# Patient Record
Sex: MALE | Race: WHITE | NOT HISPANIC OR LATINO | URBAN - METROPOLITAN AREA
[De-identification: names, ages, dates, MRNs, and addresses within clinical notes are randomized per-mention and may not be internally consistent; named-entity substitution may affect disease eponyms.]

---

## 2022-08-18 ENCOUNTER — NURSING HOME VISIT (OUTPATIENT)
Dept: GERIATRICS | Facility: OTHER | Age: 82
End: 2022-08-18
Payer: COMMERCIAL

## 2022-08-18 DIAGNOSIS — E78.49 OTHER HYPERLIPIDEMIA: ICD-10-CM

## 2022-08-18 DIAGNOSIS — F02.818 LATE ONSET ALZHEIMER'S DEMENTIA WITH BEHAVIORAL DISTURBANCE (HCC): Primary | ICD-10-CM

## 2022-08-18 DIAGNOSIS — G47.00 INSOMNIA, UNSPECIFIED TYPE: ICD-10-CM

## 2022-08-18 DIAGNOSIS — G30.1 LATE ONSET ALZHEIMER'S DEMENTIA WITH BEHAVIORAL DISTURBANCE (HCC): Primary | ICD-10-CM

## 2022-08-18 DIAGNOSIS — R26.81 UNSTEADY GAIT: ICD-10-CM

## 2022-08-18 DIAGNOSIS — F32.A DEPRESSION, UNSPECIFIED DEPRESSION TYPE: ICD-10-CM

## 2022-08-18 PROBLEM — F02.81 LATE ONSET ALZHEIMER'S DEMENTIA WITH BEHAVIORAL DISTURBANCE (HCC): Status: ACTIVE | Noted: 2022-08-18

## 2022-08-18 PROCEDURE — 99327 PR DOMICIL/REST HOME NEW PT VISIT HI SEVER 60 MIN: CPT | Performed by: FAMILY MEDICINE

## 2022-08-18 RX ORDER — PRIMIDONE 50 MG/1
50 TABLET ORAL 2 TIMES DAILY
COMMUNITY

## 2022-08-18 RX ORDER — DONEPEZIL HYDROCHLORIDE 5 MG/1
5 TABLET, FILM COATED ORAL
COMMUNITY

## 2022-08-18 RX ORDER — PAROXETINE 10 MG/1
10 TABLET, FILM COATED ORAL DAILY
COMMUNITY

## 2022-08-18 RX ORDER — PRAVASTATIN SODIUM 20 MG
20 TABLET ORAL DAILY
COMMUNITY

## 2022-08-18 RX ORDER — MEMANTINE HYDROCHLORIDE 10 MG/1
10 TABLET ORAL 2 TIMES DAILY
COMMUNITY

## 2022-08-18 NOTE — PROGRESS NOTES
Doreen [address]  [address], [address] [place] [address] POS 13  History and Physical    NAME: Kana Wheeler  AGE: 80 y o  SEX: male [phone]    DATE OF ENCOUNTER: 8/18/2022    Code status:  No CPR    Assessment and Plan     1  Late onset Alzheimer's dementia with behavioral disturbance ([facility] [address] )  - redirection, reorientation  - max assistance with ADLs  - cont donepezil 5 mg po qhs  - cont Memantine 10 mg po bid    2  Depression, unspecified depression type  - cont paroxetine 10 mg po qd    3  Other hyperlipidemia  - cont pravastatin 20 mg po qhs    4  Unsteady gait  - fall precautions in place  - does not use any assistive devices    5  Insomnia:  - start Melatonin 5 mg po qhs  - cont Tylenol 650 mg po qhs prn    All medications and routine orders were reviewed and updated as needed  Plan discussed with: staff    Chief Complaint     Seen for admission at [address] [place] [address] [address]    History of Present Illness     Kana Wheeler, a 79 y/o male with PMH of Dementia and HLD got admitted to [address] [place] [address] from home for long term stay  He was seen and examined at bedside, stable  He found lying in bed comfortably  He is pleasant, cooperative with exam  He answers questions but not goal oriented  Staff observed aggressive behaviors couple of times since admission  He is eating and sleeping well  HISTORY:  No past medical history on file  No family history on file    Social History     Socioeconomic History    Marital status: Not on file     Spouse name: Not on file    Number of children: Not on file    Years of education: Not on file    Highest education level: Not on file   Occupational History    Not on file   Tobacco Use    Smoking status: Not on file    Smokeless tobacco: Not on file   Substance and Sexual Activity    Alcohol use: Not on file    Drug use: Not on file    Sexual activity: Not on file   Other Topics Concern    Not on file   Social History Narrative    Not on file     Social Determinants of Health     Financial Resource Strain: Not on file   Food Insecurity: Not on file   Transportation Needs: Not on file   Physical Activity: Not on file   Stress: Not on file   Social Connections: Not on file   Intimate Partner Violence: Not on file   Housing Stability: Not on file       Allergies:  No Known Allergies    Review of Systems     Review of Systems   Unable to perform ROS: Dementia   As in HPI  Medications and orders     All medications reviewed and updated in Nursing Home EMR  Objective     Vitals: T: 97 6, P: 78, R: 16, BP: 114/62, 95% on RA, Wt: 133 2 lbs    Physical Exam  Vitals and nursing note reviewed  Constitutional:       General: He is not in acute distress  Appearance: Normal appearance  He is well-developed  He is not diaphoretic  HENT:      Head: Normocephalic and atraumatic  Nose: Nose normal       Mouth/Throat:      Mouth: Mucous membranes are moist       Pharynx: Oropharynx is clear  No oropharyngeal exudate  Comments: Dentures +  Eyes:      General: No scleral icterus  Right eye: No discharge  Left eye: No discharge  Extraocular Movements: Extraocular movements intact  Conjunctiva/sclera: Conjunctivae normal    Cardiovascular:      Rate and Rhythm: Normal rate and regular rhythm  Heart sounds: Normal heart sounds  No murmur heard  Pulmonary:      Effort: Pulmonary effort is normal  No respiratory distress  Breath sounds: Normal breath sounds  No wheezing  Chest:      Chest wall: No tenderness  Abdominal:      General: Bowel sounds are normal  There is no distension  Palpations: Abdomen is soft  Tenderness: There is no abdominal tenderness  There is no guarding  Musculoskeletal:         General: No tenderness or deformity  Normal range of motion  Cervical back: Normal range of motion and neck supple  Right lower leg: No edema  Left lower leg: No edema     Skin: General: Skin is warm and dry  Neurological:      Mental Status: He is alert  Cranial Nerves: No cranial nerve deficit  Motor: No abnormal muscle tone  Coordination: Coordination normal       Comments: Oriented to self  Verbal  Not goal oriented  Unable to follow commands  Psychiatric:         Mood and Affect: Mood normal          Behavior: Behavior normal          Pertinent Laboratory/Diagnostic Studies:   please see chart or hospital paperwork for details        - Counseling Documentation: patient was counseled regarding: prognosis

## 2022-09-07 ENCOUNTER — APPOINTMENT (EMERGENCY)
Dept: CT IMAGING | Facility: HOSPITAL | Age: 82
End: 2022-09-07

## 2022-09-07 ENCOUNTER — HOSPITAL ENCOUNTER (EMERGENCY)
Facility: HOSPITAL | Age: 82
Discharge: HOME | End: 2022-09-07
Attending: EMERGENCY MEDICINE
Payer: MEDICARE

## 2022-09-07 VITALS
WEIGHT: 136.02 LBS | HEART RATE: 71 BPM | TEMPERATURE: 97.7 F | RESPIRATION RATE: 18 BRPM | DIASTOLIC BLOOD PRESSURE: 81 MMHG | SYSTOLIC BLOOD PRESSURE: 169 MMHG | OXYGEN SATURATION: 97 %

## 2022-09-07 DIAGNOSIS — F03.918 DEMENTIA WITH BEHAVIORAL DISTURBANCE: ICD-10-CM

## 2022-09-07 DIAGNOSIS — S01.312A LACERATION OF AURICLE OF LEFT EAR, INITIAL ENCOUNTER: Primary | ICD-10-CM
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
## 2022-09-07 LAB
ANION GAP SERPL CALCULATED.3IONS-SCNC: 5 MMOL/L (ref 4–13)
ATRIAL RATE: 63 BPM
BASOPHILS # BLD AUTO: 0.06 THOUSANDS/ΜL (ref 0–0.1)
BASOPHILS NFR BLD AUTO: 1 % (ref 0–1)
BUN SERPL-MCNC: 13 MG/DL (ref 5–25)
CALCIUM SERPL-MCNC: 8.4 MG/DL (ref 8.4–10.2)
CHLORIDE SERPL-SCNC: 108 MMOL/L (ref 96–108)
CO2 SERPL-SCNC: 27 MMOL/L (ref 21–32)
CREAT SERPL-MCNC: 1 MG/DL (ref 0.6–1.3)
EOSINOPHIL # BLD AUTO: 0.23 THOUSAND/ΜL (ref 0–0.61)
EOSINOPHIL NFR BLD AUTO: 4 % (ref 0–6)
ERYTHROCYTE [DISTWIDTH] IN BLOOD BY AUTOMATED COUNT: 13.6 % (ref 11.6–15.1)
GFR SERPL CREATININE-BSD FRML MDRD: 71 ML/MIN/1.73SQ M
GLUCOSE SERPL-MCNC: 105 MG/DL (ref 65–140)
HCT VFR BLD AUTO: 30.9 % (ref 36.5–49.3)
HGB BLD-MCNC: 10 G/DL (ref 12–17)
IMM GRANULOCYTES # BLD AUTO: 0.02 THOUSAND/UL (ref 0–0.2)
IMM GRANULOCYTES NFR BLD AUTO: 0 % (ref 0–2)
LYMPHOCYTES # BLD AUTO: 1.69 THOUSANDS/ΜL (ref 0.6–4.47)
LYMPHOCYTES NFR BLD AUTO: 26 % (ref 14–44)
MCH RBC QN AUTO: 30.3 PG (ref 26.8–34.3)
MCHC RBC AUTO-ENTMCNC: 32.4 G/DL (ref 31.4–37.4)
MCV RBC AUTO: 94 FL (ref 82–98)
MONOCYTES # BLD AUTO: 0.52 THOUSAND/ΜL (ref 0.17–1.22)
MONOCYTES NFR BLD AUTO: 8 % (ref 4–12)
NEUTROPHILS # BLD AUTO: 4.05 THOUSANDS/ΜL (ref 1.85–7.62)
NEUTS SEG NFR BLD AUTO: 61 % (ref 43–75)
NRBC BLD AUTO-RTO: 0 /100 WBCS
P AXIS: 75 DEGREES
PLATELET # BLD AUTO: 232 THOUSANDS/UL (ref 149–390)
PMV BLD AUTO: 8.8 FL (ref 8.9–12.7)
POTASSIUM SERPL-SCNC: 2.9 MMOL/L (ref 3.5–5.3)
PR INTERVAL: 146 MS
QRS AXIS: -48 DEGREES
QRSD INTERVAL: 108 MS
QT INTERVAL: 432 MS
QTC INTERVAL: 442 MS
RBC # BLD AUTO: 3.3 MILLION/UL (ref 3.88–5.62)
SODIUM SERPL-SCNC: 140 MMOL/L (ref 135–147)
T WAVE AXIS: 74 DEGREES
VENTRICULAR RATE: 63 BPM
WBC # BLD AUTO: 6.57 THOUSAND/UL (ref 4.31–10.16)

## 2022-09-07 PROCEDURE — 99285 EMERGENCY DEPT VISIT HI MDM: CPT

## 2022-09-07 PROCEDURE — 12011 RPR F/E/E/N/L/M 2.5 CM/<: CPT | Performed by: EMERGENCY MEDICINE

## 2022-09-07 PROCEDURE — 85025 COMPLETE CBC W/AUTO DIFF WBC: CPT | Performed by: EMERGENCY MEDICINE

## 2022-09-07 PROCEDURE — 90471 IMMUNIZATION ADMIN: CPT

## 2022-09-07 PROCEDURE — 70450 CT HEAD/BRAIN W/O DYE: CPT

## 2022-09-07 PROCEDURE — 72125 CT NECK SPINE W/O DYE: CPT

## 2022-09-07 PROCEDURE — 99285 EMERGENCY DEPT VISIT HI MDM: CPT | Performed by: EMERGENCY MEDICINE

## 2022-09-07 PROCEDURE — G1004 CDSM NDSC: HCPCS

## 2022-09-07 PROCEDURE — 71260 CT THORAX DX C+: CPT

## 2022-09-07 PROCEDURE — 90715 TDAP VACCINE 7 YRS/> IM: CPT | Performed by: EMERGENCY MEDICINE

## 2022-09-07 PROCEDURE — 93005 ELECTROCARDIOGRAM TRACING: CPT

## 2022-09-07 PROCEDURE — 80048 BASIC METABOLIC PNL TOTAL CA: CPT | Performed by: EMERGENCY MEDICINE

## 2022-09-07 PROCEDURE — 74177 CT ABD & PELVIS W/CONTRAST: CPT

## 2022-09-07 PROCEDURE — 93010 ELECTROCARDIOGRAM REPORT: CPT | Performed by: INTERNAL MEDICINE

## 2022-09-07 PROCEDURE — 36415 COLL VENOUS BLD VENIPUNCTURE: CPT | Performed by: EMERGENCY MEDICINE

## 2022-09-07 RX ORDER — LIDOCAINE HYDROCHLORIDE 10 MG/ML
5 INJECTION, SOLUTION EPIDURAL; INFILTRATION; INTRACAUDAL; PERINEURAL ONCE
Status: COMPLETED | OUTPATIENT
Start: 2022-09-07 | End: 2022-09-07

## 2022-09-07 RX ORDER — QUETIAPINE FUMARATE 25 MG/1
100 TABLET, FILM COATED ORAL ONCE
Status: DISCONTINUED | OUTPATIENT
Start: 2022-09-07 | End: 2022-09-07 | Stop reason: HOSPADM

## 2022-09-07 RX ORDER — HALOPERIDOL 5 MG/1
5 TABLET ORAL ONCE
Status: DISCONTINUED | OUTPATIENT
Start: 2022-09-07 | End: 2022-09-07

## 2022-09-07 RX ORDER — OLANZAPINE 5 MG/1
10 TABLET, ORALLY DISINTEGRATING ORAL ONCE
Status: COMPLETED | OUTPATIENT
Start: 2022-09-07 | End: 2022-09-07

## 2022-09-07 RX ORDER — POTASSIUM CHLORIDE 20 MEQ/1
40 TABLET, EXTENDED RELEASE ORAL ONCE
Status: COMPLETED | OUTPATIENT
Start: 2022-09-07 | End: 2022-09-07

## 2022-09-07 RX ADMIN — IOHEXOL 70 ML: 350 INJECTION, SOLUTION INTRAVENOUS at 02:45

## 2022-09-07 RX ADMIN — LIDOCAINE HYDROCHLORIDE 5 ML: 10 INJECTION, SOLUTION EPIDURAL; INFILTRATION; INTRACAUDAL; PERINEURAL at 01:13

## 2022-09-07 RX ADMIN — POTASSIUM CHLORIDE 40 MEQ: 1500 TABLET, EXTENDED RELEASE ORAL at 02:03

## 2022-09-07 RX ADMIN — TETANUS TOXOID, REDUCED DIPHTHERIA TOXOID AND ACELLULAR PERTUSSIS VACCINE, ADSORBED 0.5 ML: 5; 2.5; 8; 8; 2.5 SUSPENSION INTRAMUSCULAR at 01:13

## 2022-09-07 RX ADMIN — OLANZAPINE 10 MG: 5 TABLET, ORALLY DISINTEGRATING ORAL at 03:30
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
## 2022-09-07 NOTE — ED PROCEDURE NOTE
Procedure  Laceration repair    Date/Time: 9/7/2022 4:48 AM  Performed by: Lonny Alex DO  Authorized by: Lonny Alex DO   Consent: Verbal consent obtained  Risks and benefits: risks, benefits and alternatives were discussed  Consent given by: patient  Patient understanding: patient states understanding of the procedure being performed  Patient consent: the patient's understanding of the procedure matches consent given  Patient identity confirmed: arm band  Time out: Immediately prior to procedure a "time out" was called to verify the correct patient, procedure, equipment, support staff and site/side marked as required  Body area: head/neck  Location details: left ear  Laceration length: 2 cm  Tendon involvement: none  Nerve involvement: none  Vascular damage: no  Anesthesia: local infiltration    Anesthesia:  Local Anesthetic: lidocaine 1% without epinephrine  Anesthetic total: 1 mL    Sedation:  Patient sedated: no      Wound Dehiscence:  Superficial Wound Dehiscence: simple closure      Procedure Details:  Preparation: Patient was prepped and draped in the usual sterile fashion  Irrigation solution: saline  Irrigation method: syringe  Amount of cleaning: standard  Debridement: none  Degree of undermining: none  Skin closure: 6-0 Prolene  Number of sutures: 3  Technique: simple  Approximation: close  Approximation difficulty: simple  Dressing: 4x4 sterile gauze  Patient tolerance: patient tolerated the procedure well with no immediate complications    Laceration repair    Date/Time: 9/7/2022 4:49 AM  Performed by: Lonny Alex DO  Authorized by: Lonny Alex DO   Consent: Verbal consent obtained    Risks and benefits: risks, benefits and alternatives were discussed  Consent given by: patient  Patient understanding: patient states understanding of the procedure being performed  Patient consent: the patient's understanding of the procedure matches consent given  Patient identity confirmed: arm band  Time out: Immediately prior to procedure a "time out" was called to verify the correct patient, procedure, equipment, support staff and site/side marked as required  Body area: head/neck  Location details: left ear  Laceration length: 2 cm  Tendon involvement: none  Nerve involvement: none  Vascular damage: no  Anesthesia: local infiltration    Anesthesia:  Local Anesthetic: lidocaine 1% without epinephrine  Anesthetic total: 1 mL    Sedation:  Patient sedated: no      Wound Dehiscence:  Superficial Wound Dehiscence: simple closure      Procedure Details:  Preparation: Patient was prepped and draped in the usual sterile fashion    Irrigation solution: saline  Irrigation method: syringe  Amount of cleaning: standard  Debridement: none  Degree of undermining: none  Skin closure: 6-0 Prolene  Number of sutures: 3  Technique: simple  Approximation: close  Approximation difficulty: simple  Dressing: 4x4 sterile gauze  Patient tolerance: patient tolerated the procedure well with no immediate complications                       Hellen Santizo DO  09/07/22 0450

## 2022-09-29 ENCOUNTER — NURSING HOME VISIT (OUTPATIENT)
Dept: GERIATRICS | Facility: OTHER | Age: 82
End: 2022-09-29
Payer: COMMERCIAL

## 2022-09-29 DIAGNOSIS — F32.A DEPRESSION, UNSPECIFIED DEPRESSION TYPE: ICD-10-CM

## 2022-09-29 DIAGNOSIS — F02.818 LATE ONSET ALZHEIMER'S DEMENTIA WITH BEHAVIORAL DISTURBANCE (HCC): Primary | ICD-10-CM

## 2022-09-29 DIAGNOSIS — E78.49 OTHER HYPERLIPIDEMIA: ICD-10-CM

## 2022-09-29 DIAGNOSIS — G47.00 INSOMNIA, UNSPECIFIED TYPE: ICD-10-CM

## 2022-09-29 DIAGNOSIS — G30.1 LATE ONSET ALZHEIMER'S DEMENTIA WITH BEHAVIORAL DISTURBANCE (HCC): Primary | ICD-10-CM

## 2022-09-29 PROCEDURE — 99335 PR DOM/R-HOME E/M EST PT LW MOD SEVERITY 25 MINUTES: CPT | Performed by: NURSE PRACTITIONER
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
## 2022-10-13 VITALS
WEIGHT: 129 LBS | SYSTOLIC BLOOD PRESSURE: 150 MMHG | DIASTOLIC BLOOD PRESSURE: 80 MMHG | TEMPERATURE: 97.2 F | RESPIRATION RATE: 20 BRPM | OXYGEN SATURATION: 96 % | HEART RATE: 85 BPM

## 2022-10-13 RX ORDER — ACETAMINOPHEN 325 MG/1
650 TABLET ORAL EVERY 6 HOURS PRN
COMMUNITY

## 2022-10-13 RX ORDER — CHOLECALCIFEROL (VITAMIN D3) 125 MCG
5 CAPSULE ORAL
COMMUNITY

## 2022-10-13 RX ORDER — OLANZAPINE 2.5 MG/1
2.5 TABLET ORAL 2 TIMES DAILY
COMMUNITY

## 2022-10-14 NOTE — ASSESSMENT & PLAN NOTE
· Mood stable on exam   · Patient has good spirits smiling during exam  · Continue Paxil 10 mg daily

## 2022-10-14 NOTE — ASSESSMENT & PLAN NOTE
· Encourage/provide good sleep hygiene   · Provide Tylenol here in her pain q h s     · Melatonin q h s  for insomnia

## 2022-10-14 NOTE — ASSESSMENT & PLAN NOTE
· There were reports of aggressive behaviors towards staff upon initial admission to [facility]   · Patient has transitioned well as of today and behaviors have subsided   · Patient is ambulating with a rolling walker  · Good meal completion   · No further concerns from staff at this time     · Continue Aricept 5 mg q h s  · Continue Namenda 10 mg b i d   · Continue olanzapine 2 5 mg b i d      Provide redirection, reorientation, distraction techniques  Fall Precautions  Assist with ADLs/IADLs  Avoid deliriogenic medications such as tramadol, benzodiazepines, anticholinergics, benadryl  Encourage Hydration/ Nutrition  Implement sleep hygiene, limit night time interuptions   Encourage participation in group activities when appropriate

## 2022-12-01 ENCOUNTER — NURSING HOME VISIT (OUTPATIENT)
Dept: GERIATRICS | Facility: OTHER | Age: 82
End: 2022-12-01

## 2022-12-01 DIAGNOSIS — F32.A DEPRESSION, UNSPECIFIED DEPRESSION TYPE: ICD-10-CM

## 2022-12-01 DIAGNOSIS — G30.1 LATE ONSET ALZHEIMER'S DEMENTIA WITH BEHAVIORAL DISTURBANCE (HCC): ICD-10-CM

## 2022-12-01 DIAGNOSIS — G47.00 INSOMNIA, UNSPECIFIED TYPE: Primary | ICD-10-CM

## 2022-12-01 DIAGNOSIS — F02.818 LATE ONSET ALZHEIMER'S DEMENTIA WITH BEHAVIORAL DISTURBANCE (HCC): ICD-10-CM

## 2022-12-04 RX ORDER — PRIMIDONE 50 MG/1
25 TABLET ORAL EVERY 12 HOURS
COMMUNITY

## 2022-12-04 RX ORDER — TRAZODONE HYDROCHLORIDE 50 MG/1
25 TABLET ORAL
COMMUNITY

## 2022-12-04 NOTE — ASSESSMENT & PLAN NOTE
Chronic per daughter  Start Trazodone 25mg daily at HS  D/C Melatonin 5mg daily  Per daughter, patient taking Primidone about 6 years ago for hand tremors  Start gradual dose reduction for Primidone:  = Start Primodone 25mg every 12 hours  = monitor for increase hand tremors  Continue consistent bed time routine  Continue monitoring and document sleep patterns at HS    [facility] on 12/16/2022

## 2022-12-04 NOTE — ASSESSMENT & PLAN NOTE
Continue Memory care and LTCF supportive care  Continue to monitor and document sleep/ meal/fluid and mood/ behavior patterns  Continue Olanzapine/ Namenda/ Aricept  Continue to redirect and reorient as often as needed

## 2022-12-04 NOTE — PROGRESS NOTES
[address], [address] [address] [place] [address]  [phone]    NAME: Regi Randall  AGE: [de-identified] y o  SEX: male    Progress Note    Location:   POS: 32 (LT)    Assessment/Plan:    Insomnia  Chronic per daughter  Start Trazodone 25mg daily at HS  D/C Melatonin 5mg daily  Per daughter, patient taking Primidone about 6 years ago for hand tremors  Start gradual dose reduction for Primidone:  = Start Primodone 25mg every 12 hours  = monitor for increase hand tremors  Continue consistent bed time routine  Continue monitoring and document sleep patterns at HS  BMP on 12/16/2022    Late onset Alzheimer's dementia with behavioral disturbance ([facility] [address] )  Continue Memory care and LTCF supportive care  Continue to monitor and document sleep/ meal/fluid and mood/ behavior patterns  Continue Olanzapine/ Namenda/ Aricept  Continue to redirect and reorient as often as needed  Depression  Continue Paxil 10mg daily      Chief complaint / Reason for visit: Acute visit    History of Present Illness: This is an 29-year-old male patient admitted at [facility] for dementia  Patient is seen and examined today per nursing request for night time wandering, going into other rooms and insomnia  Reviewed medication list on this visit: D/C Melatonin and start Trazodone 25mg daily at HS  Called and spoke to patient's POA (daughter) to clarify why patient is taking Primidone - per daughter, for hand tremors, " 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 it to him about 6 years ago"  Discussed about gradual dose reduction and eventual D/C if patient is able to tolerate being off medication - daughter agreeable, " He does not work anymore"  Discussed about starting Trazodone to address night time wandering and insomnia - daughter agreeable  Per daughter, insomnia is chronic, " Nothing works at home"       Patient is OOB for this visit - having later lunch at [facility] - per nursing, patient woke up late and slept after breakfast  Patient is cooperative, calm and not in distress  Patient acknowledged sleeping later today  Review of Systems:  Per history of present illness, all other systems reviewed and negative  HISTORY:  Medical Hx: Reviewed, unchanged  Family Hx: Reviewed, unchanged  Soc Hx: Reviewed,  unchanged    ALLERGY: Reviewed, unchanged  No Known Allergies     PHYSICAL EXAM:  Vital Signs: T97 0F -P83 -R18 BP: 136/73 SpO2: 96% RA   Weight: 131 0 lbs (11/1/2022) <= 132 4 lbs (10/24/2022) <= 129 0 lbs (9/13/2022)    General: NAD  Head: Atraumatic  Normocephalic  Eye Exam: anicteric sclera, no discharge, PERRLA, No injection  Oral Exam: moist mucous membranes, no buccaloropharyngeal erythema, palatine tonsils WNL  Neck Exam: no anterior cervical lymphadenopathy noted, neck supple  Cardiovascular: regular rate, regular rhythm, no murmurs, rubs, or gallops  Pulmonary: no wheeze, no rhonchi, no rales  No chest tenderness  Abdominal: soft, non-tender, nondistended, bowel sounds audible x 4 quadrants  : Non distended bladder  Extremities and skin: no edema noted, no rashes  Intact skin  Neurological: alert, cooperative and responsive, Oriented x 3, moving all 4 extremities symmetrically    Laboratory results / Imaging reviewed: Hard copy/ies in medical chart  Current Medications: All medications reviewed and updated in Nursing Home Chart    Please note:  Voice-recognition software may have been used in the preparation of this document  Occasional wrong word or "sound-alike" substitutions may have occurred due to the inherent limitations of voice recognition software  Interpretation should be guided by context      ALEXANDRA Braden  12/4/2022

## 2023-01-26 ENCOUNTER — NURSING HOME VISIT (OUTPATIENT)
Dept: GERIATRICS | Facility: OTHER | Age: 83
End: 2023-01-26

## 2023-01-26 DIAGNOSIS — F02.818 LATE ONSET ALZHEIMER'S DEMENTIA WITH BEHAVIORAL DISTURBANCE (HCC): Primary | ICD-10-CM

## 2023-01-26 DIAGNOSIS — R26.81 UNSTEADY GAIT: ICD-10-CM

## 2023-01-26 DIAGNOSIS — F32.A DEPRESSION, UNSPECIFIED DEPRESSION TYPE: ICD-10-CM

## 2023-01-26 DIAGNOSIS — G30.1 LATE ONSET ALZHEIMER'S DEMENTIA WITH BEHAVIORAL DISTURBANCE (HCC): Primary | ICD-10-CM

## 2023-01-27 NOTE — PROGRESS NOTES
[address], [address] [facility]   [place] [address]  [address]  History and Physical    NAME: Kacey Miranda  AGE: [de-identified] y o  SEX: male [id number]    DATE OF ENCOUNTER: 2/10/2023    Code status:  No CPR    Assessment and Plan     1  Late onset Alzheimer's dementia with behavioral disturbance ([facility] [address] )  - redirection, reorientation  - assistance with ADLs  - cont Donepezil 5 mg po qhs  - cont Memantine 10 mg po bid  - cont olanzapine 5 mg po qd    2  Unsteady gait  - doesnot use assistive devices  - Fall precautions in place    3  Depression, unspecified depression type  - stable  - wean off paroxetine    D/c pravastatin    All medications and routine orders were reviewed and updated as needed  Plan discussed with: staff    Chief Complaint     Seen for admission at [facility]    History of Present Illness     Kacey Miranda, a [de-identified] y/o male is a long term resident at [facility], dementia unit  He was seen and examined, stable  He is unable to give any history due to dementia  He wore two shirts and struggling to take them off  He needs mod assistance with ADLs  Staff have no concerns at this time  HISTORY:  Past Medical History:   Diagnosis Date   • Late onset Alzheimer's dementia with behavioral disturbance ([facility] [address] )    • Other hyperlipidemia      History reviewed  No pertinent family history  Social History     Socioeconomic History   • Marital status:       Spouse name: None   • Number of children: None   • Years of education: None   • Highest education level: None   Occupational History   • None   Tobacco Use   • Smoking status: Never   • Smokeless tobacco: Never   Vaping Use   • Vaping Use: None   Substance and Sexual Activity   • Alcohol use: Not Currently   • Drug use: Not Currently   • Sexual activity: Not Currently   Other Topics Concern   • None   Social History Narrative   • None     Social Determinants of Health     Financial Resource Strain: Not on file   Food Insecurity: Not on file   Transportation Needs: Not on file   Physical Activity: Not on file   Stress: Not on file   Social Connections: Not on file   Intimate Partner Violence: Not on file   Housing Stability: Not on file       Allergies:  No Known Allergies    Review of Systems     Review of Systems   Unable to perform ROS: Dementia   As in HPI  Medications and orders     All medications reviewed and updated in Nursing Home EMR  Objective     Vitals: T: 97 7, P: 83, R: 16, BP: 136/73, 96% on RA, Wt: 132 lbs    Physical Exam  Vitals and nursing note reviewed  Constitutional:       General: He is not in acute distress  Appearance: He is well-developed  He is not diaphoretic  Comments: Thin built elderly male   HENT:      Head: Normocephalic and atraumatic  Nose: Nose normal       Mouth/Throat:      Mouth: Mucous membranes are moist       Pharynx: Oropharynx is clear  No oropharyngeal exudate  Eyes:      General: No scleral icterus  Right eye: No discharge  Left eye: No discharge  Extraocular Movements: Extraocular movements intact  Conjunctiva/sclera: Conjunctivae normal    Cardiovascular:      Rate and Rhythm: Normal rate and regular rhythm  Heart sounds: Normal heart sounds  No murmur heard  Pulmonary:      Effort: Pulmonary effort is normal  No respiratory distress  Breath sounds: Normal breath sounds  No wheezing  Chest:      Chest wall: No tenderness  Abdominal:      General: Bowel sounds are normal  There is no distension  Palpations: Abdomen is soft  Tenderness: There is no abdominal tenderness  There is no guarding  Musculoskeletal:         General: No tenderness or deformity  Normal range of motion  Right lower leg: No edema  Left lower leg: No edema  Skin:     General: Skin is warm and dry  Neurological:      Mental Status: He is alert  Cranial Nerves: No cranial nerve deficit        Motor: No abnormal muscle tone  Coordination: Coordination normal       Comments: Oriented to self  - verbal, not goal oriented  - difficult to follow commands   Psychiatric:         Mood and Affect: Mood normal          Behavior: Behavior normal       Comments: Confused  Cooperative  Pertinent Laboratory/Diagnostic Studies: The following labs/studies were reviewed please see chart or hospital paperwork for details    See chart    - Counseling Documentation: patient was counseled regarding: prognosis

## 2023-03-23 ENCOUNTER — NURSING HOME VISIT (OUTPATIENT)
Dept: GERIATRICS | Facility: OTHER | Age: 83
End: 2023-03-23

## 2023-03-23 DIAGNOSIS — G47.00 INSOMNIA, UNSPECIFIED TYPE: ICD-10-CM

## 2023-03-23 DIAGNOSIS — R26.81 UNSTEADY GAIT: ICD-10-CM

## 2023-03-23 DIAGNOSIS — G30.1 LATE ONSET ALZHEIMER'S DEMENTIA WITH BEHAVIORAL DISTURBANCE (HCC): Primary | ICD-10-CM

## 2023-03-23 DIAGNOSIS — F02.818 LATE ONSET ALZHEIMER'S DEMENTIA WITH BEHAVIORAL DISTURBANCE (HCC): Primary | ICD-10-CM

## 2023-03-23 DIAGNOSIS — E78.49 OTHER HYPERLIPIDEMIA: ICD-10-CM

## 2023-03-23 DIAGNOSIS — F32.A DEPRESSION, UNSPECIFIED DEPRESSION TYPE: ICD-10-CM

## 2023-03-23 NOTE — ASSESSMENT & PLAN NOTE
· AAOx1- minimally verbal- ambulates with a rolling walker  · Good meal completion   · No further concerns from staff at this time     · Continue Aricept 5 mg q h s  · Continue Namenda 10 mg b i d   · Continue olanzapine 2 5 mg b i d      Provide redirection, reorientation, distraction techniques  Fall Precautions  Assist with ADLs/IADLs  Avoid deliriogenic medications such as tramadol, benzodiazepines, anticholinergics, benadryl  Encourage Hydration/ Nutrition  Implement sleep hygiene, limit night time interuptions   Encourage participation in group activities when appropriate

## 2023-03-23 NOTE — PROGRESS NOTES
[facility]  [address]  [phone]  [address]  [address]        NAME: Joey Nayak  AGE: [de-identified] y o  SEX: male CODE STATUS: No CPR    DATE OF ENCOUNTER: 3/23/2023    Assessment and Plan     1  Late onset Alzheimer's dementia with behavioral disturbance ([facility] [address] )  Assessment & Plan:  · AAOx1- minimally verbal- ambulates with a rolling walker  · Good meal completion   · No further concerns from staff at this time     · Continue Aricept 5 mg q h s  · Continue Namenda 10 mg b i d   · Continue olanzapine 2 5 mg b i d  Provide redirection, reorientation, distraction techniques  Fall Precautions  Assist with ADLs/IADLs  Avoid deliriogenic medications such as tramadol, benzodiazepines, anticholinergics, benadryl  Encourage Hydration/ Nutrition  Implement sleep hygiene, limit night time interuptions   Encourage participation in group activities when appropriate         2  Unsteady gait  Assessment & Plan:  Continue fall precautions  Assist with ADLs  Reminders how to use RW properly       3  Depression, unspecified depression type  Assessment & Plan:  · Mood stable on exam   · Continue Paxil 10 mg daily      4  Insomnia, unspecified type  Assessment & Plan:  · Chronic  · Continue Trazodone 25 mg Q HS        5  Other hyperlipidemia  Assessment & Plan:  Stable   Continue Pravastatin 20 mg daily          All medications and routine orders were reviewed and updated as needed  Chief Complaint     LTC follow up visit     History of Present Illness     Rosa Green is a 68-year-old male admitted to [address] [place] [address] in September of 2022 for Alzheimer's dementia  Patient has past medical history including but not limited to dementia, insomnia, depression, hyperlipidemia  Patient is seen today for 60 day visit  He is a poor historian due to his Alzheimer's  His speech is clear, he is able to make his needs known, he is AAO to self only   He is resting in bed, at baseline he is ambulatory without device  No reported recent falls  Does have hx of falls in past  He denies pain, he offers no complaints and is cooperative  Staff have no concerns at this time  He requires assistance and reminders, 1 person assist for ADLs/IADLs  Able to feed himself, has good appetite  Does have reported agitation at times, able to be redirected  The patient's allergies, past medical, surgical, social and family history were reviewed and unchanged  Review of Systems     Review of Systems   Unable to perform ROS: Dementia         Objective     Vitals:   Vitals:    03/23/23 1216   BP: 132/69   Pulse: 72   Resp: 18   Temp: (!) 97 1 °F (36 2 °C)   SpO2: 97%         Physical Exam  Vitals and nursing note reviewed  Constitutional:       General: He is not in acute distress  Appearance: Normal appearance  HENT:      Head: Normocephalic and atraumatic  Nose: No congestion or rhinorrhea  Mouth/Throat:      Mouth: Mucous membranes are moist    Eyes:      General: No scleral icterus  Extraocular Movements: Extraocular movements intact  Conjunctiva/sclera: Conjunctivae normal       Pupils: Pupils are equal, round, and reactive to light  Cardiovascular:      Rate and Rhythm: Normal rate and regular rhythm  Pulses: Normal pulses  Heart sounds: Normal heart sounds  No murmur heard  Pulmonary:      Effort: Pulmonary effort is normal       Breath sounds: Normal breath sounds  No wheezing, rhonchi or rales  Abdominal:      General: Bowel sounds are normal  There is no distension  Palpations: Abdomen is soft  Tenderness: There is no abdominal tenderness  Musculoskeletal:         General: No swelling or signs of injury  Lymphadenopathy:      Cervical: No cervical adenopathy  Skin:     General: Skin is warm and dry  Findings: No erythema  Neurological:      Mental Status: He is alert  He is disoriented  Sensory: No sensory deficit        Motor: "Weakness present  Gait: Gait abnormal    Psychiatric:         Mood and Affect: Mood normal          Behavior: Behavior normal          Pertinent Laboratory/Diagnostic Studies:  Reviewed in facility chart-stable     Current Medications   Medications reviewed and updated see facility [facility] for details  Current Outpatient Medications:   •  acetaminophen (TYLENOL) 325 mg tablet, Take 650 mg by mouth every 6 (six) hours as needed, Disp: , Rfl:   •  donepezil (ARICEPT) 5 mg tablet, Take 5 mg by mouth daily at bedtime, Disp: , Rfl:   •  memantine (NAMENDA) 10 mg tablet, Take 10 mg by mouth 2 (two) times a day, Disp: , Rfl:   •  OLANZapine (ZyPREXA) 2 5 mg tablet, Take 2 5 mg by mouth 2 (two) times a day, Disp: , Rfl:   •  PARoxetine (PAXIL) 10 mg tablet, Take 10 mg by mouth daily, Disp: , Rfl:   •  pravastatin (PRAVACHOL) 20 mg tablet, Take 20 mg by mouth daily, Disp: , Rfl:   •  traZODone (DESYREL) 50 mg tablet, Take 25 mg by mouth daily at bedtime, Disp: , Rfl:        Please note:  Voice-recognition software may have been used in the preparation of this document  Occasional wrong word or \"sound-alike\" substitutions may have occurred due to the inherent limitations of voice recognition software  Interpretation should be guided by anhtony Grant  3/23/2023 2:35 PM      "

## 2023-03-26 VITALS
OXYGEN SATURATION: 97 % | HEART RATE: 72 BPM | SYSTOLIC BLOOD PRESSURE: 132 MMHG | WEIGHT: 133.6 LBS | RESPIRATION RATE: 18 BRPM | TEMPERATURE: 97.1 F | DIASTOLIC BLOOD PRESSURE: 69 MMHG

## 2023-05-25 ENCOUNTER — NURSING HOME VISIT (OUTPATIENT)
Dept: GERIATRICS | Facility: OTHER | Age: 83
End: 2023-05-25

## 2023-05-25 DIAGNOSIS — F02.818 LATE ONSET ALZHEIMER'S DEMENTIA WITH BEHAVIORAL DISTURBANCE (HCC): Primary | ICD-10-CM

## 2023-05-25 DIAGNOSIS — G30.1 LATE ONSET ALZHEIMER'S DEMENTIA WITH BEHAVIORAL DISTURBANCE (HCC): Primary | ICD-10-CM

## 2023-05-25 DIAGNOSIS — F32.A DEPRESSION, UNSPECIFIED DEPRESSION TYPE: ICD-10-CM

## 2023-05-25 DIAGNOSIS — G47.00 INSOMNIA, UNSPECIFIED TYPE: ICD-10-CM

## 2023-05-25 NOTE — PROGRESS NOTES
[facility]  [address]  [phone]  [address]  [address]      NAME: Antionette Pace  AGE: [de-identified] y o  SEX: male [id number]    DATE OF ENCOUNTER: 5/29/2023    Assessment and Plan     1  Late onset Alzheimer's dementia with behavioral disturbance ([facility] [address] )  - redirection, reorientation  - assistance with ADLs  - cont Donepezil 5 mg po qd  - cont Memantine 10 mg po bid    2  Depression, unspecified depression type  - cont olanzapine 5 mg po qd    3  Insomnia, unspecified type  - cont Trazodone 25 mg po qhs      - Counseling Documentation: patient was counseled regarding: risk factor reductions    Chief Complaint     Routine Long term follow-up visit  History of Present Illness     HPI  Antionette Pace, a [de-identified] y/o male is a long term resident at [facility], seen and examined at bedside, stable  He is not able to give any history due to dementia  He walks independently  He needs mod assistance with ADLs  Staff have no concerns at this time  The following portions of the patient's history were reviewed and updated as appropriate: allergies, current medications, past family history, past medical history, past social history, past surgical history and problem list     Review of Systems     Review of Systems   Unable to perform ROS: Dementia   As in HPI  Active Problem List     Patient Active Problem List   Diagnosis   • Depression   • Late onset Alzheimer's dementia with behavioral disturbance ([facility] [address] )   • Other hyperlipidemia   • Unsteady gait   • Insomnia       Objective     Vitals: T: 97 2, P: 78, R: 16, BP: 150/78, 98% on RA, Wt: 137 5 lbs    Physical Exam  Vitals and nursing note reviewed  Constitutional:       General: He is not in acute distress  Appearance: Normal appearance  He is well-developed  He is not diaphoretic  HENT:      Head: Normocephalic and atraumatic        Nose: Nose normal       Mouth/Throat:      Mouth: Mucous membranes are moist       Pharynx: Oropharynx is clear  No oropharyngeal exudate  Eyes:      General: No scleral icterus  Right eye: No discharge  Left eye: No discharge  Extraocular Movements: Extraocular movements intact  Conjunctiva/sclera: Conjunctivae normal    Cardiovascular:      Rate and Rhythm: Normal rate and regular rhythm  Heart sounds: Normal heart sounds  No murmur heard  Pulmonary:      Effort: Pulmonary effort is normal  No respiratory distress  Breath sounds: Normal breath sounds  No wheezing  Chest:      Chest wall: No tenderness  Abdominal:      General: Bowel sounds are normal  There is no distension  Palpations: Abdomen is soft  Tenderness: There is no abdominal tenderness  There is no guarding  Musculoskeletal:         General: No tenderness or deformity  Normal range of motion  Cervical back: Normal range of motion and neck supple  Right lower leg: No edema  Left lower leg: No edema  Skin:     General: Skin is warm and dry  Neurological:      Mental Status: He is alert  Cranial Nerves: No cranial nerve deficit  Motor: No abnormal muscle tone  Coordination: Coordination normal       Comments: Oriented to self  Verbal  Able to follow simple commands  Psychiatric:         Mood and Affect: Mood normal          Behavior: Behavior normal          Pertinent Laboratory/Diagnostic Studies:  Refer to facility chart  Current Medications   Medications reviewed and updated in facility chart

## 2023-07-20 ENCOUNTER — NURSING HOME VISIT (OUTPATIENT)
Dept: GERIATRICS | Facility: OTHER | Age: 83
End: 2023-07-20
Payer: COMMERCIAL

## 2023-07-20 VITALS
TEMPERATURE: 97.4 F | OXYGEN SATURATION: 93 % | DIASTOLIC BLOOD PRESSURE: 55 MMHG | SYSTOLIC BLOOD PRESSURE: 91 MMHG | RESPIRATION RATE: 20 BRPM | HEART RATE: 79 BPM

## 2023-07-20 DIAGNOSIS — F32.A DEPRESSION, UNSPECIFIED DEPRESSION TYPE: ICD-10-CM

## 2023-07-20 DIAGNOSIS — G30.1 LATE ONSET ALZHEIMER'S DEMENTIA WITH BEHAVIORAL DISTURBANCE (HCC): Primary | ICD-10-CM

## 2023-07-20 DIAGNOSIS — G47.00 INSOMNIA, UNSPECIFIED TYPE: ICD-10-CM

## 2023-07-20 DIAGNOSIS — F02.818 LATE ONSET ALZHEIMER'S DEMENTIA WITH BEHAVIORAL DISTURBANCE (HCC): Primary | ICD-10-CM

## 2023-07-20 DIAGNOSIS — I95.9 HYPOTENSION, UNSPECIFIED HYPOTENSION TYPE: ICD-10-CM

## 2023-07-20 PROCEDURE — 99347 HOME/RES VST EST SF MDM 20: CPT | Performed by: NURSE PRACTITIONER

## 2023-07-20 NOTE — PROGRESS NOTES
[address]  [phone]  Nicholas Courts  Code 13        NAME: Dimitris Varma  AGE: 82 y.o. SEX: male CODE STATUS: No CPR    DATE OF ENCOUNTER: 7/20/2023    Assessment and Plan     1. Late onset Alzheimer's dementia with behavioral disturbance ([facility])  Assessment & Plan:  · AAOx1- minimally verbal- ambulates with a rolling walker- sometimes without device- needs reminders   · Good meal completion   · No further concerns from staff at this time     · Continue Aricept 5 mg q.h.s. · Continue Namenda 10 mg b.i.d.  · Continue olanzapine 2.5 mg b.i.d. Provide redirection, reorientation, distraction techniques  Fall Precautions  Assist with ADLs/IADLs  Avoid deliriogenic medications such as tramadol, benzodiazepines, anticholinergics, benadryl  Encourage Hydration/ Nutrition  Implement sleep hygiene, limit night time interuptions   Encourage participation in group activities when appropriate         2. Depression, unspecified depression type  Assessment & Plan:  · Mood stable on exam   · Previously on paxil- this was stopped, he is doing well , pleasant , active       3. Insomnia, unspecified type  Assessment & Plan:  · Chronic  · Continue Trazodone 25 mg Q HS        4. Hypotension, unspecified hypotension type  Assessment & Plan:  91/55 today  Denies dizzinesss  Ambulatory with steady gait on exam   Check CBC,BMP, TSH tomorrow 7/21  Encourage fluids          All medications and routine orders were reviewed and updated as needed. Chief Complaint     LTC follow up visit     History of Present Illness     Sonia Howard is a 46-year-old male admitted to [address] [place] [address] in September of 2022 for Alzheimer's dementia. Patient has past medical history including but not limited to dementia, insomnia, depression, hyperlipidemia. Patient is seen today for 60 day visit. He is a poor historian due to his Alzheimer's.  His speech is clear, he is able to make his needs known, he is AAO to self only. He is out of bed, standing at med cart, he is pleasant and cooperative, eating and drinking well per staff, no c/o pain, afebrile. No reported falls. He needs assistance with ADLs/IADLs. BP low today, will check labs, encouraged fluids. The patient's allergies, past medical, surgical, social and family history were reviewed and unchanged. Review of Systems     Review of Systems   Unable to perform ROS: Dementia         Objective     Vitals:   Vitals:    07/20/23 1254   BP: 91/55   Pulse: 79   Resp: 20   Temp: (!) 97.4 °F (36.3 °C)   SpO2: 93%         Physical Exam  Vitals and nursing note reviewed. Constitutional:       General: He is not in acute distress. Appearance: Normal appearance. HENT:      Head: Normocephalic and atraumatic. Nose: No congestion or rhinorrhea. Mouth/Throat:      Mouth: Mucous membranes are moist.   Eyes:      General: No scleral icterus. Extraocular Movements: Extraocular movements intact. Conjunctiva/sclera: Conjunctivae normal.      Pupils: Pupils are equal, round, and reactive to light. Cardiovascular:      Rate and Rhythm: Normal rate and regular rhythm. Pulses: Normal pulses. Heart sounds: Normal heart sounds. No murmur heard. Pulmonary:      Effort: Pulmonary effort is normal.      Breath sounds: Normal breath sounds. No wheezing, rhonchi or rales. Abdominal:      General: Bowel sounds are normal. There is no distension. Palpations: Abdomen is soft. Tenderness: There is no abdominal tenderness. Musculoskeletal:         General: No swelling or signs of injury. Lymphadenopathy:      Cervical: No cervical adenopathy. Skin:     General: Skin is warm and dry. Findings: No erythema. Neurological:      Mental Status: He is alert. He is disoriented. Sensory: No sensory deficit. Motor: Weakness present.       Gait: Gait abnormal.   Psychiatric:         Mood and Affect: Mood normal. Behavior: Behavior normal.         Pertinent Laboratory/Diagnostic Studies:  Reviewed in facility chart-stable     Current Medications   Medications reviewed and updated see facility [facility] for details. Current Outpatient Medications:   •  acetaminophen (TYLENOL) 325 mg tablet, Take 650 mg by mouth every 6 (six) hours as needed, Disp: , Rfl:   •  donepezil (ARICEPT) 5 mg tablet, Take 5 mg by mouth daily at bedtime, Disp: , Rfl:   •  memantine (NAMENDA) 10 mg tablet, Take 10 mg by mouth 2 (two) times a day, Disp: , Rfl:   •  OLANZapine (ZyPREXA) 2.5 mg tablet, Take 2.5 mg by mouth 2 (two) times a day, Disp: , Rfl:   •  traZODone (DESYREL) 50 mg tablet, Take 25 mg by mouth daily at bedtime, Disp: , Rfl:        Please note:  Voice-recognition software may have been used in the preparation of this document. Occasional wrong word or "sound-alike" substitutions may have occurred due to the inherent limitations of voice recognition software. Interpretation should be guided by context.          Danitza Fulton  7/20/2023 12:59 PM

## 2023-07-20 NOTE — ASSESSMENT & PLAN NOTE
· Mood stable on exam   · Previously on paxil- this was stopped, he is doing well , pleasant , active

## 2023-07-20 NOTE — ASSESSMENT & PLAN NOTE
· AAOx1- minimally verbal- ambulates with a rolling walker- sometimes without device- needs reminders   · Good meal completion   · No further concerns from staff at this time     · Continue Aricept 5 mg q.h.s. · Continue Namenda 10 mg b.i.d.  · Continue olanzapine 2.5 mg b.i.d.     Provide redirection, reorientation, distraction techniques  Fall Precautions  Assist with ADLs/IADLs  Avoid deliriogenic medications such as tramadol, benzodiazepines, anticholinergics, benadryl  Encourage Hydration/ Nutrition  Implement sleep hygiene, limit night time interuptions   Encourage participation in group activities when appropriate

## 2023-07-20 NOTE — ASSESSMENT & PLAN NOTE
91/55 today  Denies dizzinesss  Ambulatory with steady gait on exam   Check CBC,BMP, TSH tomorrow 7/21  Encourage fluids

## 2023-08-03 ENCOUNTER — APPOINTMENT (EMERGENCY)
Dept: RADIOLOGY | Facility: HOSPITAL | Age: 83
DRG: 871 | End: 2023-08-03
Payer: COMMERCIAL

## 2023-08-03 ENCOUNTER — NURSING HOME VISIT (OUTPATIENT)
Dept: GERIATRICS | Facility: OTHER | Age: 83
End: 2023-08-03
Payer: COMMERCIAL

## 2023-08-03 ENCOUNTER — APPOINTMENT (INPATIENT)
Dept: CT IMAGING | Facility: HOSPITAL | Age: 83
DRG: 871 | End: 2023-08-03
Payer: COMMERCIAL

## 2023-08-03 ENCOUNTER — HOSPITAL ENCOUNTER (INPATIENT)
Facility: HOSPITAL | Age: 83
LOS: 16 days | Discharge: HOSPICE | DRG: 871 | End: 2023-08-19
Attending: EMERGENCY MEDICINE | Admitting: INTERNAL MEDICINE
Payer: COMMERCIAL

## 2023-08-03 VITALS
OXYGEN SATURATION: 88 % | SYSTOLIC BLOOD PRESSURE: 100 MMHG | RESPIRATION RATE: 16 BRPM | DIASTOLIC BLOOD PRESSURE: 66 MMHG | HEART RATE: 108 BPM | TEMPERATURE: 97.5 F

## 2023-08-03 DIAGNOSIS — G30.1 LATE ONSET ALZHEIMER'S DEMENTIA WITH BEHAVIORAL DISTURBANCE (HCC): ICD-10-CM

## 2023-08-03 DIAGNOSIS — F02.818 LATE ONSET ALZHEIMER'S DEMENTIA WITH BEHAVIORAL DISTURBANCE (HCC): ICD-10-CM

## 2023-08-03 DIAGNOSIS — R06.00 DYSPNEA: ICD-10-CM

## 2023-08-03 DIAGNOSIS — J18.9 PNEUMONIA: Primary | ICD-10-CM

## 2023-08-03 DIAGNOSIS — J90 LOCULATED PLEURAL EFFUSION: ICD-10-CM

## 2023-08-03 DIAGNOSIS — R09.02 HYPOXIA: Primary | ICD-10-CM

## 2023-08-03 DIAGNOSIS — R47.81 SLURRED SPEECH: ICD-10-CM

## 2023-08-03 DIAGNOSIS — A41.9 SEPSIS (HCC): ICD-10-CM

## 2023-08-03 DIAGNOSIS — Z71.89 GOALS OF CARE, COUNSELING/DISCUSSION: ICD-10-CM

## 2023-08-03 DIAGNOSIS — E44.0 MODERATE PROTEIN-CALORIE MALNUTRITION (HCC): ICD-10-CM

## 2023-08-03 PROBLEM — D64.9 ANEMIA: Status: ACTIVE | Noted: 2023-08-03

## 2023-08-03 PROBLEM — R65.20 SEVERE SEPSIS (HCC): Status: ACTIVE | Noted: 2023-08-03

## 2023-08-03 PROBLEM — G93.40 ENCEPHALOPATHY ACUTE: Status: ACTIVE | Noted: 2023-08-03

## 2023-08-03 PROBLEM — R26.2 AMBULATORY DYSFUNCTION: Status: ACTIVE | Noted: 2023-08-03

## 2023-08-03 PROBLEM — D75.839 THROMBOCYTOSIS: Status: ACTIVE | Noted: 2023-08-03

## 2023-08-03 PROBLEM — R73.9 HYPERGLYCEMIA: Status: ACTIVE | Noted: 2023-08-03

## 2023-08-03 PROBLEM — J96.01 ACUTE RESPIRATORY FAILURE WITH HYPOXIA (HCC): Status: ACTIVE | Noted: 2023-08-03

## 2023-08-03 LAB
2HR DELTA HS TROPONIN: -6.7 NG/L
ALBUMIN SERPL BCP-MCNC: 3.2 G/DL (ref 3.5–5)
ALP SERPL-CCNC: 131 U/L (ref 34–104)
ALT SERPL W P-5'-P-CCNC: 6 U/L (ref 7–52)
ANION GAP SERPL CALCULATED.3IONS-SCNC: 12 MMOL/L
ANISOCYTOSIS BLD QL SMEAR: PRESENT
APTT PPP: 41 SECONDS (ref 23–37)
AST SERPL W P-5'-P-CCNC: 7 U/L (ref 13–39)
ATRIAL RATE: 93 BPM
BACTERIA UR QL AUTO: ABNORMAL /HPF
BASE EX.OXY STD BLDV CALC-SCNC: 62.8 % (ref 60–80)
BASE EXCESS BLDV CALC-SCNC: -4 MMOL/L
BASOPHILS # BLD AUTO: 0.03 THOUSANDS/ÂΜL (ref 0–0.1)
BASOPHILS NFR BLD AUTO: 0 % (ref 0–1)
BETA-HYDROXYBUTYRATE: 0.1 MMOL/L
BILIRUB SERPL-MCNC: 0.79 MG/DL (ref 0.2–1)
BILIRUB UR QL STRIP: NEGATIVE
BNP SERPL-MCNC: 237 PG/ML (ref 0–100)
BUN SERPL-MCNC: 35 MG/DL (ref 5–25)
CALCIUM ALBUM COR SERPL-MCNC: 9.8 MG/DL (ref 8.3–10.1)
CALCIUM SERPL-MCNC: 9.2 MG/DL (ref 8.4–10.2)
CARDIAC TROPONIN I PNL SERPL HS: 12.7 NG/L
CARDIAC TROPONIN I PNL SERPL HS: 6 NG/L
CHLORIDE SERPL-SCNC: 105 MMOL/L (ref 96–108)
CLARITY UR: CLEAR
CO2 SERPL-SCNC: 25 MMOL/L (ref 21–32)
COLOR UR: YELLOW
CREAT SERPL-MCNC: 1.29 MG/DL (ref 0.6–1.3)
EOSINOPHIL # BLD AUTO: 0 THOUSAND/ÂΜL (ref 0–0.61)
EOSINOPHIL NFR BLD AUTO: 0 % (ref 0–6)
ERYTHROCYTE [DISTWIDTH] IN BLOOD BY AUTOMATED COUNT: 13.9 % (ref 11.6–15.1)
EST. AVERAGE GLUCOSE BLD GHB EST-MCNC: 154 MG/DL
FERRITIN SERPL-MCNC: 714 NG/ML (ref 24–336)
FLUAV RNA RESP QL NAA+PROBE: NEGATIVE
FLUBV RNA RESP QL NAA+PROBE: NEGATIVE
FOLATE SERPL-MCNC: 8.2 NG/ML
GFR SERPL CREATININE-BSD FRML MDRD: 52 ML/MIN/1.73SQ M
GLUCOSE SERPL-MCNC: 216 MG/DL (ref 65–140)
GLUCOSE SERPL-MCNC: 368 MG/DL (ref 65–140)
GLUCOSE SERPL-MCNC: 419 MG/DL (ref 65–140)
GLUCOSE SERPL-MCNC: 428 MG/DL (ref 65–140)
GLUCOSE UR STRIP-MCNC: ABNORMAL MG/DL
HBA1C MFR BLD: 7 %
HCO3 BLDV-SCNC: 21.1 MMOL/L (ref 24–30)
HCT VFR BLD AUTO: 26.1 % (ref 36.5–49.3)
HGB BLD-MCNC: 8.1 G/DL (ref 12–17)
HGB UR QL STRIP.AUTO: NEGATIVE
HYALINE CASTS #/AREA URNS LPF: ABNORMAL /LPF
HYPERCHROMIA BLD QL SMEAR: PRESENT
IMM GRANULOCYTES # BLD AUTO: 0.33 THOUSAND/UL (ref 0–0.2)
IMM GRANULOCYTES NFR BLD AUTO: 1 % (ref 0–2)
INR PPP: 1.33 (ref 0.84–1.19)
IRON SATN MFR SERPL: 14 % (ref 20–50)
IRON SERPL-MCNC: 26 UG/DL (ref 65–175)
KETONES UR STRIP-MCNC: NEGATIVE MG/DL
LACTATE SERPL-SCNC: 1.5 MMOL/L (ref 0.5–2)
LACTATE SERPL-SCNC: 3.2 MMOL/L (ref 0.5–2)
LEUKOCYTE ESTERASE UR QL STRIP: ABNORMAL
LYMPHOCYTES # BLD AUTO: 0.99 THOUSANDS/ÂΜL (ref 0.6–4.47)
LYMPHOCYTES NFR BLD AUTO: 4 % (ref 14–44)
MCH RBC QN AUTO: 30 PG (ref 26.8–34.3)
MCHC RBC AUTO-ENTMCNC: 31 G/DL (ref 31.4–37.4)
MCV RBC AUTO: 97 FL (ref 82–98)
MONOCYTES # BLD AUTO: 1.07 THOUSAND/ÂΜL (ref 0.17–1.22)
MONOCYTES NFR BLD AUTO: 4 % (ref 4–12)
MUCOUS THREADS UR QL AUTO: ABNORMAL
NEUTROPHILS # BLD AUTO: 24.58 THOUSANDS/ÂΜL (ref 1.85–7.62)
NEUTS SEG NFR BLD AUTO: 91 % (ref 43–75)
NITRITE UR QL STRIP: NEGATIVE
NON-SQ EPI CELLS URNS QL MICRO: ABNORMAL /HPF
NRBC BLD AUTO-RTO: 0 /100 WBCS
O2 CT BLDV-SCNC: 6.7 ML/DL
P AXIS: 64 DEGREES
PCO2 BLDV: 38.2 MM HG (ref 42–50)
PH BLDV: 7.36 [PH] (ref 7.3–7.4)
PH UR STRIP.AUTO: 5.5 [PH]
PLATELET # BLD AUTO: 479 THOUSANDS/UL (ref 149–390)
PLATELET BLD QL SMEAR: ABNORMAL
PMV BLD AUTO: 9.6 FL (ref 8.9–12.7)
PO2 BLDV: 37.5 MM HG (ref 35–45)
POTASSIUM SERPL-SCNC: 3.7 MMOL/L (ref 3.5–5.3)
PR INTERVAL: 124 MS
PROCALCITONIN SERPL-MCNC: 2.31 NG/ML
PROT SERPL-MCNC: 7.1 G/DL (ref 6.4–8.4)
PROT UR STRIP-MCNC: ABNORMAL MG/DL
PROTHROMBIN TIME: 16.7 SECONDS (ref 11.6–14.5)
QRS AXIS: -53 DEGREES
QRSD INTERVAL: 98 MS
QT INTERVAL: 360 MS
QTC INTERVAL: 447 MS
RBC # BLD AUTO: 2.7 MILLION/UL (ref 3.88–5.62)
RBC #/AREA URNS AUTO: ABNORMAL /HPF
RBC MORPH BLD: PRESENT
RSV RNA RESP QL NAA+PROBE: NEGATIVE
S PNEUM AG UR QL: NEGATIVE
SARS-COV-2 RNA RESP QL NAA+PROBE: NEGATIVE
SODIUM SERPL-SCNC: 142 MMOL/L (ref 135–147)
SP GR UR STRIP.AUTO: 1.03 (ref 1–1.03)
T WAVE AXIS: 52 DEGREES
TIBC SERPL-MCNC: 182 UG/DL (ref 250–450)
UROBILINOGEN UR STRIP-ACNC: 2 MG/DL
VENTRICULAR RATE: 93 BPM
VIT B12 SERPL-MCNC: 251 PG/ML (ref 180–914)
WBC # BLD AUTO: 27 THOUSAND/UL (ref 4.31–10.16)
WBC #/AREA URNS AUTO: ABNORMAL /HPF

## 2023-08-03 PROCEDURE — 71250 CT THORAX DX C-: CPT

## 2023-08-03 PROCEDURE — 82948 REAGENT STRIP/BLOOD GLUCOSE: CPT

## 2023-08-03 PROCEDURE — 85025 COMPLETE CBC W/AUTO DIFF WBC: CPT | Performed by: EMERGENCY MEDICINE

## 2023-08-03 PROCEDURE — 99291 CRITICAL CARE FIRST HOUR: CPT | Performed by: EMERGENCY MEDICINE

## 2023-08-03 PROCEDURE — 81001 URINALYSIS AUTO W/SCOPE: CPT | Performed by: EMERGENCY MEDICINE

## 2023-08-03 PROCEDURE — 82746 ASSAY OF FOLIC ACID SERUM: CPT

## 2023-08-03 PROCEDURE — 80053 COMPREHEN METABOLIC PANEL: CPT | Performed by: EMERGENCY MEDICINE

## 2023-08-03 PROCEDURE — 99223 1ST HOSP IP/OBS HIGH 75: CPT | Performed by: NURSE PRACTITIONER

## 2023-08-03 PROCEDURE — 83036 HEMOGLOBIN GLYCOSYLATED A1C: CPT

## 2023-08-03 PROCEDURE — 84484 ASSAY OF TROPONIN QUANT: CPT | Performed by: EMERGENCY MEDICINE

## 2023-08-03 PROCEDURE — 36415 COLL VENOUS BLD VENIPUNCTURE: CPT | Performed by: EMERGENCY MEDICINE

## 2023-08-03 PROCEDURE — 82728 ASSAY OF FERRITIN: CPT

## 2023-08-03 PROCEDURE — 83550 IRON BINDING TEST: CPT

## 2023-08-03 PROCEDURE — 84145 PROCALCITONIN (PCT): CPT | Performed by: EMERGENCY MEDICINE

## 2023-08-03 PROCEDURE — 96365 THER/PROPH/DIAG IV INF INIT: CPT

## 2023-08-03 PROCEDURE — 99348 HOME/RES VST EST LOW MDM 30: CPT | Performed by: NURSE PRACTITIONER

## 2023-08-03 PROCEDURE — 87040 BLOOD CULTURE FOR BACTERIA: CPT | Performed by: EMERGENCY MEDICINE

## 2023-08-03 PROCEDURE — 82010 KETONE BODYS QUAN: CPT | Performed by: EMERGENCY MEDICINE

## 2023-08-03 PROCEDURE — 85730 THROMBOPLASTIN TIME PARTIAL: CPT | Performed by: EMERGENCY MEDICINE

## 2023-08-03 PROCEDURE — 93005 ELECTROCARDIOGRAM TRACING: CPT

## 2023-08-03 PROCEDURE — 83605 ASSAY OF LACTIC ACID: CPT | Performed by: EMERGENCY MEDICINE

## 2023-08-03 PROCEDURE — 83880 ASSAY OF NATRIURETIC PEPTIDE: CPT | Performed by: EMERGENCY MEDICINE

## 2023-08-03 PROCEDURE — 99223 1ST HOSP IP/OBS HIGH 75: CPT | Performed by: INTERNAL MEDICINE

## 2023-08-03 PROCEDURE — 85610 PROTHROMBIN TIME: CPT | Performed by: EMERGENCY MEDICINE

## 2023-08-03 PROCEDURE — 71045 X-RAY EXAM CHEST 1 VIEW: CPT

## 2023-08-03 PROCEDURE — 96375 TX/PRO/DX INJ NEW DRUG ADDON: CPT

## 2023-08-03 PROCEDURE — 0241U HB NFCT DS VIR RESP RNA 4 TRGT: CPT | Performed by: EMERGENCY MEDICINE

## 2023-08-03 PROCEDURE — 82607 VITAMIN B-12: CPT

## 2023-08-03 PROCEDURE — 99285 EMERGENCY DEPT VISIT HI MDM: CPT

## 2023-08-03 PROCEDURE — 87449 NOS EACH ORGANISM AG IA: CPT

## 2023-08-03 PROCEDURE — 83540 ASSAY OF IRON: CPT

## 2023-08-03 PROCEDURE — 70450 CT HEAD/BRAIN W/O DYE: CPT

## 2023-08-03 PROCEDURE — 82805 BLOOD GASES W/O2 SATURATION: CPT | Performed by: EMERGENCY MEDICINE

## 2023-08-03 PROCEDURE — G1004 CDSM NDSC: HCPCS

## 2023-08-03 PROCEDURE — 96361 HYDRATE IV INFUSION ADD-ON: CPT

## 2023-08-03 RX ORDER — ACETAMINOPHEN 325 MG/1
650 TABLET ORAL EVERY 6 HOURS PRN
Status: DISCONTINUED | OUTPATIENT
Start: 2023-08-03 | End: 2023-08-19 | Stop reason: HOSPADM

## 2023-08-03 RX ORDER — INSULIN LISPRO 100 [IU]/ML
1-5 INJECTION, SOLUTION INTRAVENOUS; SUBCUTANEOUS
Status: DISCONTINUED | OUTPATIENT
Start: 2023-08-03 | End: 2023-08-19 | Stop reason: HOSPADM

## 2023-08-03 RX ORDER — DONEPEZIL HYDROCHLORIDE 5 MG/1
5 TABLET, FILM COATED ORAL
Status: DISCONTINUED | OUTPATIENT
Start: 2023-08-03 | End: 2023-08-19 | Stop reason: HOSPADM

## 2023-08-03 RX ORDER — TRAZODONE HYDROCHLORIDE 50 MG/1
25 TABLET ORAL
Status: DISCONTINUED | OUTPATIENT
Start: 2023-08-03 | End: 2023-08-03

## 2023-08-03 RX ORDER — MEMANTINE HYDROCHLORIDE 10 MG/1
10 TABLET ORAL 2 TIMES DAILY
Status: DISCONTINUED | OUTPATIENT
Start: 2023-08-03 | End: 2023-08-19 | Stop reason: HOSPADM

## 2023-08-03 RX ORDER — OLANZAPINE 2.5 MG/1
2.5 TABLET ORAL 2 TIMES DAILY
Status: DISCONTINUED | OUTPATIENT
Start: 2023-08-03 | End: 2023-08-03

## 2023-08-03 RX ORDER — INSULIN LISPRO 100 [IU]/ML
5 INJECTION, SOLUTION INTRAVENOUS; SUBCUTANEOUS
Status: DISCONTINUED | OUTPATIENT
Start: 2023-08-03 | End: 2023-08-10

## 2023-08-03 RX ORDER — SENNOSIDES 8.6 MG
1 TABLET ORAL
Status: DISCONTINUED | OUTPATIENT
Start: 2023-08-03 | End: 2023-08-15

## 2023-08-03 RX ORDER — INSULIN GLARGINE 100 [IU]/ML
10 INJECTION, SOLUTION SUBCUTANEOUS
Status: DISCONTINUED | OUTPATIENT
Start: 2023-08-03 | End: 2023-08-12

## 2023-08-03 RX ORDER — GUAIFENESIN 600 MG/1
1200 TABLET, EXTENDED RELEASE ORAL EVERY 12 HOURS SCHEDULED
Status: DISCONTINUED | OUTPATIENT
Start: 2023-08-03 | End: 2023-08-19 | Stop reason: HOSPADM

## 2023-08-03 RX ORDER — SODIUM CHLORIDE, SODIUM GLUCONATE, SODIUM ACETATE, POTASSIUM CHLORIDE, MAGNESIUM CHLORIDE, SODIUM PHOSPHATE, DIBASIC, AND POTASSIUM PHOSPHATE .53; .5; .37; .037; .03; .012; .00082 G/100ML; G/100ML; G/100ML; G/100ML; G/100ML; G/100ML; G/100ML
75 INJECTION, SOLUTION INTRAVENOUS CONTINUOUS
Status: DISCONTINUED | OUTPATIENT
Start: 2023-08-03 | End: 2023-08-04

## 2023-08-03 RX ORDER — ENOXAPARIN SODIUM 100 MG/ML
40 INJECTION SUBCUTANEOUS DAILY
Status: DISCONTINUED | OUTPATIENT
Start: 2023-08-03 | End: 2023-08-19 | Stop reason: HOSPADM

## 2023-08-03 RX ADMIN — ENOXAPARIN SODIUM 40 MG: 40 INJECTION SUBCUTANEOUS at 16:43

## 2023-08-03 RX ADMIN — SODIUM CHLORIDE 1000 ML: 0.9 INJECTION, SOLUTION INTRAVENOUS at 11:07

## 2023-08-03 RX ADMIN — STANDARDIZED SENNA CONCENTRATE 8.6 MG: 8.6 TABLET ORAL at 21:14

## 2023-08-03 RX ADMIN — SODIUM CHLORIDE, SODIUM GLUCONATE, SODIUM ACETATE, POTASSIUM CHLORIDE, MAGNESIUM CHLORIDE, SODIUM PHOSPHATE, DIBASIC, AND POTASSIUM PHOSPHATE 75 ML/HR: .53; .5; .37; .037; .03; .012; .00082 INJECTION, SOLUTION INTRAVENOUS at 18:24

## 2023-08-03 RX ADMIN — DONEPEZIL HYDROCHLORIDE 5 MG: 5 TABLET ORAL at 21:14

## 2023-08-03 RX ADMIN — CEFEPIME 1000 MG: 1 INJECTION, POWDER, FOR SOLUTION INTRAMUSCULAR; INTRAVENOUS at 12:05

## 2023-08-03 RX ADMIN — SODIUM CHLORIDE 1000 ML: 0.9 INJECTION, SOLUTION INTRAVENOUS at 11:52

## 2023-08-03 RX ADMIN — OLANZAPINE 2.5 MG: 2.5 TABLET, FILM COATED ORAL at 17:56

## 2023-08-03 RX ADMIN — GUAIFENESIN 1200 MG: 600 TABLET ORAL at 21:14

## 2023-08-03 RX ADMIN — TRAZODONE HYDROCHLORIDE 25 MG: 50 TABLET ORAL at 21:14

## 2023-08-03 RX ADMIN — INSULIN HUMAN 5 UNITS: 100 INJECTION, SOLUTION PARENTERAL at 13:07

## 2023-08-03 RX ADMIN — MEMANTINE 10 MG: 10 TABLET ORAL at 17:56

## 2023-08-03 RX ADMIN — SODIUM CHLORIDE 1000 ML: 0.9 INJECTION, SOLUTION INTRAVENOUS at 12:23

## 2023-08-03 RX ADMIN — INSULIN GLARGINE 10 UNITS: 100 INJECTION, SOLUTION SUBCUTANEOUS at 21:13

## 2023-08-03 RX ADMIN — CEFTRIAXONE SODIUM 1000 MG: 10 INJECTION, POWDER, FOR SOLUTION INTRAVENOUS at 20:59

## 2023-08-03 NOTE — ASSESSMENT & PLAN NOTE
· POA met severe sepsis criteria for leukocytosis 27.00, ANC 24.58, tachycardia heart rate 95, lactic acid 3.2. · At the ED received a total 3L fluid sepsis fluid , lactic acid down to 1.5  · Procalcitonin elevated to 2.31  · Chest x-ray pending final read however concerning for large right-sided consolidation versus malignancy. · UA large leukocyte, negative for nitrates, glucosuria no bacteria  · Drip score 3 ([facility] resident, poor functional status), no recent hospitalizations. · Severe sepsis likely secondary to PNA    · Plan  · Continue ceftriaxone  · Get MRSA swab  · Repeat procalcitonin in a.m. · Check urine Legionella, urine strep pneumo  · Follow-up blood cultures  · CBC a.m.  · Monitor vital signs  · Mucinex 1200 twice daily  · Aspiration precaution  · Respiratory protocol  · Sputum culture  · Chest x-ray concerning for pneumonia at this time however cannot rule out underlying malignancy given large consolidation. CT chest on the outpatient setting?

## 2023-08-03 NOTE — ASSESSMENT & PLAN NOTE
· POA at facility noted slurred speech, slow responsive, not at his baseline  · Encephalopathy likely metabolic in the setting of sepsis  · Check CT head : No acute intracranial leasion  · Continue antibiotic as above

## 2023-08-03 NOTE — Clinical Note
Case was discussed with dr Alysha Callejas and the patient's admission status was agreed to be Admission Status: inpatient status to the service of Dr. Alysha Callejas .

## 2023-08-03 NOTE — ASSESSMENT & PLAN NOTE
· Nursing noted patient was "not himself" Found to be hypoxic with O2 88% on room air, later dropped to 79% after he took his O2 off. · Upon assessment patient was Tachycardic, Pale and with new onset slurred speech.    · Full Code- will sent to ED for eval

## 2023-08-03 NOTE — H&P
[facility]  H&P  Name: Rancho Baxter 82 y.o. male I MRN: [id number]  Unit/Bed#: W -01 I Date of Admission: 8/3/2023   Date of Service: 8/3/2023 I Hospital Day: 0      Assessment/Plan   * Severe sepsis [facility])  Assessment & Plan  · POA met severe sepsis criteria for leukocytosis 27.00, ANC 24.58, tachycardia heart rate 95, lactic acid 3.2. · At the ED received a total 3L fluid sepsis fluid , lactic acid down to 1.5  · Procalcitonin elevated to 2.31  · Chest x-ray pending final read however concerning for large right-sided consolidation versus malignancy. · UA large leukocyte, negative for nitrates, glucosuria no bacteria  · Drip score 3 ([facility] resident, poor functional status), no recent hospitalizations. · Severe sepsis likely secondary to PNA    · Plan  · Continue ceftriaxone  · Get MRSA swab  · Repeat procalcitonin in a.m. · Check urine Legionella, urine strep pneumo  · Follow-up blood cultures  · CBC a.m.  · Monitor vital signs  · Mucinex 1200 twice daily  · Aspiration precaution  · Respiratory protocol  · Sputum culture  · C CT chest without contrast given loculated effusion noted on chest x-ray final read pending this result can consider IR versus pulmonology for further management evaluation. Acute respiratory failure with hypoxia (HCC)  Assessment & Plan  · Noted with shortness of breath, hypoxic saturating 79% prior to admission  · Upon admission saturating well on 2 L nasal cannula for 94% however desaturates without oxygen supplementation.   · Continue antibiotic treatment as above  · Respiratory protocol  · Oxygen supplementation keep O2> 88%, wean as able    Encephalopathy acute  Assessment & Plan  · POA at facility noted slurred speech, slow responsive, not at his baseline  · Encephalopathy likely metabolic in the setting of sepsis  · Check CT head rule out TIA  · Continue antibiotic as above      Hyperglycemia  Assessment & Plan  · POA noted elevated glucose 419, no prior history documented of diabetes. 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 · Unclear if history of melena, hematochezia  · Anemia unclear etiology at this time possibly secondary to iron deficiency given thrombocytosis although this could represent current setting of infection versus underlying malignancy? · Plan  · Check iron panel, folate, vitamin B12  · Monitor output   ·  CBC a.m. Thrombocytosis  Assessment & Plan  · POA platelets 825, pressure review in September 2022 was noted unremarkable to 32  · Presumably in the setting of iron deficiency anemia versus infection versus underlying malignancy given chest x-ray with large consolidation? · CBC a.m. Depression  Assessment & Plan  Continue home meds    Ambulatory dysfunction  Assessment & Plan  · At baseline ambulate without AD, noted unsteadiness new from baseline  · PT OT  ·        VTE Pharmacologic Prophylaxis: VTE Score: 5 High Risk (Score >/= 5) - Pharmacological DVT Prophylaxis Ordered: enoxaparin (Lovenox). Sequential Compression Devices Ordered.   Code Status: Level 3 - DNAR and DNI facility paperwork  Discussion with family: Updated  (sister) via phone. Anticipated Length of Stay: Patient will be admitted on an inpatient basis with an anticipated length of stay of greater than 2 midnights secondary to Severe sepsis. Chief Complaint: Shortness of breath, hypoxia    History of Present Illness:  Rodger Self is a 82 y.o. male with a PMH of Alzheimer with behavioral disturbance, Depression, hyperlipidemia who presents from [facility] after being noted shortness of breath, "not himself" from baseline with associated slurred speech, unsteadiness in his gait, tachycardia, pale, lethargic saturating 79% on room air. As per nurse at [address] [place] [address] started with cough somewhat productive 3 days however no fevers, no chills with associated decreased p.o. intake. At the ED minutes to review sepsis criteria was started on cefepime, received a total of 3 L of fluid resuscitation given leukocytosis, elevated lactic acid with associated chest x-ray concerning for large consolidation in right lung. Review of Systems:    Review of Systems   Unable to perform ROS: Dementia     Past Medical and Surgical History:   Past Medical History:   Diagnosis Date   • Late onset Alzheimer's dementia with behavioral disturbance ([address])    • Other hyperlipidemia        History reviewed. No pertinent surgical history. Meds/Allergies:  Prior to Admission medications    Medication Sig Start Date End Date Taking?  Authorizing Provider   acetaminophen (TYLENOL) 325 mg tablet Take 650 mg by mouth every 6 (six) hours as needed   Yes Historical Provider, MD   donepezil (ARICEPT) 5 mg tablet Take 5 mg by mouth daily at bedtime   Yes Historical Provider, MD   memantine (NAMENDA) 10 mg tablet Take 10 mg by mouth 2 (two) times a day   Yes Historical Provider, MD   OLANZapine (ZyPREXA) 2.5 mg tablet Take 2.5 mg by mouth 2 (two) times a day   Yes Historical Provider, MD traZODone (DESYREL) 50 mg tablet Take 25 mg by mouth daily at bedtime   Yes Historical Provider, MD     I have reveiwed home medications using records provided by [facility]. Allergies: No Known Allergies    Social History:  Marital Status:    Occupation:   Patient Pre-hospital Living Situation: Assisted Living  Patient Pre-hospital Level of Mobility: walks  Patient Pre-hospital Diet Restrictions: None  Substance Use History:   Social History     Substance and Sexual Activity   Alcohol Use Not Currently     Social History     Tobacco Use   Smoking Status Never   Smokeless Tobacco Never     Social History     Substance and Sexual Activity   Drug Use Not Currently       Family History:  History reviewed. No pertinent family history. Physical Exam:     Vitals:   Blood Pressure: 146/72 (08/03/23 1532)  Pulse: (!) 42 (08/03/23 1532)  Temperature: 99.7 °F (37.6 °C) (08/03/23 1532)  Temp Source: Axillary (08/03/23 1045)  Respirations: 17 (08/03/23 1532)  Height: 5' 9" (175.3 cm) (08/03/23 1345)  SpO2: 99 % (08/03/23 1532)    Physical Exam  Vitals and nursing note reviewed. Constitutional:       General: He is sleeping. He is not in acute distress. Appearance: He is well-developed. He is ill-appearing. Comments: Cachectic, chronically ill-appearing   HENT:      Head: Normocephalic and atraumatic. Right Ear: External ear normal.      Left Ear: External ear normal.      Nose: Nose normal.      Mouth/Throat:      Mouth: Mucous membranes are moist.   Eyes:      General: No scleral icterus. Extraocular Movements: Extraocular movements intact. Conjunctiva/sclera: Conjunctivae normal.      Pupils: Pupils are equal, round, and reactive to light. Cardiovascular:      Rate and Rhythm: Normal rate and regular rhythm. Pulses: Normal pulses. Heart sounds: Normal heart sounds. No murmur heard. Pulmonary:      Effort: Pulmonary effort is normal. No respiratory distress.       Breath sounds: Rhonchi present. Abdominal:      General: Bowel sounds are normal. There is no distension. Palpations: Abdomen is soft. Tenderness: There is abdominal tenderness. Comments: Tender to palpation left upper quadrant   Musculoskeletal:         General: No swelling. Cervical back: Normal range of motion and neck supple. Right lower leg: No edema. Left lower leg: No edema. Skin:     General: Skin is warm and dry. Capillary Refill: Capillary refill takes less than 2 seconds. Neurological:      Mental Status: He is easily aroused. He is disoriented. Comments: Oriented to self, however not to place or situation. Unable due to age, year.            Additional Data:     Lab Results:  Results from last 7 days   Lab Units 08/03/23  1049   WBC Thousand/uL 27.00*   HEMOGLOBIN g/dL 8.1*   HEMATOCRIT % 26.1*   PLATELETS Thousands/uL 479*   NEUTROS PCT % 91*   LYMPHS PCT % 4*   MONOS PCT % 4   EOS PCT % 0     Results from last 7 days   Lab Units 08/03/23  1049   SODIUM mmol/L 142   POTASSIUM mmol/L 3.7   CHLORIDE mmol/L 105   CO2 mmol/L 25   BUN mg/dL 35*   CREATININE mg/dL 1.29   ANION GAP mmol/L 12   CALCIUM mg/dL 9.2   ALBUMIN g/dL 3.2*   TOTAL BILIRUBIN mg/dL 0.79   ALK PHOS U/L 131*   ALT U/L 6*   AST U/L 7*   GLUCOSE RANDOM mg/dL 419*         Results from last 7 days   Lab Units 08/03/23  1622 08/03/23  1054   POC GLUCOSE mg/dl 216* 428*         Results from last 7 days   Lab Units 08/03/23  1355 08/03/23  1131 08/03/23  1049   LACTIC ACID mmol/L 1.5 3.2*  --    PROCALCITONIN ng/ml  --   --  2.31*       Lines/Drains:  Invasive Devices     Peripheral Intravenous Line  Duration           Peripheral IV 08/03/23 Left Antecubital <1 day    Peripheral IV 08/03/23 Left Forearm <1 day                    Imaging: Reviewed radiology reports from this admission including: chest xray  XR chest 1 view portable   Final Result by Mabelene Rinne, MD (08/03 1021)      There is a right mid to lower lung opacity with well-circumscribed medial margin suggests loculated effusion. Suggest CT chest for further evaluation                  Workstation performed: [id number]         CT chest wo contrast    (Results Pending)   CT head wo contrast    (Results Pending)       EKG and Other Studies Reviewed on Admission:   · EKG: Sinus Tachycardia. HR 98.    ** Please Note: This note has been constructed using a voice recognition system.  **

## 2023-08-03 NOTE — ASSESSMENT & PLAN NOTE
· POA noted elevated glucose 419, no prior history documented of diabetes.   Received 5 units regular insulin  · Beta hydroxybutyrate and unremarkable, UA significant for glucosuria  · Plan  · Continue carbohydrate controlled diet  · We will start Lantus 10 units at bedtime  · Continue Humalog 5 units 3 times daily  · Sliding scale  · Hypoglycemia protocol  · Glucose check per protocol  · Check hemoglobin A1c

## 2023-08-03 NOTE — ASSESSMENT & PLAN NOTE
· POA platelets 609, pressure review in September 2022 was noted unremarkable to 32  · Presumably in the setting of iron deficiency anemia versus infection versus underlying malignancy given chest x-ray with large consolidation? · CBC a.m.

## 2023-08-03 NOTE — SEPSIS NOTE
Sepsis Note   Bladimir Stephenson 82 y.o. male MRN: [id number]  Unit/Bed#: ED-05 Encounter: [id number]          Default Flowsheet Data (last 720 hours)     Sepsis Reassess     Row Name 08/03/23 1416                   Repeat Volume Status and Tissue Perfusion Assessment Performed    Date of Reassessment: 08/03/23  -        Time of Reassessment: 1416  -[id number]        Sepsis Reassessment Note: Click "NEXT" below (NOT "close") to generate sepsis reassessment note. YES (proceed by clicking "NEXT")  -        Repeat Volume Status and Tissue Perfusion Assessment Performed --              User Key  (r) = Recorded By, (t) = Taken By, (c) = Cosigned By    [facility] Name Provider Maria Del Carmen Ross MD Resident                There is no height or weight on file to calculate BMI. Wt Readings from Last 1 Encounters:   03/23/23 60.6 kg (133 lb 9.6 oz)        Height is required to calculate ideal body weight.

## 2023-08-03 NOTE — ASSESSMENT & PLAN NOTE
· POA met severe sepsis criteria for leukocytosis 27.00, ANC 24.58, tachycardia heart rate 95, lactic acid 3.2. · At the ED received a total 3L fluid sepsis fluid , lactic acid down to 1.5  · Procalcitonin elevated down trending from 2.31 to 1.45  · Chest x-ray:There is a right mid to lower lung opacity with well-circumscribed medial margin suggests loculated effusion  · CT Chest: Moderate right pleural effusion with compressive atelectasis over the right lower lobe and posterior middle and upper lobes. Consolidation in the left lower lobe probably atelectasis superimposed pneumonia cannot be excluded  · UA large leukocyte, negative for nitrates, glucosuria no bacteria  · Drip score 3 ([facility] resident, poor functional status), no recent hospitalizations.   · Severe sepsis likely secondary to PNA  · Check urine Legionella, urine strep pneumo" negative  · Aspiration precaution: Patient diet change to dysphagia 3 with thin liquid and med crushed    Plan  · IR chest tube placement  · Start Unasyn 3 g for anaerobic coverage  ·  MRSA swab: pending  ·  blood cultures pending   · Monitor vital signs  · Mucinex 1200 twice daily  · Respiratory protocol

## 2023-08-03 NOTE — PLAN OF CARE
Problem: MOBILITY - ADULT  Goal: Maintain or return to baseline ADL function  Description: INTERVENTIONS:  -  Assess patient's ability to carry out ADLs; assess patient's baseline for ADL function and identify physical deficits which impact ability to perform ADLs (bathing, care of mouth/teeth, toileting, grooming, dressing, etc.)  - Assess/evaluate cause of self-care deficits   - Assess range of motion  - Assess patient's mobility; develop plan if impaired  - Assess patient's need for assistive devices and provide as appropriate  - Encourage maximum independence but intervene and supervise when necessary  - Involve family in performance of ADLs  - Assess for home care needs following discharge   - Consider OT consult to assist with ADL evaluation and planning for discharge  - Provide patient education as appropriate  Outcome: Progressing  Goal: Maintains/Returns to pre admission functional level  Description: INTERVENTIONS:  - Perform BMAT or MOVE assessment daily.   - Set and communicate daily mobility goal to care team and patient/family/caregiver. - Collaborate with rehabilitation services on mobility goals if consulted  - Perform Range of Motion  times a day. - Reposition patient every  hours.   - Dangle patient  times a day  - Stand patient  times a day  - Ambulate patient  times a day  - Out of bed to chair  times a day   - Out of bed for meals  times a day  - Out of bed for toileting  - Record patient progress and toleration of activity level   Outcome: Progressing     Problem: PAIN - ADULT  Goal: Verbalizes/displays adequate comfort level or baseline comfort level  Description: Interventions:  - Encourage patient to monitor pain and request assistance  - Assess pain using appropriate pain scale  - Administer analgesics based on type and severity of pain and evaluate response  - Implement non-pharmacological measures as appropriate and evaluate response  - Consider cultural and social influences on pain and pain management  - Notify physician/advanced practitioner if interventions unsuccessful or patient reports new pain  Outcome: Progressing     Problem: INFECTION - ADULT  Goal: Absence or prevention of progression during hospitalization  Description: INTERVENTIONS:  - Assess and monitor for signs and symptoms of infection  - Monitor lab/diagnostic results  - Monitor all insertion sites, i.e. indwelling lines, tubes, and drains  - Monitor endotracheal if appropriate and nasal secretions for changes in amount and color  - Webster appropriate cooling/warming therapies per order  - Administer medications as ordered  - Instruct and encourage patient and family to use good hand hygiene technique  - Identify and instruct in appropriate isolation precautions for identified infection/condition  Outcome: Progressing  Goal: Absence of fever/infection during neutropenic period  Description: INTERVENTIONS:  - Monitor WBC    Outcome: Progressing     Problem: SAFETY ADULT  Goal: Maintain or return to baseline ADL function  Description: INTERVENTIONS:  -  Assess patient's ability to carry out ADLs; assess patient's baseline for ADL function and identify physical deficits which impact ability to perform ADLs (bathing, care of mouth/teeth, toileting, grooming, dressing, etc.)  - Assess/evaluate cause of self-care deficits   - Assess range of motion  - Assess patient's mobility; develop plan if impaired  - Assess patient's need for assistive devices and provide as appropriate  - Encourage maximum independence but intervene and supervise when necessary  - Involve family in performance of ADLs  - Assess for home care needs following discharge   - Consider OT consult to assist with ADL evaluation and planning for discharge  - Provide patient education as appropriate  Outcome: Progressing  Goal: Maintains/Returns to pre admission functional level  Description: INTERVENTIONS:  - Perform BMAT or MOVE assessment daily.   - Set and communicate daily mobility goal to care team and patient/family/caregiver. - Collaborate with rehabilitation services on mobility goals if consulted  - Perform Range of Motion  times a day. - Reposition patient every  hours.   - Dangle patient  times a day  - Stand patient  times a day  - Ambulate patient  times a day  - Out of bed to chair  times a day   - Out of bed for meals  times a day  - Out of bed for toileting  - Record patient progress and toleration of activity level   Outcome: Progressing  Goal: Patient will remain free of falls  Description: INTERVENTIONS:  - Educate patient/family on patient safety including physical limitations  - Instruct patient to call for assistance with activity   - Consult OT/PT to assist with strengthening/mobility   - Keep Call bell within reach  - Keep bed low and locked with side rails adjusted as appropriate  - Keep care items and personal belongings within reach  - Initiate and maintain comfort rounds  - Make Fall Risk Sign visible to staff  - Offer Toileting every  Hours, in advance of need  - Initiate/Maintain alarm  - Obtain necessary fall risk management equipment  - Apply yellow socks and bracelet for high fall risk patients  - Consider moving patient to room near nurses station  Outcome: Progressing     Problem: DISCHARGE PLANNING  Goal: Discharge to home or other facility with appropriate resources  Description: INTERVENTIONS:  - Identify barriers to discharge w/patient and caregiver  - Arrange for needed discharge resources and transportation as appropriate  - Identify discharge learning needs (meds, wound care, etc.)  - Arrange for interpretive services to assist at discharge as needed  - Refer to Case Management Department for coordinating discharge planning if the patient needs post-hospital services based on physician/advanced practitioner order or complex needs related to functional status, cognitive ability, or social support system  Outcome: Progressing Problem: Knowledge Deficit  Goal: Patient/family/caregiver demonstrates understanding of disease process, treatment plan, medications, and discharge instructions  Description: Complete learning assessment and assess knowledge base.   Interventions:  - Provide teaching at level of understanding  - Provide teaching via preferred learning methods  Outcome: Progressing

## 2023-08-03 NOTE — ASSESSMENT & PLAN NOTE
· POA platelets 219 today, pressure review in September 2022 was noted unremarkable to 32  · Iron panel: Iron Sat: 14, TIBC: 182, iron: 26    Plan  Continue to monitor

## 2023-08-03 NOTE — ASSESSMENT & PLAN NOTE
· POA hemoglobin 8.1, pressure review hemoglobin back in September 2022 noted 10.1. · Unclear if history of melena, hematochezia  · Anemia unclear etiology at this time possibly secondary to iron deficiency given thrombocytosis although this could represent current setting of infection versus underlying malignancy? · Plan  · Check iron panel, folate, vitamin B12  · Monitor output   ·  CBC a.m.

## 2023-08-03 NOTE — ASSESSMENT & PLAN NOTE
· Continue home Aricept 5 mg nightly, olanzapine 2.5 twice daily, Namenda 10 mg twice daily  · Continue trazodone 50 mg at bedtime  · Delirium precaution  · Frequent reorientation  · Gerontology consulted appreciate recommendations  · Avoid restraints if able unless danger to himself or pralsetinib

## 2023-08-03 NOTE — ED PROVIDER NOTES
History  Chief Complaint   Patient presents with   • Shortness of Breath     Pt presents from [facility] with recent sob per EMS, blood sugar also in the 400's but pt is not known diabetic      27-year-old male sent in from nursing home for shortness of breath. Patient has a history of dementia but today they noticed his labored breathing as well as they checked a blood sugar and it was in the 400s. Patient has no history of diabetes. Patient is only oriented to self at baseline. History provided by:  Medical records, nursing home and EMS personnel  History limited by:  Dementia   used: No    Shortness of Breath  Severity:  Moderate  Onset quality:  Sudden  Duration:  12 hours  Timing:  Constant  Progression:  Unable to specify  Chronicity:  New  Ineffective treatments:  None tried  Associated symptoms: cough and fever    Associated symptoms: no abdominal pain, no chest pain, no headaches, no rash, no vomiting and no wheezing    Risk factors: tobacco use (Former heavy smoker according to family)        Prior to Admission Medications   Prescriptions Last Dose Informant Patient Reported? Taking? OLANZapine (ZyPREXA) 2.5 mg tablet   Yes No   Sig: Take 2.5 mg by mouth 2 (two) times a day   acetaminophen (TYLENOL) 325 mg tablet   Yes No   Sig: Take 650 mg by mouth every 6 (six) hours as needed   donepezil (ARICEPT) 5 mg tablet   Yes No   Sig: Take 5 mg by mouth daily at bedtime   memantine (NAMENDA) 10 mg tablet   Yes No   Sig: Take 10 mg by mouth 2 (two) times a day   traZODone (DESYREL) 50 mg tablet   Yes No   Sig: Take 25 mg by mouth daily at bedtime      Facility-Administered Medications: None       Past Medical History:   Diagnosis Date   • Late onset Alzheimer's dementia with behavioral disturbance ([address])    • Other hyperlipidemia        History reviewed. No pertinent surgical history. History reviewed. No pertinent family history.   I have reviewed and agree with the history as documented. E-Cigarette/Vaping     E-Cigarette/Vaping Substances   • Nicotine No    • Flavoring No      Social History     Tobacco Use   • Smoking status: Never   • Smokeless tobacco: Never   Substance Use Topics   • Alcohol use: Not Currently   • Drug use: Not Currently       Review of Systems   Constitutional: Positive for fever. Negative for activity change and appetite change. HENT: Negative for congestion and facial swelling. Eyes: Negative for discharge and redness. Respiratory: Positive for cough and shortness of breath. Negative for wheezing. Cardiovascular: Negative for chest pain and leg swelling. Gastrointestinal: Negative for abdominal distention, abdominal pain, blood in stool and vomiting. Endocrine: Negative for cold intolerance and polydipsia. Genitourinary: Negative for difficulty urinating and hematuria. Musculoskeletal: Negative for arthralgias and gait problem. Skin: Negative for color change and rash. Allergic/Immunologic: Negative for food allergies and immunocompromised state. Neurological: Negative for dizziness, seizures and headaches. Hematological: Negative for adenopathy. Does not bruise/bleed easily. Psychiatric/Behavioral: Negative for agitation, confusion and decreased concentration. All other systems reviewed and are negative. Physical Exam  Physical Exam  Vitals and nursing note reviewed. Constitutional:       Appearance: He is well-developed. He is not toxic-appearing. HENT:      Head: Normocephalic and atraumatic. Right Ear: Tympanic membrane normal.      Left Ear: Tympanic membrane normal.      Nose: Nose normal.   Eyes:      General: Lids are normal.      Conjunctiva/sclera: Conjunctivae normal.      Pupils: Pupils are equal, round, and reactive to light. Neck:      Vascular: No carotid bruit or JVD. Trachea: Trachea normal.   Cardiovascular:      Rate and Rhythm: Normal rate and regular rhythm. No extrasystoles are present. Heart sounds: Normal heart sounds. Pulmonary:      Effort: Pulmonary effort is normal.      Breath sounds: Examination of the right-upper field reveals rhonchi. Examination of the right-middle field reveals rhonchi. Examination of the right-lower field reveals rhonchi. Rhonchi present. No decreased breath sounds, wheezing or rales. Chest:      Chest wall: No deformity or tenderness. Abdominal:      General: Bowel sounds are normal.      Palpations: Abdomen is soft. Tenderness: There is no abdominal tenderness. There is no guarding or rebound. Musculoskeletal:      Right shoulder: No swelling, deformity or tenderness. Normal range of motion. Cervical back: Normal range of motion and neck supple. No deformity, tenderness or bony tenderness. Lymphadenopathy:      Cervical: No cervical adenopathy. Skin:     General: Skin is warm and dry. Neurological:      Mental Status: Mental status is at baseline. He is disoriented. Cranial Nerves: No cranial nerve deficit. Sensory: No sensory deficit. Deep Tendon Reflexes: Reflexes are normal and symmetric. Psychiatric:         Speech: He is noncommunicative. Cognition and Memory: Memory is impaired.          Vital Signs  ED Triage Vitals [08/03/23 1045]   Temperature Pulse Respirations Blood Pressure SpO2   99.1 °F (37.3 °C) 95 20 120/64 94 %      Temp Source Heart Rate Source Patient Position - Orthostatic VS BP Location FiO2 (%)   Axillary Monitor Sitting Right arm --      Pain Score       --           Vitals:    08/03/23 1045 08/03/23 1200 08/03/23 1230   BP: 120/64 134/63 143/73   Pulse: 95 89 88   Patient Position - Orthostatic VS: Sitting  Sitting         Visual Acuity      ED Medications  Medications   sodium chloride 0.9 % bolus 1,000 mL (0 mL Intravenous Stopped 8/3/23 1207)   sodium chloride 0.9 % bolus 1,000 mL (0 mL Intravenous Stopped 8/3/23 1222)     Followed by   sodium chloride 0.9 % bolus 1,000 mL (0 mL Intravenous Stopped 8/3/23 1253)   cefepime (MAXIPIME) 1,000 mg in dextrose 5 % 50 mL IVPB (0 mg Intravenous Stopped 8/3/23 1235)   insulin regular (HumuLIN R,NovoLIN R) injection 5 Units (5 Units Intravenous Given 8/3/23 1307)       Diagnostic Studies  Results Reviewed     Procedure Component Value Units Date/Time    Procalcitonin [[id number]]  (Abnormal) Collected: 08/03/23 1049    Lab Status: Final result Specimen: Blood from Arm, Left Updated: 08/03/23 1326     Procalcitonin 2.31 ng/ml     UA w Reflex to Microscopic w Reflex to Culture [[id number]]  (Abnormal) Collected: 08/03/23 1257    Lab Status: Final result Specimen: Urine, Clean Catch Updated: 08/03/23 1312     Color, UA Yellow     Clarity, UA Clear     Specific Gravity, UA 1.028     pH, UA 5.5     Leukocytes, UA Elevated glucose may cause decreased leukocyte values. See urine microscopic for UWBC result     Nitrite, UA Negative     Protein, UA 70 (1+) mg/dl      Glucose, UA >=1000 (1%) mg/dl      Ketones, UA Negative mg/dl      Urobilinogen, UA 2.0 mg/dl      Bilirubin, UA Negative     Occult Blood, UA Negative    Urine Microscopic [[id number]] Collected: 08/03/23 1257    Lab Status: In process Specimen: Urine, Clean Catch Updated: 08/03/23 1312    HS Troponin I 2hr [[id number]] Collected: 08/03/23 1304    Lab Status: In process Specimen: Blood from Arm, Right Updated: 08/03/23 1310    B-Type Natriuretic Peptide(BNP) [[id number]] Collected: 08/03/23 1049    Lab Status: In process Specimen: Blood from Arm, Left Updated: 08/03/23 1305    APTT [[id number]] Collected: 08/03/23 1049    Lab Status: In process Specimen: Blood from Arm, Left Updated: 08/03/23 624 [facility] [[id number]] Collected: 08/03/23 1049    Lab Status:  In process Specimen: Blood from Arm, Left Updated: 08/03/23 1302    Lactic acid [[id number]]  (Abnormal) Collected: 08/03/23 1131    Lab Status: Final result Specimen: Blood from Arm, Left Updated: 08/03/23 1218     LACTIC ACID 3.2 mmol/L     Narrative: Result may be elevated if tourniquet was used during collection. Lactic acid 2 Hours [[id number]]     Lab Status: No result Specimen: Blood     FLU/RSV/COVID - if FLU/RSV clinically relevant [[id number]]  (Normal) Collected: 08/03/23 1109    Lab Status: Final result Specimen: Nares from Nose Updated: 08/03/23 1210     SARS-CoV-2 Negative     INFLUENZA A PCR Negative     INFLUENZA B PCR Negative     RSV PCR Negative    Narrative:      FOR PEDIATRIC PATIENTS - copy/paste COVID Guidelines URL to browser: [email or web address]    SARS-CoV-2 assay is a Nucleic Acid Amplification assay intended for the  qualitative detection of nucleic acid from SARS-CoV-2 in nasopharyngeal  swabs. Results are for the presumptive identification of SARS-CoV-2 RNA. Positive results are indicative of infection with SARS-CoV-2, the virus  causing COVID-19, but do not rule out bacterial infection or co-infection  with other viruses. Laboratories within the [facility] and its  territories are required to report all positive results to the appropriate  public health authorities. Negative results do not preclude SARS-CoV-2  infection and should not be used as the sole basis for treatment or other  patient management decisions. Negative results must be combined with  clinical observations, patient history, and epidemiological information. This test has not been FDA cleared or approved. This test has been authorized by FDA under an Emergency Use Authorization  (EUA). This test is only authorized for the duration of time the  declaration that circumstances exist justifying the authorization of the  emergency use of an in vitro diagnostic tests for detection of SARS-CoV-2  virus and/or diagnosis of COVID-19 infection under section 564(b)(1) of  the Act, 21 U. S.C. 029VWP-1(A)(3), unless the authorization is terminated  or revoked sooner.  The test has been validated but independent review by FDA  and CLIA is pending. Test performed using [employer] [facility] GeneXpert: This RT-PCR assay targets N2,  a region unique to SARS-CoV-2. A conserved region in the E-gene was chosen  for pan-Sarbecovirus detection which includes SARS-CoV-2. According to CMS-2020-01-R, this platform meets the definition of high-throughput technology. Blood culture #2 [[id number]] Collected: 08/03/23 1201    Lab Status:  In process Specimen: Blood from Arm, Right Updated: 08/03/23 1205    HS Troponin I 4hr [[id number]]     Lab Status: No result Specimen: Blood     Comprehensive metabolic panel [[id number]]  (Abnormal) Collected: 08/03/23 1049    Lab Status: Final result Specimen: Blood from Arm, Left Updated: 08/03/23 1200     Sodium 142 mmol/L      Potassium 3.7 mmol/L      Chloride 105 mmol/L      CO2 25 mmol/L      ANION GAP 12 mmol/L      BUN 35 mg/dL      Creatinine 1.29 mg/dL      Glucose 419 mg/dL      Calcium 9.2 mg/dL      Corrected Calcium 9.8 mg/dL      AST 7 U/L      ALT 6 U/L      Alkaline Phosphatase 131 U/L      Total Protein 7.1 g/dL      Albumin 3.2 g/dL      Total Bilirubin 0.79 mg/dL      eGFR 52 ml/min/1.73sq m     Narrative:      [place] guidelines for Chronic Kidney Disease (CKD):   •  Stage 1 with normal or high GFR (GFR > 90 mL/min/1.73 square meters)  •  Stage 2 Mild CKD (GFR = 60-89 mL/min/1.73 square meters)  •  Stage 3A Moderate CKD (GFR = 45-59 mL/min/1.73 square meters)  •  Stage 3B Moderate CKD (GFR = 30-44 mL/min/1.73 square meters)  •  Stage 4 Severe CKD (GFR = 15-29 mL/min/1.73 square meters)  •  Stage 5 End Stage CKD (GFR <15 mL/min/1.73 square meters)  Note: GFR calculation is accurate only with a steady state creatinine    HS Troponin 0hr (reflex protocol) [[id number]]  (Normal) Collected: 08/03/23 1049    Lab Status: Final result Specimen: Blood from Arm, Left Updated: 08/03/23 1159     hs TnI 0hr 12.7 ng/L     CBC and differential [[id number]]  (Abnormal) Collected: 08/03/23 1049    Lab Status: Final result Specimen: Blood from Arm, Left Updated: 08/03/23 1153     WBC 27.00 Thousand/uL      RBC 2.70 Million/uL      Hemoglobin 8.1 g/dL      Hematocrit 26.1 %      MCV 97 fL      MCH 30.0 pg      MCHC 31.0 g/dL      RDW 13.9 %      MPV 9.6 fL      Platelets 308 Thousands/uL      nRBC 0 /100 WBCs      Neutrophils Relative 91 %      Immat GRANS % 1 %      Lymphocytes Relative 4 %      Monocytes Relative 4 %      Eosinophils Relative 0 %      Basophils Relative 0 %      Neutrophils Absolute 24.58 Thousands/µL      Immature Grans Absolute 0.33 Thousand/uL      Lymphocytes Absolute 0.99 Thousands/µL      Monocytes Absolute 1.07 Thousand/µL      Eosinophils Absolute 0.00 Thousand/µL      Basophils Absolute 0.03 Thousands/µL     Narrative: This is an appended report. These results have been appended to a previously verified report. Smear Review(Phlebs Do Not Order) [[id number]]  (Abnormal) Collected: 08/03/23 1049    Lab Status: Final result Specimen: Blood from Arm, Left Updated: 08/03/23 1153     RBC Morphology Present     Platelet Estimate Increased     Anisocytosis Present     Hypochromia Present    Beta Hydroxybutyrate [[id number]]  (Normal) Collected: 08/03/23 1109    Lab Status: Final result Specimen: Blood from Arm, Left Updated: 08/03/23 1147     BETA-HYDROXYBUTYRATE 0.1 mmol/L     Blood culture #1 [[id number]] Collected: 08/03/23 1131    Lab Status:  In process Specimen: Blood from Arm, Left Updated: 08/03/23 1138    Blood gas, venous [[id number]]     Lab Status: No result Specimen: Blood     Fingerstick Glucose (POCT) [[id number]]  (Abnormal) Collected: 08/03/23 1054    Lab Status: Final result Updated: 08/03/23 1055     POC Glucose 428 mg/dl                  XR chest 1 view portable    (Results Pending)              Procedures  ECG 12 Lead Documentation Only    Date/Time: 8/3/2023 10:56 AM    Performed by: Christopher Haq DO  Authorized by: Christopher Haq DO Patient location:  ED  Rate:     ECG rate:  90  Rhythm:     Rhythm: sinus rhythm    T waves:     T waves: non-specific      CriticalCare Time    Date/Time: 8/3/2023 1:00 PM    Performed by: Sharon Ayala DO  Authorized by: Sharon Ayala DO    Critical care provider statement:     Critical care time (minutes):  33    Critical care was necessary to treat or prevent imminent or life-threatening deterioration of the following conditions:  Sepsis    Critical care was time spent personally by me on the following activities:  Blood draw for specimens, obtaining history from patient or surrogate, development of treatment plan with patient or surrogate, evaluation of patient's response to treatment, examination of patient, review of old charts, re-evaluation of patient's condition, ordering and review of radiographic studies, ordering and review of laboratory studies, ordering and performing treatments and interventions and interpretation of cardiac output measurements             ED Course                                             Medical Decision Making  Differential diagnosis includes but is not limited to pneumonia, sepsis, acute bronchitis viral illness such as COVID flu RSV less likely CHF or COPD exacerbation    Dyspnea: acute illness or injury  Pneumonia: acute illness or injury  Sepsis [facility]): acute illness or injury  Amount and/or Complexity of Data Reviewed  Independent Historian: guardian     Details: Sister Tong Jacobo and brother-in-law Carmela Funez are Power of  they state that his wife had  several years ago and he is only gotten sicker in that time. They recently moved him to the nursing home from [facility]. States that he was a long to heavy duty smoker but he did not go to the doctor they are unsure if he really has any other medical problems. They did discuss CODE STATUS with me and would like him to be in DNR/DNI. External Data Reviewed: notes.      Details: Reviewed nursing home notes from this morning. They were concerned about his hypoxia and slurred speech I do not appreciate any slurred speech. Did discuss with family and they said although he is less responsive than usual well they do not feel like his speech is any different than his baseline. I did not pursue a CT of his head I felt that because of his pneumonia and sepsis that was the reason for his decreased alertness  Labs: ordered. Decision-making details documented in ED Course. Radiology: ordered and independent interpretation performed. Details: Large right-sided pneumonia  ECG/medicine tests: ordered and independent interpretation performed. Decision-making details documented in ED Course. Risk  OTC drugs. Prescription drug management. Decision regarding hospitalization. Risk Details: Patient will be admitted inpatient to the internal medicine service. Discussed with internal medicine team and they are agreement with the plan        Disposition  Final diagnoses:   Pneumonia   Sepsis ([address])   Dyspnea     Time reflects when diagnosis was documented in both MDM as applicable and the Disposition within this note     Time User Action Codes Description Comment    8/3/2023  1:06 PM Micheal Toussaint Add [J18.9] Pneumonia     8/3/2023  1:06 PM Turock, Cheryle Calix K Add [A41.9] Sepsis ([address])     8/3/2023  1:07 PM Micheal Toussaint Add [R06.00] Dyspnea       ED Disposition     ED Disposition   Admit    Condition   Stable    Date/Time   Thu Aug 3, 2023  1:06 PM    Comment   Case was discussed with dr Stanislav Soares and the patient's admission status was agreed to be Admission Status: inpatient status to the service of Dr. Stanislav Soares . Follow-up Information    None         Patient's Medications   Discharge Prescriptions    No medications on file       No discharge procedures on file.     PDMP Review     None          ED Provider  Electronically Signed by           Gregg Wright DO  08/03/23 [address] [place] [address] Dr DO  08/03/23 1320

## 2023-08-03 NOTE — ASSESSMENT & PLAN NOTE
· AAOx1- minimally verbal- clear speech at baseline- ambulates with a rolling walker- sometimes without device- needs reminders   · Staff noted change in mental status today- hypoxia, tachycardia and slurred speech   · Concern for TIA vs UTI vs Progressive dementia   · Staff state they have noticed weight loss and decreased po intake over the past 2-3 months     · Continue Aricept 5 mg q.h.s. · Continue Namenda 10 mg b.i.d.  · Continue olanzapine 2.5 mg b.i.d.     Provide redirection, reorientation, distraction techniques  Fall Precautions  Assist with ADLs/IADLs  Avoid deliriogenic medications such as tramadol, benzodiazepines, anticholinergics, benadryl  Encourage Hydration/ Nutrition  Implement sleep hygiene, limit night time interuptions   Encourage participation in group activities when appropriate

## 2023-08-03 NOTE — LETTER
Thank you for allowing us to participate in the care of your patient, Jin Sumner, who was hospitalized from [unfilled] through 8/19/2023 with the admitting diagnosis of Streptococcus empyema. Patient received IV antibiotics and had chest tube placed. Patient discharged on amoxicillin to complete 3 weeks duration of antibiotics after removal of chest tube. Patient will be going back to [facility] on hospice care. If you have any additional questions or would like to discuss further, please feel free to contact me.     Sophia Alexander MD  [facility] Internal Medicine, Hospitalist  [phone]

## 2023-08-03 NOTE — PROGRESS NOTES
[address]  [phone]  FACILITY: [facility]   Code 13  ACUTE VISIT    Assessment/Plan:    1. Hypoxia  Assessment & Plan:  · Nursing noted patient was "not himself" Found to be hypoxic with O2 88% on room air, later dropped to 79% after he took his O2 off. · Upon assessment patient was Tachycardic, Pale and with new onset slurred speech. · Full Code- will sent to ED for eval       2. Slurred speech  Assessment & Plan:  · New onset slurred speech   · At baseline his speech is clear and coherent- he does have dementia and is a poor historian however he does answer questions appropriately and able to make his needs known at baseline  · Need to r/o TIA vs UTi in setting of dementia   · Does have hx of HLD as well   · Full Code - Requested to send to Ed for eval      3. Late onset Alzheimer's dementia with behavioral disturbance ([address])  Assessment & Plan:  · AAOx1- minimally verbal- clear speech at baseline- ambulates with a rolling walker- sometimes without device- needs reminders   · Staff noted change in mental status today- hypoxia, tachycardia and slurred speech   · Concern for TIA vs UTI vs Progressive dementia   · Staff state they have noticed weight loss and decreased po intake over the past 2-3 months     · Continue Aricept 5 mg q.h.s. · Continue Namenda 10 mg b.i.d.  · Continue olanzapine 2.5 mg b.i.d. Provide redirection, reorientation, distraction techniques  Fall Precautions  Assist with ADLs/IADLs  Avoid deliriogenic medications such as tramadol, benzodiazepines, anticholinergics, benadryl  Encourage Hydration/ Nutrition  Implement sleep hygiene, limit night time interuptions   Encourage participation in group activities when appropriate              Subjective:      Patient ID: Madie Post is a 82 y.o. male. CODE STATUS: CPR   Patient is a  LTC resident at [address] [place] [address] Rd.    Seen and evaluated at bedside today for Acute visit per request of nursing for c/o hypoxia. PAST MEDICAL HISTORY:  Past Medical History:   Diagnosis Date   • Late onset Alzheimer's dementia with behavioral disturbance ([address])    • Other hyperlipidemia      History reviewed. No pertinent surgical history. Razia Zamora is a 79-year-old male, he is a LTC resident of  [address] [place] [address] since September of 2022 for Alzheimer's dementia. Patient has past medical history including but not limited to dementia, insomnia, depression, hyperlipidemia. Patient is seen today for Acute visit. He is a poor historian due to his Alzheimer's. Nursing noted patient was not himself, he was noted to have elevated heart rate, appeared short of breath, and pale for 2 days. On exam today patient is hypoxic, tachycardic and has new onset slurred speech. At baseline his speech is clear, he is able to make his needs known, he is AAO to self only. Staff state he has had a slow decline over the past few months, eating less, needing more assistance. He appears weak and frail on exam. Recent labs on 7/21/23 unremarkable, Renal function WNL, No leukocytosis, TSH WNL, CBC wnl excpet Hgb 10.8. He is a full code and will send to ED for eval.          Review of Systems   Unable to perform ROS: Dementia         Objective:    VITALS:   Vitals:    08/03/23 1155   BP: 100/66   Pulse: (!) 108   Resp: 16   Temp: 97.5 °F (36.4 °C)   SpO2: (!) 88%          Physical Exam  Vitals and nursing note reviewed. Constitutional:       General: He is not in acute distress. Appearance: He is cachectic. He is ill-appearing. HENT:      Head: Normocephalic and atraumatic. Nose: No congestion or rhinorrhea. Mouth/Throat:      Mouth: Mucous membranes are moist.   Eyes:      General: No scleral icterus. Extraocular Movements: Extraocular movements intact. Conjunctiva/sclera: Conjunctivae normal.      Pupils: Pupils are equal, round, and reactive to light. Cardiovascular:      Rate and Rhythm: Regular rhythm. Tachycardia present. Pulses: Normal pulses. Heart sounds: Normal heart sounds. No murmur heard. Pulmonary:      Effort: Pulmonary effort is normal.      Breath sounds: Normal breath sounds. No wheezing, rhonchi or rales. Abdominal:      General: Bowel sounds are normal. There is no distension. Palpations: Abdomen is soft. Tenderness: There is no abdominal tenderness. Musculoskeletal:         General: No swelling or signs of injury. Lymphadenopathy:      Cervical: No cervical adenopathy. Skin:     General: Skin is warm and dry. Findings: No erythema. Neurological:      Mental Status: He is oriented to person, place, and time. He is lethargic. GCS: GCS eye subscore is 3. GCS verbal subscore is 2. GCS motor subscore is 5. Sensory: No sensory deficit. Motor: Weakness present. Gait: Gait abnormal.   Psychiatric:         Attention and Perception: He is inattentive. Mood and Affect: Mood normal.         Speech: Speech is delayed and slurred. Behavior: Behavior is slowed. Cognition and Memory: Cognition is impaired. Memory is impaired. No current facility-administered medications for this visit.     Current Outpatient Medications:   •  acetaminophen (TYLENOL) 325 mg tablet, Take 650 mg by mouth every 6 (six) hours as needed, Disp: , Rfl:   •  donepezil (ARICEPT) 5 mg tablet, Take 5 mg by mouth daily at bedtime, Disp: , Rfl:   •  memantine (NAMENDA) 10 mg tablet, Take 10 mg by mouth 2 (two) times a day, Disp: , Rfl:   •  OLANZapine (ZyPREXA) 2.5 mg tablet, Take 2.5 mg by mouth 2 (two) times a day, Disp: , Rfl:   •  traZODone (DESYREL) 50 mg tablet, Take 25 mg by mouth daily at bedtime, Disp: , Rfl:     Facility-Administered Medications Ordered in Other Visits:   •  cefepime (MAXIPIME) 1,000 mg in dextrose 5 % 50 mL IVPB, 1,000 mg, Intravenous, Once, TEREZA Dorman,   •  sodium chloride 0.9 % bolus 1,000 mL, 1,000 mL, Intravenous, Once, Delvis Dorman, DO, Last Rate: 1,000 mL/hr at 08/03/23 1107, 1,000 mL at 08/03/23 1107  •  sodium chloride 0.9 % bolus 1,000 mL, 1,000 mL, Intravenous, Once, Last Rate: 2,000 mL/hr at 08/03/23 1152, 1,000 mL at 08/03/23 1152 **FOLLOWED BY** sodium chloride 0.9 % bolus 1,000 mL, 1,000 mL, Intravenous, Once, Leah Mason,       Please note:  Voice-recognition software may have been used in the preparation of this document. Occasional wrong word or "sound-alike" substitutions may have occurred due to the inherent limitations of voice recognition software. Interpretation should be guided by context.        ALEXANDRA Ch  08/03/23  11:55 AM

## 2023-08-03 NOTE — ASSESSMENT & PLAN NOTE
· Noted with shortness of breath, hypoxic saturating 79% prior to admission  · Upon admission saturating well on 2 L nasal cannula for 94% however desaturates without oxygen supplementation.   · Continue antibiotic treatment as above  · Respiratory protocol  · Oxygen supplementation keep O2> 88%, wean as able

## 2023-08-03 NOTE — ASSESSMENT & PLAN NOTE
· New onset slurred speech   · At baseline his speech is clear and coherent- he does have dementia and is a poor historian however he does answer questions appropriately and able to make his needs known at baseline  · Need to r/o TIA vs UTi in setting of dementia   · Does have hx of HLD as well   · Full Code - Requested to send to Ed for eval

## 2023-08-03 NOTE — CONSULTS
Consultation - Geriatric Medicine   Emily Her 82 y.o. male MRN: [id number]  Unit/Bed#:  W -01 Encounter: [id number]      Assessment/Plan   Late onset Alzheimer's dementia with behavioral disturbance with altered mental status  · Patient had altered mental status and slurred speech at facility  · Patient minimally responsive on exam  · Speech remains slurred on exam-recommend CT of head, [employer] text admitting resident Dr. Graff Salts regarding concerns  · Alzheimer's dementia progressive  · Takes Aricept 5 mg nightly, olanzapine 5 mg daily, and Namenda 10 mg twice daily  · Takes trazodone 50 mg nightly for insomnia  · Would hold on medications like olanzapine and trazodone at this time due to altered mental status  Patient is alert oriented to person  At risk for delirium due to hospitalization, sepsis, medications  Recommend delirium precautions  Maintain sleep-wake cycle, avoid nighttime interruptions  minimize overnight interruptions, group overnight vitals/labs/nursing checks as possible  dim lights, close blinds and turn off tv to minimize stimulation and encourage sleep environment in evenings  Provide adequate pain control  Avoid urinary retention and constipation  Provide frequent and early mobilization  Provide frequent redirection and reorientation as needed  Avoid medications that may worsen or precipitate delirium such as tramadol, benzodiazepines, anticholinergics, and Benadryl  Redirect unwanted behaviors as first-line therapy, avoid physical restraints as able to  · Continue to monitor    Severe sepsis  · Presented from [address] [place] [address] with hypoxia and altered mental status  · Met sepsis protocol with leukocytosis of 27, ANC 24.58, tachycardia, and elevated lactic acid of 3.2  · Procalcitonin 2.31  · Received 3l of fluid in the ER  · Felt that severe sepsis likely secondary to pneumonia  · Started on ceftriaxone  · MRSA, Legionella urine, strep pneumonia urine, blood cultures pending  · Continue to monitor vital signs  · Management per primary team    Anemia  · Hemoglobin today 8.1 hematocrit 26.1-previous from 9/7/2022 was 10.0 and 30.9  · Iron panel, folate, vitamin B12 pending  · Continue to monitor CBC    Depression/anxiety  · Chronic  · Previously on Paxil which was then weaned off and patient was doing well at nursing facility  · Currently on olanzapine 5 mg nightly-would hold for now  · Continue to provide emotional support and    Insomnia  First-line treatment is behavior modification  Maintain sleep-wake cycle, avoid nighttime interruptions  Avoid caffeine throughout the day  Avoid napping throughout the day  Encourage physical activity throughout the day  · Avoid sedative hypnotics including benzodiazepines and benadryl  · Chronic-was maintained on trazodone 50 mg nightly at nursing facility  · Would hold on trazodone at this time given patient's altered mental status and lethargy    Vision impairment  Patient does have vision impairment  Wears glasses at baseline   Recommend use of corrective lenses at all appropriate times  Encourage adequate lighting and encourage use of assistance with ambulation  Keep personal belongings close to avoid reaching  Encourage appropriate footwear at all times  Recommend large font for printed materials provided to patient    Frailty  Clinical Frail Scale: 7 living with severe frailty  Total protein 7.1 and albumin 3.2  Encourage adequate hydration and nutrition, including protein in meals  OOB as tolerated  PT/OT consulted  · Encourage early and frequent mobilization    Ambulatory dysfunction  At a baseline ambulates without AD  PT/OT following  Fall precautions  Out of bed as tolerated  Encourage early and frequent mobilization  Encourage adequate hydration and nutrition  Provide adequate pain management   Goal is undetermined at this time  · Continue with PT/OT for continued strength and balance training       Home medication review  Tylenol 975 mg every 8   donezepil 5 mg nightly  memantine 10 mg twice daily  Olanzapine 5 mg daily at 5 PM  Trazodone 50 mg at 8 PM    Personally confirmed with medication list sent from [facility]    History of Present Illness   Physician Requesting Consult: Andrzej Jamil MD  Reason for Consult / Principal Problem: Late onset Alzheimer's dementia with behavioral disturbance  Hx and PE limited by: Alzheimer's dementia  Additional history obtained from: Chart review and patient evaluation      HPI: Bladimir Stephenson is a 82y.o. year old male who presents with history of Alzheimer dementia, anemia, depression, anxiety, and insomnia. He presented from [address] [place] [address] with hypoxia and altered mental status with slurred speech. He resides at [address] [place] [address]. He moves without any assistive devices. No recent falls. He requires assistance for all ADLs and IADLs. He uses eyeglasses and upper dentures. He has ongoing issues with anxiety, depression, and insomnia-although per nursing staff well controlled on current medications. His appetite has been stable, no recent appetite loss or weight loss. He is incontinent of both bowel and bladder. He does not drive. He has ongoing issues with memory loss. Information provided by nurse at [address] [place] [address]. Upon examination patient was lying in bed, resting. He was very sleepy on exam, would only respond yes when I said his name. Did not want to answer any other questions. Nursing reports he has been very lethargic since arriving to the floor with minimally responsive. When they tried to roll and reposition him he did yell at them. Speech does remain slurred. Consults    Review of Systems   Unable to perform ROS: Mental status change (dementia and AMS)       Historical Information   Past Medical History:   Diagnosis Date   • Late onset Alzheimer's dementia with behavioral disturbance ([address])    • Other hyperlipidemia      History reviewed.  No pertinent surgical history. Social History   Social History     Substance and Sexual Activity   Alcohol Use Not Currently     Social History     Substance and Sexual Activity   Drug Use Not Currently     Social History     Tobacco Use   Smoking Status Never   Smokeless Tobacco Never     Family History: History reviewed. No pertinent family history. Meds/Allergies   all current active meds have been reviewed    No Known Allergies    Objective     Intake/Output Summary (Last 24 hours) at 8/3/2023 1636  Last data filed at 8/3/2023 1253  Gross per 24 hour   Intake 3050 ml   Output --   Net 3050 ml     Invasive Devices     Peripheral Intravenous Line  Duration           Peripheral IV 08/03/23 Left Antecubital <1 day    Peripheral IV 08/03/23 Left Forearm <1 day                Physical Exam  Vitals reviewed. Constitutional:       General: He is not in acute distress. Appearance: He is well-developed. He is ill-appearing. Comments: Frail-appearing   HENT:      Head: Normocephalic and atraumatic. Cardiovascular:      Rate and Rhythm: Regular rhythm. Tachycardia present. Heart sounds: No murmur heard. Pulmonary:      Effort: Pulmonary effort is normal. No respiratory distress. Breath sounds: Normal breath sounds. Abdominal:      Palpations: Abdomen is soft. Tenderness: There is no abdominal tenderness. Musculoskeletal:         General: No swelling. Right lower leg: No edema. Left lower leg: No edema. Skin:     General: Skin is warm and dry. Neurological:      Mental Status: He is alert. Mental status is at baseline. Cranial Nerves: No cranial nerve deficit. Motor: Weakness present.       Gait: Gait abnormal.      Comments: Unable to assess orientation         Lab Results:   I have personally reviewed pertinent lab results including the following:  -Lactic acid, VBG, troponin, UA,    I have personally reviewed the following imaging study reports in PACS:  -Chest x-ray    Therapies: PT: Not consulted  OT: Not consulted    VTE Prophylaxis: Sequential compression device (Venodyne)  and Enoxaparin (Lovenox)    Code Status: Level 3 - DNAR and DNI  Advance Directive and Living Will:      Power of :  yes  POLST:      Family and Social Support:   Living arrangements: [facility]  Support system: Family  Assistance needed: Yes    Goals of Care: Undetermined    Please note:  Voice-recognition software may have been used in the preparation of this document. Occasional wrong word or "sound-alike" substitutions may have occurred due to the inherent limitations of voice recognition software. Interpretation should be guided by context.

## 2023-08-03 NOTE — ASSESSMENT & PLAN NOTE
· POA noted elevated glucose 419, no prior history documented of diabetes.   Received 5 units regular insulin  · Beta hydroxybutyrate and unremarkable, UA significant for glucosuria  · Check hemoglobin A1c:7      Plan  · Continue carbohydrate controlled diet  · Continue Lantus 10 units at bedtime  · Continue Humalog 5 units 3 times daily  · Sliding scale  · Hypoglycemia protocol  · Glucose check per protocol

## 2023-08-03 NOTE — ASSESSMENT & PLAN NOTE
· POA hemoglobin 8.0, pressure review hemoglobin back in September 2022 noted 10.1. · Unclear if history of melena, hematochezia  · Anemia unclear etiology at this time possibly secondary to iron deficiency given thrombocytosis although this could represent current setting of infection versus underlying malignancy? Check iron panel: Ferritin 714, Iron Sat: 14, TIBC: 182, Iron: 26  folate, vitamin B12 WNL     Plan  · Monitor output and consider transfusion if less than 7  ·  CBC a.m.

## 2023-08-03 NOTE — ASSESSMENT & PLAN NOTE
· Home meds Aricept 5 mg nightly, olanzapine 5 mg daily, Namenda 10 mg twice daily, trazodone 50 mg at bedtime  · Holding trazodone 50 mg at bedtime, Zyprexa 5 mg  · Delirium precaution  · Frequent reorientation  · Gerontology consulted appreciate recommendations  · Avoid restraints if able unless danger to himself or pralsetinib  · If combative consider 2.5 mg Zyprexa as needed

## 2023-08-04 ENCOUNTER — APPOINTMENT (INPATIENT)
Dept: RADIOLOGY | Facility: HOSPITAL | Age: 83
DRG: 871 | End: 2023-08-04
Payer: COMMERCIAL

## 2023-08-04 PROBLEM — N39.0 URINARY TRACT INFECTION: Status: RESOLVED | Noted: 2023-08-04 | Resolved: 2023-08-04

## 2023-08-04 PROBLEM — N39.0 URINARY TRACT INFECTION: Status: ACTIVE | Noted: 2023-08-04

## 2023-08-04 LAB
ANION GAP SERPL CALCULATED.3IONS-SCNC: 11 MMOL/L
ANION GAP SERPL CALCULATED.3IONS-SCNC: 9 MMOL/L
ATRIAL RATE: 92 BPM
BASOPHILS # BLD AUTO: 0.03 THOUSANDS/ÂΜL (ref 0–0.1)
BASOPHILS # BLD AUTO: 0.05 THOUSANDS/ÂΜL (ref 0–0.1)
BASOPHILS NFR BLD AUTO: 0 % (ref 0–1)
BASOPHILS NFR BLD AUTO: 0 % (ref 0–1)
BUN SERPL-MCNC: 22 MG/DL (ref 5–25)
BUN SERPL-MCNC: 23 MG/DL (ref 5–25)
CALCIUM SERPL-MCNC: 8.3 MG/DL (ref 8.4–10.2)
CALCIUM SERPL-MCNC: 8.7 MG/DL (ref 8.4–10.2)
CHLORIDE SERPL-SCNC: 114 MMOL/L (ref 96–108)
CHLORIDE SERPL-SCNC: 114 MMOL/L (ref 96–108)
CO2 SERPL-SCNC: 23 MMOL/L (ref 21–32)
CO2 SERPL-SCNC: 23 MMOL/L (ref 21–32)
CREAT SERPL-MCNC: 0.95 MG/DL (ref 0.6–1.3)
CREAT SERPL-MCNC: 1.07 MG/DL (ref 0.6–1.3)
EOSINOPHIL # BLD AUTO: 0.01 THOUSAND/ÂΜL (ref 0–0.61)
EOSINOPHIL # BLD AUTO: 0.02 THOUSAND/ÂΜL (ref 0–0.61)
EOSINOPHIL NFR BLD AUTO: 0 % (ref 0–6)
EOSINOPHIL NFR BLD AUTO: 0 % (ref 0–6)
ERYTHROCYTE [DISTWIDTH] IN BLOOD BY AUTOMATED COUNT: 14.1 % (ref 11.6–15.1)
ERYTHROCYTE [DISTWIDTH] IN BLOOD BY AUTOMATED COUNT: 14.2 % (ref 11.6–15.1)
GFR SERPL CREATININE-BSD FRML MDRD: 65 ML/MIN/1.73SQ M
GFR SERPL CREATININE-BSD FRML MDRD: 75 ML/MIN/1.73SQ M
GLUCOSE FLD-MCNC: 22 MG/DL
GLUCOSE SERPL-MCNC: 119 MG/DL (ref 65–140)
GLUCOSE SERPL-MCNC: 158 MG/DL (ref 65–140)
GLUCOSE SERPL-MCNC: 161 MG/DL (ref 65–140)
GLUCOSE SERPL-MCNC: 164 MG/DL (ref 65–140)
GLUCOSE SERPL-MCNC: 180 MG/DL (ref 65–140)
GLUCOSE SERPL-MCNC: 207 MG/DL (ref 65–140)
HCT VFR BLD AUTO: 20.6 % (ref 36.5–49.3)
HCT VFR BLD AUTO: 25.9 % (ref 36.5–49.3)
HGB BLD-MCNC: 6.2 G/DL (ref 12–17)
HGB BLD-MCNC: 8 G/DL (ref 12–17)
IMM GRANULOCYTES # BLD AUTO: 0.31 THOUSAND/UL (ref 0–0.2)
IMM GRANULOCYTES # BLD AUTO: >0.5 THOUSAND/UL (ref 0–0.2)
IMM GRANULOCYTES NFR BLD AUTO: 2 % (ref 0–2)
IMM GRANULOCYTES NFR BLD AUTO: 2 % (ref 0–2)
L PNEUMO1 AG UR QL IA.RAPID: NEGATIVE
LDH FLD L TO P-CCNC: 1560 U/L
LYMPHOCYTES # BLD AUTO: 1.52 THOUSANDS/ÂΜL (ref 0.6–4.47)
LYMPHOCYTES # BLD AUTO: 1.56 THOUSANDS/ÂΜL (ref 0.6–4.47)
LYMPHOCYTES NFR BLD AUTO: 6 % (ref 14–44)
LYMPHOCYTES NFR BLD AUTO: 9 % (ref 14–44)
MAGNESIUM SERPL-MCNC: 1.7 MG/DL (ref 1.9–2.7)
MCH RBC QN AUTO: 29.5 PG (ref 26.8–34.3)
MCH RBC QN AUTO: 29.9 PG (ref 26.8–34.3)
MCHC RBC AUTO-ENTMCNC: 30.1 G/DL (ref 31.4–37.4)
MCHC RBC AUTO-ENTMCNC: 30.9 G/DL (ref 31.4–37.4)
MCV RBC AUTO: 97 FL (ref 82–98)
MCV RBC AUTO: 98 FL (ref 82–98)
MONOCYTES # BLD AUTO: 0.9 THOUSAND/ÂΜL (ref 0.17–1.22)
MONOCYTES # BLD AUTO: 0.92 THOUSAND/ÂΜL (ref 0.17–1.22)
MONOCYTES NFR BLD AUTO: 4 % (ref 4–12)
MONOCYTES NFR BLD AUTO: 5 % (ref 4–12)
NEUTROPHILS # BLD AUTO: 14.23 THOUSANDS/ÂΜL (ref 1.85–7.62)
NEUTROPHILS # BLD AUTO: 22.44 THOUSANDS/ÂΜL (ref 1.85–7.62)
NEUTS SEG NFR BLD AUTO: 84 % (ref 43–75)
NEUTS SEG NFR BLD AUTO: 88 % (ref 43–75)
NRBC BLD AUTO-RTO: 0 /100 WBCS
NRBC BLD AUTO-RTO: 0 /100 WBCS
P AXIS: 61 DEGREES
PH BODY FLUID: 6.9
PLATELET # BLD AUTO: 363 THOUSANDS/UL (ref 149–390)
PLATELET # BLD AUTO: 488 THOUSANDS/UL (ref 149–390)
PMV BLD AUTO: 9.5 FL (ref 8.9–12.7)
PMV BLD AUTO: 9.6 FL (ref 8.9–12.7)
POTASSIUM SERPL-SCNC: 3.2 MMOL/L (ref 3.5–5.3)
POTASSIUM SERPL-SCNC: 3.3 MMOL/L (ref 3.5–5.3)
PR INTERVAL: 124 MS
PROCALCITONIN SERPL-MCNC: 1.45 NG/ML
PROT FLD-MCNC: 4.9 G/DL
QRS AXIS: -43 DEGREES
QRSD INTERVAL: 96 MS
QT INTERVAL: 308 MS
QTC INTERVAL: 380 MS
RBC # BLD AUTO: 2.1 MILLION/UL (ref 3.88–5.62)
RBC # BLD AUTO: 2.68 MILLION/UL (ref 3.88–5.62)
SODIUM SERPL-SCNC: 146 MMOL/L (ref 135–147)
SODIUM SERPL-SCNC: 148 MMOL/L (ref 135–147)
T WAVE AXIS: 45 DEGREES
VENTRICULAR RATE: 92 BPM
WBC # BLD AUTO: 17.01 THOUSAND/UL (ref 4.31–10.16)
WBC # BLD AUTO: 25.53 THOUSAND/UL (ref 4.31–10.16)

## 2023-08-04 PROCEDURE — 82945 GLUCOSE OTHER FLUID: CPT | Performed by: INTERNAL MEDICINE

## 2023-08-04 PROCEDURE — 93005 ELECTROCARDIOGRAM TRACING: CPT

## 2023-08-04 PROCEDURE — 87077 CULTURE AEROBIC IDENTIFY: CPT | Performed by: RADIOLOGY

## 2023-08-04 PROCEDURE — 87181 SC STD AGAR DILUTION PER AGT: CPT | Performed by: RADIOLOGY

## 2023-08-04 PROCEDURE — 87075 CULTR BACTERIA EXCEPT BLOOD: CPT | Performed by: RADIOLOGY

## 2023-08-04 PROCEDURE — 86920 COMPATIBILITY TEST SPIN: CPT

## 2023-08-04 PROCEDURE — 84145 PROCALCITONIN (PCT): CPT

## 2023-08-04 PROCEDURE — 32557 INSERT CATH PLEURA W/ IMAGE: CPT | Performed by: RADIOLOGY

## 2023-08-04 PROCEDURE — 86900 BLOOD TYPING SEROLOGIC ABO: CPT

## 2023-08-04 PROCEDURE — 87205 SMEAR GRAM STAIN: CPT | Performed by: RADIOLOGY

## 2023-08-04 PROCEDURE — 99232 SBSQ HOSP IP/OBS MODERATE 35: CPT | Performed by: HOSPITALIST

## 2023-08-04 PROCEDURE — 93010 ELECTROCARDIOGRAM REPORT: CPT | Performed by: INTERNAL MEDICINE

## 2023-08-04 PROCEDURE — 80048 BASIC METABOLIC PNL TOTAL CA: CPT

## 2023-08-04 PROCEDURE — 83986 ASSAY PH BODY FLUID NOS: CPT | Performed by: INTERNAL MEDICINE

## 2023-08-04 PROCEDURE — 87070 CULTURE OTHR SPECIMN AEROBIC: CPT | Performed by: RADIOLOGY

## 2023-08-04 PROCEDURE — 86850 RBC ANTIBODY SCREEN: CPT

## 2023-08-04 PROCEDURE — 86901 BLOOD TYPING SEROLOGIC RH(D): CPT

## 2023-08-04 PROCEDURE — 85025 COMPLETE CBC W/AUTO DIFF WBC: CPT

## 2023-08-04 PROCEDURE — 82948 REAGENT STRIP/BLOOD GLUCOSE: CPT

## 2023-08-04 PROCEDURE — 83615 LACTATE (LD) (LDH) ENZYME: CPT | Performed by: INTERNAL MEDICINE

## 2023-08-04 PROCEDURE — 99223 1ST HOSP IP/OBS HIGH 75: CPT | Performed by: INTERNAL MEDICINE

## 2023-08-04 PROCEDURE — 32557 INSERT CATH PLEURA W/ IMAGE: CPT

## 2023-08-04 PROCEDURE — 92610 EVALUATE SWALLOWING FUNCTION: CPT

## 2023-08-04 PROCEDURE — 83735 ASSAY OF MAGNESIUM: CPT

## 2023-08-04 PROCEDURE — NC001 PR NO CHARGE: Performed by: RADIOLOGY

## 2023-08-04 PROCEDURE — 99233 SBSQ HOSP IP/OBS HIGH 50: CPT | Performed by: NURSE PRACTITIONER

## 2023-08-04 PROCEDURE — 87081 CULTURE SCREEN ONLY: CPT

## 2023-08-04 PROCEDURE — 84157 ASSAY OF PROTEIN OTHER: CPT | Performed by: INTERNAL MEDICINE

## 2023-08-04 PROCEDURE — C1769 GUIDE WIRE: HCPCS

## 2023-08-04 PROCEDURE — C1729 CATH, DRAINAGE: HCPCS

## 2023-08-04 RX ORDER — POTASSIUM CHLORIDE 14.9 MG/ML
20 INJECTION INTRAVENOUS
Status: COMPLETED | OUTPATIENT
Start: 2023-08-04 | End: 2023-08-04

## 2023-08-04 RX ORDER — POTASSIUM CHLORIDE 29.8 MG/ML
40 INJECTION INTRAVENOUS
Status: DISCONTINUED | OUTPATIENT
Start: 2023-08-04 | End: 2023-08-04

## 2023-08-04 RX ORDER — OLANZAPINE 10 MG/1
5 INJECTION, POWDER, LYOPHILIZED, FOR SOLUTION INTRAMUSCULAR ONCE
Status: DISCONTINUED | OUTPATIENT
Start: 2023-08-04 | End: 2023-08-15

## 2023-08-04 RX ORDER — DEXTROSE MONOHYDRATE 50 MG/ML
75 INJECTION, SOLUTION INTRAVENOUS CONTINUOUS
Status: DISCONTINUED | OUTPATIENT
Start: 2023-08-04 | End: 2023-08-08

## 2023-08-04 RX ORDER — LIDOCAINE WITH 8.4% SOD BICARB 0.9%(10ML)
SYRINGE (ML) INJECTION AS NEEDED
Status: COMPLETED | OUTPATIENT
Start: 2023-08-04 | End: 2023-08-04

## 2023-08-04 RX ORDER — POTASSIUM CHLORIDE 14.9 MG/ML
20 INJECTION INTRAVENOUS ONCE
Status: DISCONTINUED | OUTPATIENT
Start: 2023-08-04 | End: 2023-08-04

## 2023-08-04 RX ADMIN — STANDARDIZED SENNA CONCENTRATE 8.6 MG: 8.6 TABLET ORAL at 21:31

## 2023-08-04 RX ADMIN — Medication 7 ML: at 15:01

## 2023-08-04 RX ADMIN — ENOXAPARIN SODIUM 40 MG: 40 INJECTION SUBCUTANEOUS at 08:41

## 2023-08-04 RX ADMIN — MEMANTINE 10 MG: 10 TABLET ORAL at 08:42

## 2023-08-04 RX ADMIN — POTASSIUM CHLORIDE 20 MEQ: 14.9 INJECTION, SOLUTION INTRAVENOUS at 10:22

## 2023-08-04 RX ADMIN — INSULIN LISPRO 1 UNITS: 100 INJECTION, SOLUTION INTRAVENOUS; SUBCUTANEOUS at 08:31

## 2023-08-04 RX ADMIN — SODIUM CHLORIDE 3 G: 9 INJECTION, SOLUTION INTRAVENOUS at 13:22

## 2023-08-04 RX ADMIN — ACETAMINOPHEN 650 MG: 325 TABLET, FILM COATED ORAL at 16:11

## 2023-08-04 RX ADMIN — POTASSIUM CHLORIDE 20 MEQ: 14.9 INJECTION, SOLUTION INTRAVENOUS at 08:35

## 2023-08-04 RX ADMIN — DEXTROSE 75 ML/HR: 5 SOLUTION INTRAVENOUS at 10:21

## 2023-08-04 RX ADMIN — POTASSIUM PHOSPHATE, MONOBASIC AND POTASSIUM PHOSPHATE, DIBASIC 21 MMOL: 224; 236 INJECTION, SOLUTION, CONCENTRATE INTRAVENOUS at 21:40

## 2023-08-04 RX ADMIN — INSULIN LISPRO 5 UNITS: 100 INJECTION, SOLUTION INTRAVENOUS; SUBCUTANEOUS at 12:30

## 2023-08-04 RX ADMIN — SODIUM CHLORIDE 3 G: 9 INJECTION, SOLUTION INTRAVENOUS at 18:35

## 2023-08-04 RX ADMIN — GUAIFENESIN 1200 MG: 600 TABLET ORAL at 21:31

## 2023-08-04 RX ADMIN — DONEPEZIL HYDROCHLORIDE 5 MG: 5 TABLET ORAL at 21:31

## 2023-08-04 RX ADMIN — CEFEPIME 1000 MG: 1 INJECTION, POWDER, FOR SOLUTION INTRAMUSCULAR; INTRAVENOUS at 12:30

## 2023-08-04 RX ADMIN — GUAIFENESIN 1200 MG: 600 TABLET ORAL at 08:42

## 2023-08-04 RX ADMIN — MEMANTINE 10 MG: 10 TABLET ORAL at 18:35

## 2023-08-04 RX ADMIN — INSULIN LISPRO 5 UNITS: 100 INJECTION, SOLUTION INTRAVENOUS; SUBCUTANEOUS at 09:47

## 2023-08-04 RX ADMIN — INSULIN GLARGINE 10 UNITS: 100 INJECTION, SOLUTION SUBCUTANEOUS at 21:32

## 2023-08-04 RX ADMIN — INSULIN LISPRO 1 UNITS: 100 INJECTION, SOLUTION INTRAVENOUS; SUBCUTANEOUS at 17:27

## 2023-08-04 RX ADMIN — INSULIN LISPRO 5 UNITS: 100 INJECTION, SOLUTION INTRAVENOUS; SUBCUTANEOUS at 17:28

## 2023-08-04 NOTE — CONSULTS
Interventional Radiology  Consultation 8/4/2023     Consults  Rodger Self   1940   [id number]      Assessment/Plan:  82year old male with large right pleural effusion. IR consulted for right chest tube placement. Labs are appropriate, plan for US guided right chest tube placement. Medical Problems     Problem List     * (Principal) Severe sepsis ([address])    Depression    Late onset Alzheimer's dementia with behavioral disturbance ([address])    Other hyperlipidemia    Unsteady gait    Insomnia    Low BP    Hypoxia    Slurred speech    Hyperglycemia    Acute respiratory failure with hypoxia (HCC)    Anemia    Thrombocytosis    Encephalopathy acute    Ambulatory dysfunction            Subjective:     Patient ID: Rodger Self is a 82 y.o. male. History of Present Illness  82year old male with large right pleural effusion. IR consulted for right chest tube placement. Labs are appropriate, plan for US guided right chest tube placement. Review of Systems  as above    Past Medical History:   Diagnosis Date   • Late onset Alzheimer's dementia with behavioral disturbance ([address])    • Other hyperlipidemia         History reviewed. No pertinent surgical history.      Social History     Tobacco Use   Smoking Status Never   Smokeless Tobacco Never        Social History     Substance and Sexual Activity   Alcohol Use Not Currently        Social History     Substance and Sexual Activity   Drug Use Not Currently        No Known Allergies    Current Facility-Administered Medications   Medication Dose Route Frequency Provider Last Rate Last Admin   • acetaminophen (TYLENOL) tablet 650 mg  650 mg Oral Q6H PRN Kamaljit Che MD       • ampicillin-sulbactam (UNASYN) 3 g in sodium chloride 0.9 % 100 mL IVPB  3 g Intravenous Q6H Danitza Ward  mL/hr at 08/04/23 1322 3 g at 08/04/23 1322   • dextrose 5 % infusion  75 mL/hr Intravenous Continuous Parker Liu MD 75 mL/hr at 08/04/23 1021 75 mL/hr at 08/04/23 1021   • donepezil (ARICEPT) tablet 5 mg  5 mg Oral HS Antony Muniz MD   5 mg at 08/03/23 2114   • enoxaparin (LOVENOX) subcutaneous injection 40 mg  40 mg Subcutaneous Daily Antony Muniz MD   40 mg at 08/04/23 0841   • guaiFENesin (MUCINEX) 12 hr tablet 1,200 mg  1,200 mg Oral Q12H 3021 [facility] MD Kim   1,200 mg at 08/04/23 0884   • insulin glargine (LANTUS) subcutaneous injection 10 Units 0.1 mL  10 Units Subcutaneous HS Antony Muniz MD   10 Units at 08/03/23 2113   • insulin lispro (HumaLOG) 100 units/mL subcutaneous injection 1-5 Units  1-5 Units Subcutaneous TID [facility] Antony Muniz MD   1 Units at 08/04/23 0831   • insulin lispro (HumaLOG) 100 units/mL subcutaneous injection 5 Units  5 Units Subcutaneous TID With Meals Antony Muniz MD   5 Units at 08/04/23 1230   • memantine (NAMENDA) tablet 10 mg  10 mg Oral BID Antony Muniz MD   10 mg at 08/04/23 6365   • senna (SENOKOT) tablet 8.6 mg  1 tablet Oral HS Antony Muniz MD   8.6 mg at 08/03/23 2114          Objective:    Vitals:    08/04/23 0739 08/04/23 1011 08/04/23 1027 08/04/23 1028   BP: 130/67      BP Location:       Pulse: 89 101 67 (!) 106   Resp: 16      Temp: 98.3 °F (36.8 °C)   99.8 °F (37.7 °C)   TempSrc:       SpO2: (!) 89% 90% 98% 97%   Weight:       Height:            Physical Exam  Not performed    Lab Results   Component Value Date     (H) 08/03/2023      Lab Results   Component Value Date    WBC 25.53 (H) 08/04/2023    HGB 8.0 (L) 08/04/2023    HCT 25.9 (L) 08/04/2023    MCV 97 08/04/2023     (H) 08/04/2023     Lab Results   Component Value Date    INR 1.33 (H) 08/03/2023    PROTIME 16.7 (H) 08/03/2023     Lab Results   Component Value Date    PTT 41 (H) 08/03/2023         I have personally reviewed pertinent imaging and laboratory results.      Code Status: Level 3 - DNAR and DNI  Advance Directive and Living Will: Power of :    POLST:      IR has been consulted to evaluate the patient, determine the appropriate procedure, and whether or not a procedure can and should be performed. Thank you for allowing me to participate in the care of Kimberly Hightower. Please don't hesitate to call, text, email, or [employer] with any questions. This text is generated with voice recognition software. There may be translation, syntax,  or grammatical errors. If you have any questions, please contact the dictating provider.

## 2023-08-04 NOTE — PLAN OF CARE
Problem: MOBILITY - ADULT  Goal: Maintain or return to baseline ADL function  Description: INTERVENTIONS:  -  Assess patient's ability to carry out ADLs; assess patient's baseline for ADL function and identify physical deficits which impact ability to perform ADLs (bathing, care of mouth/teeth, toileting, grooming, dressing, etc.)  - Assess/evaluate cause of self-care deficits   - Assess range of motion  - Assess patient's mobility; develop plan if impaired  - Assess patient's need for assistive devices and provide as appropriate  - Encourage maximum independence but intervene and supervise when necessary  - Involve family in performance of ADLs  - Assess for home care needs following discharge   - Consider OT consult to assist with ADL evaluation and planning for discharge  - Provide patient education as appropriate  Outcome: Progressing     Problem: PAIN - ADULT  Goal: Verbalizes/displays adequate comfort level or baseline comfort level  Description: Interventions:  - Encourage patient to monitor pain and request assistance  - Assess pain using appropriate pain scale  - Administer analgesics based on type and severity of pain and evaluate response  - Implement non-pharmacological measures as appropriate and evaluate response  - Consider cultural and social influences on pain and pain management  - Notify physician/advanced practitioner if interventions unsuccessful or patient reports new pain  Outcome: Progressing     Problem: INFECTION - ADULT  Goal: Absence or prevention of progression during hospitalization  Description: INTERVENTIONS:  - Assess and monitor for signs and symptoms of infection  - Monitor lab/diagnostic results  - Monitor all insertion sites, i.e. indwelling lines, tubes, and drains  - Monitor endotracheal if appropriate and nasal secretions for changes in amount and color  - [place] appropriate cooling/warming therapies per order  - Administer medications as ordered  - Instruct and encourage patient and family to use good hand hygiene technique  - Identify and instruct in appropriate isolation precautions for identified infection/condition  Outcome: Progressing     Problem: SAFETY ADULT  Goal: Patient will remain free of falls  Description: INTERVENTIONS:  - Educate patient/family on patient safety including physical limitations  - Instruct patient to call for assistance with activity   - Consult OT/PT to assist with strengthening/mobility   - Keep Call bell within reach  - Keep bed low and locked with side rails adjusted as appropriate  - Keep care items and personal belongings within reach  - Initiate and maintain comfort rounds  - Make Fall Risk Sign visible to staff  - Offer Toileting every 2 Hours, in advance of need  - Initiate/Maintain bed/chair alarm  - Apply yellow socks and bracelet for high fall risk patients  - Consider moving patient to room near nurses station  Outcome: Progressing

## 2023-08-04 NOTE — PROGRESS NOTES
Progress Note - Geriatric Medicine   Justina Spicer 82 y.o. male MRN: [id number]  Unit/Bed#: W -01 Encounter: [id number]      Assessment/Plan:  Late onset Alzheimer's dementia with behavior disturbance  · History of Alzheimer's dementia, had altered mental status at facility with slurred speech yesterday  · CT of the head showed no acute intracranial abnormality. PARENCHYMA: Decreased attenuation is noted in periventricular and subcortical white matter demonstrating an appearance that is statistically most likely to represent mild microangiopathic change; this appearance is similar when compared to most recent prior examination.   · Vitamin B12 level 251-recommend vitamin B12 supplementation for goal of vitamin B12 level greater than 400  · Recommend checking a TSH with next set of lab  · Maintained at the facility on Aricept 5 mg nightly, olanzapine 5 mg daily, Namenda twice daily, and trazodone 50 mg nightly for insomnia  · Trazodone and Zyprexa held due to altered mental status yesterday-continue to hold  Unable to assess orientation  At risk for delirium due to Alzheimer's dementia, hospitalization, sepsis, sleep disturbance  Recommend delirium precautions  Maintain sleep-wake cycle, avoid nighttime interruptions  minimize overnight interruptions, group overnight vitals/labs/nursing checks as possible  dim lights, close blinds and turn off tv to minimize stimulation and encourage sleep environment in evenings  Provide adequate pain control  Avoid urinary retention and constipation  Provide frequent and early mobilization  Provide frequent redirection and reorientation as needed  Avoid medications that may worsen or precipitate delirium such as tramadol, benzodiazepines, anticholinergics, and Benadryl  Redirect unwanted behaviors as first-line therapy, avoid physical restraints as able to  · Continue to monitor    Severe sepsis  · Presented from [address] [place] [address] with hypoxia and altered mental status  · Met sepsis protocol with leukocytosis, tachycardia, and elevated lactic acid  · Procalcitonin yesterday was 2.31 and today was 1.45  · Blood cultures pending  · Sputum culture not yet collected  · Legionella urine, strep pneumoniae urine negative  · Tmax 101  · CT the chest yesterday showed- IMPRESSION:Moderate right pleural effusion with compressive atelectasis over the right lower lobe and posterior right middle and upper lobes. Consolidation in the left lower lobe, probably atelectatic. Superimposed pneumonia cannot be excluded. Soft tissue densities in the bronchial tree as above, appearance favors secretions. · Currently on Rocephin daily  · Management per primary    Insomnia  First-line treatment is behavior modification  Maintain sleep-wake cycle, avoid nighttime interruptions  Avoid caffeine throughout the day  Avoid napping throughout the day  Encourage physical activity throughout the day  · Avoid sedative hypnotics including benzodiazepines and benadryl  · Chronically takes trazodone 50 mg nightly-hold for now    Ambulatory dysfunction  At a baseline ambulates without AD  PT/OT following  Fall precautions  Out of bed as tolerated  Encourage early and frequent mobilization  Encourage adequate hydration and nutrition  Provide adequate pain management   Goal is undetermined   · Continue with PT/OT for continued strength and balance training     Subjective:   Patient is being seen for a geriatrics follow-up. Upon examination patient was lying bed, resting. He remains lethargic on exam, was just groaning. Nurse reports he did take his pills and applesauce and ate a small amount of breakfast.  Patient's sister was bedside who reports he is very altered from his baseline. At baseline he communicates with them and is able to recognize her. They are requesting an eye update from internal medicine and I Tiger text internal medicine resident to come bedside to speak with them.     Review of Systems   Unable to perform ROS: Mental status change         Objective:     Vitals: Blood pressure 130/67, pulse 101, temperature 98.3 °F (36.8 °C), resp. rate 16, height 5' 9" (1.753 m), weight 59.3 kg (130 lb 11.7 oz), SpO2 90 %. ,Body mass index is 19.31 kg/m². Intake/Output Summary (Last 24 hours) at 8/4/2023 1013  Last data filed at 8/3/2023 2200  Gross per 24 hour   Intake 3370 ml   Output --   Net 3370 ml       Current Medications: Reviewed    Physical Exam:   Physical Exam  Vitals reviewed. Constitutional:       General: He is sleeping. He is not in acute distress. Appearance: He is well-developed. He is ill-appearing. Comments: Frail-appearing   HENT:      Head: Normocephalic and atraumatic. Cardiovascular:      Rate and Rhythm: Normal rate and regular rhythm. Heart sounds: No murmur heard. Pulmonary:      Effort: Pulmonary effort is normal. No respiratory distress. Breath sounds: Normal breath sounds. Comments: 1L nasal cannula  Abdominal:      General: Abdomen is flat. Bowel sounds are normal. There is no distension. Palpations: Abdomen is soft. Tenderness: There is no abdominal tenderness. Musculoskeletal:         General: No swelling. Right lower leg: No edema. Left lower leg: No edema. Skin:     General: Skin is warm and dry. Coloration: Skin is pale. Neurological:      Mental Status: Mental status is at baseline. Cranial Nerves: No cranial nerve deficit. Motor: Weakness present.       Gait: Gait abnormal.      Comments: Unable to assess orientation          Invasive Devices     Peripheral Intravenous Line  Duration           Peripheral IV 08/03/23 Left Antecubital 1 day    Peripheral IV 08/03/23 Left Forearm <1 day          Drain  Duration           External Urinary Catheter <1 day                Lab, Imaging and other studies:    Lab Results:   I have personally reviewed pertinent lab results including the following:  -Procalcitonin, BMP, CBC, Legionella urine, strep pneumonia urine, folate, vitamin B12    I have personally reviewed the following imaging study reports in PACS:  CT head and CT chest  - I have personally reviewed pertinent reports. Please note:  Voice-recognition software may have been used in the preparation of this document. Occasional wrong word or "sound-alike" substitutions may have occurred due to the inherent limitations of voice recognition software. Interpretation should be guided by context.

## 2023-08-04 NOTE — PHYSICAL THERAPY NOTE
Physical Therapy Cancellation Note     08/04/23 1429   Note Type   Note type Cancelled Session   Cancel Reasons Patient off floor/test   Additional Comments PT consult received and chart reviewed. PT attempt. Spoke with RN Ryan Wynne pt currently off unit at IR for chest tube placement. Will f/u as able and approrpriate.        Bindu Chris, PT, DPT   Available via [employer]Days[employer]  NPI # [id number]  [place] License - [id number]  0/3/0753

## 2023-08-04 NOTE — PROGRESS NOTES
[facility]  Progress Note  Name: Haider Lipscomb  MRN: [id number]  Unit/Bed#: W -01 I Date of Admission: 8/3/2023   Date of Service: 8/4/2023 I Hospital Day: 1    Assessment/Plan   * Severe sepsis ([address])  Assessment & Plan  · POA met severe sepsis criteria for leukocytosis 27.00, ANC 24.58, tachycardia heart rate 95, lactic acid 3.2. · At the ED received a total 3L fluid sepsis fluid , lactic acid down to 1.5  · Procalcitonin elevated down trending from 2.31 to 1.45  · Chest x-ray:There is a right mid to lower lung opacity with well-circumscribed medial margin suggests loculated effusion  · CT Chest: Moderate right pleural effusion with compressive atelectasis over the right lower lobe and posterior middle and upper lobes. Consolidation in the left lower lobe probably atelectasis superimposed pneumonia cannot be excluded  · UA large leukocyte, negative for nitrates, glucosuria no bacteria  · Drip score 3 ([facility] resident, poor functional status), no recent hospitalizations. · Severe sepsis likely secondary to PNA  · Check urine Legionella, urine strep pneumo" negative  · Aspiration precaution: Patient diet change to dysphagia 3 with thin liquid and med crushed    Plan  · IR chest tube placement  · Start Unasyn 3 g for anaerobic coverage  ·  MRSA swab: pending  ·  blood cultures pending   · Monitor vital signs  · Mucinex 1200 twice daily  · Respiratory protocol  · BMP at 8PM          Acute respiratory failure with hypoxia (HCC)  Assessment & Plan  · Noted with shortness of breath, hypoxic saturating 79% prior to admission  · Upon admission saturating well on 2 L nasal cannula for 94% however desaturates without oxygen supplementation. · Continue antibiotic treatment as above  · Respiratory protocol  · Oxygen supplementation keep O2> 88%, wean as able    Hyperglycemia  Assessment & Plan  · POA noted elevated glucose 419, no prior history documented of diabetes.   Received 5 units regular insulin  · Beta hydroxybutyrate and unremarkable, UA significant for glucosuria  · Check hemoglobin A1c:7      Plan  · Continue carbohydrate controlled diet  · Continue Lantus 10 units at bedtime  · Continue Humalog 5 units 3 times daily  · Sliding scale  · Hypoglycemia protocol  · Glucose check per protocol      Encephalopathy acute  Assessment & Plan  · POA at facility noted slurred speech, slow responsive, not at his baseline  · Encephalopathy likely metabolic in the setting of sepsis  · Check CT head : No acute intracranial leasion  · Continue antibiotic as above      Ambulatory dysfunction  Assessment & Plan  · At baseline ambulate without AD, noted unsteadiness new from baseline  · PT/ OT      Thrombocytosis  Assessment & Plan  · POA platelets 886 today, pressure review in September 2022 was noted unremarkable to 32  · Iron panel: Iron Sat: 14, TIBC: 182, iron: 26    Plan  Continue to monitor    Anemia  Assessment & Plan  · POA hemoglobin 8.0, pressure review hemoglobin back in September 2022 noted 10.1. · Unclear if history of melena, hematochezia  · Anemia unclear etiology at this time possibly secondary to iron deficiency given thrombocytosis although this could represent current setting of infection versus underlying malignancy? Check iron panel: Ferritin 714, Iron Sat: 14, TIBC: 182, Iron: 26  folate, vitamin B12 WNL     Plan  · Monitor output and consider transfusion if less than 7  ·  CBC a.m.       Late onset Alzheimer's dementia with behavioral disturbance (HCC)  Assessment & Plan  · Home meds Aricept 5 mg nightly, olanzapine 5 mg daily, Namenda 10 mg twice daily, trazodone 50 mg at bedtime  · Holding trazodone 50 mg at bedtime, Zyprexa 5 mg  · Delirium precaution  · Frequent reorientation  · Gerontology consulted appreciate recommendations  · Avoid restraints if able unless danger to himself or pralsetinib  · If combative consider 2.5 mg Zyprexa as needed    Depression  Assessment & Plan  Continue home meds             VTE Pharmacologic Prophylaxis: VTE Score: 5 High Risk (Score >/= 5) - Pharmacological DVT Prophylaxis Ordered: enoxaparin (Lovenox). Sequential Compression Devices Ordered. Patient Centered Rounds: I performed bedside rounds with nursing staff today. Discussions with Specialists or Other Care Team Provider: IR    Education and Discussions with Family / Patient: Updated  (sister) at bedside. Current Length of Stay: 1 day(s)  Current Patient Status: Inpatient   Discharge Plan: [facility] is following this patient on consult. They are not yet medically stable for discharge secondary to SEVERE Sepsis. Code Status: Level 3 - DNAR and DNI    Subjective:   Patient was put on restraints overnight due to agitation. This morning restraint was removed, and patient is being monitored for any agitation. Unable to assess orientation because of his  dementia. patient sister came to visit him. Patient diet was changed to dysphagia 3 w/thin liquid along with crushed medication. Objective:     Vitals:   Temp (24hrs), Av.7 °F (37.6 °C), Min:98.3 °F (36.8 °C), Max:101 °F (38.3 °C)    Temp:  [98.3 °F (36.8 °C)-101 °F (38.3 °C)] 99.8 °F (37.7 °C)  HR:  [] 95  Resp:  [16-19] 19  BP: (109-141)/(56-96) 109/68  SpO2:  [89 %-98 %] 92 %  Body mass index is 19.31 kg/m². Input and Output Summary (last 24 hours): Intake/Output Summary (Last 24 hours) at 2023 1532  Last data filed at 2023 1504  Gross per 24 hour   Intake 320 ml   Output 410 ml   Net -90 ml       Physical Exam:   Physical Exam  Vitals and nursing note reviewed. Constitutional:       General: He is not in acute distress. Appearance: He is well-developed. He is ill-appearing. HENT:      Head: Normocephalic and atraumatic. Eyes:      Conjunctiva/sclera: Conjunctivae normal.   Cardiovascular:      Rate and Rhythm: Normal rate and regular rhythm.       Heart sounds: No murmur heard.  Pulmonary:      Effort: Pulmonary effort is normal. No respiratory distress. Abdominal:      General: Abdomen is flat. Bowel sounds are normal.      Palpations: Abdomen is soft. Tenderness: There is no abdominal tenderness. Musculoskeletal:         General: No swelling. Cervical back: Neck supple. Skin:     General: Skin is warm and dry. Capillary Refill: Capillary refill takes less than 2 seconds. Neurological:      Mental Status: He is alert. He is disoriented. Psychiatric:         Mood and Affect: Mood normal.         Additional Data:     Labs:  Results from last 7 days   Lab Units 08/04/23  0900   WBC Thousand/uL 25.53*   HEMOGLOBIN g/dL 8.0*   HEMATOCRIT % 25.9*   PLATELETS Thousands/uL 488*   NEUTROS PCT % 88*   LYMPHS PCT % 6*   MONOS PCT % 4   EOS PCT % 0     Results from last 7 days   Lab Units 08/04/23  0518 08/03/23  1049   SODIUM mmol/L 148* 142   POTASSIUM mmol/L 3.2* 3.7   CHLORIDE mmol/L 114* 105   CO2 mmol/L 23 25   BUN mg/dL 23 35*   CREATININE mg/dL 0.95 1.29   ANION GAP mmol/L 11 12   CALCIUM mg/dL 8.7 9.2   ALBUMIN g/dL  --  3.2*   TOTAL BILIRUBIN mg/dL  --  0.79   ALK PHOS U/L  --  131*   ALT U/L  --  6*   AST U/L  --  7*   GLUCOSE RANDOM mg/dL 161* 419*     Results from last 7 days   Lab Units 08/03/23  1716   INR  1.33*     Results from last 7 days   Lab Units 08/04/23  1130 08/04/23  0822 08/03/23  2105 08/03/23  1622 08/03/23  1054   POC GLUCOSE mg/dl 119 164* 368* 216* 428*     Results from last 7 days   Lab Units 08/03/23  1614   HEMOGLOBIN A1C % 7.0*     Results from last 7 days   Lab Units 08/04/23  0518 08/03/23  1355 08/03/23  1131 08/03/23  1049   LACTIC ACID mmol/L  --  1.5 3.2*  --    PROCALCITONIN ng/ml 1.45*  --   --  2.31*       Lines/Drains:  Invasive Devices     Peripheral Intravenous Line  Duration           Peripheral IV 08/03/23 Left Forearm 1 day    Peripheral IV 08/04/23 Dorsal (posterior); Right Forearm <1 day          Drain  Duration Chest Tube Right 12 Fr. <1 day                      Imaging: Reviewed radiology reports from this admission including: chest xray    Recent Cultures (last 7 days):   Results from last 7 days   Lab Units 08/03/23  2251 08/03/23  1201 08/03/23  1131   BLOOD CULTURE   --  Received in Microbiology Lab. Culture in Progress. Received in Microbiology Lab. Culture in Progress. LEGIONELLA URINARY ANTIGEN  Negative  --   --        Last 24 Hours Medication List:   Current Facility-Administered Medications   Medication Dose Route Frequency Provider Last Rate   • acetaminophen  650 mg Oral Q6H PRN Pam Quijano MD     • ampicillin-sulbactam  3 g Intravenous Q6H Gin Lanza MD Stopped (08/04/23 1508)   • dextrose  75 mL/hr Intravenous Continuous Marina Mcmanus MD 75 mL/hr (08/04/23 1021)   • donepezil  5 mg Oral HS Pam Quijano MD     • enoxaparin  40 mg Subcutaneous Daily Pam Quijano MD     • guaiFENesin  1,200 mg Oral Q12H [address] [place], MD     • insulin glargine  10 Units Subcutaneous HS Pam Quijano MD     • insulin lispro  1-5 Units Subcutaneous TID [address] [place], MD     • insulin lispro  5 Units Subcutaneous TID With Meals Pam Quijano MD     • memantine  10 mg Oral BID Pam Quijano MD     • senna  1 tablet Oral HS Pam Quijano MD          Today, Patient Was Seen By: Rox Moseley MD    **Please Note: This note may have been constructed using a voice recognition system. **

## 2023-08-04 NOTE — BRIEF OP NOTE (RAD/CATH)
INTERVENTIONAL RADIOLOGY PROCEDURE NOTE    Date: 8/4/2023    Procedure:   Procedure Summary     Date:  Room / Location:     Anesthesia Start:  Anesthesia Stop:     Procedure:  Diagnosis:     Scheduled Providers:  Responsible Provider:     Anesthesia Type: Not recorded ASA Status: Not recorded          Preoperative diagnosis:   1. Pneumonia    2. Sepsis ([address])    3. Dyspnea    4. Late onset Alzheimer's dementia with behavioral disturbance ([address])    5. Loculated pleural effusion         Postoperative diagnosis: Same. Surgeon: Haydee Dorantes MD     Assistant: None. No qualified resident was available. Blood loss: Minimal    Specimens: 10 cc pleural culture     Findings: 12 F chest tube placed into right effusion. Complications: None immediate.     Anesthesia: local

## 2023-08-04 NOTE — SPEECH THERAPY NOTE
Speech-Language Pathology Bedside Swallow Evaluation        Patient Name: Leigh Ann MARCUSDLorriZ Date: 8/4/2023     Problem List  Principal Problem:    Severe sepsis [facility])  Active Problems:    Depression    Late onset Alzheimer's dementia with behavioral disturbance ([address])    Hyperglycemia    Acute respiratory failure with hypoxia (HCC)    Anemia    Thrombocytosis    Encephalopathy acute    Ambulatory dysfunction         Past Medical History  Past Medical History:   Diagnosis Date   • Late onset Alzheimer's dementia with behavioral disturbance ([address])    • Other hyperlipidemia        Past Surgical History  History reviewed. No pertinent surgical history. Summary    Pt presents with at least mild oral dysphagia- difficulty noted chewing harder foods ie carrot coins and fresh fruit cup. No overt s/s aspiration noted w/ thin liquids by cup and straw. Recommendations:   Diet: soft/level 3 diet and thin liquids   Meds: crushed with puree   Frequent Oral care: 2x/day  Aspiration precautions   Other Recommendations/ considerations: will cont to follow for diet tolerance and monitor for aspiration risk. Current Medical Status  Pt is a 82 y.o. male who presented to [address] [place] [address]  with severe sepsis. Pt presented emergency department complaining of of breath and hypoxia. Patient is from assisted living facility, he was noted to be short of breath and not himself. He was noted to have oxygen saturations of 79% on room air. Evaluation emergency department he was noted to have a large right-sided consolidation versus mass. Was started on antibiotics and being admitted. He is unable to provide any history. On exam chronically ill-appearing. Dry mucous membranes. Lungs poor respiratory effort.     Past medical history:   Please see H&P for details    Special Studies:  CT-chest w/o contrast: 8/3/23 Moderate right pleural effusion with compressive atelectasis over the right lower lobe and posterior right middle and upper lobes. Consolidation in the left lower lobe, probably atelectatic. Superimposed pneumonia cannot be excluded. Soft tissue densities in the bronchial tree as above, appearance favors secretions. Social/Education/Vocational Hx:  Pt lives [address]    Swallow Information   Current Risks for Dysphagia & Aspiration: hx of dementia  Current Symptoms/Concerns: R sided consolidation, suspect aspiration. Current Diet: regular diet and thin liquids   Baseline Diet: regular diet and thin liquids  Takes pills- unknown     Baseline Assessment   Behavior/Cognition: alert and confused   Speech/Language Status: able to follow commands inconsistently and limited verbal output  Patient Positioning: upright in bed     Swallow Mechanism Exam   Facial: symmetrical  Labial: WFL  Lingual: WFL  Velum: unable to visualize  Mandible: adequate ROM  Dentition: full dentures  Vocal quality:clear/adequate   Volitional Cough: strong/productive   Respiratory: NC    Consistencies Assessed and Performance   Consistencies Administered: thin liquids, nectar thick, honey thick, puree, soft solids and hard solids (pt seen at lunch w/ mashed potatoes, meatloaf, carrots coins, fresh fruit cup)    Oral Stage: pt w/ adequate bolus retrieval from fork, cup and straw. Decreased/prolonged  Mastication w/ carrot coins and fresh fruit. Better oral processing w/ meatloaf and mashed potatoes. Pt appeared w/ adequate oral control w/ liquids by cup and straw. No pocketing or residue noted. Pharyngeal Stage: swallow initiation was timely with complete laryngeal excursion.  No coughing, throat clearing or wet voice noted throughout meal.       Esophageal Concerns: none reported      Results Reviewed with: patient, RN, MD and family   Dysphagia Goals: pt will tolerate dysphagia 3 diet  with thin liquids without s/s of aspiration x2-4 sessions      April Francisco Armando MA CCC-SLP  Speech Pathologist  [place] license # [id number]  [id number]  [place] license # [id number]  Available via [facility] Text

## 2023-08-04 NOTE — CONSULTS
Pulmonary Consultation   Perla Bailey 82 y.o. male MRN: [id number]  Unit/Bed#: W -01 Encounter: [id number]    Reason for consultation: Pleural effusion. Requesting physician: Dr. Fara Hightower. Impressions:  1. Right pleural effusion suspicious for parapneumonic effusion versus empyema. 2. Alzheimer's disease with behavioral dysfunction. 3. Concern for recurrent aspiration. Recommendations:  1. Could perform thoracentesis but imaging strongly favors that this is a complicated parapneumonic effusion or empyema and so a chest tube is recommended. 2. Referred patient to IR for this as patient is not able to follow commands and will need multiple staff members to help keep patient still. 3. When chest tube in place, please place to -20cm H2O suction. Patient may be to water seal during transport. 4. Recommend transitioning antibiotics to Unasyn for anaerobic coverage. 5. Patient will not be able to perform a sputum culture correctly. 6. Did attempt to discuss whether this procedure was acceptable with his sister, but her phone was turned off.  7. Pulmonary will follow with recommendations for things like possible intrapleural lytics. History of Present Illness   HPI:  Perla Bailey is a 82 y.o. male who was referred from a nursing home due to hypoxemia and dyspnea. He was found to be saturating 79% on room air. Imaging demonstrated a small to moderate right pleural effusion with pleural thickening suspicious for an empyema. He has demonstrated a coarse, heavy cough. Patient cannot provide further history due to his dementia. Review of systems:  Review of Systems   Unable to perform ROS: Dementia     All other 12-point review of systems are negative. Historical Information   Past Medical History:   Diagnosis Date   • Late onset Alzheimer's dementia with behavioral disturbance ([address])    • Other hyperlipidemia      History reviewed. No pertinent surgical history. History reviewed.  No pertinent family history. Tobacco history: Unknown. Family history: Unknown. Meds/Allergies   Current Facility-Administered Medications   Medication Dose Route Frequency   • acetaminophen (TYLENOL) tablet 650 mg  650 mg Oral Q6H PRN   • cefepime (MAXIPIME) 1,000 mg in dextrose 5 % 50 mL IVPB  1,000 mg Intravenous Q12H   • dextrose 5 % infusion  75 mL/hr Intravenous Continuous   • donepezil (ARICEPT) tablet 5 mg  5 mg Oral HS   • enoxaparin (LOVENOX) subcutaneous injection 40 mg  40 mg Subcutaneous Daily   • guaiFENesin (MUCINEX) 12 hr tablet 1,200 mg  1,200 mg Oral Q12H DAVID   • insulin glargine (LANTUS) subcutaneous injection 10 Units 0.1 mL  10 Units Subcutaneous HS   • insulin lispro (HumaLOG) 100 units/mL subcutaneous injection 1-5 Units  1-5 Units Subcutaneous TID AC   • insulin lispro (HumaLOG) 100 units/mL subcutaneous injection 5 Units  5 Units Subcutaneous TID With Meals   • memantine (NAMENDA) tablet 10 mg  10 mg Oral BID   • senna (SENOKOT) tablet 8.6 mg  1 tablet Oral HS     No Known Allergies    Vitals: Blood pressure 130/67, pulse (!) 106, temperature 99.8 °F (37.7 °C), resp. rate 16, height 5' 9" (1.753 m), weight 59.3 kg (130 lb 11.7 oz), SpO2 97 %., on 2 L O2, Body mass index is 19.31 kg/m². Intake/Output Summary (Last 24 hours) at 8/4/2023 1233  Last data filed at 8/3/2023 2200  Gross per 24 hour   Intake 1370 ml   Output --   Net 1370 ml     Physical exam:     Physical Exam  Vitals reviewed. Constitutional:       General: He is not in acute distress. Appearance: Normal appearance. He is not ill-appearing or toxic-appearing. HENT:      Head: Normocephalic and atraumatic. Eyes:      General: No scleral icterus. Conjunctiva/sclera: Conjunctivae normal.      Pupils: Pupils are equal, round, and reactive to light. Cardiovascular:      Rate and Rhythm: Normal rate and regular rhythm. Heart sounds: Normal heart sounds. No murmur heard. No friction rub. No gallop. Pulmonary:      Effort: Pulmonary effort is normal. No respiratory distress. Breath sounds: Examination of the right-middle field reveals decreased breath sounds. Examination of the right-lower field reveals decreased breath sounds. Decreased breath sounds and rhonchi present. No wheezing or rales. Musculoskeletal:      Cervical back: Neck supple. Right lower leg: No edema. Left lower leg: No edema. Skin:     General: Skin is warm and dry. Findings: No rash. Neurological:      Mental Status: He is alert. Mental status is at baseline. He is disoriented. Comments: Intermittently answering questions appropriately versus providing nonsense remarks; occasionally abusive toward staff per reports. Labs: I have personally reviewed pertinent lab results. Results from last 7 days   Lab Units 08/04/23  0900 08/03/23  1049   WBC Thousand/uL 25.53* 27.00*   HEMOGLOBIN g/dL 8.0* 8.1*   HEMATOCRIT % 25.9* 26.1*   PLATELETS Thousands/uL 488* 479*         Results from last 7 days   Lab Units 08/04/23  0518 08/03/23  1049   POTASSIUM mmol/L 3.2* 3.7   CHLORIDE mmol/L 114* 105   CO2 mmol/L 23 25   BUN mg/dL 23 35*   CREATININE mg/dL 0.95 1.29   CALCIUM mg/dL 8.7 9.2   ALK PHOS U/L  --  131*   ALT U/L  --  6*   AST U/L  --  7*     Results from last 7 days   Lab Units 08/03/23  1716   INR  1.33*   PTT seconds 41*     Imaging and other studies: I have personally reviewed pertinent films in PACS    Code Status: Level 3 - DNAR and DNI    Thank you for allowing us to participate in the care of your patient.     Madelyn Tanner M.D.

## 2023-08-04 NOTE — ASSESSMENT & PLAN NOTE
· POA met severe sepsis criteria for leukocytosis 27.00, ANC 24.58, tachycardia heart rate 95, lactic acid 3.2. · At the ED received a total 3L fluid sepsis fluid , lactic acid down to 1.5  · Procalcitonin elevated down trending from 2.31 to 1.45  · Chest x-ray:There is a right mid to lower lung opacity with well-circumscribed medial margin suggests loculated effusion  · CT Chest: Moderate right pleural effusion with compressive atelectasis over the right lower lobe and posterior middle and upper lobes. Consolidation in the left lower lobe probably atelectasis superimposed pneumonia cannot be excluded  · UA large leukocyte, negative for nitrates, glucosuria no bacteria  · Drip score 3 ([facility] resident, poor functional status), no recent hospitalizations.   · Severe sepsis likely secondary to PNA  · Check urine Legionella, urine strep pneumo" negative  · Aspiration precaution: Patient diet change to dysphagia 3 with thin liquid and med crushed  ·  Blood cultures pending, MRSA swab: pending  IR chest tube placement: chest tube draining 300 ml straw color fluid     Plan    · Day 2  Unasyn 3 g for anaerobic coverage  · Respiratory protocol  ·  Monitor vital signs  · Mucinex 1200 twice daily

## 2023-08-04 NOTE — OCCUPATIONAL THERAPY NOTE
Occupational Therapy Cancellation     Patient Name: Armando Room  ZABQC'T Date: 8/4/2023  Problem List  Principal Problem:    Severe sepsis [facility])  Active Problems:    Depression    Late onset Alzheimer's dementia with behavioral disturbance ([address])    Hyperglycemia    Acute respiratory failure with hypoxia (HCC)    Anemia    Thrombocytosis    Encephalopathy acute    Ambulatory dysfunction    Urinary tract infection    Past Medical History  Past Medical History:   Diagnosis Date    Late onset Alzheimer's dementia with behavioral disturbance ([address])     Other hyperlipidemia      Past Surgical History  History reviewed. No pertinent surgical history. 08/04/23 1430   Note Type   Note type Cancelled Session  (Friday 8/4/23)   Cancel Reasons Patient off floor/test   Additional Comments OT orders received and chart review completed. Attempted to see pt for OT eval. Presently off floor at IR for chest tube placement. Will cancel and continue to follow as appropriate / schedule allows.      Jenny Restrepo, OTR/L  [id number]  [id number]

## 2023-08-04 NOTE — ASSESSMENT & PLAN NOTE
· POA hemoglobin 8.0, pressure review hemoglobin back in September 2022 noted 10.1. · Unclear if history of melena, hematochezia  · Anemia unclear etiology at this time possibly secondary to iron deficiency given thrombocytosis although this could represent current setting of infection versus underlying malignancy? Check iron panel: Ferritin 714, Iron Sat: 14, TIBC: 182, Iron: 26  folate, vitamin B12 WNL   Received 1 unit of blood over night for hemoglobin of 6.2  Plan  · Monitor output and consider transfusion if less than 7  ·  CBC a.m.

## 2023-08-04 NOTE — PLAN OF CARE
Problem: MOBILITY - ADULT  Goal: Maintain or return to baseline ADL function  Description: INTERVENTIONS:  -  Assess patient's ability to carry out ADLs; assess patient's baseline for ADL function and identify physical deficits which impact ability to perform ADLs (bathing, care of mouth/teeth, toileting, grooming, dressing, etc.)  - Assess/evaluate cause of self-care deficits   - Assess range of motion  - Assess patient's mobility; develop plan if impaired  - Assess patient's need for assistive devices and provide as appropriate  - Encourage maximum independence but intervene and supervise when necessary  - Involve family in performance of ADLs  - Assess for home care needs following discharge   - Consider OT consult to assist with ADL evaluation and planning for discharge  - Provide patient education as appropriate  Outcome: Progressing  Goal: Maintains/Returns to pre admission functional level  Description: INTERVENTIONS:  - Perform BMAT or MOVE assessment daily.   - Set and communicate daily mobility goal to care team and patient/family/caregiver. - Collaborate with rehabilitation services on mobility goals if consulted  - Perform Range of Motion  times a day. - Reposition patient every  hours.   - Dangle patient  times a day  - Stand patient  times a day  - Ambulate patient  times a day  - Out of bed to chair  times a day   - Out of bed for meals  times a day  - Out of bed for toileting  - Record patient progress and toleration of activity level   Outcome: Progressing     Problem: PAIN - ADULT  Goal: Verbalizes/displays adequate comfort level or baseline comfort level  Description: Interventions:  - Encourage patient to monitor pain and request assistance  - Assess pain using appropriate pain scale  - Administer analgesics based on type and severity of pain and evaluate response  - Implement non-pharmacological measures as appropriate and evaluate response  - Consider cultural and social influences on pain and pain management  - Notify physician/advanced practitioner if interventions unsuccessful or patient reports new pain  Outcome: Progressing     Problem: INFECTION - ADULT  Goal: Absence or prevention of progression during hospitalization  Description: INTERVENTIONS:  - Assess and monitor for signs and symptoms of infection  - Monitor lab/diagnostic results  - Monitor all insertion sites, i.e. indwelling lines, tubes, and drains  - Monitor endotracheal if appropriate and nasal secretions for changes in amount and color  - [place] appropriate cooling/warming therapies per order  - Administer medications as ordered  - Instruct and encourage patient and family to use good hand hygiene technique  - Identify and instruct in appropriate isolation precautions for identified infection/condition  Outcome: Progressing  Goal: Absence of fever/infection during neutropenic period  Description: INTERVENTIONS:  - Monitor WBC    Outcome: Progressing     Problem: SAFETY ADULT  Goal: Maintain or return to baseline ADL function  Description: INTERVENTIONS:  -  Assess patient's ability to carry out ADLs; assess patient's baseline for ADL function and identify physical deficits which impact ability to perform ADLs (bathing, care of mouth/teeth, toileting, grooming, dressing, etc.)  - Assess/evaluate cause of self-care deficits   - Assess range of motion  - Assess patient's mobility; develop plan if impaired  - Assess patient's need for assistive devices and provide as appropriate  - Encourage maximum independence but intervene and supervise when necessary  - Involve family in performance of ADLs  - Assess for home care needs following discharge   - Consider OT consult to assist with ADL evaluation and planning for discharge  - Provide patient education as appropriate  Outcome: Progressing  Goal: Maintains/Returns to pre admission functional level  Description: INTERVENTIONS:  - Perform BMAT or MOVE assessment daily.   - Set and communicate daily mobility goal to care team and patient/family/caregiver. - Collaborate with rehabilitation services on mobility goals if consulted  - Perform Range of Motion  times a day. - Reposition patient every  hours.   - Dangle patient  times a day  - Stand patient  times a day  - Ambulate patient  times a day  - Out of bed to chair  times a day   - Out of bed for meals  times a day  - Out of bed for toileting  - Record patient progress and toleration of activity level   Outcome: Progressing  Goal: Patient will remain free of falls  Description: INTERVENTIONS:  - Educate patient/family on patient safety including physical limitations  - Instruct patient to call for assistance with activity   - Consult OT/PT to assist with strengthening/mobility   - Keep Call bell within reach  - Keep bed low and locked with side rails adjusted as appropriate  - Keep care items and personal belongings within reach  - Initiate and maintain comfort rounds  - Make Fall Risk Sign visible to staff  - Offer Toileting every  Hours, in advance of need  - Initiate/Maintain alarm  - Obtain necessary fall risk management equipment:   - Apply yellow socks and bracelet for high fall risk patients  - Consider moving patient to room near nurses station  Outcome: Progressing     Problem: DISCHARGE PLANNING  Goal: Discharge to home or other facility with appropriate resources  Description: INTERVENTIONS:  - Identify barriers to discharge w/patient and caregiver  - Arrange for needed discharge resources and transportation as appropriate  - Identify discharge learning needs (meds, wound care, etc.)  - Arrange for interpretive services to assist at discharge as needed  - Refer to Case Management Department for coordinating discharge planning if the patient needs post-hospital services based on physician/advanced practitioner order or complex needs related to functional status, cognitive ability, or social support system  Outcome: Progressing Problem: Knowledge Deficit  Goal: Patient/family/caregiver demonstrates understanding of disease process, treatment plan, medications, and discharge instructions  Description: Complete learning assessment and assess knowledge base.   Interventions:  - Provide teaching at level of understanding  - Provide teaching via preferred learning methods  Outcome: Progressing     Problem: Prexisting or High Potential for Compromised Skin Integrity  Goal: Skin integrity is maintained or improved  Description: INTERVENTIONS:  - Identify patients at risk for skin breakdown  - Assess and monitor skin integrity  - Assess and monitor nutrition and hydration status  - Monitor labs   - Assess for incontinence   - Turn and reposition patient  - Assist with mobility/ambulation  - Relieve pressure over bony prominences  - Avoid friction and shearing  - Provide appropriate hygiene as needed including keeping skin clean and dry  - Evaluate need for skin moisturizer/barrier cream  - Collaborate with interdisciplinary team   - Patient/family teaching  - Consider wound care consult   Outcome: Progressing

## 2023-08-04 NOTE — UTILIZATION REVIEW
Initial Clinical Review    Admission: Date/Time/Statement:   Admission Orders (From admission, onward)     Ordered        08/03/23 1337  INPATIENT ADMISSION  Once                      Orders Placed This Encounter   Procedures   • INPATIENT ADMISSION     Standing Status:   Standing     Number of Occurrences:   1     Order Specific Question:   Level of Care     Answer:   Med Surg [16]     Order Specific Question:   Estimated length of stay     Answer:   More than 2 Midnights     Order Specific Question:   Certification     Answer:   I certify that inpatient services are medically necessary for this patient for a duration of greater than two midnights. See H&P and MD Progress Notes for additional information about the patient's course of treatment. ED Arrival Information     Expected   -    Arrival   8/3/2023 10:38    Acuity   Urgent            Means of arrival   Ambulance    Escorted by   [facility]    Admission type   Emergency            Arrival complaint   SOB           Chief Complaint   Patient presents with   • Shortness of Breath     [facility] with recent sob per EMS, blood sugar also in the 400's but pt is not known diabetic        Initial Presentation: 82 y.o. male  PMH of Alzheimer with behavioral disturbance, depression, hyperlipidemia who presents to ED via EMS [facility] after being noted shortness of breath, "not himself" from baseline with associated slurred speech, unsteadiness in his gait, tachycardia, pale, lethargic saturating 79% on room air, cough x 3 days, decreased po intake per facility . On exam, pt cachectic, ill appearing, has rhonchi. Sat wel on 2 L o2 but desats off O2. Tender to touch LUQ abdomen, oriented to self only . Labs -WBC elevated 27.0 ,ANC 24.58,   procal 2.31 elevated LA 3.2, elevated glucose . UA large leukocyte, glucosuria.  Hgb 8.1 from 10.1 10/2022  CXR concerning for large consolidation in right lung. ECG- ST @98 . Drip score 3. Pt given IVF, IV abx, 5 U reg insulin  in ED. Pt admitted as inpatient with severe sepsis-likely 2/2 PNA. Acute resp failure w/ hypoxia. Acute encephalopathy. Hyperglycemia. Plan -IV ceftriaxone, procal and CBC in am, check urine antigens, F/U blood and sputum cx, Mucinex. Obtain CT chest. Continue supplemental O2 to keep sat >88 %. Check CT head . Carb controlled diet. start Lantus 10 units at bedtime . Continue Humalog 5 units 3 times daily. SSI Accucheks . A1c.     gerontology consult- minimally responsive on exam .Speech remains slurred on exam-recommend CT of head. Delirium monitoring. Hold on medications like olanzapine and trazodone at this time due to altered mental status. F/U Iron panel, folate, vitamin B12 pending. Monitor CBC . PT/OT . Date: 8/4   Day 2:    Medicine- Pulmonology consult placed today . Temp 101.0 F overnight . Procal down trending. LA normalized with IVF. WBC's remain elevated . Urine antigens neg . IV ceftriaxone d/c'ed, started IV cefepime . Sodium elevated to 148 today . IVF changed to dextrose . K3.2 , repletion ordered. BMP 1800 tonight . Blood glucose improved from admission . Diet change to dysphagia 3 with thin liquid and med crushed. F/U Blood cx . Continue to monitor glucose . CT head shows no  acute intracranial lesion. Pt placed on restraints overnight due to agitation , restraints removed this am .    Pulmonology consult- R pleural effusion suspicious for parapneumonic effusion versus empyema. Recommend chest tube . Referred to IR for this. Pt not able to follow commands and will need multiple staff members to help keep patient still. . When Chest tube in place , place to -20 cm H2o suction . Recommend transition abx to Unasyn . Pt not able to perform sputum cx correctly . Decreased breath sounds R mid and lower fields , rhonchi present .  Intermittently answering questions appropriately versus providing nonsense remarks; occasionally abusive toward staff . IR consult- plan for US guided right chest tube placement  8/4/23 @1508   12 F chest tube placed into right effusion local anesthesia  10 cc's yellow/clear aspirated and sent to the lab. Sutures, statlock, and dry dressing to site. Tube to -20 mmH20 pleuravac seal.      Date: 8/5    Day 3: Has surpassed a 2nd midnight with active treatments and services, which include :  Hgb down to 6.2 last night, received consent from sister for transfusions- pt unable to give consent . Pt transfused 1 U RBC overnight . For CBC today as well as LD ordered . K 3.4, mag 1.7 repletion ordered . 12 Fr chest tube in place -20 cm H2o for yellow drainage , 620 ml since insertion . Pt continues IV Unasyn. F./U body fluid , blood and urine cx . Dysphagia diet with difficulty swallowing per nsg . Dextrose IVF infusing with glucose 204 this am .Sodium 142 this am . .          ED Triage Vitals [08/03/23 1045]   Temperature Pulse Respirations Blood Pressure SpO2   99.1 °F (37.3 °C) 95 20 120/64 94 %      Temp Source Heart Rate Source Patient Position - Orthostatic VS BP Location FiO2 (%)   Axillary Monitor Sitting Right arm --      Pain Score       --          Wt Readings from Last 1 Encounters:   08/04/23 59.3 kg (130 lb 11.7 oz)     Additional Vital Signs:   Date/Time Temp Pulse Resp BP MAP (mmHg) SpO2 Calculated FIO2 (%) - Nasal Cannula Nasal Cannula O2 Flow Rate (L/min) O2 Device   08/04/23 10:28:01 99.8 °F (37.7 °C) 106 Abnormal  -- -- -- 97 % -- -- --   08/04/23 10:27:48 -- 67 -- -- -- 98 % -- -- --   08/04/23 10:11:06 -- 101 -- -- -- 90 % -- -- --   08/04/23 07:39:28 98.3 °F (36.8 °C) 89 16 130/67 88 89 % Abnormal  -- -- --   08/03/23 21:00:55 101 °F (38.3 °C) Abnormal  80 16 141/72 95 90 % -- -- --   08/03/23 15:32:04 99.7 °F (37.6 °C) 42 Abnormal  17 146/72 97 99 % -- -- --   08/03/23 1400 -- 83 -- 134/68 94 94 % -- -- --   08/03/23 1345 -- 83 -- 157/77 108 96 % -- -- --   08/03/23 1330 -- 86 -- 138/72 100 95 % -- -- --   08/03/23 1315 -- 91 -- 160/79 112 91 % -- -- --   08/03/23 1300 -- 90 -- 147/87 112 92 % -- -- --   08/03/23 1245 -- 91 -- 130/66 92 93 % -- -- --   08/03/23 1230 -- 88 16 143/73 102 94 % 28 2 L/min Nasal cannula   08/03/23 1215 -- 89 -- 143/68 98 94 % -- -- --   08/03/23 1200 -- 89 16 134/63 90 -- -- -- --     Pertinent Labs/Diagnostic Test Results:    8/3 ECG-   ECG rate:  90   Rhythm:     Rhythm: sinus rhythm     T waves:     T waves: non-specific     8/4 ECG-Normal sinus rhythm  Left axis deviation  Incomplete right bundle branch block  Nonspecific T wave abnormality    CT chest wo contrast   Final Result by Brenda Watson MD (08/04 9417)      Moderate right pleural effusion with compressive atelectasis over the right lower lobe and posterior right middle and upper lobes. Consolidation in the left lower lobe, probably atelectatic. Superimposed pneumonia cannot be excluded. Soft tissue densities in the bronchial tree as above, appearance favors secretions. Other findings, as per the body of the report. Workstation performed: [id number]         CT head wo contrast   Final Result by Geni Ivan MD (08/04 0248)      No acute intracranial abnormality. Workstation performed: [id number]         XR chest 1 view portable   Final Result by Freddie Reid MD (08/03 5664)      There is a right mid to lower lung opacity with well-circumscribed medial margin suggests loculated effusion.  Suggest CT chest for further evaluation                  Workstation performed: [id number]           Results from last 7 days   Lab Units 08/03/23  1109   SARS-COV-2  Negative     Results from last 7 days   Lab Units 08/04/23  0900 08/03/23  1049   WBC Thousand/uL 25.53* 27.00*   HEMOGLOBIN g/dL 8.0* 8.1*   HEMATOCRIT % 25.9* 26.1*   PLATELETS Thousands/uL 488* 479*   NEUTROS ABS Thousands/µL 22.44* 24.58*         Results from last 7 days   Lab Units 08/04/23  0518 08/03/23  1049   SODIUM mmol/L 148* 142   POTASSIUM mmol/L 3.2* 3.7   CHLORIDE mmol/L 114* 105   CO2 mmol/L 23 25   ANION GAP mmol/L 11 12   BUN mg/dL 23 35*   CREATININE mg/dL 0.95 1.29   EGFR ml/min/1.73sq m 75 52   CALCIUM mg/dL 8.7 9.2     Results from last 7 days   Lab Units 08/03/23  1049   AST U/L 7*   ALT U/L 6*   ALK PHOS U/L 131*   TOTAL PROTEIN g/dL 7.1   ALBUMIN g/dL 3.2*   TOTAL BILIRUBIN mg/dL 0.79     Results from last 7 days   Lab Units 08/04/23  1130 08/04/23  0822 08/03/23  2105 08/03/23  1622 08/03/23  1054   POC GLUCOSE mg/dl 119 164* 368* 216* 428*     Results from last 7 days   Lab Units 08/04/23  0518 08/03/23  1049   GLUCOSE RANDOM mg/dL 161* 419*         Results from last 7 days   Lab Units 08/03/23  1614   HEMOGLOBIN A1C % 7.0*   EAG mg/dl 154     BETA-HYDROXYBUTYRATE   Date Value Ref Range Status   08/03/2023 0.1 <0.6 mmol/L Final          Results from last 7 days   Lab Units 08/03/23  1354   PH JOHN  7.360   PCO2 JOHN mm Hg 38.2*   PO2 JOHN mm Hg 37.5   HCO3 JOHN mmol/L 21.1*   BASE EXC JOHN mmol/L -4.0   O2 CONTENT JOHN ml/dL 6.7   O2 HGB, VENOUS % 62.8             Results from last 7 days   Lab Units 08/03/23  1304 08/03/23  1049   HS TNI 0HR ng/L  --  12.7   HS TNI 2HR ng/L 6  --    HSTNI D2 ng/L -6.7  --          Results from last 7 days   Lab Units 08/03/23  1716   PROTIME seconds 16.7*   INR  1.33*   PTT seconds 41*         Results from last 7 days   Lab Units 08/04/23  0518 08/03/23  1049   PROCALCITONIN ng/ml 1.45* 2.31*     Results from last 7 days   Lab Units 08/03/23  1355 08/03/23  1131   LACTIC ACID mmol/L 1.5 3.2*             Results from last 7 days   Lab Units 08/03/23  1049   BNP pg/mL 237*     Results from last 7 days   Lab Units 08/03/23  1049   FERRITIN ng/mL 714*   IRON SATURATION % 14*   IRON ug/dL 26*   TIBC ug/dL 182*                                 Results from last 7 days   Lab Units 08/03/23  1257   CLARITY UA  Clear   COLOR UA  Yellow   SPEC GRAV UA  1.028   PH UA 5.5   GLUCOSE UA mg/dl >=1000 (1%)*   KETONES UA mg/dl Negative   BLOOD UA  Negative   PROTEIN UA mg/dl 70 (1+)*   NITRITE UA  Negative   BILIRUBIN UA  Negative   UROBILINOGEN UA (BE) mg/dl 2.0*   LEUKOCYTES UA  Elevated glucose may cause decreased leukocyte values. See urine microscopic for UWBC result*   WBC UA /hpf 1-2   RBC UA /hpf 1-2   BACTERIA UA /hpf None Seen   EPITHELIAL CELLS WET PREP /hpf Occasional   MUCUS THREADS  Occasional*     Results from last 7 days   Lab Units 08/03/23  2251 08/03/23  1257 08/03/23  1109   STREP PNEUMONIAE ANTIGEN, URINE   --  Negative  --    LEGIONELLA URINARY ANTIGEN  Negative  --   --    INFLUENZA A PCR   --   --  Negative   INFLUENZA B PCR   --   --  Negative   RSV PCR   --   --  Negative                             Results from last 7 days   Lab Units 08/03/23  1201 08/03/23  1131   BLOOD CULTURE  Received in Microbiology Lab. Culture in Progress. Received in Microbiology Lab. Culture in Progress.                    ED Treatment:   Medication Administration from 08/03/2023 1038 to 08/03/2023 1518       Date/Time Order Dose Route Action     08/03/2023 1207 EDT sodium chloride 0.9 % bolus 1,000 mL 0 mL Intravenous Stopped     08/03/2023 1107 EDT sodium chloride 0.9 % bolus 1,000 mL 1,000 mL Intravenous New Bag     08/03/2023 1222 EDT sodium chloride 0.9 % bolus 1,000 mL 0 mL Intravenous Stopped     08/03/2023 1152 EDT sodium chloride 0.9 % bolus 1,000 mL 1,000 mL Intravenous New Bag     08/03/2023 1253 EDT sodium chloride 0.9 % bolus 1,000 mL 0 mL Intravenous Stopped     08/03/2023 1223 EDT sodium chloride 0.9 % bolus 1,000 mL 1,000 mL Intravenous New Bag     08/03/2023 1235 EDT cefepime (MAXIPIME) 1,000 mg in dextrose 5 % 50 mL IVPB 0 mg Intravenous Stopped     08/03/2023 1205 EDT cefepime (MAXIPIME) 1,000 mg in dextrose 5 % 50 mL IVPB 1,000 mg Intravenous New Bag     08/03/2023 1307 EDT insulin regular (HumuLIN R,NovoLIN R) injection 5 Units 5 Units Intravenous Given Past Medical History:   Diagnosis Date   • Late onset Alzheimer's dementia with behavioral disturbance ([address])    • Other hyperlipidemia      Present on Admission:  • Late onset Alzheimer's dementia with behavioral disturbance ([address])  • Depression      Admitting Diagnosis: Pneumonia [J18.9]  Dyspnea [R06.00]  Sepsis ([address]) [A41.9]  Late onset Alzheimer's dementia with behavioral disturbance ([address]) [G30.1, F02.818]  Age/Sex: 82 y.o. male  Admission Orders:  Scheduled Medications:  cefepime, 1,000 mg, Intravenous, Q12H  donepezil, 5 mg, Oral, HS  enoxaparin, 40 mg, Subcutaneous, Daily  guaiFENesin, 1,200 mg, Oral, Q12H [address]  insulin glargine, 10 Units, Subcutaneous, HS  insulin lispro, 1-5 Units, Subcutaneous, TID AC  insulin lispro, 5 Units, Subcutaneous, TID With Meals  memantine, 10 mg, Oral, BID  potassium chloride, 20 mEq, Intravenous, Q2H  senna, 1 tablet, Oral, HS    traZODone (DESYREL) tablet 25 mg  Dose: 25 mg  Freq: Daily at bedtime Route: PO  Start: 08/03/23 2200 End: 08/03/23 2131  potassium chloride 20 mEq IVPB (premix)  Dose: 20 mEq  Freq: Every 2 hours Route: IV  Last Dose: 20 mEq (08/04/23 1022)  Start: 08/04/23 0845 End: 08/04/23 1244    Continuous IV Infusions:  dextrose, 75 mL/hr, Intravenous, Continuous    multi-electrolyte (PLASMALYTE-A/ISOLYTE-S PH 7.4) IV solution  Rate: 75 mL/hr Dose: 75 mL/hr  Freq: Continuous Route: IV  Indications of Use: IV Hydration  Last Dose: Stopped (08/04/23 0847)  Start: 08/03/23 1745 End: 08/04/23 0835  PRN Meds:  acetaminophen, 650 mg, Oral, Q6H PRN    aspiration monitoring    ambulate TID   SCD  IP CONSULT TO GERONTOLOGY  IP CONSULT TO PULMONOLOGY  IP CONSULT TO PHARMACY    Network Utilization Review Department  ATTENTION: Please call with any questions or concerns to [phone] and carefully listen to the prompts so that you are directed to the right person.  All voicemails are confidential.  Betty Fonseca all requests for admission clinical reviews, approved or denied determinations and any other requests to dedicated fax number below belonging to the campus where the patient is receiving treatment.  List of dedicated fax numbers for the Facilities:  [place] DENIALS (Administrative/Medical Necessity) [phone] [address] (Maternity/NICU/Pediatrics) [phone]   [address] [place] [address] [phone]   [facility] [address] [facility] [phone]   [address] [place] [address] [address] [facility] [address] [phone]   [address] [facility] [address] [phone]

## 2023-08-04 NOTE — ASSESSMENT & PLAN NOTE
· POA platelets 987 today, pressure review in September 2022 was noted unremarkable to 32  · Iron panel: Iron Sat: 14, TIBC: 182, iron: 26    Plan  Continue to monitor

## 2023-08-05 LAB
ABO GROUP BLD BPU: NORMAL
ABO GROUP BLD: NORMAL
ABO GROUP BLD: NORMAL
ANION GAP SERPL CALCULATED.3IONS-SCNC: 9 MMOL/L
ANISOCYTOSIS BLD QL SMEAR: PRESENT
BASOPHILS # BLD MANUAL: 0 THOUSAND/UL (ref 0–0.1)
BASOPHILS NFR MAR MANUAL: 0 % (ref 0–1)
BLD GP AB SCN SERPL QL: NEGATIVE
BPU ID: NORMAL
BUN SERPL-MCNC: 17 MG/DL (ref 5–25)
CALCIUM SERPL-MCNC: 8.3 MG/DL (ref 8.4–10.2)
CHLORIDE SERPL-SCNC: 110 MMOL/L (ref 96–108)
CO2 SERPL-SCNC: 23 MMOL/L (ref 21–32)
CREAT SERPL-MCNC: 0.87 MG/DL (ref 0.6–1.3)
CROSSMATCH: NORMAL
EOSINOPHIL # BLD MANUAL: 0.47 THOUSAND/UL (ref 0–0.4)
EOSINOPHIL NFR BLD MANUAL: 2 % (ref 0–6)
ERYTHROCYTE [DISTWIDTH] IN BLOOD BY AUTOMATED COUNT: 15.9 % (ref 11.6–15.1)
GFR SERPL CREATININE-BSD FRML MDRD: 81 ML/MIN/1.73SQ M
GLUCOSE SERPL-MCNC: 108 MG/DL (ref 65–140)
GLUCOSE SERPL-MCNC: 179 MG/DL (ref 65–140)
GLUCOSE SERPL-MCNC: 186 MG/DL (ref 65–140)
GLUCOSE SERPL-MCNC: 204 MG/DL (ref 65–140)
GLUCOSE SERPL-MCNC: 94 MG/DL (ref 65–140)
HCT VFR BLD AUTO: 28.1 % (ref 36.5–49.3)
HGB BLD-MCNC: 8.9 G/DL (ref 12–17)
LDH SERPL-CCNC: 172 U/L (ref 140–271)
LG PLATELETS BLD QL SMEAR: PRESENT
LYMPHOCYTES # BLD AUTO: 2.1 THOUSAND/UL (ref 0.6–4.47)
LYMPHOCYTES # BLD AUTO: 4 % (ref 14–44)
MAGNESIUM SERPL-MCNC: 1.7 MG/DL (ref 1.9–2.7)
MCH RBC QN AUTO: 29.6 PG (ref 26.8–34.3)
MCHC RBC AUTO-ENTMCNC: 31.7 G/DL (ref 31.4–37.4)
MCV RBC AUTO: 93 FL (ref 82–98)
MONOCYTES # BLD AUTO: 1.17 THOUSAND/UL (ref 0–1.22)
MONOCYTES NFR BLD: 5 % (ref 4–12)
MRSA NOSE QL CULT: NORMAL
MYELOCYTES NFR BLD MANUAL: 1 % (ref 0–1)
NEUTROPHILS # BLD MANUAL: 19.36 THOUSAND/UL (ref 1.85–7.62)
NEUTS BAND NFR BLD MANUAL: 2 % (ref 0–8)
NEUTS SEG NFR BLD AUTO: 81 % (ref 43–75)
NRBC BLD AUTO-RTO: 1 /100 WBC (ref 0–2)
PHOSPHATE SERPL-MCNC: 3.4 MG/DL (ref 2.3–4.1)
PLATELET # BLD AUTO: 471 THOUSANDS/UL (ref 149–390)
PLATELET BLD QL SMEAR: ABNORMAL
PMV BLD AUTO: 9.7 FL (ref 8.9–12.7)
POLYCHROMASIA BLD QL SMEAR: PRESENT
POTASSIUM SERPL-SCNC: 3.4 MMOL/L (ref 3.5–5.3)
RBC # BLD AUTO: 3.01 MILLION/UL (ref 3.88–5.62)
RBC MORPH BLD: PRESENT
RH BLD: POSITIVE
RH BLD: POSITIVE
SODIUM SERPL-SCNC: 142 MMOL/L (ref 135–147)
SPECIMEN EXPIRATION DATE: NORMAL
UNIT DISPENSE STATUS: NORMAL
UNIT PRODUCT CODE: NORMAL
UNIT PRODUCT VOLUME: 350 ML
UNIT RH: NORMAL
VARIANT LYMPHS # BLD AUTO: 5 %
WBC # BLD AUTO: 23.32 THOUSAND/UL (ref 4.31–10.16)

## 2023-08-05 PROCEDURE — 84100 ASSAY OF PHOSPHORUS: CPT

## 2023-08-05 PROCEDURE — 0W9930Z DRAINAGE OF RIGHT PLEURAL CAVITY WITH DRAINAGE DEVICE, PERCUTANEOUS APPROACH: ICD-10-PCS | Performed by: RADIOLOGY

## 2023-08-05 PROCEDURE — 85027 COMPLETE CBC AUTOMATED: CPT

## 2023-08-05 PROCEDURE — 82948 REAGENT STRIP/BLOOD GLUCOSE: CPT

## 2023-08-05 PROCEDURE — 85007 BL SMEAR W/DIFF WBC COUNT: CPT

## 2023-08-05 PROCEDURE — 30233N1 TRANSFUSION OF NONAUTOLOGOUS RED BLOOD CELLS INTO PERIPHERAL VEIN, PERCUTANEOUS APPROACH: ICD-10-PCS | Performed by: HOSPITALIST

## 2023-08-05 PROCEDURE — 80048 BASIC METABOLIC PNL TOTAL CA: CPT

## 2023-08-05 PROCEDURE — P9040 RBC LEUKOREDUCED IRRADIATED: HCPCS

## 2023-08-05 PROCEDURE — 99232 SBSQ HOSP IP/OBS MODERATE 35: CPT | Performed by: INTERNAL MEDICINE

## 2023-08-05 PROCEDURE — 99232 SBSQ HOSP IP/OBS MODERATE 35: CPT | Performed by: HOSPITALIST

## 2023-08-05 PROCEDURE — 83615 LACTATE (LD) (LDH) ENZYME: CPT

## 2023-08-05 PROCEDURE — 83735 ASSAY OF MAGNESIUM: CPT

## 2023-08-05 RX ORDER — POTASSIUM CHLORIDE 14.9 MG/ML
20 INJECTION INTRAVENOUS
Status: COMPLETED | OUTPATIENT
Start: 2023-08-05 | End: 2023-08-05

## 2023-08-05 RX ORDER — MAGNESIUM SULFATE HEPTAHYDRATE 40 MG/ML
2 INJECTION, SOLUTION INTRAVENOUS ONCE
Status: COMPLETED | OUTPATIENT
Start: 2023-08-05 | End: 2023-08-05

## 2023-08-05 RX ADMIN — MAGNESIUM SULFATE HEPTAHYDRATE 2 G: 40 INJECTION, SOLUTION INTRAVENOUS at 10:05

## 2023-08-05 RX ADMIN — POTASSIUM CHLORIDE 20 MEQ: 14.9 INJECTION, SOLUTION INTRAVENOUS at 10:05

## 2023-08-05 RX ADMIN — SODIUM CHLORIDE 3 G: 9 INJECTION, SOLUTION INTRAVENOUS at 00:00

## 2023-08-05 RX ADMIN — SODIUM CHLORIDE 3 G: 9 INJECTION, SOLUTION INTRAVENOUS at 18:34

## 2023-08-05 RX ADMIN — MEMANTINE 10 MG: 10 TABLET ORAL at 10:06

## 2023-08-05 RX ADMIN — ACETAMINOPHEN 650 MG: 325 TABLET, FILM COATED ORAL at 15:53

## 2023-08-05 RX ADMIN — DONEPEZIL HYDROCHLORIDE 5 MG: 5 TABLET ORAL at 21:48

## 2023-08-05 RX ADMIN — ENOXAPARIN SODIUM 40 MG: 40 INJECTION SUBCUTANEOUS at 08:23

## 2023-08-05 RX ADMIN — SODIUM CHLORIDE 3 G: 9 INJECTION, SOLUTION INTRAVENOUS at 07:57

## 2023-08-05 RX ADMIN — INSULIN LISPRO 1 UNITS: 100 INJECTION, SOLUTION INTRAVENOUS; SUBCUTANEOUS at 18:33

## 2023-08-05 RX ADMIN — INSULIN LISPRO 5 UNITS: 100 INJECTION, SOLUTION INTRAVENOUS; SUBCUTANEOUS at 07:59

## 2023-08-05 RX ADMIN — POTASSIUM CHLORIDE 20 MEQ: 14.9 INJECTION, SOLUTION INTRAVENOUS at 07:57

## 2023-08-05 RX ADMIN — INSULIN LISPRO 2 UNITS: 100 INJECTION, SOLUTION INTRAVENOUS; SUBCUTANEOUS at 07:59

## 2023-08-05 RX ADMIN — SODIUM CHLORIDE 3 G: 9 INJECTION, SOLUTION INTRAVENOUS at 13:45

## 2023-08-05 RX ADMIN — GUAIFENESIN 1200 MG: 600 TABLET ORAL at 08:23

## 2023-08-05 RX ADMIN — STANDARDIZED SENNA CONCENTRATE 8.6 MG: 8.6 TABLET ORAL at 21:48

## 2023-08-05 RX ADMIN — INSULIN GLARGINE 10 UNITS: 100 INJECTION, SOLUTION SUBCUTANEOUS at 21:48

## 2023-08-05 RX ADMIN — GUAIFENESIN 1200 MG: 600 TABLET ORAL at 21:48

## 2023-08-05 RX ADMIN — DEXTROSE 75 ML/HR: 5 SOLUTION INTRAVENOUS at 13:59

## 2023-08-05 RX ADMIN — INSULIN LISPRO 5 UNITS: 100 INJECTION, SOLUTION INTRAVENOUS; SUBCUTANEOUS at 13:45

## 2023-08-05 RX ADMIN — MEMANTINE 10 MG: 10 TABLET ORAL at 18:33

## 2023-08-05 NOTE — PROGRESS NOTES
Progress Note - Pulmonary   Denia Steele 82 y.o. male MRN: [id number]  Unit/Bed#: W -01 Encounter: [id number]      Interval History:   Patient nonverbal.  To provide any history. Review of Systems:  Full 12 point review of systems negative other than previously mentioned    Objective:   Vitals: Blood pressure 149/83, pulse 72, temperature 99.6 °F (37.6 °C), temperature source Axillary, resp. rate 20, height 5' 9" (1.753 m), weight 59.6 kg (131 lb 6.3 oz), SpO2 92 %. , Body mass index is 19.4 kg/m². No acute distress but nonverbal  S1-S2  Diminished breath sounds likely secondary to poor inspiratory effort, + chest tube in right hemithorax  Soft nontender nondistended  Not oriented    Labs: I have personally reviewed pertinent lab results. Results Reviewed     Procedure Component Value Units Date/Time    Blood culture #1 [[id number]] Collected: 08/03/23 1131    Lab Status: Preliminary result Specimen: Blood from Arm, Left Updated: 08/04/23 1601     Blood Culture No Growth at 24 hrs. Blood culture #2 [[id number]] Collected: 08/03/23 1201    Lab Status: Preliminary result Specimen: Blood from Arm, Right Updated: 08/04/23 1601     Blood Culture No Growth at 24 hrs. MRSA culture [[id number]] Collected: 08/04/23 0953    Lab Status:  In process Specimen: Nares from Nose Updated: 08/04/23 0957    CBC and differential [[id number]]  (Abnormal) Collected: 08/04/23 0900    Lab Status: Final result Specimen: Blood from Arm, Right Updated: 08/04/23 0907     WBC 25.53 Thousand/uL      RBC 2.68 Million/uL      Hemoglobin 8.0 g/dL      Hematocrit 25.9 %      MCV 97 fL      MCH 29.9 pg      MCHC 30.9 g/dL      RDW 14.1 %      MPV 9.5 fL      Platelets 258 Thousands/uL      nRBC 0 /100 WBCs      Neutrophils Relative 88 %      Immat GRANS % 2 %      Lymphocytes Relative 6 %      Monocytes Relative 4 %      Eosinophils Relative 0 %      Basophils Relative 0 %      Neutrophils Absolute 22.44 Thousands/µL      Immature Grans Absolute >0.50 Thousand/uL      Lymphocytes Absolute 1.56 Thousands/µL      Monocytes Absolute 0.92 Thousand/µL      Eosinophils Absolute 0.01 Thousand/µL      Basophils Absolute 0.05 Thousands/µL     Legionella antigen, Urine [[id number]]  (Normal) Collected: 08/03/23 2251    Lab Status: Final result Specimen: Urine, Other Updated: 08/04/23 0800     Legionella Urinary Antigen Negative    Procalcitonin, Next Day AM Collection [[id number]]  (Abnormal) Collected: 08/04/23 0518    Lab Status: Final result Specimen: Blood from Arm, Right Updated: 08/04/23 0619     Procalcitonin 1.45 ng/ml     Iron Saturation % [[id number]]  (Abnormal) Collected: 08/03/23 1049    Lab Status: Final result Specimen: Blood from Arm, Left Updated: 08/03/23 2230     Iron Saturation 14 %      TIBC 182 ug/dL      Iron 26 ug/dL     Folate [[id number]]  (Normal) Collected: 08/03/23 1049    Lab Status: Final result Specimen: Blood from Arm, Left Updated: 08/03/23 2018     Folate 8.2 ng/mL     Vitamin B12 [[id number]]  (Normal) Collected: 08/03/23 1049    Lab Status: Final result Specimen: Blood from Arm, Left Updated: 08/03/23 2018     Vitamin B-12 251 pg/mL     Ferritin [[id number]]  (Abnormal) Collected: 08/03/23 1049    Lab Status: Final result Specimen: Blood from Arm, Left Updated: 08/03/23 2018     Ferritin 714 ng/mL     Hemoglobin A1C w/ EAG Estimation [[id number]]  (Abnormal) Collected: 08/03/23 1614    Lab Status: Final result Specimen: Blood from Arm, Right Updated: 08/03/23 2006     Hemoglobin A1C 7.0 %       mg/dl     Protime-INR [[id number]]  (Abnormal) Collected: 08/03/23 1716    Lab Status: Final result Specimen: Blood from Arm, Right Updated: 08/03/23 1747     Protime 16.7 seconds      INR 1.33    APTT [[id number]]  (Abnormal) Collected: 08/03/23 1716    Lab Status: Final result Specimen: Blood from Arm, Right Updated: 08/03/23 1747     PTT 41 seconds     Strep Pneumoniae, Urine [[id number]]  (Normal) Collected: 08/03/23 [facility]    Lab Status: Final result Specimen: Urine, Clean Catch Updated: 08/03/23 1648     Strep pneumoniae antigen, urine Negative    Lactic acid 2 Hours [[id number]]  (Normal) Collected: 08/03/23 1355    Lab Status: Final result Specimen: Blood from Arm, Left Updated: 08/03/23 1416     LACTIC ACID 1.5 mmol/L     Narrative:      Result may be elevated if tourniquet was used during collection.     Blood gas, venous [[id number]]  (Abnormal) Collected: 08/03/23 1354    Lab Status: Final result Specimen: Blood from Arm, Left Updated: 08/03/23 1411     pH, Kj 7.360     pCO2, Kj 38.2 mm Hg      pO2, Kj 37.5 mm Hg      HCO3, Kj 21.1 mmol/L      Base Excess, Kj -4.0 mmol/L      O2 Content, Kj 6.7 ml/dL      O2 HGB, VENOUS 62.8 %     HS Troponin I 2hr [[id number]]  (Normal) Collected: 08/03/23 1304    Lab Status: Final result Specimen: Blood from Arm, Right Updated: 08/03/23 1339     hs TnI 2hr 6 ng/L      Delta 2hr hsTnI -6.7 ng/L     B-Type Natriuretic Peptide(BNP) [[id number]]  (Abnormal) Collected: 08/03/23 1049    Lab Status: Final result Specimen: Blood from Arm, Left Updated: 08/03/23 1332      pg/mL     Urine Microscopic [[id number]]  (Abnormal) Collected: 08/03/23 1257    Lab Status: Final result Specimen: Urine, Clean Catch Updated: 08/03/23 1331     RBC, UA 1-2 /hpf      WBC, UA 1-2 /hpf      Epithelial Cells Occasional /hpf      Bacteria, UA None Seen /hpf      MUCUS THREADS Occasional     Hyaline Casts, UA 0-3 /lpf     Procalcitonin [[id number]]  (Abnormal) Collected: 08/03/23 1049    Lab Status: Final result Specimen: Blood from Arm, Left Updated: 08/03/23 1326     Procalcitonin 2.31 ng/ml     UA w Reflex to Microscopic w Reflex to Culture [[id number]]  (Abnormal) Collected: 08/03/23 1257    Lab Status: Final result Specimen: Urine, Clean Catch Updated: 08/03/23 1312     Color, UA Yellow     Clarity, UA Clear     Specific Gravity, UA 1.028     pH, UA 5.5     Leukocytes, UA Elevated glucose may cause decreased leukocyte values. See urine microscopic for UWBC result     Nitrite, UA Negative     Protein, UA 70 (1+) mg/dl      Glucose, UA >=1000 (1%) mg/dl      Ketones, UA Negative mg/dl      Urobilinogen, UA 2.0 mg/dl      Bilirubin, UA Negative     Occult Blood, UA Negative    Lactic acid [[id number]]  (Abnormal) Collected: 08/03/23 1131    Lab Status: Final result Specimen: Blood from Arm, Left Updated: 08/03/23 1218     LACTIC ACID 3.2 mmol/L     Narrative:      Result may be elevated if tourniquet was used during collection. FLU/RSV/COVID - if FLU/RSV clinically relevant [[id number]]  (Normal) Collected: 08/03/23 1109    Lab Status: Final result Specimen: Nares from Nose Updated: 08/03/23 1210     SARS-CoV-2 Negative     INFLUENZA A PCR Negative     INFLUENZA B PCR Negative     RSV PCR Negative    Narrative:      FOR PEDIATRIC PATIENTS - copy/paste COVID Guidelines URL to browser: [email or web address]    SARS-CoV-2 assay is a Nucleic Acid Amplification assay intended for the  qualitative detection of nucleic acid from SARS-CoV-2 in nasopharyngeal  swabs. Results are for the presumptive identification of SARS-CoV-2 RNA. Positive results are indicative of infection with SARS-CoV-2, the virus  causing COVID-19, but do not rule out bacterial infection or co-infection  with other viruses. Laboratories within the [facility] and its  territories are required to report all positive results to the appropriate  public health authorities. Negative results do not preclude SARS-CoV-2  infection and should not be used as the sole basis for treatment or other  patient management decisions. Negative results must be combined with  clinical observations, patient history, and epidemiological information. This test has not been FDA cleared or approved. This test has been authorized by FDA under an Emergency Use Authorization  (EUA).  This test is only authorized for the duration of time the  declaration that circumstances exist justifying the authorization of the  emergency use of an in vitro diagnostic tests for detection of SARS-CoV-2  virus and/or diagnosis of COVID-19 infection under section 564(b)(1) of  the Act, 21 U. S.C. 994XVP-2(F)(6), unless the authorization is terminated  or revoked sooner. The test has been validated but independent review by FDA  and CLIA is pending. Test performed using [employer] GeneXpert: This RT-PCR assay targets N2,  a region unique to SARS-CoV-2. A conserved region in the E-gene was chosen  for pan-Sarbecovirus detection which includes SARS-CoV-2. According to CMS-2020-01-R, this platform meets the definition of high-throughput technology.     Comprehensive metabolic panel [[id number]]  (Abnormal) Collected: 08/03/23 1049    Lab Status: Final result Specimen: Blood from Arm, Left Updated: 08/03/23 1200     Sodium 142 mmol/L      Potassium 3.7 mmol/L      Chloride 105 mmol/L      CO2 25 mmol/L      ANION GAP 12 mmol/L      BUN 35 mg/dL      Creatinine 1.29 mg/dL      Glucose 419 mg/dL      Calcium 9.2 mg/dL      Corrected Calcium 9.8 mg/dL      AST 7 U/L      ALT 6 U/L      Alkaline Phosphatase 131 U/L      Total Protein 7.1 g/dL      Albumin 3.2 g/dL      Total Bilirubin 0.79 mg/dL      eGFR 52 ml/min/1.73sq m     Narrative:      [place] guidelines for Chronic Kidney Disease (CKD):   •  Stage 1 with normal or high GFR (GFR > 90 mL/min/1.73 square meters)  •  Stage 2 Mild CKD (GFR = 60-89 mL/min/1.73 square meters)  •  Stage 3A Moderate CKD (GFR = 45-59 mL/min/1.73 square meters)  •  Stage 3B Moderate CKD (GFR = 30-44 mL/min/1.73 square meters)  •  Stage 4 Severe CKD (GFR = 15-29 mL/min/1.73 square meters)  •  Stage 5 End Stage CKD (GFR <15 mL/min/1.73 square meters)  Note: GFR calculation is accurate only with a steady state creatinine    HS Troponin 0hr (reflex protocol) [[id number]]  (Normal) Collected: 08/03/23 1049    Lab Status: Final result Specimen: Blood from Arm, Left Updated: 08/03/23 1159     hs TnI 0hr 12.7 ng/L     CBC and differential [[id number]]  (Abnormal) Collected: 08/03/23 1049    Lab Status: Final result Specimen: Blood from Arm, Left Updated: 08/03/23 1153     WBC 27.00 Thousand/uL      RBC 2.70 Million/uL      Hemoglobin 8.1 g/dL      Hematocrit 26.1 %      MCV 97 fL      MCH 30.0 pg      MCHC 31.0 g/dL      RDW 13.9 %      MPV 9.6 fL      Platelets 538 Thousands/uL      nRBC 0 /100 WBCs      Neutrophils Relative 91 %      Immat GRANS % 1 %      Lymphocytes Relative 4 %      Monocytes Relative 4 %      Eosinophils Relative 0 %      Basophils Relative 0 %      Neutrophils Absolute 24.58 Thousands/µL      Immature Grans Absolute 0.33 Thousand/uL      Lymphocytes Absolute 0.99 Thousands/µL      Monocytes Absolute 1.07 Thousand/µL      Eosinophils Absolute 0.00 Thousand/µL      Basophils Absolute 0.03 Thousands/µL     Narrative: This is an appended report. These results have been appended to a previously verified report.     Smear Review(Phlebs Do Not Order) [[id number]]  (Abnormal) Collected: 08/03/23 1049    Lab Status: Final result Specimen: Blood from Arm, Left Updated: 08/03/23 1153     RBC Morphology Present     Platelet Estimate Increased     Anisocytosis Present     Hypochromia Present    Beta Hydroxybutyrate [[id number]]  (Normal) Collected: 08/03/23 1109    Lab Status: Final result Specimen: Blood from Arm, Left Updated: 08/03/23 1147     BETA-HYDROXYBUTYRATE 0.1 mmol/L     Fingerstick Glucose (POCT) [[id number]]  (Abnormal) Collected: 08/03/23 1054    Lab Status: Final result Updated: 08/03/23 1055     POC Glucose 428 mg/dl            Current Medications:    Current Facility-Administered Medications:   •  acetaminophen (TYLENOL) tablet 650 mg, 650 mg, Oral, Q6H PRN, Peri Mujica MD, 650 mg at 08/04/23 1611  •  ampicillin-sulbactam (UNASYN) 3 g in sodium chloride 0.9 % 100 mL IVPB, 3 g, Intravenous, Julissa Hummel MD, Last Rate: 200 mL/hr at 08/05/23 0757, 3 g at 08/05/23 0757  •  dextrose 5 % infusion, 75 mL/hr, Intravenous, Continuous, Erika Gueavra MD, Last Rate: 75 mL/hr at 08/04/23 1021, 75 mL/hr at 08/04/23 1021  •  donepezil (ARICEPT) tablet 5 mg, 5 mg, Oral, HS, Andrey Gabriel MD, 5 mg at 08/04/23 2131  •  enoxaparin (LOVENOX) subcutaneous injection 40 mg, 40 mg, Subcutaneous, Daily, Andrey Gabriel MD, 40 mg at 08/05/23 0811  •  guaiFENesin (MUCINEX) 12 hr tablet 1,200 mg, 1,200 mg, Oral, Q12H [address], Andrey Gabriel MD, 1,200 mg at 08/05/23 9927  •  insulin glargine (LANTUS) subcutaneous injection 10 Units 0.1 mL, 10 Units, Subcutaneous, HS, Andrey Gabriel MD, 10 Units at 08/04/23 2132  •  insulin lispro (HumaLOG) 100 units/mL subcutaneous injection 1-5 Units, 1-5 Units, Subcutaneous, TID AC, 2 Units at 08/05/23 0759 **AND** Fingerstick Glucose (POCT), , , TID AC, Andrey Gabriel MD  •  insulin lispro (HumaLOG) 100 units/mL subcutaneous injection 5 Units, 5 Units, Subcutaneous, TID With Meals, Andrey Gabriel MD, 5 Units at 08/05/23 0759  •  memantine (NAMENDA) tablet 10 mg, 10 mg, Oral, BID, Andrey Gabriel MD, 10 mg at 08/05/23 1006  •  OLANZapine (ZyPREXA) IM injection 5 mg, 5 mg, Intramuscular, Once, Yennifer Rosario DO  •  senna (SENOKOT) tablet 8.6 mg, 1 tablet, Oral, HS, Andrey Gabriel MD, 8.6 mg at 08/04/23 2131     Microbiology:  Pleural cultures pending    Imaging and other studies:   I personally viewed and interpreted the following imaging studies:  CT chest 8/3/2023 shows right-sided loculated pleural effusion with thickened visceral pleura    Assessment:  1. Complicated parapneumonic effusion    Plan:  • Patient's pleural studies are consistent with complicated parapneumonic effusion  • Continue chest tube drainage until less than 100 cc of output over 24 hours.   There is a 300 cc of serous output over the last 24 hours (total in Pleur-evac)  • Antibiotics per primary service, however I agree with ampicillin/sulbactam  • Follow-up pleural cultures  • Repeat chest x-ray tomorrow  • Pulmonary medicine will continue to follow      Richard Yanez M.D.   [facility]

## 2023-08-05 NOTE — PROGRESS NOTES
[id number]  Progress Note  Name: Nadia Regalado  MRN: [id number]  Unit/Bed#: W -01 I Date of Admission: 8/3/2023   Date of Service: 8/5/2023 I Hospital Day: 2    Assessment/Plan   * Severe sepsis ([address])  Assessment & Plan  · POA met severe sepsis criteria for leukocytosis 27.00, ANC 24.58, tachycardia heart rate 95, lactic acid 3.2. · At the ED received a total 3L fluid sepsis fluid , lactic acid down to 1.5  · Procalcitonin elevated down trending from 2.31 to 1.45  · Chest x-ray:There is a right mid to lower lung opacity with well-circumscribed medial margin suggests loculated effusion  · CT Chest: Moderate right pleural effusion with compressive atelectasis over the right lower lobe and posterior middle and upper lobes. Consolidation in the left lower lobe probably atelectasis superimposed pneumonia cannot be excluded  · UA large leukocyte, negative for nitrates, glucosuria no bacteria  · Drip score 3 (senior living resident, poor functional status), no recent hospitalizations. · Severe sepsis likely secondary to PNA  · Check urine Legionella, urine strep pneumo" negative  · Aspiration precaution: Patient diet change to dysphagia 3 with thin liquid and med crushed  ·  Blood cultures pending, MRSA swab: pending  IR chest tube placement: chest tube draining 300 ml straw color fluid     Plan    · Day 2  Unasyn 3 g for anaerobic coverage  · Respiratory protocol  ·  Monitor vital signs  · Mucinex 1200 twice daily              Acute respiratory failure with hypoxia (HCC)  Assessment & Plan  · Noted with shortness of breath, hypoxic saturating 79% prior to admission  · Upon admission saturating well on 2 L nasal cannula for 94% however desaturates without oxygen supplementation.   · Continue antibiotic treatment as above  · Respiratory protocol  · Oxygen supplementation keep O2> 88%, wean as able    Hyperglycemia  Assessment & Plan  · POA noted elevated glucose 419, no prior history documented of diabetes. Received 5 units regular insulin  · Beta hydroxybutyrate and unremarkable, UA significant for glucosuria  · Check hemoglobin A1c:7      Plan  · Continue carbohydrate controlled diet  · Continue Lantus 10 units at bedtime  · Continue Humalog 5 units 3 times daily  · Sliding scale  · Hypoglycemia protocol  · Glucose check per protocol      Encephalopathy acute  Assessment & Plan  · POA at facility noted slurred speech, slow responsive, not at his baseline  · Encephalopathy likely metabolic in the setting of sepsis  · Check CT head : No acute intracranial leasion  · Continue antibiotic as above      Ambulatory dysfunction  Assessment & Plan  · At baseline ambulate without AD, noted unsteadiness new from baseline  · PT/ OT      Thrombocytosis  Assessment & Plan  · POA platelets 296 today, pressure review in September 2022 was noted unremarkable to 32  · Iron panel: Iron Sat: 14, TIBC: 182, iron: 26    Plan  Continue to monitor    Anemia  Assessment & Plan  · POA hemoglobin 8.0, pressure review hemoglobin back in September 2022 noted 10.1. · Unclear if history of melena, hematochezia  · Anemia unclear etiology at this time possibly secondary to iron deficiency given thrombocytosis although this could represent current setting of infection versus underlying malignancy? Check iron panel: Ferritin 714, Iron Sat: 14, TIBC: 182, Iron: 26  folate, vitamin B12 WNL   Received 1 unit of blood over night for hemoglobin of 6.2  Plan  · Monitor output and consider transfusion if less than 7  ·  CBC a.m.       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 Sequential Compression Devices Ordered. Patient Centered Rounds: I performed bedside rounds with nursing staff today. Discussions with Specialists or Other Care Team Provider: Critical care    Education and Discussions with Family / Patient: Attempted to update  (sister) via phone. Left voicemail. Current Length of Stay: 2 day(s)  Current Patient Status: Inpatient   Discharge Plan: [facility] is following this patient on consult. They are not yet medically stable for discharge secondary to Pneumonia. Code Status: Level 3 - DNAR and DNI    Subjective:   Over patient's hemoglobin dropped to 6.2 and received 1 unit of blood. This morning patient's hemoglobin is stable. Patient is more alert and able to answer questions. Patient needed respiratory protocol to help clear his airway. After that he was able to be off   oxygen. He denies chest pain, headache and shortness of breath. Objective:     Vitals:   Temp (24hrs), Av.6 °F (37.6 °C), Min:98 °F (36.7 °C), Max:101 °F (38.3 °C)    Temp:  [98 °F (36.7 °C)-101 °F (38.3 °C)] 101 °F (38.3 °C)  HR:  [] 90  Resp:  [16-22] 22  BP: (119-168)/(54-83) 122/64  SpO2:  [88 %-96 %] 93 %  Body mass index is 19.4 kg/m². Input and Output Summary (last 24 hours): Intake/Output Summary (Last 24 hours) at 2023 1546  Last data filed at 2023 1417  Gross per 24 hour   Intake 2568.75 ml   Output 1275 ml   Net 1293.75 ml       Physical Exam:   Physical Exam  Vitals and nursing note reviewed. Constitutional:       General: He is not in acute distress. Appearance: Normal appearance. He is well-developed. HENT:      Head: Normocephalic and atraumatic.    Eyes:      Conjunctiva/sclera: Conjunctivae normal.   Cardiovascular:      Rate and Rhythm: Normal rate and regular rhythm. Heart sounds: No murmur heard. Pulmonary:      Effort: Pulmonary effort is normal. No respiratory distress. Breath sounds: Normal breath sounds. Abdominal:      General: Abdomen is flat. Bowel sounds are normal.      Palpations: Abdomen is soft. Tenderness: There is no abdominal tenderness. Musculoskeletal:         General: No swelling. Cervical back: Neck supple. Skin:     General: Skin is warm and dry. Capillary Refill: Capillary refill takes less than 2 seconds. Neurological:      Mental Status: He is alert. He is disoriented. Psychiatric:         Mood and Affect: Mood normal.         Additional Data:     Labs:  Results from last 7 days   Lab Units 08/05/23  1059 08/04/23  2202   WBC Thousand/uL 23.32* 17.01*   HEMOGLOBIN g/dL 8.9* 6.2*   HEMATOCRIT % 28.1* 20.6*   PLATELETS Thousands/uL 471* 363   BANDS PCT % 2  --    NEUTROS PCT %  --  84*   LYMPHS PCT %  --  9*   LYMPHO PCT % 4*  --    MONOS PCT %  --  5   MONO PCT % 5  --    EOS PCT % 2 0     Results from last 7 days   Lab Units 08/05/23  0715 08/04/23  0518 08/03/23  1049   SODIUM mmol/L 142   < > 142   POTASSIUM mmol/L 3.4*   < > 3.7   CHLORIDE mmol/L 110*   < > 105   CO2 mmol/L 23   < > 25   BUN mg/dL 17   < > 35*   CREATININE mg/dL 0.87   < > 1.29   ANION GAP mmol/L 9   < > 12   CALCIUM mg/dL 8.3*   < > 9.2   ALBUMIN g/dL  --   --  3.2*   TOTAL BILIRUBIN mg/dL  --   --  0.79   ALK PHOS U/L  --   --  131*   ALT U/L  --   --  6*   AST U/L  --   --  7*   GLUCOSE RANDOM mg/dL 186*   < > 419*    < > = values in this interval not displayed.      Results from last 7 days   Lab Units 08/03/23  1716   INR  1.33*     Results from last 7 days   Lab Units 08/05/23  1107 08/05/23  0743 08/04/23  2017 08/04/23  1615 08/04/23  1130 08/04/23  0822 08/03/23  2105 08/03/23  1622 08/03/23  1054   POC GLUCOSE mg/dl 94 204* 207* 158* 119 164* 368* 216* 428*     Results from last 7 days   Lab Units 08/03/23  1614   HEMOGLOBIN A1C % 7.0*     Results from last 7 days   Lab Units 08/04/23  0518 08/03/23  1355 08/03/23  1131 08/03/23  1049   LACTIC ACID mmol/L  --  1.5 3.2*  --    PROCALCITONIN ng/ml 1.45*  --   --  2.31*       Lines/Drains:  Invasive Devices     Peripheral Intravenous Line  Duration           Peripheral IV 08/03/23 Left Forearm 2 days    Peripheral IV 08/04/23 Dorsal (posterior); Right Forearm 1 day    Peripheral IV 08/04/23 Right Hand <1 day          Drain  Duration           Chest Tube Right 12 Fr. 1 day                      Imaging: Reviewed radiology reports from this admission including: chest CT scan    Recent Cultures (last 7 days):   Results from last 7 days   Lab Units 08/04/23  1515 08/03/23  2251 08/03/23  1201 08/03/23  1131   BLOOD CULTURE   --   --  No Growth at 24 hrs. No Growth at 24 hrs.    GRAM STAIN RESULT  2+ Polys  No bacteria seen  --   --   --    BODY FLUID CULTURE, STERILE  No growth  --   --   --    LEGIONELLA URINARY ANTIGEN   --  Negative  --   --        Last 24 Hours Medication List:   Current Facility-Administered Medications   Medication Dose Route Frequency Provider Last Rate   • acetaminophen  650 mg Oral Q6H PRN Bryanna Franks MD     • ampicillin-sulbactam  3 g Intravenous Q6H Rosana Kaiser MD 3 g (08/05/23 1345)   • dextrose  75 mL/hr Intravenous Continuous Liz Hall MD 75 mL/hr (08/05/23 1359)   • donepezil  5 mg Oral HS Bryanna Franks MD     • enoxaparin  40 mg Subcutaneous Daily Bryanna Franks MD     • guaiFENesin  1,200 mg Oral Q12H [address] [place], [place]     • insulin glargine  10 Units Subcutaneous HS Bryanna Franks MD     • insulin lispro  1-5 Units Subcutaneous TID [address] [place], MD     • insulin lispro  5 Units Subcutaneous TID With Meals Bryanna Franks MD     • memantine  10 mg Oral BID Franklin Ceron Osman Mcarthur MD     • OLANZapine  5 mg Intramuscular Once Luis Hogan DO     • senna  1 tablet Oral HS Subhash Van MD          Today, Patient Was Seen By: Shanel Saleem MD    **Please Note: This note may have been constructed using a voice recognition system. **

## 2023-08-05 NOTE — PLAN OF CARE
Problem: MOBILITY - ADULT  Goal: Maintain or return to baseline ADL function  Description: INTERVENTIONS:  -  Assess patient's ability to carry out ADLs; assess patient's baseline for ADL function and identify physical deficits which impact ability to perform ADLs (bathing, care of mouth/teeth, toileting, grooming, dressing, etc.)  - Assess/evaluate cause of self-care deficits   - Assess range of motion  - Assess patient's mobility; develop plan if impaired  - Assess patient's need for assistive devices and provide as appropriate  - Encourage maximum independence but intervene and supervise when necessary  - Involve family in performance of ADLs  - Assess for home care needs following discharge   - Consider OT consult to assist with ADL evaluation and planning for discharge  - Provide patient education as appropriate  Outcome: Progressing  Goal: Maintains/Returns to pre admission functional level  Description: INTERVENTIONS:  - Perform BMAT or MOVE assessment daily.   - Set and communicate daily mobility goal to care team and patient/family/caregiver. - Collaborate with rehabilitation services on mobility goals if consulted  - Perform Range of Motion  times a day. - Reposition patient every  hours.   - Dangle patient  times a day  - Stand patient  times a day  - Ambulate patient  times a day  - Out of bed to chair  times a day   - Out of bed for meals  times a day  - Out of bed for toileting  - Record patient progress and toleration of activity level   Outcome: Progressing     Problem: PAIN - ADULT  Goal: Verbalizes/displays adequate comfort level or baseline comfort level  Description: Interventions:  - Encourage patient to monitor pain and request assistance  - Assess pain using appropriate pain scale  - Administer analgesics based on type and severity of pain and evaluate response  - Implement non-pharmacological measures as appropriate and evaluate response  - Consider cultural and social influences on pain and pain management  - Notify physician/advanced practitioner if interventions unsuccessful or patient reports new pain  Outcome: Progressing     Problem: INFECTION - ADULT  Goal: Absence or prevention of progression during hospitalization  Description: INTERVENTIONS:  - Assess and monitor for signs and symptoms of infection  - Monitor lab/diagnostic results  - Monitor all insertion sites, i.e. indwelling lines, tubes, and drains  - Monitor endotracheal if appropriate and nasal secretions for changes in amount and color  - [place] appropriate cooling/warming therapies per order  - Administer medications as ordered  - Instruct and encourage patient and family to use good hand hygiene technique  - Identify and instruct in appropriate isolation precautions for identified infection/condition  Outcome: Progressing  Goal: Absence of fever/infection during neutropenic period  Description: INTERVENTIONS:  - Monitor WBC    Outcome: Progressing     Problem: SAFETY ADULT  Goal: Maintain or return to baseline ADL function  Description: INTERVENTIONS:  -  Assess patient's ability to carry out ADLs; assess patient's baseline for ADL function and identify physical deficits which impact ability to perform ADLs (bathing, care of mouth/teeth, toileting, grooming, dressing, etc.)  - Assess/evaluate cause of self-care deficits   - Assess range of motion  - Assess patient's mobility; develop plan if impaired  - Assess patient's need for assistive devices and provide as appropriate  - Encourage maximum independence but intervene and supervise when necessary  - Involve family in performance of ADLs  - Assess for home care needs following discharge   - Consider OT consult to assist with ADL evaluation and planning for discharge  - Provide patient education as appropriate  Outcome: Progressing  Goal: Maintains/Returns to pre admission functional level  Description: INTERVENTIONS:  - Perform BMAT or MOVE assessment daily.   - Set and communicate daily mobility goal to care team and patient/family/caregiver. - Collaborate with rehabilitation services on mobility goals if consulted  - Perform Range of Motion  times a day. - Reposition patient every  hours.   - Dangle patient  times a day  - Stand patient  times a day  - Ambulate patient  times a day  - Out of bed to chair  times a day   - Out of bed for meals  times a day  - Out of bed for toileting  - Record patient progress and toleration of activity level   Outcome: Progressing  Goal: Patient will remain free of falls  Description: INTERVENTIONS:  - Educate patient/family on patient safety including physical limitations  - Instruct patient to call for assistance with activity   - Consult OT/PT to assist with strengthening/mobility   - Keep Call bell within reach  - Keep bed low and locked with side rails adjusted as appropriate  - Keep care items and personal belongings within reach  - Initiate and maintain comfort rounds  - Make Fall Risk Sign visible to staff  - Offer Toileting every  Hours, in advance of need  - Initiate/Maintain alarm  - Obtain necessary fall risk management equipment  - Apply yellow socks and bracelet for high fall risk patients  - Consider moving patient to room near nurses station  Outcome: Progressing     Problem: DISCHARGE PLANNING  Goal: Discharge to home or other facility with appropriate resources  Description: INTERVENTIONS:  - Identify barriers to discharge w/patient and caregiver  - Arrange for needed discharge resources and transportation as appropriate  - Identify discharge learning needs (meds, wound care, etc.)  - Arrange for interpretive services to assist at discharge as needed  - Refer to Case Management Department for coordinating discharge planning if the patient needs post-hospital services based on physician/advanced practitioner order or complex needs related to functional status, cognitive ability, or social support system  Outcome: Progressing Problem: Knowledge Deficit  Goal: Patient/family/caregiver demonstrates understanding of disease process, treatment plan, medications, and discharge instructions  Description: Complete learning assessment and assess knowledge base.   Interventions:  - Provide teaching at level of understanding  - Provide teaching via preferred learning methods  Outcome: Progressing     Problem: Prexisting or High Potential for Compromised Skin Integrity  Goal: Skin integrity is maintained or improved  Description: INTERVENTIONS:  - Identify patients at risk for skin breakdown  - Assess and monitor skin integrity  - Assess and monitor nutrition and hydration status  - Monitor labs   - Assess for incontinence   - Turn and reposition patient  - Assist with mobility/ambulation  - Relieve pressure over bony prominences  - Avoid friction and shearing  - Provide appropriate hygiene as needed including keeping skin clean and dry  - Evaluate need for skin moisturizer/barrier cream  - Collaborate with interdisciplinary team   - Patient/family teaching  - Consider wound care consult   Outcome: Progressing     Problem: SAFETY,RESTRAINT: NV/NON-SELF DESTRUCTIVE BEHAVIOR  Goal: Remains free of harm/injury (restraint for non violent/non self-detsructive behavior)  Description: INTERVENTIONS:  - Instruct patient/family regarding restraint use   - Assess and monitor physiologic and psychological status   - Provide interventions and comfort measures to meet assessed patient needs   - Identify and implement measures to help patient regain control  - Assess readiness for release of restraint   Outcome: Progressing  Goal: Returns to optimal restraint-free functioning  Description: INTERVENTIONS:  - Assess the patient's behavior and symptoms that indicate continued need for restraint  - Identify and implement measures to help patient regain control  - Assess readiness for release of restraint   Outcome: Progressing     Problem: Nutrition/Hydration-ADULT  Goal: Nutrient/Hydration intake appropriate for improving, restoring or maintaining nutritional needs  Description: Monitor and assess patient's nutrition/hydration status for malnutrition. Collaborate with interdisciplinary team and initiate plan and interventions as ordered. Monitor patient's weight and dietary intake as ordered or per policy. Utilize nutrition screening tool and intervene as necessary. Determine patient's food preferences and provide high-protein, high-caloric foods as appropriate.      INTERVENTIONS:  - Monitor oral intake, urinary output, labs, and treatment plans  - Assess nutrition and hydration status and recommend course of action  - Evaluate amount of meals eaten  - Assist patient with eating if necessary   - Allow adequate time for meals  - Recommend/ encourage appropriate diets, oral nutritional supplements, and vitamin/mineral supplements  - Order, calculate, and assess calorie counts as needed  - Recommend, monitor, and adjust tube feedings and TPN/PPN based on assessed needs  - Assess need for intravenous fluids  - Provide specific nutrition/hydration education as appropriate  - Include patient/family/caregiver in decisions related to nutrition  Outcome: Progressing

## 2023-08-05 NOTE — QUICK NOTE
Notified by nurse that the patient's HGB < 7.0 (6.2). Patient unable to provide consent, and sister Osker Duane was called at 23:22/11:22 PM and verbal consent was obtained. Will recheck H/H after transfusion.

## 2023-08-06 ENCOUNTER — APPOINTMENT (INPATIENT)
Dept: RADIOLOGY | Facility: HOSPITAL | Age: 83
DRG: 871 | End: 2023-08-06
Payer: COMMERCIAL

## 2023-08-06 PROBLEM — J90 LOCULATED PLEURAL EFFUSION: Status: ACTIVE | Noted: 2023-08-06

## 2023-08-06 PROBLEM — D75.839 THROMBOCYTOSIS: Status: RESOLVED | Noted: 2023-08-03 | Resolved: 2023-08-06

## 2023-08-06 LAB
ANION GAP SERPL CALCULATED.3IONS-SCNC: 9 MMOL/L
BASOPHILS # BLD AUTO: 0.03 THOUSANDS/ÂΜL (ref 0–0.1)
BASOPHILS NFR BLD AUTO: 0 % (ref 0–1)
BUN SERPL-MCNC: 14 MG/DL (ref 5–25)
CALCIUM SERPL-MCNC: 7.9 MG/DL (ref 8.4–10.2)
CHLORIDE SERPL-SCNC: 106 MMOL/L (ref 96–108)
CO2 SERPL-SCNC: 23 MMOL/L (ref 21–32)
CREAT SERPL-MCNC: 0.94 MG/DL (ref 0.6–1.3)
EOSINOPHIL # BLD AUTO: 0.28 THOUSAND/ÂΜL (ref 0–0.61)
EOSINOPHIL NFR BLD AUTO: 2 % (ref 0–6)
ERYTHROCYTE [DISTWIDTH] IN BLOOD BY AUTOMATED COUNT: 15.6 % (ref 11.6–15.1)
GFR SERPL CREATININE-BSD FRML MDRD: 76 ML/MIN/1.73SQ M
GLUCOSE SERPL-MCNC: 139 MG/DL (ref 65–140)
GLUCOSE SERPL-MCNC: 160 MG/DL (ref 65–140)
GLUCOSE SERPL-MCNC: 172 MG/DL (ref 65–140)
GLUCOSE SERPL-MCNC: 85 MG/DL (ref 65–140)
GLUCOSE SERPL-MCNC: 98 MG/DL (ref 65–140)
HCT VFR BLD AUTO: 29.1 % (ref 36.5–49.3)
HGB BLD-MCNC: 9.2 G/DL (ref 12–17)
IMM GRANULOCYTES # BLD AUTO: 0.27 THOUSAND/UL (ref 0–0.2)
IMM GRANULOCYTES NFR BLD AUTO: 2 % (ref 0–2)
LYMPHOCYTES # BLD AUTO: 1.61 THOUSANDS/ÂΜL (ref 0.6–4.47)
LYMPHOCYTES NFR BLD AUTO: 10 % (ref 14–44)
MAGNESIUM SERPL-MCNC: 1.8 MG/DL (ref 1.9–2.7)
MCH RBC QN AUTO: 29.1 PG (ref 26.8–34.3)
MCHC RBC AUTO-ENTMCNC: 31.6 G/DL (ref 31.4–37.4)
MCV RBC AUTO: 92 FL (ref 82–98)
MONOCYTES # BLD AUTO: 0.72 THOUSAND/ÂΜL (ref 0.17–1.22)
MONOCYTES NFR BLD AUTO: 4 % (ref 4–12)
NEUTROPHILS # BLD AUTO: 13.83 THOUSANDS/ÂΜL (ref 1.85–7.62)
NEUTS SEG NFR BLD AUTO: 82 % (ref 43–75)
NRBC BLD AUTO-RTO: 0 /100 WBCS
PLATELET # BLD AUTO: 475 THOUSANDS/UL (ref 149–390)
PMV BLD AUTO: 9.5 FL (ref 8.9–12.7)
POTASSIUM SERPL-SCNC: 3.4 MMOL/L (ref 3.5–5.3)
RBC # BLD AUTO: 3.16 MILLION/UL (ref 3.88–5.62)
SODIUM SERPL-SCNC: 138 MMOL/L (ref 135–147)
WBC # BLD AUTO: 16.74 THOUSAND/UL (ref 4.31–10.16)

## 2023-08-06 PROCEDURE — 83735 ASSAY OF MAGNESIUM: CPT

## 2023-08-06 PROCEDURE — 99232 SBSQ HOSP IP/OBS MODERATE 35: CPT | Performed by: HOSPITALIST

## 2023-08-06 PROCEDURE — 99232 SBSQ HOSP IP/OBS MODERATE 35: CPT | Performed by: INTERNAL MEDICINE

## 2023-08-06 PROCEDURE — 82948 REAGENT STRIP/BLOOD GLUCOSE: CPT

## 2023-08-06 PROCEDURE — 71046 X-RAY EXAM CHEST 2 VIEWS: CPT

## 2023-08-06 PROCEDURE — 80048 BASIC METABOLIC PNL TOTAL CA: CPT

## 2023-08-06 PROCEDURE — 85025 COMPLETE CBC W/AUTO DIFF WBC: CPT

## 2023-08-06 RX ORDER — MAGNESIUM SULFATE HEPTAHYDRATE 40 MG/ML
2 INJECTION, SOLUTION INTRAVENOUS ONCE
Status: COMPLETED | OUTPATIENT
Start: 2023-08-06 | End: 2023-08-06

## 2023-08-06 RX ORDER — POTASSIUM CHLORIDE 14.9 MG/ML
20 INJECTION INTRAVENOUS ONCE
Status: COMPLETED | OUTPATIENT
Start: 2023-08-06 | End: 2023-08-06

## 2023-08-06 RX ORDER — POTASSIUM CHLORIDE 20 MEQ/1
20 TABLET, EXTENDED RELEASE ORAL ONCE
Status: DISCONTINUED | OUTPATIENT
Start: 2023-08-06 | End: 2023-08-06

## 2023-08-06 RX ADMIN — INSULIN LISPRO 1 UNITS: 100 INJECTION, SOLUTION INTRAVENOUS; SUBCUTANEOUS at 08:32

## 2023-08-06 RX ADMIN — ENOXAPARIN SODIUM 40 MG: 40 INJECTION SUBCUTANEOUS at 08:32

## 2023-08-06 RX ADMIN — SODIUM CHLORIDE 3 G: 9 INJECTION, SOLUTION INTRAVENOUS at 21:06

## 2023-08-06 RX ADMIN — MAGNESIUM SULFATE HEPTAHYDRATE 2 G: 40 INJECTION, SOLUTION INTRAVENOUS at 11:26

## 2023-08-06 RX ADMIN — DEXTROSE 75 ML/HR: 5 SOLUTION INTRAVENOUS at 11:31

## 2023-08-06 RX ADMIN — GUAIFENESIN 1200 MG: 600 TABLET ORAL at 21:20

## 2023-08-06 RX ADMIN — INSULIN LISPRO 1 UNITS: 100 INJECTION, SOLUTION INTRAVENOUS; SUBCUTANEOUS at 13:41

## 2023-08-06 RX ADMIN — MEMANTINE 10 MG: 10 TABLET ORAL at 08:32

## 2023-08-06 RX ADMIN — DONEPEZIL HYDROCHLORIDE 5 MG: 5 TABLET ORAL at 21:20

## 2023-08-06 RX ADMIN — SODIUM CHLORIDE 3 G: 9 INJECTION, SOLUTION INTRAVENOUS at 01:21

## 2023-08-06 RX ADMIN — SODIUM CHLORIDE 3 G: 9 INJECTION, SOLUTION INTRAVENOUS at 08:31

## 2023-08-06 RX ADMIN — POTASSIUM CHLORIDE 20 MEQ: 14.9 INJECTION, SOLUTION INTRAVENOUS at 09:09

## 2023-08-06 RX ADMIN — ACETAMINOPHEN 650 MG: 325 TABLET, FILM COATED ORAL at 16:23

## 2023-08-06 RX ADMIN — SODIUM CHLORIDE 3 G: 9 INJECTION, SOLUTION INTRAVENOUS at 13:40

## 2023-08-06 RX ADMIN — GUAIFENESIN 1200 MG: 600 TABLET ORAL at 08:34

## 2023-08-06 RX ADMIN — INSULIN GLARGINE 10 UNITS: 100 INJECTION, SOLUTION SUBCUTANEOUS at 22:18

## 2023-08-06 RX ADMIN — MEMANTINE 10 MG: 10 TABLET ORAL at 17:00

## 2023-08-06 RX ADMIN — INSULIN LISPRO 5 UNITS: 100 INJECTION, SOLUTION INTRAVENOUS; SUBCUTANEOUS at 13:41

## 2023-08-06 RX ADMIN — STANDARDIZED SENNA CONCENTRATE 8.6 MG: 8.6 TABLET ORAL at 21:20

## 2023-08-06 RX ADMIN — INSULIN LISPRO 5 UNITS: 100 INJECTION, SOLUTION INTRAVENOUS; SUBCUTANEOUS at 08:33

## 2023-08-06 NOTE — PHYSICAL THERAPY NOTE
PHYSICAL THERAPY NOTE    Patient Name: Srikanth BHAT Date: 8/6/2023 08/06/23 2640   Note Type   Note type Cancelled Session   Cancel Reasons Medical status   Additional Comments (S)  Per PCA, pt running fever of 102. Pt Cancelled for PT session. Additionally PT would appreciate clarification of PTA mobility status as CM handoff stated that pt was DEPENDENT @ [facility], whereas Geriatric medicine stated that Pt ambulates without AD at his baseline.  Will follow   Ginger Moreno, PT

## 2023-08-06 NOTE — ASSESSMENT & PLAN NOTE
Waxing waning mental status, likely delirious.    Delirium precautions  -Patient is high risk of delirium due to cognitive impairment  -Initiate delirium precautions  -maintain normal sleep/wake cycle  -minimize overnight interruptions, group overnight vitals/labs/nursing checks as possible  -dim lights, close blinds and turn off tv to minimize stimulation and encourage sleep environment in evenings  -ensure that pain is well controlled consider Tylenol 975mg Q8H scheduled if not already ordered   -monitor for fecal and urinary retention which may precipitate delirium  -encourage early mobilization and ambulation  -provide frequent reorientation and redirection  -encourage family and friends at the bedside to help help calm patient if anxious  -avoid medications which may precipitate or worsen delirium such as tramadol, benzodiazepine, anticholinergics, and benadryl  -encourage hydration and nutrition   -Avoid antipsychotics altogether due to prolonged QTC

## 2023-08-06 NOTE — PLAN OF CARE
Problem: MOBILITY - ADULT  Goal: Maintain or return to baseline ADL function  Description: INTERVENTIONS:  -  Assess patient's ability to carry out ADLs; assess patient's baseline for ADL function and identify physical deficits which impact ability to perform ADLs (bathing, care of mouth/teeth, toileting, grooming, dressing, etc.)  - Assess/evaluate cause of self-care deficits   - Assess range of motion  - Assess patient's mobility; develop plan if impaired  - Assess patient's need for assistive devices and provide as appropriate  - Encourage maximum independence but intervene and supervise when necessary  - Involve family in performance of ADLs  - Assess for home care needs following discharge   - Consider OT consult to assist with ADL evaluation and planning for discharge  - Provide patient education as appropriate  Outcome: Progressing  Goal: Maintains/Returns to pre admission functional level  Description: INTERVENTIONS:  - Perform BMAT or MOVE assessment daily.   - Set and communicate daily mobility goal to care team and patient/family/caregiver. - Collaborate with rehabilitation services on mobility goals if consulted  - Perform Range of Motion  times a day. - Reposition patient every  hours.   - Dangle patient  times a day  - Stand patient times a day  - Ambulate patient  times a day  - Out of bed to chair  times a day   - Out of bed for meals  times a day  - Out of bed for toileting  - Record patient progress and toleration of activity level   Outcome: Progressing     Problem: PAIN - ADULT  Goal: Verbalizes/displays adequate comfort level or baseline comfort level  Description: Interventions:  - Encourage patient to monitor pain and request assistance  - Assess pain using appropriate pain scale  - Administer analgesics based on type and severity of pain and evaluate response  - Implement non-pharmacological measures as appropriate and evaluate response  - Consider cultural and social influences on pain and pain management  - Notify physician/advanced practitioner if interventions unsuccessful or patient reports new pain  Outcome: Progressing     Problem: INFECTION - ADULT  Goal: Absence or prevention of progression during hospitalization  Description: INTERVENTIONS:  - Assess and monitor for signs and symptoms of infection  - Monitor lab/diagnostic results  - Monitor all insertion sites, i.e. indwelling lines, tubes, and drains  - Monitor endotracheal if appropriate and nasal secretions for changes in amount and color  - [place] appropriate cooling/warming therapies per order  - Administer medications as ordered  - Instruct and encourage patient and family to use good hand hygiene technique  - Identify and instruct in appropriate isolation precautions for identified infection/condition  Outcome: Progressing  Goal: Absence of fever/infection during neutropenic period  Description: INTERVENTIONS:  - Monitor WBC    Outcome: Progressing     Problem: SAFETY ADULT  Goal: Maintain or return to baseline ADL function  Description: INTERVENTIONS:  -  Assess patient's ability to carry out ADLs; assess patient's baseline for ADL function and identify physical deficits which impact ability to perform ADLs (bathing, care of mouth/teeth, toileting, grooming, dressing, etc.)  - Assess/evaluate cause of self-care deficits   - Assess range of motion  - Assess patient's mobility; develop plan if impaired  - Assess patient's need for assistive devices and provide as appropriate  - Encourage maximum independence but intervene and supervise when necessary  - Involve family in performance of ADLs  - Assess for home care needs following discharge   - Consider OT consult to assist with ADL evaluation and planning for discharge  - Provide patient education as appropriate  Outcome: Progressing  Goal: Maintains/Returns to pre admission functional level  Description: INTERVENTIONS:  - Perform BMAT or MOVE assessment daily.   - Set and communicate daily mobility goal to care team and patient/family/caregiver. - Collaborate with rehabilitation services on mobility goals if consulted  - Perform Range of Motion  times a day. - Reposition patient every  hours.   - Dangle patient  times a day  - Stand patient  times a day  - Ambulate patient  times a day  - Out of bed to chair  times a day   - Out of bed for meals  times a day  - Out of bed for toileting  - Record patient progress and toleration of activity level   Outcome: Progressing  Goal: Patient will remain free of falls  Description: INTERVENTIONS:  - Educate patient/family on patient safety including physical limitations  - Instruct patient to call for assistance with activity   - Consult OT/PT to assist with strengthening/mobility   - Keep Call bell within reach  - Keep bed low and locked with side rails adjusted as appropriate  - Keep care items and personal belongings within reach  - Initiate and maintain comfort rounds  - Make Fall Risk Sign visible to staff  - Offer Toileting every  Hours, in advance of need  - Initiate/Maintain alarm  - Obtain necessary fall risk management equipment:  - Apply yellow socks and bracelet for high fall risk patients  - Consider moving patient to room near nurses station  Outcome: Progressing     Problem: DISCHARGE PLANNING  Goal: Discharge to home or other facility with appropriate resources  Description: INTERVENTIONS:  - Identify barriers to discharge w/patient and caregiver  - Arrange for needed discharge resources and transportation as appropriate  - Identify discharge learning needs (meds, wound care, etc.)  - Arrange for interpretive services to assist at discharge as needed  - Refer to Case Management Department for coordinating discharge planning if the patient needs post-hospital services based on physician/advanced practitioner order or complex needs related to functional status, cognitive ability, or social support system  Outcome: Progressing Problem: Knowledge Deficit  Goal: Patient/family/caregiver demonstrates understanding of disease process, treatment plan, medications, and discharge instructions  Description: Complete learning assessment and assess knowledge base.   Interventions:  - Provide teaching at level of understanding  - Provide teaching via preferred learning methods  Outcome: Progressing     Problem: Prexisting or High Potential for Compromised Skin Integrity  Goal: Skin integrity is maintained or improved  Description: INTERVENTIONS:  - Identify patients at risk for skin breakdown  - Assess and monitor skin integrity  - Assess and monitor nutrition and hydration status  - Monitor labs   - Assess for incontinence   - Turn and reposition patient  - Assist with mobility/ambulation  - Relieve pressure over bony prominences  - Avoid friction and shearing  - Provide appropriate hygiene as needed including keeping skin clean and dry  - Evaluate need for skin moisturizer/barrier cream  - Collaborate with interdisciplinary team   - Patient/family teaching  - Consider wound care consult   Outcome: Progressing     Problem: SAFETY,RESTRAINT: NV/NON-SELF DESTRUCTIVE BEHAVIOR  Goal: Remains free of harm/injury (restraint for non violent/non self-detsructive behavior)  Description: INTERVENTIONS:  - Instruct patient/family regarding restraint use   - Assess and monitor physiologic and psychological status   - Provide interventions and comfort measures to meet assessed patient needs   - Identify and implement measures to help patient regain control  - Assess readiness for release of restraint   Outcome: Progressing  Goal: Returns to optimal restraint-free functioning  Description: INTERVENTIONS:  - Assess the patient's behavior and symptoms that indicate continued need for restraint  - Identify and implement measures to help patient regain control  - Assess readiness for release of restraint   Outcome: Progressing     Problem: Nutrition/Hydration-ADULT  Goal: Nutrient/Hydration intake appropriate for improving, restoring or maintaining nutritional needs  Description: Monitor and assess patient's nutrition/hydration status for malnutrition. Collaborate with interdisciplinary team and initiate plan and interventions as ordered. Monitor patient's weight and dietary intake as ordered or per policy. Utilize nutrition screening tool and intervene as necessary. Determine patient's food preferences and provide high-protein, high-caloric foods as appropriate.      INTERVENTIONS:  - Monitor oral intake, urinary output, labs, and treatment plans  - Assess nutrition and hydration status and recommend course of action  - Evaluate amount of meals eaten  - Assist patient with eating if necessary   - Allow adequate time for meals  - Recommend/ encourage appropriate diets, oral nutritional supplements, and vitamin/mineral supplements  - Order, calculate, and assess calorie counts as needed  - Recommend, monitor, and adjust tube feedings and TPN/PPN based on assessed needs  - Assess need for intravenous fluids  - Provide specific nutrition/hydration education as appropriate  - Include patient/family/caregiver in decisions related to nutrition  Outcome: Progressing

## 2023-08-06 NOTE — PROGRESS NOTES
[address] [facility]  Progress Note  Name: Mark Nava  MRN: [id number]  Unit/Bed#: W -01 I Date of Admission: 8/3/2023   Date of Service: 8/6/2023 I Hospital Day: 3    Assessment/Plan   Loculated pleural effusion  Assessment & Plan  S/p chest tube placement. Cultures pending. No growth thus far. pulm following, adjusted chest tube. cxr continues to show persistent effusion unchanged thus far. Agree with pulm may need thoracic surgery eval for VATS, however, will discuss with sister and patient first given they were hesitant with invasive surgical interventions. Ambulatory dysfunction  Assessment & Plan  Pt/ot eval pending    Encephalopathy acute  Assessment & Plan  Waxing waning mental status, likely delirious. Delirium precautions  -Patient is high risk of delirium due to cognitive impairment  -Initiate delirium precautions  -maintain normal sleep/wake cycle  -minimize overnight interruptions, group overnight vitals/labs/nursing checks as possible  -dim lights, close blinds and turn off tv to minimize stimulation and encourage sleep environment in evenings  -ensure that pain is well controlled consider Tylenol 975mg Q8H scheduled if not already ordered   -monitor for fecal and urinary retention which may precipitate delirium  -encourage early mobilization and ambulation  -provide frequent reorientation and redirection  -encourage family and friends at the bedside to help help calm patient if anxious  -avoid medications which may precipitate or worsen delirium such as tramadol, benzodiazepine, anticholinergics, and benadryl  -encourage hydration and nutrition   -Avoid antipsychotics altogether due to prolonged QTC          Anemia  Assessment & Plan  S/p transfusion. 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 On restraints at times.      * Severe sepsis [facility])  Assessment & Plan  Due to loculated effusion, cont unasyn             VTE Pharmacologic Prophylaxis: VTE Score: 5 High Risk (Score >/= 5) - Pharmacological DVT Prophylaxis Ordered: enoxaparin (Lovenox). Sequential Compression Devices Ordered. Patient Centered Rounds: I performed bedside rounds with nursing staff today. Discussions with Specialists or Other Care Team Provider:     Education and Discussions with Family / Patient: called sister for an update and left a voicemail. Total Time Spent on Date of Encounter in care of patient: 35 minutes This time was spent on one or more of the following: performing physical exam; counseling and coordination of care; obtaining or reviewing history; documenting in the medical record; reviewing/ordering tests, medications or procedures; communicating with other healthcare professionals and discussing with patient's family/caregivers. Current Length of Stay: 3 day(s)  Current Patient Status: Inpatient   Certification Statement: The patient will continue to require additional inpatient hospital stay due to management of chest tube      Code Status: Level 3 - DNAR and DNI    Subjective:   Pt waxing waning, denies any complaints, poor appetite per RN. Objective:     Vitals:   Temp (24hrs), Av.5 °F (37.5 °C), Min:98.8 °F (37.1 °C), Max:100.3 °F (37.9 °C)    Temp:  [98.8 °F (37.1 °C)-100.3 °F (37.9 °C)] 100.3 °F (37.9 °C)  HR:  [70-82] 82  Resp:  [16-22] 22  BP: (114-122)/(65-69) 122/68  SpO2:  [92 %-94 %] 92 %  Body mass index is 19.4 kg/m². Input and Output Summary (last 24 hours): Intake/Output Summary (Last 24 hours) at 2023 1547  Last data filed at 2023 1300  Gross per 24 hour   Intake 1201.25 ml   Output 675 ml   Net 526.25 ml       Physical Exam:   Lungs: chest tube in place draining fluid. Cont to monitor output. Cardiac: normal rate. Neuro aa0*1, on restraints.          Additional Data:     Labs:  Results from last 7 days   Lab Units 23  0537 23  1059   WBC Thousand/uL 16.74* 23.32*   HEMOGLOBIN g/dL 9.2* 8.9*   HEMATOCRIT % 29.1* 28.1*   PLATELETS Thousands/uL 475* 471*   BANDS PCT %  --  2   NEUTROS PCT % 82*  --    LYMPHS PCT % 10*  --    LYMPHO PCT %  --  4*   MONOS PCT % 4  --    MONO PCT %  --  5   EOS PCT % 2 2     Results from last 7 days   Lab Units 08/06/23  0537 08/04/23  0518 08/03/23  1049   SODIUM mmol/L 138   < > 142   POTASSIUM mmol/L 3.4*   < > 3.7   CHLORIDE mmol/L 106   < > 105   CO2 mmol/L 23   < > 25   BUN mg/dL 14   < > 35*   CREATININE mg/dL 0.94   < > 1.29   ANION GAP mmol/L 9   < > 12   CALCIUM mg/dL 7.9*   < > 9.2   ALBUMIN g/dL  --   --  3.2*   TOTAL BILIRUBIN mg/dL  --   --  0.79   ALK PHOS U/L  --   --  131*   ALT U/L  --   --  6*   AST U/L  --   --  7*   GLUCOSE RANDOM mg/dL 139   < > 419*    < > = values in this interval not displayed. Results from last 7 days   Lab Units 08/03/23  1716   INR  1.33*     Results from last 7 days   Lab Units 08/06/23  1114 08/06/23  0735 08/05/23  2121 08/05/23  1559 08/05/23  1107 08/05/23  0743 08/04/23  2017 08/04/23  1615 08/04/23  1130 08/04/23  0822 08/03/23  2105 08/03/23  1622   POC GLUCOSE mg/dl 172* 160* 108 179* 94 204* 207* 158* 119 164* 368* 216*     Results from last 7 days   Lab Units 08/03/23  1614   HEMOGLOBIN A1C % 7.0*     Results from last 7 days   Lab Units 08/04/23  0518 08/03/23  1355 08/03/23  1131 08/03/23  1049   LACTIC ACID mmol/L  --  1.5 3.2*  --    PROCALCITONIN ng/ml 1.45*  --   --  2.31*       Lines/Drains:  Invasive Devices     Peripheral Intravenous Line  Duration           Peripheral IV 08/03/23 Left Forearm 3 days    Peripheral IV 08/04/23 Dorsal (posterior); Right Forearm 2 days    Peripheral IV 08/04/23 Right Hand 1 day          Drain  Duration           Chest Tube Right 12 Fr. 2 days                      Imaging: Reviewed radiology reports from this admission including: chest xray    Recent Cultures (last 7 days):   Results from last 7 days   Lab Units 08/04/23  1515 08/03/23  2251 08/03/23  1201 08/03/23  1131   BLOOD CULTURE   --   --  No Growth at 48 hrs. No Growth at 48 hrs. GRAM STAIN RESULT  2+ Polys  No bacteria seen  --   --   --    BODY FLUID CULTURE, STERILE  No growth  --   --   --    LEGIONELLA URINARY ANTIGEN   --  Negative  --   --        Last 24 Hours Medication List:   Current Facility-Administered Medications   Medication Dose Route Frequency Provider Last Rate   • acetaminophen  650 mg Oral Q6H PRN Aron Dodson MD     • ampicillin-sulbactam  3 g Intravenous Q6H Melissa Phoenix MD 3 g (08/06/23 1340)   • dextrose  75 mL/hr Intravenous Continuous Kike Monzon MD 75 mL/hr (08/06/23 1131)   • donepezil  5 mg Oral HS Aron Dodson MD     • enoxaparin  40 mg Subcutaneous Daily Aron Dodson MD     • guaiFENesin  1,200 mg Oral Q12H [address] [place], [place]     • insulin glargine  10 Units Subcutaneous HS Aron Dodson MD     • insulin lispro  1-5 Units Subcutaneous TID [address] [place], [place]     • insulin lispro  5 Units Subcutaneous TID With Meals Aron Dodson MD     • memantine  10 mg Oral BID Aron Dodson MD     • OLANZapine  5 mg Intramuscular Once Ana Gonsales DO     • senna  1 tablet Oral HS Aron Dodson MD          Today, Patient Was Seen By: Annamaria Pritchard MD    **Please Note: This note may have been constructed using a voice recognition system. **

## 2023-08-06 NOTE — ASSESSMENT & PLAN NOTE
S/p chest tube placement. Cultures pending. No growth thus far. pulm following, adjusted chest tube. cxr continues to show persistent effusion unchanged thus far. Agree with pulm may need thoracic surgery eval for VATS, however, will discuss with sister and patient first given they were hesitant with invasive surgical interventions.

## 2023-08-06 NOTE — PROGRESS NOTES
Progress Note - Pulmonary   Armitz Her 82 y.o. male MRN: [id number]  Unit/Bed#: W -01 Encounter: [id number]      Interval History:   Pt minimally verbal today. C/o sinus headache. Does not answer other questions appropriately. Review of Systems:  Full 12 point review of systems negative other than previously mentioned    Objective:   Vitals: Blood pressure 122/69, pulse 70, temperature 98.8 °F (37.1 °C), resp. rate 16, height 5' 9" (1.753 m), weight 59.6 kg (131 lb 6.3 oz), SpO2 92 %. , Body mass index is 19.4 kg/m². NAD  s1s2  Diminished BS  Soft nt nd  Lethargic, minimally responsive    Labs: I have personally reviewed pertinent lab results. Results Reviewed     Procedure Component Value Units Date/Time    MRSA culture [[id number]]  (Normal) Collected: 08/04/23 0953    Lab Status: Final result Specimen: Nares from Nose Updated: 08/05/23 2330     MRSA Culture Only No Methicillin Resistant Staphlyococcus aureus (MRSA) isolated    Blood culture #1 [[id number]] Collected: 08/03/23 1131    Lab Status: Preliminary result Specimen: Blood from Arm, Left Updated: 08/05/23 1601     Blood Culture No Growth at 48 hrs. Blood culture #2 [[id number]] Collected: 08/03/23 1201    Lab Status: Preliminary result Specimen: Blood from Arm, Right Updated: 08/05/23 1601     Blood Culture No Growth at 48 hrs.     CBC and differential [[id number]]  (Abnormal) Collected: 08/04/23 0900    Lab Status: Final result Specimen: Blood from Arm, Right Updated: 08/04/23 0907     WBC 25.53 Thousand/uL      RBC 2.68 Million/uL      Hemoglobin 8.0 g/dL      Hematocrit 25.9 %      MCV 97 fL      MCH 29.9 pg      MCHC 30.9 g/dL      RDW 14.1 %      MPV 9.5 fL      Platelets 201 Thousands/uL      nRBC 0 /100 WBCs      Neutrophils Relative 88 %      Immat GRANS % 2 %      Lymphocytes Relative 6 %      Monocytes Relative 4 %      Eosinophils Relative 0 %      Basophils Relative 0 %      Neutrophils Absolute 22.44 Thousands/µL      Immature Grans Absolute >0.50 Thousand/uL      Lymphocytes Absolute 1.56 Thousands/µL      Monocytes Absolute 0.92 Thousand/µL      Eosinophils Absolute 0.01 Thousand/µL      Basophils Absolute 0.05 Thousands/µL     Legionella antigen, Urine [[id number]]  (Normal) Collected: 08/03/23 2251    Lab Status: Final result Specimen: Urine, Other Updated: 08/04/23 0800     Legionella Urinary Antigen Negative    Procalcitonin, Next Day AM Collection [[id number]]  (Abnormal) Collected: 08/04/23 0518    Lab Status: Final result Specimen: Blood from Arm, Right Updated: 08/04/23 0619     Procalcitonin 1.45 ng/ml     Iron Saturation % [[id number]]  (Abnormal) Collected: 08/03/23 1049    Lab Status: Final result Specimen: Blood from Arm, Left Updated: 08/03/23 2230     Iron Saturation 14 %      TIBC 182 ug/dL      Iron 26 ug/dL     Folate [[id number]]  (Normal) Collected: 08/03/23 1049    Lab Status: Final result Specimen: Blood from Arm, Left Updated: 08/03/23 2018     Folate 8.2 ng/mL     Vitamin B12 [[id number]]  (Normal) Collected: 08/03/23 1049    Lab Status: Final result Specimen: Blood from Arm, Left Updated: 08/03/23 2018     Vitamin B-12 251 pg/mL     Ferritin [[id number]]  (Abnormal) Collected: 08/03/23 1049    Lab Status: Final result Specimen: Blood from Arm, Left Updated: 08/03/23 2018     Ferritin 714 ng/mL     Hemoglobin A1C w/ EAG Estimation [[id number]]  (Abnormal) Collected: 08/03/23 1614    Lab Status: Final result Specimen: Blood from Arm, Right Updated: 08/03/23 2006     Hemoglobin A1C 7.0 %       mg/dl     Protime-INR [[id number]]  (Abnormal) Collected: 08/03/23 1716    Lab Status: Final result Specimen: Blood from Arm, Right Updated: 08/03/23 1747     Protime 16.7 seconds      INR 1.33    APTT [[id number]]  (Abnormal) Collected: 08/03/23 1716    Lab Status: Final result Specimen: Blood from Arm, Right Updated: 08/03/23 1747     PTT 41 seconds     Strep Pneumoniae, Urine [[id number]]  (Normal) Collected: 08/03/23 1257 Lab Status: Final result Specimen: Urine, Clean Catch Updated: 08/03/23 1648     Strep pneumoniae antigen, urine Negative    Lactic acid 2 Hours [[id number]]  (Normal) Collected: 08/03/23 1355    Lab Status: Final result Specimen: Blood from Arm, Left Updated: 08/03/23 1416     LACTIC ACID 1.5 mmol/L     Narrative:      Result may be elevated if tourniquet was used during collection.     Blood gas, venous [[id number]]  (Abnormal) Collected: 08/03/23 1354    Lab Status: Final result Specimen: Blood from Arm, Left Updated: 08/03/23 1411     pH, Kj 7.360     pCO2, Kj 38.2 mm Hg      pO2, Kj 37.5 mm Hg      HCO3, Kj 21.1 mmol/L      Base Excess, Kj -4.0 mmol/L      O2 Content, Kj 6.7 ml/dL      O2 HGB, VENOUS 62.8 %     HS Troponin I 2hr [[id number]]  (Normal) Collected: 08/03/23 1304    Lab Status: Final result Specimen: Blood from Arm, Right Updated: 08/03/23 1339     hs TnI 2hr 6 ng/L      Delta 2hr hsTnI -6.7 ng/L     B-Type Natriuretic Peptide(BNP) [[id number]]  (Abnormal) Collected: 08/03/23 1049    Lab Status: Final result Specimen: Blood from Arm, Left Updated: 08/03/23 1332      pg/mL     Urine Microscopic [[id number]]  (Abnormal) Collected: 08/03/23 1257    Lab Status: Final result Specimen: Urine, Clean Catch Updated: 08/03/23 1331     RBC, UA 1-2 /hpf      WBC, UA 1-2 /hpf      Epithelial Cells Occasional /hpf      Bacteria, UA None Seen /hpf      MUCUS THREADS Occasional     Hyaline Casts, UA 0-3 /lpf     Procalcitonin [[id number]]  (Abnormal) Collected: 08/03/23 1049    Lab Status: Final result Specimen: Blood from Arm, Left Updated: 08/03/23 1326     Procalcitonin 2.31 ng/ml     UA w Reflex to Microscopic w Reflex to Culture [[id number]]  (Abnormal) Collected: 08/03/23 1257    Lab Status: Final result Specimen: Urine, Clean Catch Updated: 08/03/23 1312     Color, UA Yellow     Clarity, UA Clear     Specific Gravity, UA 1.028     pH, UA 5.5     Leukocytes, UA Elevated glucose may cause decreased leukocyte values. See urine microscopic for UWBC result     Nitrite, UA Negative     Protein, UA 70 (1+) mg/dl      Glucose, UA >=1000 (1%) mg/dl      Ketones, UA Negative mg/dl      Urobilinogen, UA 2.0 mg/dl      Bilirubin, UA Negative     Occult Blood, UA Negative    Lactic acid [[id number]]  (Abnormal) Collected: 08/03/23 1131    Lab Status: Final result Specimen: Blood from Arm, Left Updated: 08/03/23 1218     LACTIC ACID 3.2 mmol/L     Narrative:      Result may be elevated if tourniquet was used during collection. FLU/RSV/COVID - if FLU/RSV clinically relevant [[id number]]  (Normal) Collected: 08/03/23 1109    Lab Status: Final result Specimen: Nares from Nose Updated: 08/03/23 1210     SARS-CoV-2 Negative     INFLUENZA A PCR Negative     INFLUENZA B PCR Negative     RSV PCR Negative    Narrative:      FOR PEDIATRIC PATIENTS - copy/paste COVID Guidelines URL to browser: [email or web address]    SARS-CoV-2 assay is a Nucleic Acid Amplification assay intended for the  qualitative detection of nucleic acid from SARS-CoV-2 in nasopharyngeal  swabs. Results are for the presumptive identification of SARS-CoV-2 RNA. Positive results are indicative of infection with SARS-CoV-2, the virus  causing COVID-19, but do not rule out bacterial infection or co-infection  with other viruses. Laboratories within the [facility] and its  territories are required to report all positive results to the appropriate  public health authorities. Negative results do not preclude SARS-CoV-2  infection and should not be used as the sole basis for treatment or other  patient management decisions. Negative results must be combined with  clinical observations, patient history, and epidemiological information. This test has not been FDA cleared or approved. This test has been authorized by FDA under an Emergency Use Authorization  (EUA).  This test is only authorized for the duration of time the  declaration that circumstances exist justifying the authorization of the  emergency use of an in vitro diagnostic tests for detection of SARS-CoV-2  virus and/or diagnosis of COVID-19 infection under section 564(b)(1) of  the Act, 21 U. S.C. 954UHB-3(Y)(0), unless the authorization is terminated  or revoked sooner. The test has been validated but independent review by FDA  and CLIA is pending. Test performed using [employer] GeneXpert: This RT-PCR assay targets N2,  a region unique to SARS-CoV-2. A conserved region in the E-gene was chosen  for pan-Sarbecovirus detection which includes SARS-CoV-2. According to CMS-2020-01-R, this platform meets the definition of high-throughput technology.     Comprehensive metabolic panel [[id number]]  (Abnormal) Collected: 08/03/23 1049    Lab Status: Final result Specimen: Blood from Arm, Left Updated: 08/03/23 1200     Sodium 142 mmol/L      Potassium 3.7 mmol/L      Chloride 105 mmol/L      CO2 25 mmol/L      ANION GAP 12 mmol/L      BUN 35 mg/dL      Creatinine 1.29 mg/dL      Glucose 419 mg/dL      Calcium 9.2 mg/dL      Corrected Calcium 9.8 mg/dL      AST 7 U/L      ALT 6 U/L      Alkaline Phosphatase 131 U/L      Total Protein 7.1 g/dL      Albumin 3.2 g/dL      Total Bilirubin 0.79 mg/dL      eGFR 52 ml/min/1.73sq m     Narrative:      [place] guidelines for Chronic Kidney Disease (CKD):   •  Stage 1 with normal or high GFR (GFR > 90 mL/min/1.73 square meters)  •  Stage 2 Mild CKD (GFR = 60-89 mL/min/1.73 square meters)  •  Stage 3A Moderate CKD (GFR = 45-59 mL/min/1.73 square meters)  •  Stage 3B Moderate CKD (GFR = 30-44 mL/min/1.73 square meters)  •  Stage 4 Severe CKD (GFR = 15-29 mL/min/1.73 square meters)  •  Stage 5 End Stage CKD (GFR <15 mL/min/1.73 square meters)  Note: GFR calculation is accurate only with a steady state creatinine    HS Troponin 0hr (reflex protocol) [[id number]]  (Normal) Collected: 08/03/23 1049 Lab Status: Final result Specimen: Blood from Arm, Left Updated: 08/03/23 1159     hs TnI 0hr 12.7 ng/L     CBC and differential [[id number]]  (Abnormal) Collected: 08/03/23 1049    Lab Status: Final result Specimen: Blood from Arm, Left Updated: 08/03/23 1153     WBC 27.00 Thousand/uL      RBC 2.70 Million/uL      Hemoglobin 8.1 g/dL      Hematocrit 26.1 %      MCV 97 fL      MCH 30.0 pg      MCHC 31.0 g/dL      RDW 13.9 %      MPV 9.6 fL      Platelets 698 Thousands/uL      nRBC 0 /100 WBCs      Neutrophils Relative 91 %      Immat GRANS % 1 %      Lymphocytes Relative 4 %      Monocytes Relative 4 %      Eosinophils Relative 0 %      Basophils Relative 0 %      Neutrophils Absolute 24.58 Thousands/µL      Immature Grans Absolute 0.33 Thousand/uL      Lymphocytes Absolute 0.99 Thousands/µL      Monocytes Absolute 1.07 Thousand/µL      Eosinophils Absolute 0.00 Thousand/µL      Basophils Absolute 0.03 Thousands/µL     Narrative: This is an appended report. These results have been appended to a previously verified report.     Smear Review(Phlebs Do Not Order) [[id number]]  (Abnormal) Collected: 08/03/23 1049    Lab Status: Final result Specimen: Blood from Arm, Left Updated: 08/03/23 1153     RBC Morphology Present     Platelet Estimate Increased     Anisocytosis Present     Hypochromia Present    Beta Hydroxybutyrate [[id number]]  (Normal) Collected: 08/03/23 1109    Lab Status: Final result Specimen: Blood from Arm, Left Updated: 08/03/23 1147     BETA-HYDROXYBUTYRATE 0.1 mmol/L     Fingerstick Glucose (POCT) [[id number]]  (Abnormal) Collected: 08/03/23 1054    Lab Status: Final result Updated: 08/03/23 1055     POC Glucose 428 mg/dl            Current Medications:    Current Facility-Administered Medications:   •  acetaminophen (TYLENOL) tablet 650 mg, 650 mg, Oral, Q6H PRN, Kamaljit Che MD, 650 mg at 08/05/23 1553  •  ampicillin-sulbactam (UNASYN) 3 g in sodium chloride 0.9 % 100 mL IVPB, 3 g, Intravenous, Q6H, Paresh Tabares MD, Last Rate: 200 mL/hr at 08/06/23 0831, 3 g at 08/06/23 0831  •  dextrose 5 % infusion, 75 mL/hr, Intravenous, Continuous, Charisse Archer MD, Last Rate: 75 mL/hr at 08/05/23 1359, 75 mL/hr at 08/05/23 1359  •  donepezil (ARICEPT) tablet 5 mg, 5 mg, Oral, HS, Bess Merino MD, 5 mg at 08/05/23 2148  •  enoxaparin (LOVENOX) subcutaneous injection 40 mg, 40 mg, Subcutaneous, Daily, Bess Merino MD, 40 mg at 08/06/23 8413  •  guaiFENesin (MUCINEX) 12 hr tablet 1,200 mg, 1,200 mg, Oral, Q12H [address], Bess Merino MD, 1,200 mg at 08/06/23 1884  •  insulin glargine (LANTUS) subcutaneous injection 10 Units 0.1 mL, 10 Units, Subcutaneous, HS, Bess Merino MD, 10 Units at 08/05/23 2148  •  insulin lispro (HumaLOG) 100 units/mL subcutaneous injection 1-5 Units, 1-5 Units, Subcutaneous, TID AC, 1 Units at 08/06/23 0832 **AND** Fingerstick Glucose (POCT), , , TID AC, Bess Merino MD  •  insulin lispro (HumaLOG) 100 units/mL subcutaneous injection 5 Units, 5 Units, Subcutaneous, TID With Meals, Bess Merino MD, 5 Units at 08/06/23 5435  •  magnesium sulfate 2 g/50 mL IVPB (premix) 2 g, 2 g, Intravenous, Once, May Janell Goldstein MD  •  memantine [facility]) tablet 10 mg, 10 mg, Oral, BID, Bess Merino MD, 10 mg at 08/06/23 6495  •  OLANZapine (ZyPREXA) IM injection 5 mg, 5 mg, Intramuscular, Once, Mahesh Robbins DO  •  potassium chloride 20 mEq IVPB (premix), 20 mEq, Intravenous, Once, May Janell Kraft MD  •  [facility]) tablet 8.6 mg, 1 tablet, Oral, HS, Bess Merino MD, 8.6 mg at 08/05/23 6501     Microbiology:  Plueral cx's pending    Imaging and other studies:   I personally viewed and interpreted the following imaging studies:  CXR 8/6/23 shows persistent R posterior loculated effusion    Assessment:  1.  Complicated parapneumonic effusion    Plan:  • Chest tube flushed at beside with adherent casts flushed through tubing. Pleural fluid appears to be free flowing afterwards. • Cont. Abx per primary service. Consider ID consult for assistance with duration. • ~ 200cc of serous drainage, with some thick adherent casts in tubing over 24 hours. • Cont. Chest tube to suction. Repeat imaging once output is <100cc. May require lytic therapy or VATS/decort eval given current persistent appearing loculated effusion and thickened visceral pleura on CT  • F/u pleural cx's  • Pulmonary medicine will cont. To follow. Richard Mercado M.D.   [facility]

## 2023-08-06 NOTE — QUICK NOTE
Called patient's Sister Augustin Alicea , updated today's condition and treatment plan including the chest tube function and management by the pulmonary team.  Philipp Age her questions and concerns. She asked patient's diet. Addendum: Nursing staff informed that patient has a fever of 102.1 °F and received a dose of Tylenol and ice packs this evening. Informed her to recheck temperature 1 hour after giving Tylenol.

## 2023-08-07 PROBLEM — E11.9 T2DM (TYPE 2 DIABETES MELLITUS) (HCC): Status: ACTIVE | Noted: 2023-08-07

## 2023-08-07 LAB
ANION GAP SERPL CALCULATED.3IONS-SCNC: 8 MMOL/L
ATRIAL RATE: 93 BPM
BACTERIA SPEC BFLD CULT: NO GROWTH
BUN SERPL-MCNC: 13 MG/DL (ref 5–25)
CALCIUM SERPL-MCNC: 8 MG/DL (ref 8.4–10.2)
CHLORIDE SERPL-SCNC: 103 MMOL/L (ref 96–108)
CO2 SERPL-SCNC: 25 MMOL/L (ref 21–32)
CREAT SERPL-MCNC: 0.9 MG/DL (ref 0.6–1.3)
ERYTHROCYTE [DISTWIDTH] IN BLOOD BY AUTOMATED COUNT: 14.9 % (ref 11.6–15.1)
GFR SERPL CREATININE-BSD FRML MDRD: 80 ML/MIN/1.73SQ M
GLUCOSE SERPL-MCNC: 113 MG/DL (ref 65–140)
GLUCOSE SERPL-MCNC: 125 MG/DL (ref 65–140)
GLUCOSE SERPL-MCNC: 140 MG/DL (ref 65–140)
GLUCOSE SERPL-MCNC: 156 MG/DL (ref 65–140)
GLUCOSE SERPL-MCNC: 65 MG/DL (ref 65–140)
GLUCOSE SERPL-MCNC: 92 MG/DL (ref 65–140)
GRAM STN SPEC: NORMAL
GRAM STN SPEC: NORMAL
HCT VFR BLD AUTO: 32.7 % (ref 36.5–49.3)
HGB BLD-MCNC: 10.3 G/DL (ref 12–17)
MAGNESIUM SERPL-MCNC: 2 MG/DL (ref 1.9–2.7)
MCH RBC QN AUTO: 29 PG (ref 26.8–34.3)
MCHC RBC AUTO-ENTMCNC: 31.5 G/DL (ref 31.4–37.4)
MCV RBC AUTO: 92 FL (ref 82–98)
P AXIS: 64 DEGREES
PLATELET # BLD AUTO: 515 THOUSANDS/UL (ref 149–390)
PMV BLD AUTO: 9.6 FL (ref 8.9–12.7)
POTASSIUM SERPL-SCNC: 3.4 MMOL/L (ref 3.5–5.3)
PR INTERVAL: 124 MS
QRS AXIS: -53 DEGREES
QRSD INTERVAL: 98 MS
QT INTERVAL: 360 MS
QTC INTERVAL: 447 MS
RBC # BLD AUTO: 3.55 MILLION/UL (ref 3.88–5.62)
SODIUM SERPL-SCNC: 136 MMOL/L (ref 135–147)
T WAVE AXIS: 52 DEGREES
VENTRICULAR RATE: 93 BPM
WBC # BLD AUTO: 18.12 THOUSAND/UL (ref 4.31–10.16)

## 2023-08-07 PROCEDURE — 83735 ASSAY OF MAGNESIUM: CPT

## 2023-08-07 PROCEDURE — 93010 ELECTROCARDIOGRAM REPORT: CPT | Performed by: INTERNAL MEDICINE

## 2023-08-07 PROCEDURE — 99223 1ST HOSP IP/OBS HIGH 75: CPT | Performed by: INTERNAL MEDICINE

## 2023-08-07 PROCEDURE — 3E0L3GC INTRODUCTION OF OTHER THERAPEUTIC SUBSTANCE INTO PLEURAL CAVITY, PERCUTANEOUS APPROACH: ICD-10-PCS | Performed by: INTERNAL MEDICINE

## 2023-08-07 PROCEDURE — 97163 PT EVAL HIGH COMPLEX 45 MIN: CPT

## 2023-08-07 PROCEDURE — 82948 REAGENT STRIP/BLOOD GLUCOSE: CPT

## 2023-08-07 PROCEDURE — 85027 COMPLETE CBC AUTOMATED: CPT

## 2023-08-07 PROCEDURE — 99232 SBSQ HOSP IP/OBS MODERATE 35: CPT | Performed by: HOSPITALIST

## 2023-08-07 PROCEDURE — 99232 SBSQ HOSP IP/OBS MODERATE 35: CPT | Performed by: INTERNAL MEDICINE

## 2023-08-07 PROCEDURE — 80048 BASIC METABOLIC PNL TOTAL CA: CPT

## 2023-08-07 RX ORDER — POTASSIUM CHLORIDE 20MEQ/15ML
40 LIQUID (ML) ORAL ONCE
Status: COMPLETED | OUTPATIENT
Start: 2023-08-07 | End: 2023-08-07

## 2023-08-07 RX ADMIN — DORNASE ALFA 5 MG: 1 SOLUTION RESPIRATORY (INHALATION) at 13:19

## 2023-08-07 RX ADMIN — SODIUM CHLORIDE 3 G: 9 INJECTION, SOLUTION INTRAVENOUS at 08:14

## 2023-08-07 RX ADMIN — POTASSIUM CHLORIDE 40 MEQ: 1.5 SOLUTION ORAL at 10:27

## 2023-08-07 RX ADMIN — ALTEPLASE 10 MG: 2.2 INJECTION, POWDER, LYOPHILIZED, FOR SOLUTION INTRAVENOUS at 13:19

## 2023-08-07 RX ADMIN — INSULIN LISPRO 1 UNITS: 100 INJECTION, SOLUTION INTRAVENOUS; SUBCUTANEOUS at 17:07

## 2023-08-07 RX ADMIN — GUAIFENESIN 1200 MG: 600 TABLET ORAL at 23:26

## 2023-08-07 RX ADMIN — DEXTROSE 75 ML/HR: 5 SOLUTION INTRAVENOUS at 22:10

## 2023-08-07 RX ADMIN — SODIUM CHLORIDE 3 G: 9 INJECTION, SOLUTION INTRAVENOUS at 21:13

## 2023-08-07 RX ADMIN — GUAIFENESIN 1200 MG: 600 TABLET ORAL at 08:14

## 2023-08-07 RX ADMIN — STANDARDIZED SENNA CONCENTRATE 8.6 MG: 8.6 TABLET ORAL at 23:26

## 2023-08-07 RX ADMIN — INSULIN GLARGINE 10 UNITS: 100 INJECTION, SOLUTION SUBCUTANEOUS at 23:38

## 2023-08-07 RX ADMIN — SODIUM CHLORIDE 3 G: 9 INJECTION, SOLUTION INTRAVENOUS at 15:50

## 2023-08-07 RX ADMIN — ENOXAPARIN SODIUM 40 MG: 40 INJECTION SUBCUTANEOUS at 08:14

## 2023-08-07 RX ADMIN — MEMANTINE 10 MG: 10 TABLET ORAL at 08:14

## 2023-08-07 RX ADMIN — ACETAMINOPHEN 650 MG: 325 TABLET, FILM COATED ORAL at 19:21

## 2023-08-07 RX ADMIN — INSULIN LISPRO 5 UNITS: 100 INJECTION, SOLUTION INTRAVENOUS; SUBCUTANEOUS at 17:07

## 2023-08-07 RX ADMIN — MEMANTINE 10 MG: 10 TABLET ORAL at 17:04

## 2023-08-07 RX ADMIN — ACETAMINOPHEN 650 MG: 325 TABLET, FILM COATED ORAL at 11:50

## 2023-08-07 RX ADMIN — DONEPEZIL HYDROCHLORIDE 5 MG: 5 TABLET ORAL at 23:27

## 2023-08-07 RX ADMIN — SODIUM CHLORIDE 3 G: 9 INJECTION, SOLUTION INTRAVENOUS at 03:22

## 2023-08-07 RX ADMIN — INSULIN LISPRO 5 UNITS: 100 INJECTION, SOLUTION INTRAVENOUS; SUBCUTANEOUS at 08:15

## 2023-08-07 NOTE — PROGRESS NOTES
Progress Note - Pulmonary   Rodger Self 82 y.o. male MRN: [id number]  Unit/Bed#: W -01 Encounter: [id number]    Assessment:  1. Complicated parapneumonic effusion. The patient's chest tube continues to drain >100cc over 24 hrs and repeat chest X-rays show persistent large right pleural effusion. There is a need for further treatment as the patient is not showing signs of clinical improvement. The patient's family does not wish for surgery and the patient is a poor surgical candidate for VATS. Recommended that the patient be given TPA and dornase ramón for three days. Cultures are negative at this time. Continue with current antibiotic treatment. Plan:  -Begin TPA 10 mg with dornase ramón 5mg 2x a day for three days.   -Continue antibiotics per primary service. Await final culture results.   -Continue to monitor chest tube output.   -Pulmonary medicine will continue to follow    Subjective:   HPI is limited secondary to the patient's mental status. Rodger Self is a 81 yo M with a hx dementia who was sent to the ED on 8/3/23 from his nursing home for SOB. The patient is minimally responsive to questions but able to ask for water. He has a poor appetite per nurse. Objective:     Vitals: Blood pressure 102/69, pulse 86, temperature (!) 100.8 °F (38.2 °C), resp. rate 15, height 5' 9" (1.753 m), weight 60 kg (132 lb 4.4 oz), SpO2 91 %. ,Body mass index is 19.53 kg/m². Intake/Output Summary (Last 24 hours) at 8/7/2023 1247  Last data filed at 8/7/2023 5375  Gross per 24 hour   Intake 0 ml   Output 900 ml   Net -900 ml       Invasive Devices     Peripheral Intravenous Line  Duration           Peripheral IV 08/03/23 Left Forearm 4 days    Peripheral IV 08/04/23 Dorsal (posterior); Right Forearm 3 days    Peripheral IV 08/04/23 Right Hand 2 days          Drain  Duration           Chest Tube Right 12 Fr. 2 days    External Urinary Catheter <1 day                Physical Exam  Constitutional: General: He is not in acute distress. Comments: Body mass index is 19.53 kg/m². HENT:      Head: Normocephalic and atraumatic. Mouth/Throat:      Mouth: Mucous membranes are dry. Eyes:      Comments: Does not open eyes during exam   Cardiovascular:      Rate and Rhythm: Normal rate. Heart sounds: Normal heart sounds. Pulmonary:      Breath sounds: No wheezing. Comments: Diminished breath sounds, chest tube in right hemithorax  Abdominal:      General: Abdomen is flat. There is no distension. Palpations: Abdomen is soft. Musculoskeletal:      Right lower leg: No edema. Left lower leg: No edema. Skin:     General: Skin is warm and dry. Psychiatric:      Comments: Asks for basic needs such as water or lights but otherwise does not respond to questions. Labs: I have personally reviewed pertinent lab results. , CBC:   Lab Results   Component Value Date    WBC 18.12 (H) 08/07/2023    HGB 10.3 (L) 08/07/2023    HCT 32.7 (L) 08/07/2023    MCV 92 08/07/2023     (H) 08/07/2023    RBC 3.55 (L) 08/07/2023    MCH 29.0 08/07/2023    MCHC 31.5 08/07/2023    RDW 14.9 08/07/2023    MPV 9.6 08/07/2023   , CMP:   Lab Results   Component Value Date    SODIUM 136 08/07/2023    K 3.4 (L) 08/07/2023     08/07/2023    CO2 25 08/07/2023    BUN 13 08/07/2023    CREATININE 0.90 08/07/2023    CALCIUM 8.0 (L) 08/07/2023    EGFR 80 08/07/2023     Imaging and other studies: I have personally reviewed pertinent reports.    and I have personally reviewed pertinent films in PACS

## 2023-08-07 NOTE — PLAN OF CARE
Problem: MOBILITY - ADULT  Goal: Maintain or return to baseline ADL function  Description: INTERVENTIONS:  -  Assess patient's ability to carry out ADLs; assess patient's baseline for ADL function and identify physical deficits which impact ability to perform ADLs (bathing, care of mouth/teeth, toileting, grooming, dressing, etc.)  - Assess/evaluate cause of self-care deficits   - Assess range of motion  - Assess patient's mobility; develop plan if impaired  - Assess patient's need for assistive devices and provide as appropriate  - Encourage maximum independence but intervene and supervise when necessary  - Involve family in performance of ADLs  - Assess for home care needs following discharge   - Consider OT consult to assist with ADL evaluation and planning for discharge  - Provide patient education as appropriate  Outcome: Progressing  Goal: Maintains/Returns to pre admission functional level  Description: INTERVENTIONS:  - Perform BMAT or MOVE assessment daily.   - Set and communicate daily mobility goal to care team and patient/family/caregiver. - Collaborate with rehabilitation services on mobility goals if consulted  - Perform Range of Motion 2 times a day. - Reposition patient every 2 hours.   - Dangle patient 2 times a day  - Stand patient 2 times a day  - Ambulate patient 2 times a day  - Out of bed to chair 2 times a day   - Out of bed for meals 2 times a day  - Out of bed for toileting  - Record patient progress and toleration of activity level   Outcome: Progressing     Problem: PAIN - ADULT  Goal: Verbalizes/displays adequate comfort level or baseline comfort level  Description: Interventions:  - Encourage patient to monitor pain and request assistance  - Assess pain using appropriate pain scale  - Administer analgesics based on type and severity of pain and evaluate response  - Implement non-pharmacological measures as appropriate and evaluate response  - Consider cultural and social influences on pain and pain management  - Notify physician/advanced practitioner if interventions unsuccessful or patient reports new pain  Outcome: Progressing     Problem: INFECTION - ADULT  Goal: Absence or prevention of progression during hospitalization  Description: INTERVENTIONS:  - Assess and monitor for signs and symptoms of infection  - Monitor lab/diagnostic results  - Monitor all insertion sites, i.e. indwelling lines, tubes, and drains  - Monitor endotracheal if appropriate and nasal secretions for changes in amount and color  - [place] appropriate cooling/warming therapies per order  - Administer medications as ordered  - Instruct and encourage patient and family to use good hand hygiene technique  - Identify and instruct in appropriate isolation precautions for identified infection/condition  Outcome: Progressing  Goal: Absence of fever/infection during neutropenic period  Description: INTERVENTIONS:  - Monitor WBC    Outcome: Progressing     Problem: SAFETY ADULT  Goal: Maintain or return to baseline ADL function  Description: INTERVENTIONS:  -  Assess patient's ability to carry out ADLs; assess patient's baseline for ADL function and identify physical deficits which impact ability to perform ADLs (bathing, care of mouth/teeth, toileting, grooming, dressing, etc.)  - Assess/evaluate cause of self-care deficits   - Assess range of motion  - Assess patient's mobility; develop plan if impaired  - Assess patient's need for assistive devices and provide as appropriate  - Encourage maximum independence but intervene and supervise when necessary  - Involve family in performance of ADLs  - Assess for home care needs following discharge   - Consider OT consult to assist with ADL evaluation and planning for discharge  - Provide patient education as appropriate  Outcome: Progressing  Goal: Maintains/Returns to pre admission functional level  Description: INTERVENTIONS:  - Perform BMAT or MOVE assessment daily.   - Set and communicate daily mobility goal to care team and patient/family/caregiver. - Collaborate with rehabilitation services on mobility goals if consulted  - Perform Range of Motion 2 times a day. - Reposition patient every 2 hours.   - Dangle patient 2 times a day  - Stand patient 2 times a day  - Ambulate patient 2 times a day  - Out of bed to chair 2 times a day   - Out of bed for meals 2 times a day  - Out of bed for toileting  - Record patient progress and toleration of activity level   Outcome: Progressing  Goal: Patient will remain free of falls  Description: INTERVENTIONS:  - Educate patient/family on patient safety including physical limitations  - Instruct patient to call for assistance with activity   - Consult OT/PT to assist with strengthening/mobility   - Keep Call bell within reach  - Keep bed low and locked with side rails adjusted as appropriate  - Keep care items and personal belongings within reach  - Initiate and maintain comfort rounds  - Make Fall Risk Sign visible to staff  - Offer Toileting every 2 Hours, in advance of need  - Initiate/Maintain bed alarm  - Obtain necessary fall risk management equipment:   - Apply yellow socks and bracelet for high fall risk patients  - Consider moving patient to room near nurses station  Outcome: Progressing     Problem: DISCHARGE PLANNING  Goal: Discharge to home or other facility with appropriate resources  Description: INTERVENTIONS:  - Identify barriers to discharge w/patient and caregiver  - Arrange for needed discharge resources and transportation as appropriate  - Identify discharge learning needs (meds, wound care, etc.)  - Arrange for interpretive services to assist at discharge as needed  - Refer to Case Management Department for coordinating discharge planning if the patient needs post-hospital services based on physician/advanced practitioner order or complex needs related to functional status, cognitive ability, or social support system  Outcome: Progressing     Problem: Knowledge Deficit  Goal: Patient/family/caregiver demonstrates understanding of disease process, treatment plan, medications, and discharge instructions  Description: Complete learning assessment and assess knowledge base.   Interventions:  - Provide teaching at level of understanding  - Provide teaching via preferred learning methods  Outcome: Progressing     Problem: Prexisting or High Potential for Compromised Skin Integrity  Goal: Skin integrity is maintained or improved  Description: INTERVENTIONS:  - Identify patients at risk for skin breakdown  - Assess and monitor skin integrity  - Assess and monitor nutrition and hydration status  - Monitor labs   - Assess for incontinence   - Turn and reposition patient  - Assist with mobility/ambulation  - Relieve pressure over bony prominences  - Avoid friction and shearing  - Provide appropriate hygiene as needed including keeping skin clean and dry  - Evaluate need for skin moisturizer/barrier cream  - Collaborate with interdisciplinary team   - Patient/family teaching  - Consider wound care consult   Outcome: Progressing     Problem: SAFETY,RESTRAINT: NV/NON-SELF DESTRUCTIVE BEHAVIOR  Goal: Remains free of harm/injury (restraint for non violent/non self-detsructive behavior)  Description: INTERVENTIONS:  - Instruct patient/family regarding restraint use   - Assess and monitor physiologic and psychological status   - Provide interventions and comfort measures to meet assessed patient needs   - Identify and implement measures to help patient regain control  - Assess readiness for release of restraint   Outcome: Progressing  Goal: Returns to optimal restraint-free functioning  Description: INTERVENTIONS:  - Assess the patient's behavior and symptoms that indicate continued need for restraint  - Identify and implement measures to help patient regain control  - Assess readiness for release of restraint   Outcome: Progressing Problem: Nutrition/Hydration-ADULT  Goal: Nutrient/Hydration intake appropriate for improving, restoring or maintaining nutritional needs  Description: Monitor and assess patient's nutrition/hydration status for malnutrition. Collaborate with interdisciplinary team and initiate plan and interventions as ordered. Monitor patient's weight and dietary intake as ordered or per policy. Utilize nutrition screening tool and intervene as necessary. Determine patient's food preferences and provide high-protein, high-caloric foods as appropriate.      INTERVENTIONS:  - Monitor oral intake, urinary output, labs, and treatment plans  - Assess nutrition and hydration status and recommend course of action  - Evaluate amount of meals eaten  - Assist patient with eating if necessary   - Allow adequate time for meals  - Recommend/ encourage appropriate diets, oral nutritional supplements, and vitamin/mineral supplements  - Order, calculate, and assess calorie counts as needed  - Recommend, monitor, and adjust tube feedings and TPN/PPN based on assessed needs  - Assess need for intravenous fluids  - Provide specific nutrition/hydration education as appropriate  - Include patient/family/caregiver in decisions related to nutrition  Outcome: Progressing

## 2023-08-07 NOTE — ASSESSMENT & PLAN NOTE
· Home meds Aricept 5 mg nightly, olanzapine 5 mg daily, Namenda 10 mg twice daily, trazodone 50 mg at bedtime  · Holding trazodone 50 mg at bedtime, Zyprexa 5 mg  · Delirium precaution  · Frequent reorientation  · Avoid restraints if able unless danger to himself

## 2023-08-07 NOTE — ASSESSMENT & PLAN NOTE
Chest x-ray- There is a right mid to lower lung opacity with well-circumscribed medial margin suggests loculated effusion. CT chest- Moderate right pleural effusion with compressive atelectasis over the right lower lobe and posterior right middle and upper lobes, Consolidation in the left lower lobe, probably atelectatic. Superimposed pneumonia cannot be excluded. Soft tissue densities in the bronchial tree as above, appearance favors secretions.   IR placed chest tube 8/4    Plan-  • Appreciate pulmonology recommendations- Suboptimal pleural drainage, trial of TPA/Dornase BID per MIST2 trial, 1 hour dwell time post instillation, Return to -[id number] suction when not on dwell time, Continue unasyn - further abx per ID  • Dysphagia diet  Overall poor surgical candidate and his sister agrees, should source control not be achieved with TPA/Dornase, may consider transition to hospice care - continue ongoing [address] discussions  Appreciate infectious disease recommendations-Continue Unasyn

## 2023-08-07 NOTE — PHYSICAL THERAPY NOTE
PHYSICAL THERAPY EVALUATION NOTE          Patient Name: Bladimir Stephenson  KNRMV'P Date: 2023          AGE:   82 y.o. Mrn:   [id number]  ADMIT DX:  Pneumonia [J18.9]  Dyspnea [R06.00]  Sepsis ([address]) [A41.9]  Late onset Alzheimer's dementia with behavioral disturbance ([address]) [G30.1, F02.818]    Past Medical History:  Past Medical History:   Diagnosis Date    Late onset Alzheimer's dementia with behavioral disturbance ([address])     Other hyperlipidemia        Past Surgical History:  History reviewed. No pertinent surgical history. Length Of Stay: 4        PHYSICAL THERAPY EVALUATION:    Patient's identity confirmed via 2 patient identifiers (full name and ) at start of session       23 1345   PT Last Visit   PT Visit Date 23   Note Type   Note type Evaluation   Pain Assessment   Pain Assessment Tool 0-10   Pain Score No Pain   Restrictions/Precautions   Other Precautions Cognitive; Chair Alarm; Bed Alarm;Multiple lines; Fall Risk;Pain  (chest tube clamped, 1L O2 via NC,)   Home Living   Type of Home   ([facility] (Memory Care Unit[facility]   Home Layout One level   Prior Function   Level of [place] Needs assistance with functional mobility   Lives With Facility staff   Receives Help From Personal care attendant  (staff at facility)   Comments Pt unreliable historian, per PERLA Villa at baseline pt ambulates w/ Ax1   General   Additional Pertinent History SpO2 fluctuated between 91-96% througout mobility, 94% at end of eval   Family/Caregiver Present No   Cognition   Overall Cognitive Status Impaired  (dementia at baseline)   Arousal/Participation Cooperative   Orientation Level Oriented to person;Disoriented to place; Disoriented to time;Disoriented to situation  (responds to name "Joan Jiménez")   Memory Decreased recall of biographical information;Decreased short term memory;Decreased recall of recent events;Decreased recall of precautions   Following Commands Follows one step commands with increased time or repetition   Comments Pt ID via name and . Pt w/ Alzheimer's dementia at baseline, able to follow simple commands/cues, presents w/ poor safety awareness and impulsivity w/ mobility   RLE Assessment   RLE Assessment X   Strength RLE   RLE Overall Strength 3/5  (grossly assessed w/ functional mobility)   LLE Assessment   LLE Assessment X   Strength LLE   LLE Overall Strength 3/5  (grossly assessed w/ functional mobility)   Bed Mobility   Supine to Sit 3  Moderate assistance   Additional items Assist x 2;HOB elevated; Increased time required;Verbal cues;LE management   Sit to Supine 3  Moderate assistance   Additional items Assist x 2;HOB elevated; Increased time required;Verbal cues;LE management   Additional Comments pt able to maintain sitting balance at EOB w/ close supervision   Transfers   Sit to Stand 3  Moderate assistance   Additional items Assist x 2; Increased time required;Verbal cues   Stand to Sit 3  Moderate assistance   Additional items Assist x 2; Increased time required;Verbal cues   Additional Comments bilateral hand-held assist. pt able to maintain upright standing position for approx 15 sec then impulsively returned self to sitting position   Ambulation/Elevation   Gait pattern Not tested   Ambulation/Elevation Additional Comments VC for pt to initial steps/ambulation provided; however, pt returned self to a sitting position at EOB   Balance   Static Sitting Fair   Dynamic Sitting Fair   Static Standing Poor   Activity Tolerance   Activity Tolerance Patient limited by fatigue   Medical Staff Made Aware PCA Lexi assisted w/ mobility, PERLA Villa   Nurse Made Aware LPN Hermann   Assessment   Prognosis Fair   Problem List Decreased strength;Decreased endurance; Impaired balance;Decreased mobility; Decreased cognition; Impaired judgement;Decreased safety awareness;Decreased skin integrity   Assessment Gina Lawrence is a 82 y.o.  Male who [facility] due to SOB and diagnosis of severe sepsis. Orders for PT eval and treat received, w/ activity orders of ambulate patient. Comorbidities affecting pt's functional mobility at time of evaluation include: Alzheimer's dementia, anemia, unsteady gait. Personal factors affecting DC include: inability to navigate level surfaces w/o external assistance, decreased cognition, impulsivity and limited insight into impairments. At baseline, pt mobilizes w/ Ax1. Upon evaluation, pt presents w/ the following deficits: impaired strength, impaired balance, impaired cognition, decreased safety awareness and decreased endurance/activity tolerance. Pt currently requires mod Ax2 for bed mobility, mod Ax2 for transfers. Pt's clinical presentation is unstable/unpredictable due to abnormal lab values, need for increased assistance w/ functional mobility compared to baseline, requiring supplemental O2 in order to maintain O2 saturation, need for input for mobility technique, need for input for task focus, need to input for safety awareness, ongoing medical management. From a PT/mobility standpoint given the above findings, DC recommendation is: Post-acute inpatient rehabilitation vs return to facility w/ skilled PT services pending level of assistance facility is able to provide for functional mobility. During current admission, pt will benefit from continued skilled inpatient PT in the acute care setting in order to address the above deficits and to maximize function and mobility prior to DC from acute care.    Goals   [facility] Expiration Date 08/17/23   Short Term Goal #1 Pt will: perform bed mobility w/ supervision to decrease pt's burden of care and increase pt's independence w/ repositioning in bed; perform transfers w/ supervision to promote increased OOB mobility; ambulate at least 75' w/ LRAD and min Ax1 to increase pt's ambulatory endurance/tolerance; increase all balance ratings by at least 1 grade to decrease pt's risk of falls PT Treatment Day 0   Plan   Treatment/Interventions Functional transfer training;LE strengthening/ROM; Therapeutic exercise; Endurance training;Cognitive reorientation;Patient/family training;Equipment eval/education; Bed mobility;Gait training; Compensatory technique education   PT Frequency 3-5x/wk   Recommendation   PT Discharge Recommendation Post acute rehabilitation services  (vs return to facility w/ skilled PT services)   Additional Comments DC rec: Due to pt's baseline cognitive deficits, pt will likely benefit from returning to a familiar environment w/ familiar staff and routine. If facility is unable to provide pt's current required level of assistance for functional mobility, rec pt DC to STR or a higher level of care   AM-PAC Basic Mobility Inpatient   Turning in Flat Bed Without Bedrails 2   Lying on Back to Sitting on Edge of Flat Bed Without Bedrails 2   Moving Bed to Chair 2   Standing Up From Chair Using Arms 2   Walk in Room 1   Climb 3-5 Stairs With Railing 1   Basic Mobility Inpatient Raw Score 10   Highest Level Of Mobility   -[facility] Goal 4: Move to chair/commode   -HL Achieved 3: Sit at edge of bed   End of Consult   Patient Position at End of Consult Supine;Bed/Chair alarm activated; All needs within reach  (PCA remaining present in room to assist pt w/ feeding)       The patient's AM-PAC Basic Mobility Inpatient Short Form Raw Score is 10. A Raw score of less than or equal to 16 suggests the patient may benefit from discharge to post-acute rehabilitation services. Please also refer to the recommendation of the Physical Therapist for safe discharge planning.     Pt will benefit from skilled inpatient PT during this admission in order to facilitate progress towards goals and to maximize functional independence prior to [facility] rec: post acute rehab (vs return to facility w/ skilled PT services - due to pt's baseline cognitive deficits, pt will likely benefit from returning to a familiar environment w/ familiar staff and routine.  If facility is unable to provide pt's current required level of assistance for functional mobility, rec pt DC to STR or a higher level of care)        Lizzie Shepherd, PT, DPT  08/07/23

## 2023-08-07 NOTE — ASSESSMENT & PLAN NOTE
· POA platelets 993 today, pressure review in September 2022 was noted unremarkable to 32  · Iron panel: Iron Sat: 14, TIBC: 182, iron: 26    Plan  Continue to monitor

## 2023-08-07 NOTE — QUICK NOTE
TPA/dornase administered into chest tube at 1315 will clamp for one hour and then place back on -20 suction at 1415. Will administer second dose tonight at 2200. Will repeat twice a day for a total of 3 days.

## 2023-08-07 NOTE — CASE MANAGEMENT
Case Management Assessment & Discharge Planning Note    Patient name Norma Serrano  Location W /W -29 MRN [id number]  : 1940 Date 2023       Current Admission Date: 8/3/2023  Current Admission Diagnosis:Severe sepsis [facility])   Patient Active Problem List    Diagnosis Date Noted   • Loculated pleural effusion 2023   • Hypoxia 2023   • Slurred speech 2023   • Hyperglycemia 2023   • Severe sepsis ([address]) 2023   • Acute respiratory failure with hypoxia ([address]) 2023   • Anemia 2023   • Encephalopathy acute 2023   • Ambulatory dysfunction 2023   • Low BP 2023   • Depression 2022   • Late onset Alzheimer's dementia with behavioral disturbance ([address]) 2022   • Other hyperlipidemia 2022   • Unsteady gait 2022   • Insomnia 2022      LOS (days): 4  Geometric Mean LOS (GMLOS) (days): 5.00  Days to GMLOS:0.9     OBJECTIVE:    Risk of Unplanned Readmission Score: 18.28         Current admission status: Inpatient       Preferred Pharmacy:   PATIENT/FAMILY REPORTS NO PREFERRED PHARMACY  No address on file      Primary Care Provider: Jorge Eubanks MD    Primary Insurance: Rosemary FATIMA  Secondary Insurance:     ASSESSMENT:  [facility] Proxies    There are no active Health Care Proxies on file. Readmission Root Cause  30 Day Readmission: No    Patient Information  Admitted from[de-identified] Facility  Mental Status: Confused  During Assessment patient was accompanied by: Not accompanied during assessment  Assessment information provided by[de-identified] Other - please comment ([employer])  Primary Caregiver: Other (Comment)  Caregiver's Name[de-identified] [address]: Other (Comment), Family members ([facility] staff)  [facility]: [facility] [address] [place] [address] do you live in?: [facility] entry access options.  Select all that apply.: No steps to enter home  Type of Current Residence: Facility  Upon entering residence, is there a bedroom on the main floor (no further steps)?: Yes  Upon entering residence, is there a bathroom on the main floor (no further steps)?: Yes  In the last 12 months, was there a time when you were not able to pay the mortgage or rent on time?: No  In the last 12 months, how many places have you lived?: 1  In the last 12 months, was there a time when you did not have a steady place to sleep or slept in a shelter (including now)?: No  Homeless/housing insecurity resource given?: N/A  Living Arrangements: Other (Comment) ([facility]/ memory care)    Activities of Daily Living Prior to Admission  Functional Status: Assistance  Completes ADLs independently?: No  Level of ADL dependence: Total Dependent  Ambulates independently?: Yes  Does patient use assisted devices?: No    Patient Information Continued  Income Source: Pension/snf  Does patient have prescription coverage?: Yes  Within the past 12 months, you worried that your food would run out before you got the money to buy more.: Never true  Within the past 12 months, the food you bought just didn't last and you didn't have money to get more.: Never true  Food insecurity resource given?: N/A    Means of Transportation  In the past 12 months, has lack of transportation kept you from medical appointments or from getting medications?: No  In the past 12 months, has lack of transportation kept you from meetings, work, or from getting things needed for daily living?: No  Was application for public transport provided?: N/A        DISCHARGE DETAILS:    Discharge planning discussed with[de-identified] [address] [place] [address]     Other Referral/Resources/Interventions Provided:  Referral Comments: Patient admitted to [facility] due to severe sepsis. Call made to [address] [place] [address] where patient lives to discuss patients baseline. Per staff at facility, patient is independent at baseline with ambulation- does not use a walker, sometimes an assist x1. Patient is confused at baseline.  PT did see patient and at this time patient is being recommended for STR vs return to facility w/ PT services. CM to follow up with facility and patients family as able to continue with dcp.     Would you like to participate in our [facility] service program?  : No - Declined

## 2023-08-07 NOTE — PLAN OF CARE
Problem: PHYSICAL THERAPY ADULT  Goal: Performs mobility at highest level of function for planned discharge setting. See evaluation for individualized goals. Description: Treatment/Interventions: Functional transfer training, LE strengthening/ROM, Therapeutic exercise, Endurance training, Cognitive reorientation, Patient/family training, Equipment eval/education, Bed mobility, Gait training, Compensatory technique education          See flowsheet documentation for full assessment, interventions and recommendations. 8/7/2023 1457 by Laura Mcknight PT  Note: Prognosis: Fair  Problem List: Decreased strength, Decreased endurance, Impaired balance, Decreased mobility, Decreased cognition, Impaired judgement, Decreased safety awareness, Decreased skin integrity  Assessment: Leigh Ann Lima is a 82 y.o. Male who [facility] due to SOB and diagnosis of severe sepsis. Orders for PT eval and treat received, w/ activity orders of ambulate patient. Comorbidities affecting pt's functional mobility at time of evaluation include: Alzheimer's dementia, anemia, unsteady gait. Personal factors affecting DC include: inability to navigate level surfaces w/o external assistance, decreased cognition, impulsivity and limited insight into impairments. At baseline, pt mobilizes w/ Ax1. Upon evaluation, pt presents w/ the following deficits: impaired strength, impaired balance, impaired cognition, decreased safety awareness and decreased endurance/activity tolerance. Pt currently requires mod Ax2 for bed mobility, mod Ax2 for transfers. Pt's clinical presentation is unstable/unpredictable due to abnormal lab values, need for increased assistance w/ functional mobility compared to baseline, requiring supplemental O2 in order to maintain O2 saturation, need for input for mobility technique, need for input for task focus, need to input for safety awareness, ongoing medical management.  From a PT/mobility standpoint given the above findings, DC recommendation is: Post-acute inpatient rehabilitation vs return to facility w/ skilled PT services pending level of assistance facility is able to provide for functional mobility. During current admission, pt will benefit from continued skilled inpatient PT in the acute care setting in order to address the above deficits and to maximize function and mobility prior to DC from acute care. PT Discharge Recommendation: Post acute rehabilitation services (vs return to facility w/ skilled PT services)    See flowsheet documentation for full assessment.

## 2023-08-07 NOTE — ASSESSMENT & PLAN NOTE
Detail Level: Detailed · POA noted elevated glucose 419, no prior history documented of diabetes.   Received 5 units regular insulin  · Beta hydroxybutyrate and unremarkable, UA significant for glucosuria  · Check hemoglobin A1c:7      Plan  · Continue carbohydrate controlled diet  · Continue Lantus 10 units at bedtime  · Continue Humalog 5 units 3 times daily  · Sliding scale  · Hypoglycemia protocol  · Glucose check per protocol Detail Level: Generalized

## 2023-08-07 NOTE — ASSESSMENT & PLAN NOTE
· POA met severe sepsis criteria for leukocytosis 27.00, ANC 24.58, tachycardia heart rate 95, lactic acid 3.2. · At the ED received a total 3L fluid sepsis fluid , lactic acid down to 1.5  · Procalcitonin elevated down trending from 2.31 to 1.45  · Chest x-ray:There is a right mid to lower lung opacity with well-circumscribed medial margin suggests loculated effusion  · CT Chest: Moderate right pleural effusion with compressive atelectasis over the right lower lobe and posterior middle and upper lobes. Consolidation in the left lower lobe probably atelectasis superimposed pneumonia cannot be excluded  · UA large leukocyte, negative for nitrates, glucosuria no bacteria  · Drip score 3 ([facility] resident, poor functional status), no recent hospitalizations.   · Severe sepsis likely secondary to PNA  · Blood culture-negative  · Fluid culture-negative  IR chest tube placement    Plan-  Day 4  Unasyn 3 g  Respiratory protocol  Monitor vital signs  Mucinex 1200 twice daily

## 2023-08-07 NOTE — PROGRESS NOTES
[facility]  Progress Note  Name: Lucía Farah  MRN: [id number]  Unit/Bed#: W -01 I Date of Admission: 8/3/2023   Date of Service: 8/7/2023 I Hospital Day: 4    Assessment/Plan   * Severe sepsis [facility])  Assessment & Plan  · POA met severe sepsis criteria for leukocytosis 27.00, ANC 24.58, tachycardia heart rate 95, lactic acid 3.2. · At the ED received a total 3L fluid sepsis fluid , lactic acid down to 1.5  · Procalcitonin elevated down trending from 2.31 to 1.45  · Chest x-ray:There is a right mid to lower lung opacity with well-circumscribed medial margin suggests loculated effusion  · CT Chest: Moderate right pleural effusion with compressive atelectasis over the right lower lobe and posterior middle and upper lobes. Consolidation in the left lower lobe probably atelectasis superimposed pneumonia cannot be excluded  · UA large leukocyte, negative for nitrates, glucosuria no bacteria  · Drip score 3 ([facility] resident, poor functional status), no recent hospitalizations. · Severe sepsis likely secondary to PNA  · Blood culture-negative  · Fluid culture-negative  IR chest tube placement    Plan-  Day 4  Unasyn 3 g  Respiratory protocol  Monitor vital signs  Mucinex 1200 twice daily              Loculated pleural effusion  Assessment & Plan  Chest x-ray- There is a right mid to lower lung opacity with well-circumscribed medial margin suggests loculated effusion. CT chest- Moderate right pleural effusion with compressive atelectasis over the right lower lobe and posterior right middle and upper lobes, Consolidation in the left lower lobe, probably atelectatic. Superimposed pneumonia cannot be excluded. Soft tissue densities in the bronchial tree as above, appearance favors secretions.   IR placed chest tube 8/4    Plan-  • Appreciate pulmonology recommendations- Suboptimal pleural drainage, trial of TPA/Dornase BID per MIST2 trial, 1 hour dwell time post instillation, Return to [id number] suction when not on dwell time, Continue unasyn - further abx per ID  • Dysphagia diet  Overall poor surgical candidate and his sister agrees, should source control not be achieved with TPA/Dornase, may consider transition to hospice care - continue ongoing [address] [place] discussions  Appreciate infectious disease recommendations-Continue Unasyn        Ambulatory dysfunction  Assessment & Plan  · At baseline ambulate without AD, noted unsteadiness new from baseline  · PT/ OT      Encephalopathy acute  Assessment & Plan  · POA at facility noted slurred speech, slow responsive, not at his baseline  · Encephalopathy likely metabolic in the setting of sepsis  · Check CT head : No acute intracranial leasion  · Continue antibiotic as above      Anemia  Assessment & Plan  · POA hemoglobin 8.0, pressure review hemoglobin back in September 2022 noted 10.1. · Unclear if history of melena, hematochezia  · Anemia unclear etiology at this time possibly secondary to iron deficiency given thrombocytosis although this could represent current setting of infection versus underlying malignancy? Check iron panel: Ferritin 714, Iron Sat: 14, TIBC: 182, Iron: 26  folate, vitamin B12 WNL     Plan  · Monitor output and consider transfusion if less than 7  ·  CBC a.m. Acute respiratory failure with hypoxia (HCC)  Assessment & Plan  · Noted with shortness of breath, hypoxic saturating 79% prior to admission  · Upon admission saturating well on 2 L nasal cannula for 94% however desaturates without oxygen supplementation. 91% on room air    Plan-  See plan for loculated effusion  · Continue antibiotic treatment as above  · Respiratory protocol  · Oxygen supplementation keep O2> 88%, wean as able    Hyperglycemia  Assessment & Plan  · POA noted elevated glucose 419, no prior history documented of diabetes.   Received 5 units regular insulin  · Beta hydroxybutyrate and unremarkable, UA significant for glucosuria  · Check hemoglobin A1c:7      Plan  · Continue carbohydrate controlled diet  · Continue Lantus 10 units at bedtime  · Continue Humalog 5 units 3 times daily  · Sliding scale  · Hypoglycemia protocol  · Glucose check per protocol      Late onset Alzheimer's dementia with behavioral disturbance (HCC)  Assessment & Plan  · Home meds Aricept 5 mg nightly, olanzapine 5 mg daily, Namenda 10 mg twice daily, trazodone 50 mg at bedtime  · Holding trazodone 50 mg at bedtime, Zyprexa 5 mg  · Delirium precaution  · Frequent reorientation  · Avoid restraints if able unless danger to himself    Thrombocytosis-resolved as of 2023  Assessment & Plan  · POA platelets 053 today, pressure review in 2022 was noted unremarkable to 32  · Iron panel: Iron Sat: 14, TIBC: 182, iron: 26    Plan  Continue to monitor           VTE Pharmacologic Prophylaxis: VTE Score: 5 High Risk (Score >/= 5) - Pharmacological DVT Prophylaxis Ordered: enoxaparin (Lovenox). Sequential Compression Devices Ordered. Patient Centered Rounds: I performed bedside rounds with nursing staff today. Discussions with Specialists or Other Care Team Provider: Pulmonology, infectious disease    Education and Discussions with Family / Patient: Updated  (sister) via phone. Current Length of Stay: 4 day(s)  Current Patient Status: Inpatient   Discharge Plan: Anticipate discharge in 48 hrs to discharge location to be determined pending rehab evaluations. Code Status: Level 3 - DNAR and DNI    Subjective:   Patient was seen and examined at the bedside. Patient was resting comfortably in no acute distress. Overnight patient was febrile reaching 102. No other overnight acute events. Upon my examination patient did not have any complaints at this time.     Objective:     Vitals:   Temp (24hrs), Av.8 °F (37.7 °C), Min:97.9 °F (36.6 °C), Max:102.1 °F (38.9 °C)    Temp:  [97.9 °F (36.6 °C)-102.1 °F (38.9 °C)] 98.7 °F (37.1 °C)  HR:  [75-94] 82  Resp:  [15-18] 15  BP: ()/(49-72) 113/72  SpO2:  [88 %-91 %] 91 %  Body mass index is 19.53 kg/m². Input and Output Summary (last 24 hours): Intake/Output Summary (Last 24 hours) at 8/7/2023 1535  Last data filed at 8/7/2023 1345  Gross per 24 hour   Intake 0 ml   Output 900 ml   Net -900 ml       Physical Exam:   Physical Exam  Vitals and nursing note reviewed. Constitutional:       General: He is not in acute distress. Appearance: He is well-developed. He is ill-appearing. He is not toxic-appearing or diaphoretic. HENT:      Head: Normocephalic and atraumatic. Mouth/Throat:      Mouth: Mucous membranes are moist.   Eyes:      Extraocular Movements: Extraocular movements intact. Conjunctiva/sclera: Conjunctivae normal.      Pupils: Pupils are equal, round, and reactive to light. Cardiovascular:      Rate and Rhythm: Normal rate and regular rhythm. Pulses: Normal pulses. Heart sounds: Normal heart sounds. No murmur heard. Pulmonary:      Effort: Pulmonary effort is normal. No respiratory distress. Breath sounds: Rhonchi present. No wheezing. Abdominal:      General: Bowel sounds are normal. There is no distension. Palpations: Abdomen is soft. Tenderness: There is no abdominal tenderness. There is no guarding or rebound. Musculoskeletal:         General: No swelling or tenderness. Normal range of motion. Cervical back: Normal range of motion and neck supple. Right lower leg: No edema. Left lower leg: No edema. Skin:     General: Skin is warm and dry. Capillary Refill: Capillary refill takes less than 2 seconds. Neurological:      Mental Status: He is alert. Mental status is at baseline. He is disoriented. Cranial Nerves: No cranial nerve deficit. Sensory: No sensory deficit. Motor: Weakness present.       Comments: Awake, follows minimal commands, able to converse and answer some questions appropriately   Psychiatric:         Mood and Affect: Mood normal.         Behavior: Behavior normal.          Additional Data:     Labs:  Results from last 7 days   Lab Units 08/07/23  0508 08/06/23  0537 08/05/23  1059   WBC Thousand/uL 18.12* 16.74* 23.32*   HEMOGLOBIN g/dL 10.3* 9.2* 8.9*   HEMATOCRIT % 32.7* 29.1* 28.1*   PLATELETS Thousands/uL 515* 475* 471*   BANDS PCT %  --   --  2   NEUTROS PCT %  --  82*  --    LYMPHS PCT %  --  10*  --    LYMPHO PCT %  --   --  4*   MONOS PCT %  --  4  --    MONO PCT %  --   --  5   EOS PCT %  --  2 2     Results from last 7 days   Lab Units 08/07/23  0508 08/04/23  0518 08/03/23  1049   SODIUM mmol/L 136   < > 142   POTASSIUM mmol/L 3.4*   < > 3.7   CHLORIDE mmol/L 103   < > 105   CO2 mmol/L 25   < > 25   BUN mg/dL 13   < > 35*   CREATININE mg/dL 0.90   < > 1.29   ANION GAP mmol/L 8   < > 12   CALCIUM mg/dL 8.0*   < > 9.2   ALBUMIN g/dL  --   --  3.2*   TOTAL BILIRUBIN mg/dL  --   --  0.79   ALK PHOS U/L  --   --  131*   ALT U/L  --   --  6*   AST U/L  --   --  7*   GLUCOSE RANDOM mg/dL 113   < > 419*    < > = values in this interval not displayed. Results from last 7 days   Lab Units 08/03/23  1716   INR  1.33*     Results from last 7 days   Lab Units 08/07/23  1520 08/07/23  1128 08/07/23  0709 08/06/23  2053 08/06/23  1609 08/06/23  1114 08/06/23  0735 08/05/23  2121 08/05/23  1559 08/05/23  1107 08/05/23  0743 08/04/23 2017   POC GLUCOSE mg/dl 156* 92 140 98 85 172* 160* 108 179* 94 204* 207*     Results from last 7 days   Lab Units 08/03/23  1614   HEMOGLOBIN A1C % 7.0*     Results from last 7 days   Lab Units 08/04/23  0518 08/03/23  1355 08/03/23  1131 08/03/23  1049   LACTIC ACID mmol/L  --  1.5 3.2*  --    PROCALCITONIN ng/ml 1.45*  --   --  2.31*       Lines/Drains:  Invasive Devices     Peripheral Intravenous Line  Duration           Peripheral IV 08/03/23 Left Forearm 4 days    Peripheral IV 08/04/23 Dorsal (posterior); Right Forearm 3 days    Peripheral IV 08/04/23 Right Hand 2 days Drain  Duration           Chest Tube Right 12 Fr. 3 days    External Urinary Catheter <1 day                      Imaging: Reviewed radiology reports from this admission including: chest xray and chest CT scan    Recent Cultures (last 7 days):   Results from last 7 days   Lab Units 08/04/23  1515 08/03/23  2251 08/03/23  1201 08/03/23  1131   BLOOD CULTURE   --   --  No Growth at 72 hrs. No Growth at 72 hrs. GRAM STAIN RESULT  2+ Polys  No bacteria seen  --   --   --    BODY FLUID CULTURE, STERILE  No growth  --   --   --    LEGIONELLA URINARY ANTIGEN   --  Negative  --   --        Last 24 Hours Medication List:   Current Facility-Administered Medications   Medication Dose Route Frequency Provider Last Rate   • acetaminophen  650 mg Oral Q6H PRN Marlin Rick MD     • ampicillin-sulbactam  3 g Intravenous Q6H Barbara Gates MD 3 g (08/07/23 0580)   • dextrose  75 mL/hr Intravenous Continuous Yane Bass MD 75 mL/hr (08/07/23 0322)   • donepezil  5 mg Oral HS Marlin Rick MD     • enoxaparin  40 mg Subcutaneous Daily Marlin Rick MD     • guaiFENesin  1,200 mg Oral Q12H [address] [place], [place]     • insulin glargine  10 Units Subcutaneous HS Marlin Rick MD     • insulin lispro  1-5 Units Subcutaneous TID [address] [place], [place]     • insulin lispro  5 Units Subcutaneous TID With Meals Marlin Rick MD     • memantine  10 mg Oral BID Marlin Rick MD     • OLANZapine  5 mg Intramuscular Once Beth Fierro, DO     • senna  1 tablet Oral HS Marlin Rick MD          Today, Patient Was Seen By: Yane Bass MD    **Please Note: This note may have been constructed using a voice recognition system. **

## 2023-08-07 NOTE — ASSESSMENT & PLAN NOTE
· Noted with shortness of breath, hypoxic saturating 79% prior to admission  · Upon admission saturating well on 2 L nasal cannula for 94% however desaturates without oxygen supplementation.   91% on room air    Plan-  See plan for loculated effusion  · Continue antibiotic treatment as above  · Respiratory protocol  · Oxygen supplementation keep O2> 88%, wean as able

## 2023-08-07 NOTE — CONSULTS
Consultation - Infectious Disease   Leigh Ann Lima 82 y.o. male MRN: [id number]  Unit/Bed#: W -01 Encounter: [id number]      IMPRESSION & RECOMMENDATIONS:   1. Severe sepsis. Present on admission with leukocytosis and tachycardia as well as a lactate level of 3.2. Now subsequently has developed a fever. Suspect secondary to a pneumonia with complicated parapneumonic effusion with presumptive empyema. No other clear source appreciated. Suspect the ongoing sepsis is secondary to inadequate source control.  -Antibiotics as below  -Source control measures as below  -Follow-up cultures and adjust the antibiotics needed  -Recheck CBC with differential and CMP  -Supportive care    2. Complicated parapneumonic effusion. Presumptive empyema although cultures are negative. Suspect oral christine in this patient that was at high risk of aspiration. Doubt any resistant organisms with the cultures negative despite not receiving any broad antibiotics. Suspect the ongoing clinical sepsis is related to inadequate source control.  -Continue Unasyn for now  -Chest tube drainage  -tPA/dornase instillation  -Close pulmonary follow-up  -May need VATS for adequate source control    3. Acute hypoxic respiratory failure. Appears to be secondary to pneumonia with complicated parapneumonic effusion. No other clear source appreciated. Patient continues to require oxygen by nasal cannula. -Oxygen support  -Monitor respiratory status  -Treatment of complicated effusion as above    4. Acute encephalopathy. Complicating baseline dementia. Suspect related to the patient's acute infectious process.   Remains nonverbal.  Imaging of the brain without any abnormality.  -Monitor cognition  -Treat sepsis as above    Have discussed the above management plan in detail with the primary service    Extensive review of the medical records in epic including review of the notes, radiographs, and laboratory results     HISTORY OF PRESENT ILLNESS:  Reason for Consult: Complicated pleural effusion with probable empyema  HPI: Bernadine Carmen is a 82y.o. year old male dementia with behavioral disturbance, dyslipidemia, depression admitted to [address] [place] [address] with worsening shortness of breath, decreased cognition, unsteady gait, and found to be hypoxemic with a large right pleural effusion who we are asked to assist with antibiotic management for probable empyema. The patient apparently had been having cough in the 3 days prior to admission although no fever or chills. He began complaining of worsening dyspnea. He had some worsening cognition worse than his baseline and therefore was eventually brought to the hospital for further evaluation. Chest x-ray revealed a relatively large right pleural effusion and CT supported that diagnosis. He was also found of a brisk leukocytosis the setting of tachycardia and lactic acidosis and was diagnosed with severe sepsis, had blood cultures obtained and was started on ceftriaxone admitted for further management. He underwent chest tube placement on 8/4/2023 with pleural fluid sent for analysis. His pleural pH was 6.9 and the LDH was well over 1000. His antibiotics were changed to ampicillin sulbactam for a complicated effusion with presumptive empyema. He has been spiking fever intermittently during his hospital stay. Despite the chest tube drainage she has continued to have fever. He is not able to give any history and therefore the entire history is through chart review and discussion with the primary service. He is not having any reported diarrhea or vomiting. He continues to drain fluid in the chest tube at a slow rate and his repeat chest x-ray still shows a large effusion. REVIEW OF SYSTEMS:  A complete review of systems is negative other than that noted in the HPI.     PAST MEDICAL HISTORY:  Past Medical History:   Diagnosis Date   • Late onset Alzheimer's dementia with behavioral disturbance ([address])    • Other hyperlipidemia      History reviewed. No pertinent surgical history. FAMILY HISTORY:  Non-contributory    SOCIAL HISTORY:  Social History   Social History     Substance and Sexual Activity   Alcohol Use Not Currently     Social History     Substance and Sexual Activity   Drug Use Not Currently     Social History     Tobacco Use   Smoking Status Never   Smokeless Tobacco Never       ALLERGIES:  No Known Allergies    MEDICATIONS:  All current active medications have been reviewed. Antibiotics: Unasyn    PHYSICAL EXAM:  Temp:  [97.9 °F (36.6 °C)-102.1 °F (38.9 °C)] 100.8 °F (38.2 °C)  HR:  [75-94] 86  Resp:  [15-22] 15  BP: ()/(49-69) 102/69  SpO2:  [88 %-92 %] 91 %  Temp (24hrs), Av.1 °F (37.8 °C), Min:97.9 °F (36.6 °C), Max:102.1 °F (38.9 °C)  Current: Temperature: (!) 100.8 °F (38.2 °C)    Intake/Output Summary (Last 24 hours) at 2023 1433  Last data filed at 2023 1300  Gross per 24 hour   Intake 0 ml   Output 900 ml   Net -900 ml       General Appearance:  Elderly, debilitated, nonverbal but in no acute distress   Head:  Normocephalic, without obvious abnormality, atraumatic   Eyes:  Conjunctiva pink and sclera anicteric, both eyes   Nose: Nares normal, mucosa normal, no drainage   Throat: Oropharynx dry without lesions   Neck: Supple, symmetrical, no adenopathy, no tenderness/mass/nodules   Back:   Symmetric, no curvature, ROM normal, no CVA tenderness   Lungs:   Decreased breath sounds on the right with a right-sided chest tube in place, respirations unlabored   Chest Wall:  No tenderness or deformity. Right-sided chest tube in place with purulent fluid   Heart:  RRR; no murmur, rub or gallop   Abdomen:   Soft, non-tender, non-distended, positive bowel sounds    Extremities: No cyanosis, clubbing or edema   Skin: No rashes or lesions. No draining wounds noted.    Lymph nodes: Cervical, supraclavicular nodes normal   Neurologic: Alert and oriented times 3, extremity strength 5/5 and symmetric       LABS, IMAGING, & OTHER STUDIES:  Lab Results:  I have personally reviewed pertinent labs. Results from last 7 days   Lab Units 08/07/23  0508 08/06/23  0537 08/05/23  1059   WBC Thousand/uL 18.12* 16.74* 23.32*   HEMOGLOBIN g/dL 10.3* 9.2* 8.9*   PLATELETS Thousands/uL 515* 475* 471*     Results from last 7 days   Lab Units 08/07/23  0508 08/06/23  0537 08/05/23  0715 08/04/23  0518 08/03/23  1049   SODIUM mmol/L 136 138 142   < > 142   POTASSIUM mmol/L 3.4* 3.4* 3.4*   < > 3.7   CHLORIDE mmol/L 103 106 110*   < > 105   CO2 mmol/L 25 23 23   < > 25   BUN mg/dL 13 14 17   < > 35*   CREATININE mg/dL 0.90 0.94 0.87   < > 1.29   EGFR ml/min/1.73sq m 80 76 81   < > 52   CALCIUM mg/dL 8.0* 7.9* 8.3*   < > 9.2   AST U/L  --   --   --   --  7*   ALT U/L  --   --   --   --  6*   ALK PHOS U/L  --   --   --   --  131*    < > = values in this interval not displayed. Results from last 7 days   Lab Units 08/04/23  1515 08/04/23  0953 08/03/23  2251 08/03/23  1201 08/03/23  1131   BLOOD CULTURE   --   --   --  No Growth at 72 hrs. No Growth at 72 hrs. GRAM STAIN RESULT  2+ Polys  No bacteria seen  --   --   --   --    BODY FLUID CULTURE, STERILE  No growth  --   --   --   --    MRSA CULTURE ONLY   --  No Methicillin Resistant Staphlyococcus aureus (MRSA) isolated  --   --   --    LEGIONELLA URINARY ANTIGEN   --   --  Negative  --   --      Results from last 7 days   Lab Units 08/04/23  0518 08/03/23  1049   PROCALCITONIN ng/ml 1.45* 2.31*         Results from last 7 days   Lab Units 08/03/23  1049   FERRITIN ng/mL 714*       Pleural LDH 1560. Pleural pH 6.9. Pleural culture negative. Imaging Studies:     Chest x-ray. Persistent large right pleural effusion    CT chest.  Moderate right effusion with atelectasis. Left basilar atelectasis.     Images personally reviewed by me in PACS

## 2023-08-08 PROBLEM — K59.00 CONSTIPATION: Status: ACTIVE | Noted: 2023-08-08

## 2023-08-08 LAB
ANION GAP SERPL CALCULATED.3IONS-SCNC: 9 MMOL/L
BACTERIA BLD CULT: NORMAL
BACTERIA BLD CULT: NORMAL
BASOPHILS # BLD AUTO: 0.08 THOUSANDS/ÂΜL (ref 0–0.1)
BASOPHILS NFR BLD AUTO: 1 % (ref 0–1)
BUN SERPL-MCNC: 12 MG/DL (ref 5–25)
CALCIUM SERPL-MCNC: 8 MG/DL (ref 8.4–10.2)
CHLORIDE SERPL-SCNC: 102 MMOL/L (ref 96–108)
CO2 SERPL-SCNC: 23 MMOL/L (ref 21–32)
CREAT SERPL-MCNC: 0.92 MG/DL (ref 0.6–1.3)
EOSINOPHIL # BLD AUTO: 0.26 THOUSAND/ÂΜL (ref 0–0.61)
EOSINOPHIL NFR BLD AUTO: 2 % (ref 0–6)
ERYTHROCYTE [DISTWIDTH] IN BLOOD BY AUTOMATED COUNT: 15 % (ref 11.6–15.1)
GFR SERPL CREATININE-BSD FRML MDRD: 78 ML/MIN/1.73SQ M
GLUCOSE SERPL-MCNC: 102 MG/DL (ref 65–140)
GLUCOSE SERPL-MCNC: 121 MG/DL (ref 65–140)
GLUCOSE SERPL-MCNC: 125 MG/DL (ref 65–140)
GLUCOSE SERPL-MCNC: 193 MG/DL (ref 65–140)
GLUCOSE SERPL-MCNC: 87 MG/DL (ref 65–140)
HCT VFR BLD AUTO: 34.5 % (ref 36.5–49.3)
HGB BLD-MCNC: 10.7 G/DL (ref 12–17)
IMM GRANULOCYTES # BLD AUTO: 0.25 THOUSAND/UL (ref 0–0.2)
IMM GRANULOCYTES NFR BLD AUTO: 2 % (ref 0–2)
LACTATE SERPL-SCNC: 1.1 MMOL/L (ref 0.5–2)
LACTATE SERPL-SCNC: 2.4 MMOL/L (ref 0.5–2)
LYMPHOCYTES # BLD AUTO: 1.59 THOUSANDS/ÂΜL (ref 0.6–4.47)
LYMPHOCYTES NFR BLD AUTO: 9 % (ref 14–44)
MAGNESIUM SERPL-MCNC: 2 MG/DL (ref 1.9–2.7)
MCH RBC QN AUTO: 29.5 PG (ref 26.8–34.3)
MCHC RBC AUTO-ENTMCNC: 31 G/DL (ref 31.4–37.4)
MCV RBC AUTO: 95 FL (ref 82–98)
MONOCYTES # BLD AUTO: 1.14 THOUSAND/ÂΜL (ref 0.17–1.22)
MONOCYTES NFR BLD AUTO: 7 % (ref 4–12)
NEUTROPHILS # BLD AUTO: 13.58 THOUSANDS/ÂΜL (ref 1.85–7.62)
NEUTS SEG NFR BLD AUTO: 79 % (ref 43–75)
NRBC BLD AUTO-RTO: 0 /100 WBCS
PLATELET # BLD AUTO: 523 THOUSANDS/UL (ref 149–390)
PMV BLD AUTO: 9.6 FL (ref 8.9–12.7)
POTASSIUM SERPL-SCNC: 4.3 MMOL/L (ref 3.5–5.3)
PROCALCITONIN SERPL-MCNC: 0.56 NG/ML
RBC # BLD AUTO: 3.63 MILLION/UL (ref 3.88–5.62)
SODIUM SERPL-SCNC: 134 MMOL/L (ref 135–147)
WBC # BLD AUTO: 16.9 THOUSAND/UL (ref 4.31–10.16)

## 2023-08-08 PROCEDURE — 82948 REAGENT STRIP/BLOOD GLUCOSE: CPT

## 2023-08-08 PROCEDURE — 99232 SBSQ HOSP IP/OBS MODERATE 35: CPT | Performed by: FAMILY MEDICINE

## 2023-08-08 PROCEDURE — 83735 ASSAY OF MAGNESIUM: CPT

## 2023-08-08 PROCEDURE — 92526 ORAL FUNCTION THERAPY: CPT

## 2023-08-08 PROCEDURE — 83605 ASSAY OF LACTIC ACID: CPT

## 2023-08-08 PROCEDURE — 99232 SBSQ HOSP IP/OBS MODERATE 35: CPT | Performed by: HOSPITALIST

## 2023-08-08 PROCEDURE — 99233 SBSQ HOSP IP/OBS HIGH 50: CPT | Performed by: INTERNAL MEDICINE

## 2023-08-08 PROCEDURE — 84145 PROCALCITONIN (PCT): CPT

## 2023-08-08 PROCEDURE — 85025 COMPLETE CBC W/AUTO DIFF WBC: CPT

## 2023-08-08 PROCEDURE — 80048 BASIC METABOLIC PNL TOTAL CA: CPT

## 2023-08-08 PROCEDURE — 87040 BLOOD CULTURE FOR BACTERIA: CPT

## 2023-08-08 RX ORDER — POLYETHYLENE GLYCOL 3350 17 G/17G
17 POWDER, FOR SOLUTION ORAL DAILY
Status: DISCONTINUED | OUTPATIENT
Start: 2023-08-08 | End: 2023-08-15

## 2023-08-08 RX ORDER — SODIUM CHLORIDE 9 MG/ML
75 INJECTION, SOLUTION INTRAVENOUS CONTINUOUS
Status: DISCONTINUED | OUTPATIENT
Start: 2023-08-08 | End: 2023-08-09

## 2023-08-08 RX ADMIN — DORNASE ALFA 5 MG: 1 SOLUTION RESPIRATORY (INHALATION) at 11:36

## 2023-08-08 RX ADMIN — GUAIFENESIN 1200 MG: 600 TABLET ORAL at 08:34

## 2023-08-08 RX ADMIN — ALTEPLASE 10 MG: 2.2 INJECTION, POWDER, LYOPHILIZED, FOR SOLUTION INTRAVENOUS at 22:59

## 2023-08-08 RX ADMIN — MEMANTINE 10 MG: 10 TABLET ORAL at 17:30

## 2023-08-08 RX ADMIN — ALTEPLASE 10 MG: 2.2 INJECTION, POWDER, LYOPHILIZED, FOR SOLUTION INTRAVENOUS at 01:29

## 2023-08-08 RX ADMIN — SODIUM CHLORIDE 3 G: 9 INJECTION, SOLUTION INTRAVENOUS at 03:09

## 2023-08-08 RX ADMIN — SODIUM CHLORIDE 75 ML/HR: 0.9 INJECTION, SOLUTION INTRAVENOUS at 15:43

## 2023-08-08 RX ADMIN — DORNASE ALFA 5 MG: 1 SOLUTION RESPIRATORY (INHALATION) at 22:59

## 2023-08-08 RX ADMIN — ACETAMINOPHEN 650 MG: 325 TABLET, FILM COATED ORAL at 17:30

## 2023-08-08 RX ADMIN — MEMANTINE 10 MG: 10 TABLET ORAL at 08:34

## 2023-08-08 RX ADMIN — ACETAMINOPHEN 650 MG: 325 TABLET, FILM COATED ORAL at 05:55

## 2023-08-08 RX ADMIN — INSULIN LISPRO 1 UNITS: 100 INJECTION, SOLUTION INTRAVENOUS; SUBCUTANEOUS at 07:12

## 2023-08-08 RX ADMIN — SODIUM CHLORIDE 3 G: 9 INJECTION, SOLUTION INTRAVENOUS at 08:45

## 2023-08-08 RX ADMIN — SODIUM CHLORIDE 500 ML: 0.9 INJECTION, SOLUTION INTRAVENOUS at 07:11

## 2023-08-08 RX ADMIN — DEXTROSE 75 ML/HR: 5 SOLUTION INTRAVENOUS at 04:05

## 2023-08-08 RX ADMIN — POLYETHYLENE GLYCOL 3350 17 G: 17 POWDER, FOR SOLUTION ORAL at 09:24

## 2023-08-08 RX ADMIN — CEFTRIAXONE 2000 MG: 2 INJECTION, POWDER, FOR SOLUTION INTRAMUSCULAR; INTRAVENOUS at 11:35

## 2023-08-08 RX ADMIN — DORNASE ALFA 5 MG: 1 SOLUTION RESPIRATORY (INHALATION) at 01:30

## 2023-08-08 RX ADMIN — GUAIFENESIN 1200 MG: 600 TABLET ORAL at 21:45

## 2023-08-08 RX ADMIN — ENOXAPARIN SODIUM 40 MG: 40 INJECTION SUBCUTANEOUS at 08:45

## 2023-08-08 RX ADMIN — ALTEPLASE 10 MG: 2.2 INJECTION, POWDER, LYOPHILIZED, FOR SOLUTION INTRAVENOUS at 11:36

## 2023-08-08 RX ADMIN — INSULIN GLARGINE 10 UNITS: 100 INJECTION, SOLUTION SUBCUTANEOUS at 21:45

## 2023-08-08 RX ADMIN — INSULIN LISPRO 5 UNITS: 100 INJECTION, SOLUTION INTRAVENOUS; SUBCUTANEOUS at 07:12

## 2023-08-08 RX ADMIN — DONEPEZIL HYDROCHLORIDE 5 MG: 5 TABLET ORAL at 21:45

## 2023-08-08 RX ADMIN — STANDARDIZED SENNA CONCENTRATE 8.6 MG: 8.6 TABLET ORAL at 21:45

## 2023-08-08 NOTE — PROGRESS NOTES
Progress Note - Infectious Disease   Harpreet Alcaraz 82 y.o. male MRN: [id number]  Unit/Bed#: W -01 Encounter: [id number]      Impression/Plan:  1. Severe sepsis. Present on admission with leukocytosis and tachycardia as well as a lactate level of 3.2. Now subsequently has developed a fever. Suspect secondary to a pneumonia with complicating empyema. No other clear source appreciated. Suspect the ongoing sepsis is secondary to inadequate source control.  -Antibiotics as below  -Source control measures as below  -Follow-up cultures and adjust the antibiotics needed  -Recheck CBC with differential and CMP  -Supportive care     2. Streptococcus intermedius empyema. Suspect the ongoing clinical sepsis is related to inadequate source control. Increase pleural fluid output since tPA dornase instillation.  -Discontinue Unasyn while  -Begin ceftriaxone 2 g IV every 24 hours  -Follow-up final sensitivities and adjust antibiotics needed  -Chest tube drainage  -tPA/dornase instillation  -Close pulmonary follow-up  -May need VATS for adequate source control     3. Acute hypoxic respiratory failure. Appears to be secondary to pneumonia with complicated parapneumonic effusion. No other clear source appreciated. Patient continues to require oxygen by nasal cannula. -Oxygen support  -Monitor respiratory status  -Treatment of complicated effusion as above     4. Acute encephalopathy. Complicating baseline dementia. Suspect related to the patient's acute infectious process. Remains nonverbal.  Imaging of the brain without any abnormality.  -Monitor cognition  -Treat sepsis as above    Discussed the above management plan with the primary service    Antibiotics:  Unasyn 5  Antibiotics 6    Subjective:  Patient continues to have intermittent fever; no nausea, vomiting, diarrhea; no cough, shortness of breath; no increased pain. No new symptoms. Patient not verbalizing much.   He dropped his blood pressure overnight but this seems to have recovered with IV fluids. Increased pleural fluid output with tPA instillation    Objective:  Vitals:  Temp:  [98.7 °F (37.1 °C)-101.4 °F (38.6 °C)] 99.4 °F (37.4 °C)  HR:  [76-94] 76  Resp:  [15-16] 16  BP: ()/() 103/66  SpO2:  [91 %-97 %] 97 %  Temp (24hrs), Av °F (37.8 °C), Min:98.7 °F (37.1 °C), Max:101.4 °F (38.6 °C)  Current: Temperature: 99.4 °F (37.4 °C)    Physical Exam:   General Appearance:  Alert, interactive, nontoxic, no acute distress. Throat: Oropharynx moist without lesions. Lungs:   Decreased breath sounds on the right.; no wheezes, rhonchi or rales; respirations unlabored. Right-sided chest tube in place. Heart:  RRR; no murmur, rub or gallop   Abdomen:   Soft, non-tender, non-distended, positive bowel sounds. Extremities: No clubbing, cyanosis or edema   Skin: No new rashes or lesions. No draining wounds noted. Labs, Imaging, & Other studies:   All pertinent labs and imaging studies were personally reviewed  Results from last 7 days   Lab Units 23  0506 23  0508 23  0537   WBC Thousand/uL 16.90* 18.12* 16.74*   HEMOGLOBIN g/dL 10.7* 10.3* 9.2*   PLATELETS Thousands/uL 523* 515* 475*     Results from last 7 days   Lab Units 23  0506 23  0508 23  0537 23  0518 23  1049   SODIUM mmol/L 134* 136 138   < > 142   POTASSIUM mmol/L 4.3 3.4* 3.4*   < > 3.7   CHLORIDE mmol/L 102 103 106   < > 105   CO2 mmol/L 23 25 23   < > 25   BUN mg/dL 12 13 14   < > 35*   CREATININE mg/dL 0.92 0.90 0.94   < > 1.29   EGFR ml/min/1.73sq m 78 80 76   < > 52   CALCIUM mg/dL 8.0* 8.0* 7.9*   < > 9.2   AST U/L  --   --   --   --  7*   ALT U/L  --   --   --   --  6*   ALK PHOS U/L  --   --   --   --  131*    < > = values in this interval not displayed.      Results from last 7 days   Lab Units 23  1515 23  0953 23  2251 23  1201 23  1131   BLOOD CULTURE   --   --   --  No Growth After 4 Days. No Growth After 4 Days.    GRAM STAIN RESULT  2+ Polys  No bacteria seen  --   --   --   --    BODY FLUID CULTURE, STERILE  No growth  --   --   --   --    MRSA CULTURE ONLY   --  No Methicillin Resistant Staphlyococcus aureus (MRSA) isolated  --   --   --    LEGIONELLA URINARY ANTIGEN   --   --  Negative  --   --      Results from last 7 days   Lab Units 08/08/23  0506 08/04/23  0518 08/03/23  1049   PROCALCITONIN ng/ml 0.56* 1.45* 2.31*         Results from last 7 days   Lab Units 08/03/23  1049   FERRITIN ng/mL 714*

## 2023-08-08 NOTE — ASSESSMENT & PLAN NOTE
Chest x-ray- There is a right mid to lower lung opacity with well-circumscribed medial margin suggests loculated effusion. CT chest- Moderate right pleural effusion with compressive atelectasis over the right lower lobe and posterior right middle and upper lobes, Consolidation in the left lower lobe, probably atelectatic. Superimposed pneumonia cannot be excluded. Soft tissue densities in the bronchial tree as above, appearance favors secretions.   IR placed chest tube 8/4    Plan-  • Appreciate pulmonology recommendations- Suboptimal pleural drainage, trial of 3/6 TPA/Dornase BID per MIST2 trial, 1 hour dwell time post instillation, Return to -[id number] suction when not on dwell time, Continue unasyn - further abx per ID  Overall poor surgical candidate and his sister agrees, should source control not be achieved with TPA/Dornase, may consider transition to hospice care - continue ongoing [address] [place] discussions  Appreciate infectious disease recommendations- transition to ceftriaxone until sensitivities

## 2023-08-08 NOTE — QUICK NOTE
I, personally administered dose #3 of 6 of tPA (30cc) and dornase (30cc) into the right sided chest tube. The chest tube is clamped, and nursing is aware. Will unclamp chest tube in 60min and place back on -20 of suction. Patient tolerated instillation well and without incident.

## 2023-08-08 NOTE — PLAN OF CARE
Problem: MOBILITY - ADULT  Goal: Maintain or return to baseline ADL function  Description: INTERVENTIONS:  -  Assess patient's ability to carry out ADLs; assess patient's baseline for ADL function and identify physical deficits which impact ability to perform ADLs (bathing, care of mouth/teeth, toileting, grooming, dressing, etc.)  - Assess/evaluate cause of self-care deficits   - Assess range of motion  - Assess patient's mobility; develop plan if impaired  - Assess patient's need for assistive devices and provide as appropriate  - Encourage maximum independence but intervene and supervise when necessary  - Involve family in performance of ADLs  - Assess for home care needs following discharge   - Consider OT consult to assist with ADL evaluation and planning for discharge  - Provide patient education as appropriate  Outcome: Progressing  Goal: Maintains/Returns to pre admission functional level  Description: INTERVENTIONS:  - Perform BMAT or MOVE assessment daily.   - Set and communicate daily mobility goal to care team and patient/family/caregiver.    - Collaborate with rehabilitation services on mobility goals if consulted  - Out of bed for toileting  - Record patient progress and toleration of activity level   Outcome: Progressing     Problem: PAIN - ADULT  Goal: Verbalizes/displays adequate comfort level or baseline comfort level  Description: Interventions:  - Encourage patient to monitor pain and request assistance  - Assess pain using appropriate pain scale  - Administer analgesics based on type and severity of pain and evaluate response  - Implement non-pharmacological measures as appropriate and evaluate response  - Consider cultural and social influences on pain and pain management  - Notify physician/advanced practitioner if interventions unsuccessful or patient reports new pain  Outcome: Progressing     Problem: INFECTION - ADULT  Goal: Absence or prevention of progression during hospitalization  Description: INTERVENTIONS:  - Assess and monitor for signs and symptoms of infection  - Monitor lab/diagnostic results  - Monitor all insertion sites, i.e. indwelling lines, tubes, and drains  - Monitor endotracheal if appropriate and nasal secretions for changes in amount and color  - Show Low appropriate cooling/warming therapies per order  - Administer medications as ordered  - Instruct and encourage patient and family to use good hand hygiene technique  - Identify and instruct in appropriate isolation precautions for identified infection/condition  Outcome: Progressing  Goal: Absence of fever/infection during neutropenic period  Description: INTERVENTIONS:  - Monitor WBC    Outcome: Progressing     Problem: SAFETY ADULT  Goal: Maintain or return to baseline ADL function  Description: INTERVENTIONS:  -  Assess patient's ability to carry out ADLs; assess patient's baseline for ADL function and identify physical deficits which impact ability to perform ADLs (bathing, care of mouth/teeth, toileting, grooming, dressing, etc.)  - Assess/evaluate cause of self-care deficits   - Assess range of motion  - Assess patient's mobility; develop plan if impaired  - Assess patient's need for assistive devices and provide as appropriate  - Encourage maximum independence but intervene and supervise when necessary  - Involve family in performance of ADLs  - Assess for home care needs following discharge   - Consider OT consult to assist with ADL evaluation and planning for discharge  - Provide patient education as appropriate  Outcome: Progressing  Goal: Maintains/Returns to pre admission functional level  Description: INTERVENTIONS:  - Perform BMAT or MOVE assessment daily.   - Set and communicate daily mobility goal to care team and patient/family/caregiver.    - Collaborate with rehabilitation services on mobility goals if consulted  - Out of bed for toileting  - Record patient progress and toleration of activity level   Outcome: Progressing  Goal: Patient will remain free of falls  Description: INTERVENTIONS:  - Educate patient/family on patient safety including physical limitations  - Instruct patient to call for assistance with activity   - Consult OT/PT to assist with strengthening/mobility   - Keep Call bell within reach  - Keep bed low and locked with side rails adjusted as appropriate  - Keep care items and personal belongings within reach  - Initiate and maintain comfort rounds  - Make Fall Risk Sign visible to staff  - Initiate/Maintain bed alarm  - Apply yellow socks and bracelet for high fall risk patients  - Consider moving patient to room near nurses station  Outcome: Progressing     Problem: DISCHARGE PLANNING  Goal: Discharge to home or other facility with appropriate resources  Description: INTERVENTIONS:  - Identify barriers to discharge w/patient and caregiver  - Arrange for needed discharge resources and transportation as appropriate  - Identify discharge learning needs (meds, wound care, etc.)  - Arrange for interpretive services to assist at discharge as needed  - Refer to Case Management Department for coordinating discharge planning if the patient needs post-hospital services based on physician/advanced practitioner order or complex needs related to functional status, cognitive ability, or social support system  Outcome: Progressing     Problem: Knowledge Deficit  Goal: Patient/family/caregiver demonstrates understanding of disease process, treatment plan, medications, and discharge instructions  Description: Complete learning assessment and assess knowledge base.   Interventions:  - Provide teaching at level of understanding  - Provide teaching via preferred learning methods  Outcome: Progressing     Problem: Prexisting or High Potential for Compromised Skin Integrity  Goal: Skin integrity is maintained or improved  Description: INTERVENTIONS:  - Identify patients at risk for skin breakdown  - Assess and monitor skin integrity  - Assess and monitor nutrition and hydration status  - Monitor labs   - Assess for incontinence   - Turn and reposition patient  - Assist with mobility/ambulation  - Relieve pressure over bony prominences  - Avoid friction and shearing  - Provide appropriate hygiene as needed including keeping skin clean and dry  - Evaluate need for skin moisturizer/barrier cream  - Collaborate with interdisciplinary team   - Patient/family teaching  - Consider wound care consult   Outcome: Progressing     Problem: SAFETY,RESTRAINT: NV/NON-SELF DESTRUCTIVE BEHAVIOR  Goal: Remains free of harm/injury (restraint for non violent/non self-detsructive behavior)  Description: INTERVENTIONS:  - Instruct patient/family regarding restraint use   - Assess and monitor physiologic and psychological status   - Provide interventions and comfort measures to meet assessed patient needs   - Identify and implement measures to help patient regain control  - Assess readiness for release of restraint   Outcome: Progressing  Goal: Returns to optimal restraint-free functioning  Description: INTERVENTIONS:  - Assess the patient's behavior and symptoms that indicate continued need for restraint  - Identify and implement measures to help patient regain control  - Assess readiness for release of restraint   Outcome: Progressing     Problem: Nutrition/Hydration-ADULT  Goal: Nutrient/Hydration intake appropriate for improving, restoring or maintaining nutritional needs  Description: Monitor and assess patient's nutrition/hydration status for malnutrition. Collaborate with interdisciplinary team and initiate plan and interventions as ordered. Monitor patient's weight and dietary intake as ordered or per policy. Utilize nutrition screening tool and intervene as necessary. Determine patient's food preferences and provide high-protein, high-caloric foods as appropriate.      INTERVENTIONS:  - Monitor oral intake, urinary output, labs, and treatment plans  - Assess nutrition and hydration status and recommend course of action  - Evaluate amount of meals eaten  - Assist patient with eating if necessary   - Allow adequate time for meals  - Recommend/ encourage appropriate diets, oral nutritional supplements, and vitamin/mineral supplements  - Order, calculate, and assess calorie counts as needed  - Recommend, monitor, and adjust tube feedings and TPN/PPN based on assessed needs  - Assess need for intravenous fluids  - Provide specific nutrition/hydration education as appropriate  - Include patient/family/caregiver in decisions related to nutrition  Outcome: Progressing

## 2023-08-08 NOTE — ASSESSMENT & PLAN NOTE
Last BM this morning   Continue senna 2 tablets BID   Continue miralax prn   Encourage PO hydration and food intake  Optimize bowel regimen as needed

## 2023-08-08 NOTE — CASE MANAGEMENT
Case Management Discharge Planning Note    Patient name David Winston  Location W /W -92 MRN [id number]  : 1940 Date 2023       Current Admission Date: 8/3/2023  Current Admission Diagnosis:Severe sepsis [facility])   Patient Active Problem List    Diagnosis Date Noted   • Constipation 2023   • Loculated pleural effusion 2023   • Hypoxia 2023   • Slurred speech 2023   • Hyperglycemia 2023   • Severe sepsis ([address]) 2023   • Acute respiratory failure with hypoxia ([address]) 2023   • Anemia 2023   • Encephalopathy acute 2023   • Ambulatory dysfunction 2023   • Low BP 2023   • Depression 2022   • Late onset Alzheimer's dementia with behavioral disturbance ([address]) 2022   • Other hyperlipidemia 2022   • Unsteady gait 2022   • Insomnia 2022      LOS (days): 5  Geometric Mean LOS (GMLOS) (days): 5.00  Days to GMLOS:-0.2     OBJECTIVE:  Risk of Unplanned Readmission Score: 17.5         Current admission status: Inpatient   Preferred Pharmacy:   PATIENT/FAMILY REPORTS NO PREFERRED PHARMACY  No address on file      Primary Care Provider: Avery Eduardo MD    Primary Insurance: [facility]  Secondary Insurance:     DISCHARGE DETAILS:    Discharge planning discussed with[de-identified] sister [facility], over phone  [place] of Choice: Yes  Comments - Freedom of Choice: STR  CM contacted family/caregiver?: Yes  Were Treatment Team discharge recommendations reviewed with patient/caregiver?: Yes  Did patient/caregiver verbalize understanding of patient care needs?: N/A- going to facility  Were patient/caregiver advised of the risks associated with not following Treatment Team discharge recommendations?: Yes    Contacts  Patient Contacts:  Katty  Relationship to Patient[de-identified] Family (sister)  Contact Method: Phone  Phone Number: [phone]  Reason/Outcome: Discharge Planning, Continuity of Care, Emergency Contact, Referral    Requested Home Health Care         Is the patient interested in [facility] at discharge?: No    DME Referral Provided  Referral made for DME?: No    Other Referral/Resources/Interventions Provided:  Interventions: SNF  Referral Comments: CM spoke with sister Ramu Sevilla as patient is unable to [facility] at this current LOC- sister agreeable to STR referrals, CM to place in [facility]. CM did receive message from Maria Luz Clayton ([phone]) w/ [facility] that they can accept patient back on hospice services. CM to follow up as able to continue with dcp.     Would you like to participate in our [address] service program?  : No - Declined    Treatment Team Recommendation: SNF  Discharge Destination Plan[de-identified] SNF  Transport at Discharge : [facility] Ambulance

## 2023-08-08 NOTE — ASSESSMENT & PLAN NOTE
· At baseline ambulate without AD, noted unsteadiness new from baseline      Plan-  PT OT- postacute rehabilitation

## 2023-08-08 NOTE — PROGRESS NOTES
Progress Note - Geriatric Medicine   Carmen Cabello 82 y.o. male MRN: [id number]  Unit/Bed#: W -01 Encounter: [id number]      Assessment/Plan:  Constipation  Assessment & Plan  No BM recorded since admission to the hospital  Increase senna to 2 tablets twice daily  Use MiraLAX as needed  Monitor for urinary retention  Optimize bowel regimen as needed    Loculated pleural effusion  Assessment & Plan  Manage as per primary team  Continue antibiotics and monitor closely  Patient might need VATS  Maintain pain control add Tylenol scheduled and consider oxycodone 2.5 mg as needed for severe pain  Pulmonology and infectious disease follow    Encephalopathy acute  Assessment & Plan  Hypoactive delirium  Multiple etiologies: cognitive impairment at baseline, acute infection, dehydration, suspect pain, immobility.  -continue delirium precautions  -maintain normal sleep/wake cycle  -minimize overnight interruptions, group overnight vitals/labs/nursing checks as possible  -dim lights, close blinds and turn off tv to minimize stimulation and encourage sleep environment in evenings  -ensure that pain is well controlled,  consider Tylenol 975mg Q8H scheduled   -monitor for fecal and urinary retention which may precipitate delirium  -encourage early mobilization and ambulation  -provide frequent reorientation and redirection  -encourage family and friends at the bedside to help calm patient if anxious  -avoid medications which may precipitate or worsen delirium such as tramadol, benzodiazepine, anticholinergics, and antihistaminics  -encourage hydration and nutrition , assist with feeding   -Aspiration precautions  -redirect unwanted behaviors as first line, avoid physical restraints.   -May use Zyprexa 2.5 mg every 12 hours as needed only for severe agitation  -Continue antibiotic treatment as per primary team      Subjective:     Patient seen and examined at bedside, somnolent, sponsored to verbal stimuli but not able to provide review of system. Per nursing staff minimal p.o. intake, slept throughout the day. No bowel movement recorded for the past few days. Fever noted today    Review of Systems   Unable to perform ROS: Mental status change         Objective:     Vitals: Blood pressure 101/65, pulse 92, temperature 99.5 °F (37.5 °C), resp. rate 20, height 5' 9" (1.753 m), weight 60 kg (132 lb 4.4 oz), SpO2 94 %. ,Body mass index is 19.53 kg/m². Intake/Output Summary (Last 24 hours) at 8/8/2023 1557  Last data filed at 8/8/2023 1200  Gross per 24 hour   Intake 1490 ml   Output 3800 ml   Net -2310 ml       Current Medications: Reviewed    Physical Exam:   Physical Exam  Vitals and nursing note reviewed. Constitutional:       General: He is not in acute distress. Appearance: He is well-developed. He is ill-appearing. Comments: Frail looking   HENT:      Head: Normocephalic and atraumatic. Ears:      Comments: [place]     Mouth/Throat:      Mouth: Mucous membranes are dry. Eyes:      Conjunctiva/sclera: Conjunctivae normal.   Cardiovascular:      Rate and Rhythm: Normal rate and regular rhythm. Heart sounds: Heart sounds are distant. Pulmonary:      Effort: Pulmonary effort is normal. No respiratory distress. Breath sounds: Rhonchi present. Abdominal:      Palpations: Abdomen is soft. Tenderness: There is no abdominal tenderness. Genitourinary:     Comments: Condom cath  Musculoskeletal:         General: No swelling. Cervical back: Neck supple. Skin:     General: Skin is warm and dry. Capillary Refill: Capillary refill takes less than 2 seconds. Neurological:      Mental Status: He is alert.       Comments: AAOx0, somnolent, responds to verbal stimuli          Invasive Devices     Peripheral Intravenous Line  Duration           Peripheral IV 08/08/23 Distal;Right;Ventral (anterior) Forearm <1 day          Drain  Duration           Chest Tube Right 12 Fr. 4 days    External Urinary Catheter 1 day                Lab, Imaging and other studies: I have personally reviewed pertinent reports.

## 2023-08-08 NOTE — PLAN OF CARE
Problem: MOBILITY - ADULT  Goal: Maintain or return to baseline ADL function  Description: INTERVENTIONS:  -  Assess patient's ability to carry out ADLs; assess patient's baseline for ADL function and identify physical deficits which impact ability to perform ADLs (bathing, care of mouth/teeth, toileting, grooming, dressing, etc.)  - Assess/evaluate cause of self-care deficits   - Assess range of motion  - Assess patient's mobility; develop plan if impaired  - Assess patient's need for assistive devices and provide as appropriate  - Encourage maximum independence but intervene and supervise when necessary  - Involve family in performance of ADLs  - Assess for home care needs following discharge   - Consider OT consult to assist with ADL evaluation and planning for discharge  - Provide patient education as appropriate  Outcome: Progressing  Goal: Maintains/Returns to pre admission functional level  Description: INTERVENTIONS:  - Perform BMAT or MOVE assessment daily.   - Set and communicate daily mobility goal to care team and patient/family/caregiver. - Collaborate with rehabilitation services on mobility goals if consulted  - Perform Range of Motion 2 times a day. - Reposition patient every 2 hours.   - Dangle patient 2 times a day  - Stand patient 2 times a day  - Ambulate patient 2 times a day  - Out of bed to chair 2 times a day   - Out of bed for meals 2 times a day  - Out of bed for toileting  - Record patient progress and toleration of activity level   Outcome: Progressing     Problem: PAIN - ADULT  Goal: Verbalizes/displays adequate comfort level or baseline comfort level  Description: Interventions:  - Encourage patient to monitor pain and request assistance  - Assess pain using appropriate pain scale  - Administer analgesics based on type and severity of pain and evaluate response  - Implement non-pharmacological measures as appropriate and evaluate response  - Consider cultural and social influences on pain and pain management  - Notify physician/advanced practitioner if interventions unsuccessful or patient reports new pain  Outcome: Progressing     Problem: INFECTION - ADULT  Goal: Absence or prevention of progression during hospitalization  Description: INTERVENTIONS:  - Assess and monitor for signs and symptoms of infection  - Monitor lab/diagnostic results  - Monitor all insertion sites, i.e. indwelling lines, tubes, and drains  - Monitor endotracheal if appropriate and nasal secretions for changes in amount and color  - [place] appropriate cooling/warming therapies per order  - Administer medications as ordered  - Instruct and encourage patient and family to use good hand hygiene technique  - Identify and instruct in appropriate isolation precautions for identified infection/condition  Outcome: Progressing  Goal: Absence of fever/infection during neutropenic period  Description: INTERVENTIONS:  - Monitor WBC    Outcome: Progressing     Problem: SAFETY ADULT  Goal: Maintain or return to baseline ADL function  Description: INTERVENTIONS:  -  Assess patient's ability to carry out ADLs; assess patient's baseline for ADL function and identify physical deficits which impact ability to perform ADLs (bathing, care of mouth/teeth, toileting, grooming, dressing, etc.)  - Assess/evaluate cause of self-care deficits   - Assess range of motion  - Assess patient's mobility; develop plan if impaired  - Assess patient's need for assistive devices and provide as appropriate  - Encourage maximum independence but intervene and supervise when necessary  - Involve family in performance of ADLs  - Assess for home care needs following discharge   - Consider OT consult to assist with ADL evaluation and planning for discharge  - Provide patient education as appropriate  Outcome: Progressing  Goal: Maintains/Returns to pre admission functional level  Description: INTERVENTIONS:  - Perform BMAT or MOVE assessment daily.   - Set and communicate daily mobility goal to care team and patient/family/caregiver. - Collaborate with rehabilitation services on mobility goals if consulted  - Perform Range of Motion 2 times a day. - Reposition patient every 2 hours.   - Dangle patient 2 times a day  - Stand patient 2 times a day  - Ambulate patient 2 times a day  - Out of bed to chair 2 times a day   - Out of bed for meals 2 times a day  - Out of bed for toileting  - Record patient progress and toleration of activity level   Outcome: Progressing  Goal: Patient will remain free of falls  Description: INTERVENTIONS:  - Educate patient/family on patient safety including physical limitations  - Instruct patient to call for assistance with activity   - Consult OT/PT to assist with strengthening/mobility   - Keep Call bell within reach  - Keep bed low and locked with side rails adjusted as appropriate  - Keep care items and personal belongings within reach  - Initiate and maintain comfort rounds  - Make Fall Risk Sign visible to staff  - Offer Toileting every 2 Hours, in advance of need  - Initiate/Maintain bed alarm  - Obtain necessary fall risk management equipment:   - Apply yellow socks and bracelet for high fall risk patients  - Consider moving patient to room near nurses station  Outcome: Progressing     Problem: DISCHARGE PLANNING  Goal: Discharge to home or other facility with appropriate resources  Description: INTERVENTIONS:  - Identify barriers to discharge w/patient and caregiver  - Arrange for needed discharge resources and transportation as appropriate  - Identify discharge learning needs (meds, wound care, etc.)  - Arrange for interpretive services to assist at discharge as needed  - Refer to Case Management Department for coordinating discharge planning if the patient needs post-hospital services based on physician/advanced practitioner order or complex needs related to functional status, cognitive ability, or social support system  Outcome: Progressing     Problem: Knowledge Deficit  Goal: Patient/family/caregiver demonstrates understanding of disease process, treatment plan, medications, and discharge instructions  Description: Complete learning assessment and assess knowledge base.   Interventions:  - Provide teaching at level of understanding  - Provide teaching via preferred learning methods  Outcome: Progressing     Problem: Prexisting or High Potential for Compromised Skin Integrity  Goal: Skin integrity is maintained or improved  Description: INTERVENTIONS:  - Identify patients at risk for skin breakdown  - Assess and monitor skin integrity  - Assess and monitor nutrition and hydration status  - Monitor labs   - Assess for incontinence   - Turn and reposition patient  - Assist with mobility/ambulation  - Relieve pressure over bony prominences  - Avoid friction and shearing  - Provide appropriate hygiene as needed including keeping skin clean and dry  - Evaluate need for skin moisturizer/barrier cream  - Collaborate with interdisciplinary team   - Patient/family teaching  - Consider wound care consult   Outcome: Progressing     Problem: SAFETY,RESTRAINT: NV/NON-SELF DESTRUCTIVE BEHAVIOR  Goal: Remains free of harm/injury (restraint for non violent/non self-detsructive behavior)  Description: INTERVENTIONS:  - Instruct patient/family regarding restraint use   - Assess and monitor physiologic and psychological status   - Provide interventions and comfort measures to meet assessed patient needs   - Identify and implement measures to help patient regain control  - Assess readiness for release of restraint   Outcome: Progressing  Goal: Returns to optimal restraint-free functioning  Description: INTERVENTIONS:  - Assess the patient's behavior and symptoms that indicate continued need for restraint  - Identify and implement measures to help patient regain control  - Assess readiness for release of restraint   Outcome: Progressing Problem: Nutrition/Hydration-ADULT  Goal: Nutrient/Hydration intake appropriate for improving, restoring or maintaining nutritional needs  Description: Monitor and assess patient's nutrition/hydration status for malnutrition. Collaborate with interdisciplinary team and initiate plan and interventions as ordered. Monitor patient's weight and dietary intake as ordered or per policy. Utilize nutrition screening tool and intervene as necessary. Determine patient's food preferences and provide high-protein, high-caloric foods as appropriate.      INTERVENTIONS:  - Monitor oral intake, urinary output, labs, and treatment plans  - Assess nutrition and hydration status and recommend course of action  - Evaluate amount of meals eaten  - Assist patient with eating if necessary   - Allow adequate time for meals  - Recommend/ encourage appropriate diets, oral nutritional supplements, and vitamin/mineral supplements  - Order, calculate, and assess calorie counts as needed  - Recommend, monitor, and adjust tube feedings and TPN/PPN based on assessed needs  - Assess need for intravenous fluids  - Provide specific nutrition/hydration education as appropriate  - Include patient/family/caregiver in decisions related to nutrition  Outcome: Progressing

## 2023-08-08 NOTE — QUICK NOTE
I, personally instilled patient's Right sided CT with dose #2 of 6 of tPA (30 mL) and dornase (30 mL), then clamped. RN aware that patient's chest tube is clamped and will turn and reposition patient throughout dwell to disperse medication. Patient tolerated instillation well and without incident. Will unclamp in 60 min.   RN aware to call for increased work of breathing, respiratory distress, increased FiO2 requirements, or complaints of pain in chest.

## 2023-08-08 NOTE — SPEECH THERAPY NOTE
Speech Language/Pathology    Speech/Language Pathology Progress Note    Patient Name: Yasir Foster  WBLVS'B Date: 8/8/2023       Subjective:  Pt seen for dysphagia tx follow up at lunch. Pt w/ eyes closed, rambling speech. Lunch tray at bedside, untouched. Spoke w/ nsg, pt takes meds w/ applesauce, but overall po intake has been poor. Objective: Attempted to feed pt mashed potatoes, van pudding and chopped turkey. Pt w/ decreased attention to task, saying "Now wait a minute"; max verbal and tactile cues to open mouth and retrieve bolus, decreased mouth opening for only a few tsps of pudding. Slow transfer. Pt was able to suck NTL and thin liquids from straw. Appeared w/ better  control w/ NTL than thin liquids. Swallows appeared mildly delayed, but complete. Inconsistent delayed cough noted after thin liquids. ? Aspiration risk. Assessment:  Pt w/ decreased po intake, suspect 2* altered mental status; Pt may be at risk for aspiration with thin liquids due to altered mental status, decreased control and delayed cough. Plan/Recommendations:  Change diet to dysphagia puree w/ nectar thick liquids   cont meds  Crushed in puree .   Will cont to follow      April Rose, [facility] CCC-SLP  Speech Pathologist  Available via  tiger text

## 2023-08-08 NOTE — OCCUPATIONAL THERAPY NOTE
Occupational Therapy Screen Note         Patient Name: Justina Spicer  DVOFX'Q Date: 8/8/2023 08/08/23 1355   OT Last Visit   OT Visit Date 08/08/23   Note Type   Note type Screen   Additional Comments OT orders received. Chart review performed. Based on conversation with PERLA Jean Baptiste, pt is from [facility], where he is Ax1 for functional mobility and dependent for all ADL tasks, including feeding, bathing, dressing, toileting. Pt does not have any skilled OT needs at this time as he is currently at his baseline for ADL tasks. As per chart review pt is currently working with physical therapy to address functional mobility deficits. Will d/c pt from OT caseload at this time. Please re-consult if needs arise.    Maynor Barreto MS, OTR/L

## 2023-08-08 NOTE — NURSING NOTE
Sepsis alert called at 2026 [facility] patient had fever and SBP overnight 95. Patient given tylenol and 500cc NS bolus as ordered. BC and lac acid ordered attempt x2 to draw by two different staff, unable to draw. Venous access consult put in Vascular access team made aware.  Will continue to monitor S/S

## 2023-08-08 NOTE — QUICK NOTE
At this time, patient's right sided CT was unclamped. Patient tolerated dwell well and without incident.

## 2023-08-08 NOTE — ASSESSMENT & PLAN NOTE
S/p R chest tube placement 8/4  Continue IV antibiotics and monitor closely   Consider VATS  Add tylenol 975 mg scheduled TID   Consider adding oxycodone 2.5 mg prn for severe pain   Pulmonology and ID follows

## 2023-08-08 NOTE — ASSESSMENT & PLAN NOTE
Hypoactive delirium. Patient responds to name only   Multiple etiologies: cognitive impairment at baseline, acute infection, dehydration, suspect pain, immobility, change in environment, hypernatremia   Continue delirium precautions   Maintain sleep/wake cycle. Minimize overnight interruptions  Dim lights, close blinds, turn off TV to minimize stimulation and encourage sleep environment in evenings   Ensure pain is well controlled. Consider scheduled tylenol 975 mg TID  Monitor for constipation and urinary retention   Encourage early mobilization and ambulation   Provide frequent reorientation and redirection   Encourage family and friends at bedside   Avoid tramadol, benzodiazepines, anticholinergics, and antihistamines  Encourage PO hydration and food intake. Assist with feedings   Continue aspiration precautions   Redirect unwanted behaviors as first line. Avoid physical restraints  Continue IV antibiotics as per primary care team     Per discussion with sister ( POA) - she is considering comfort measures.

## 2023-08-08 NOTE — ASSESSMENT & PLAN NOTE
On admission patient met criteria for severe sepsis secondary to pneumonia due to the loculated effusion  · Blood culture-negative  · Fluid culture-negative  IR chest tube placement 8/4  Overnight patient was febrile Tmax 101.4-blood culture, Pro-Cruz, lactate drawn at that time  Was given  cc bolus  Lactate-2.4  Procal 0.56      Plan-  Blood culture-pending  Appreciate ID recommendations- Transition to ceftriaxone  Gentle IVF NS 75cc  Follow-up with reflex lactate  Respiratory protocol  Monitor vital signs  Mucinex 1200 twice daily

## 2023-08-08 NOTE — PROGRESS NOTES
[address] [facility]  Progress Note  Name: Nadia Regalado  MRN: [id number]  Unit/Bed#: W -01 I Date of Admission: 8/3/2023   Date of Service: 8/8/2023 I Hospital Day: 5    Assessment/Plan   * Severe sepsis [facility])  Assessment & Plan  On admission patient met criteria for severe sepsis secondary to pneumonia due to the loculated effusion  · Blood culture-negative  · Fluid culture-negative  IR chest tube placement 8/4  Overnight patient was febrile Tmax 101.4-blood culture, Pro-Cruz, lactate drawn at that time  Was given  cc bolus  Lactate-2.4  Procal 0.56      Plan-  Blood culture-pending  Appreciate ID recommendations- Transition to ceftriaxone  Gentle IVF NS 75cc  Follow-up with reflex lactate  Respiratory protocol  Monitor vital signs  Mucinex 1200 twice daily              Loculated pleural effusion  Assessment & Plan  Chest x-ray- There is a right mid to lower lung opacity with well-circumscribed medial margin suggests loculated effusion. CT chest- Moderate right pleural effusion with compressive atelectasis over the right lower lobe and posterior right middle and upper lobes, Consolidation in the left lower lobe, probably atelectatic. Superimposed pneumonia cannot be excluded. Soft tissue densities in the bronchial tree as above, appearance favors secretions.   IR placed chest tube 8/4    Plan-  • Appreciate pulmonology recommendations- Suboptimal pleural drainage, trial of 3/6 TPA/Dornase BID per MIST2 trial, 1 hour dwell time post instillation, Return to -[id number] suction when not on dwell time, Continue unasyn - further abx per ID  Overall poor surgical candidate and his sister agrees, should source control not be achieved with TPA/Dornase, may consider transition to hospice care - continue ongoing [address] [place] discussions  Appreciate infectious disease recommendations- transition to ceftriaxone until sensitivities        Ambulatory dysfunction  Assessment & Plan  · At baseline ambulate without AD, noted unsteadiness new from baseline      Plan-  PT OT- postacute rehabilitation      Encephalopathy acute  Assessment & Plan  · POA at facility noted slurred speech, slow responsive, not at his baseline  · Encephalopathy likely metabolic in the setting of sepsis  · Check CT head : No acute intracranial leasion  · Continue antibiotic as above      Anemia  Assessment & Plan  · POA hemoglobin 8.0, pressure review hemoglobin back in September 2022 noted 10.1. · Unclear if history of melena, hematochezia  · Anemia unclear etiology at this time possibly secondary to iron deficiency given thrombocytosis although this could represent current setting of infection versus underlying malignancy? Check iron panel: Ferritin 714, Iron Sat: 14, TIBC: 182, Iron: 26  folate, vitamin B12 WNL     Plan  · Monitor output and consider transfusion if less than 7  ·  CBC a.m. 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 Sequential Compression Devices Ordered. Patient Centered Rounds: I performed bedside rounds with nursing staff today. Discussions with Specialists or Other Care Team Provider: Pulmonology    Education and Discussions with Family / Patient: Updated  (sister) via phone. Current Length of Stay: 5 day(s)  Current Patient Status: Inpatient   Discharge Plan: Anticipate discharge in 48 hrs to rehab facility. Code Status: Level 3 - DNAR and DNI    Subjective:   Patient was seen and examined at the bedside. Patient was resting comfortably no acute distress. Upon my evaluation patient was more awake and alert today in comparison to yesterday. Patient denies any complaints at that time. Objective:     Vitals:   Temp (24hrs), Av.9 °F (37.7 °C), Min:98.7 °F (37.1 °C), Max:101.4 °F (38.6 °C)    Temp:  [98.7 °F (37.1 °C)-101.4 °F (38.6 °C)] 99.5 °F (37.5 °C)  HR:  [76-94] 92  Resp:  [16-20] 20  BP: ()/() 101/65  SpO2:  [91 %-97 %] 94 %  Body mass index is 19.53 kg/m². Input and Output Summary (last 24 hours): Intake/Output Summary (Last 24 hours) at 2023 1447  Last data filed at 2023 0900  Gross per 24 hour   Intake 1370 ml   Output 3800 ml   Net -2430 ml       Physical Exam:   Physical Exam  Vitals and nursing note reviewed. Constitutional:       General: He is not in acute distress. Appearance: He is well-developed. He is ill-appearing. He is not toxic-appearing or diaphoretic. HENT:      Head: Normocephalic and atraumatic. Mouth/Throat:      Mouth: Mucous membranes are moist.   Eyes:      Extraocular Movements: Extraocular movements intact. Conjunctiva/sclera: Conjunctivae normal.      Pupils: Pupils are equal, round, and reactive to light. Cardiovascular:      Rate and Rhythm: Normal rate and regular rhythm. Pulses: Normal pulses. Heart sounds: Normal heart sounds. No murmur heard. Pulmonary:      Effort: Pulmonary effort is normal. No respiratory distress. Breath sounds: Rhonchi present. No wheezing. Abdominal:      General: Bowel sounds are normal. There is no distension. Palpations: Abdomen is soft. Tenderness: There is no abdominal tenderness. There is no guarding or rebound. Musculoskeletal:         General: No swelling or tenderness. Normal range of motion. Cervical back: Normal range of motion and neck supple. Right lower leg: No edema. Left lower leg: No edema. Skin:     General: Skin is warm and dry.       Capillary Refill: Capillary refill takes less than 2 seconds. Neurological:      Mental Status: He is alert. Mental status is at baseline. He is disoriented. Cranial Nerves: No cranial nerve deficit. Sensory: No sensory deficit. Motor: Weakness present. Comments: More awake, follows some commands, able to converse and answer some questions appropriately   Psychiatric:         Mood and Affect: Mood normal.         Behavior: Behavior normal.          Additional Data:     Labs:  Results from last 7 days   Lab Units 08/08/23  0506 08/06/23  0537 08/05/23  1059   WBC Thousand/uL 16.90*   < > 23.32*   HEMOGLOBIN g/dL 10.7*   < > 8.9*   HEMATOCRIT % 34.5*   < > 28.1*   PLATELETS Thousands/uL 523*   < > 471*   BANDS PCT %  --   --  2   NEUTROS PCT % 79*   < >  --    LYMPHS PCT % 9*   < >  --    LYMPHO PCT %  --   --  4*   MONOS PCT % 7   < >  --    MONO PCT %  --   --  5   EOS PCT % 2   < > 2    < > = values in this interval not displayed. Results from last 7 days   Lab Units 08/08/23  0506 08/04/23  0518 08/03/23  1049   SODIUM mmol/L 134*   < > 142   POTASSIUM mmol/L 4.3   < > 3.7   CHLORIDE mmol/L 102   < > 105   CO2 mmol/L 23   < > 25   BUN mg/dL 12   < > 35*   CREATININE mg/dL 0.92   < > 1.29   ANION GAP mmol/L 9   < > 12   CALCIUM mg/dL 8.0*   < > 9.2   ALBUMIN g/dL  --   --  3.2*   TOTAL BILIRUBIN mg/dL  --   --  0.79   ALK PHOS U/L  --   --  131*   ALT U/L  --   --  6*   AST U/L  --   --  7*   GLUCOSE RANDOM mg/dL 125   < > 419*    < > = values in this interval not displayed.      Results from last 7 days   Lab Units 08/03/23  1716   INR  1.33*     Results from last 7 days   Lab Units 08/08/23  1144 08/08/23  0708 08/07/23  2338 08/07/23  2126 08/07/23  1520 08/07/23  1128 08/07/23  0709 08/06/23  2053 08/06/23  1609 08/06/23  1114 08/06/23  0735 08/05/23  2121   POC GLUCOSE mg/dl 87 193* 125 65 156* 92 140 98 85 172* 160* 108     Results from last 7 days   Lab Units 08/03/23  1614   HEMOGLOBIN A1C % 7.0*     Results from last 7 days   Lab Units 08/08/23  1124 08/08/23  0506 08/04/23  0518 08/03/23  1355 08/03/23  1131 08/03/23  1049   LACTIC ACID mmol/L 2.4*  --   --  1.5 3.2*  --    PROCALCITONIN ng/ml  --  0.56* 1.45*  --   --  2.31*       Lines/Drains:  Invasive Devices     Peripheral Intravenous Line  Duration           Peripheral IV 08/08/23 Distal;Right;Ventral (anterior) Forearm <1 day          Drain  Duration           Chest Tube Right 12 Fr. 3 days    External Urinary Catheter 1 day                      Imaging: Reviewed radiology reports from this admission including: chest xray, abdominal/pelvic CT and CT head    Recent Cultures (last 7 days):   Results from last 7 days   Lab Units 08/04/23  1515 08/03/23  2251 08/03/23  1201 08/03/23  1131   BLOOD CULTURE   --   --  No Growth After 4 Days. No Growth After 4 Days.    GRAM STAIN RESULT  2+ Polys  No bacteria seen  --   --   --    BODY FLUID CULTURE, STERILE  No growth  --   --   --    LEGIONELLA URINARY ANTIGEN   --  Negative  --   --        Last 24 Hours Medication List:   Current Facility-Administered Medications   Medication Dose Route Frequency Provider Last Rate   • acetaminophen  650 mg Oral Q6H PRN Subhash Van MD     • cefTRIAXone  2,000 mg Intravenous Q24H Mireya Murray MD 2,000 mg (08/08/23 1135)   • donepezil  5 mg Oral HS Subhash Van MD     • enoxaparin  40 mg Subcutaneous Daily Subhash Van MD     • guaiFENesin  1,200 mg Oral Q12H [address] [place], MD     • insulin glargine  10 Units Subcutaneous HS Subhash Van MD     • insulin lispro  1-5 Units Subcutaneous TID [address] [place], MD     • insulin lispro  5 Units Subcutaneous TID With Meals Subhash Van MD     • memantine  10 mg Oral BID Subhashsamantha Van MD     • OLANZapine  5 mg Intramuscular Once Luis Hogan DO     • polyethylene glycol  17 g Oral Daily Mc Dent MD     • senna  1 tablet Oral HS Olman Daniel Titus Granados MD     • sodium chloride  75 mL/hr Intravenous Continuous Yariel Webber MD          Today, Patient Was Seen By: Yariel Webber MD    **Please Note: This note may have been constructed using a voice recognition system. **

## 2023-08-08 NOTE — PROGRESS NOTES
Progress Note - Pulmonary   Ada Pu 82 y.o. male MRN: [id number]  Unit/Bed#: W -01 Encounter: [id number]    Assessment:  Empyema  The patient's is having increased drainage of serosanguinous fluid from his chest tube at 200 cc since this morning. He is tolerating the TPA dornase treatment well with increased serosanguinous fluid output. Will continue treatment plan. Anaerobic cultures are positive for streptococcus intermedius. Infectious disease has been consulted for abx management. The patient was febrile today at 101.4 degrees Farenheit this AM. Recommend chest X-ray if the patient continues to febrile/is febrile again. Plan:  -Abx management per infectious disease  -continue TPA 10mg/dornase ramón 5mg treatment  -Repeat Chest X-ray is the patient continues fever/has a new fever. Otherwise we will consider chest X-ray once TPA/dornase treatment is further along  -Pulm will continue to follow    Subjective:   Hx is limited to the patient's mental status. Patient was seen and examined at bedside. Patient was resting comfortably. He responded to his name but otherwise did not speak. He had a fever of 101.2 degrees F this morning. Objective:     Vitals: Blood pressure 105/65, pulse 96, temperature (!) 101.2 °F (38.4 °C), resp. rate 20, height 5' 9" (1.753 m), weight 60 kg (132 lb 4.4 oz), SpO2 90 %. ,Body mass index is 19.53 kg/m². Intake/Output Summary (Last 24 hours) at 8/8/2023 1633  Last data filed at 8/8/2023 1200  Gross per 24 hour   Intake 1490 ml   Output 3800 ml   Net -2310 ml       Invasive Devices     Peripheral Intravenous Line  Duration           Peripheral IV 08/08/23 Distal;Right;Ventral (anterior) Forearm <1 day          Drain  Duration           Chest Tube Right 12 Fr. 4 days    External Urinary Catheter 1 day              Physical Exam  Constitutional:       Comments: Body mass index is 19.53 kg/m². HENT:      Head: Normocephalic and atraumatic.       Mouth/Throat: Mouth: Mucous membranes are dry. Cardiovascular:      Rate and Rhythm: Normal rate and regular rhythm. Pulmonary:      Breath sounds: Rhonchi (BL) present. Comments: Chest tube in place right hemithorax  -20 suction, no air leak, serosanguinous fluid 200cc since last marked in AM  Abdominal:      General: Abdomen is flat. There is no distension. Palpations: Abdomen is soft. Musculoskeletal:      Right lower leg: No edema. Left lower leg: No edema. Neurological:      Comments: Responds to name but otherwise does not speak         Labs: I have personally reviewed pertinent lab results. , ABG: No results found for: "PHART", "XWG8XFD", "PO2ART", "FFF2QYV", "I8PDZPQJ", "BEART", "SOURCE", BNP: No results found for: "BNP", CBC:   Lab Results   Component Value Date    WBC 16.90 (H) 08/08/2023    HGB 10.7 (L) 08/08/2023    HCT 34.5 (L) 08/08/2023    MCV 95 08/08/2023     (H) 08/08/2023    RBC 3.63 (L) 08/08/2023    MCH 29.5 08/08/2023    MCHC 31.0 (L) 08/08/2023    RDW 15.0 08/08/2023    MPV 9.6 08/08/2023    NRBC 0 08/08/2023   , CMP:   Lab Results   Component Value Date    SODIUM 134 (L) 08/08/2023    K 4.3 08/08/2023     08/08/2023    CO2 23 08/08/2023    BUN 12 08/08/2023    CREATININE 0.92 08/08/2023    CALCIUM 8.0 (L) 08/08/2023    EGFR 78 08/08/2023   , PT/INR: No results found for: "PT", "INR", Troponin: No results found for: "TROPONINI"     Imaging and other studies: I have personally reviewed pertinent reports. and I have personally reviewed pertinent films in PACS     XR chest pa & lateral   Final Result by Kehinde Collins MD (08/07 1215)      Persistent large right pleural effusion         Workstation performed: [id number]         CT chest wo contrast   Final Result by Aurelio Ballesteros MD (08/04 6500)      Moderate right pleural effusion with compressive atelectasis over the right lower lobe and posterior right middle and upper lobes.    Consolidation in the left lower lobe, probably atelectatic. Superimposed pneumonia cannot be excluded. Soft tissue densities in the bronchial tree as above, appearance favors secretions. Other findings, as per the body of the report. Workstation performed: [id number]         CT head wo contrast   Final Result by Jaret Pacheco MD (08/04 0248)      No acute intracranial abnormality. Workstation performed: [id number]         XR chest 1 view portable   Final Result by Ryanne Cohn MD (08/03 1501)      There is a right mid to lower lung opacity with well-circumscribed medial margin suggests loculated effusion.  Suggest CT chest for further evaluation                  Workstation performed: [id number]         IR chest tube placement    (Results Pending)

## 2023-08-09 PROBLEM — R41.0 DELIRIUM: Status: ACTIVE | Noted: 2023-08-09

## 2023-08-09 LAB
ANION GAP SERPL CALCULATED.3IONS-SCNC: 10 MMOL/L
BASOPHILS # BLD AUTO: 0.06 THOUSANDS/ÂΜL (ref 0–0.1)
BASOPHILS NFR BLD AUTO: 1 % (ref 0–1)
BUN SERPL-MCNC: 14 MG/DL (ref 5–25)
CALCIUM SERPL-MCNC: 8.2 MG/DL (ref 8.4–10.2)
CHLORIDE SERPL-SCNC: 104 MMOL/L (ref 96–108)
CO2 SERPL-SCNC: 25 MMOL/L (ref 21–32)
CREAT SERPL-MCNC: 0.89 MG/DL (ref 0.6–1.3)
EOSINOPHIL # BLD AUTO: 0.12 THOUSAND/ÂΜL (ref 0–0.61)
EOSINOPHIL NFR BLD AUTO: 1 % (ref 0–6)
ERYTHROCYTE [DISTWIDTH] IN BLOOD BY AUTOMATED COUNT: 14.7 % (ref 11.6–15.1)
GFR SERPL CREATININE-BSD FRML MDRD: 80 ML/MIN/1.73SQ M
GLUCOSE SERPL-MCNC: 101 MG/DL (ref 65–140)
GLUCOSE SERPL-MCNC: 124 MG/DL (ref 65–140)
GLUCOSE SERPL-MCNC: 87 MG/DL (ref 65–140)
GLUCOSE SERPL-MCNC: 92 MG/DL (ref 65–140)
GLUCOSE SERPL-MCNC: 93 MG/DL (ref 65–140)
HCT VFR BLD AUTO: 31.3 % (ref 36.5–49.3)
HGB BLD-MCNC: 9.7 G/DL (ref 12–17)
IMM GRANULOCYTES # BLD AUTO: 0.16 THOUSAND/UL (ref 0–0.2)
IMM GRANULOCYTES NFR BLD AUTO: 1 % (ref 0–2)
LYMPHOCYTES # BLD AUTO: 1.36 THOUSANDS/ÂΜL (ref 0.6–4.47)
LYMPHOCYTES NFR BLD AUTO: 10 % (ref 14–44)
MAGNESIUM SERPL-MCNC: 1.9 MG/DL (ref 1.9–2.7)
MCH RBC QN AUTO: 29.2 PG (ref 26.8–34.3)
MCHC RBC AUTO-ENTMCNC: 31 G/DL (ref 31.4–37.4)
MCV RBC AUTO: 94 FL (ref 82–98)
MONOCYTES # BLD AUTO: 0.67 THOUSAND/ÂΜL (ref 0.17–1.22)
MONOCYTES NFR BLD AUTO: 5 % (ref 4–12)
NEUTROPHILS # BLD AUTO: 10.91 THOUSANDS/ÂΜL (ref 1.85–7.62)
NEUTS SEG NFR BLD AUTO: 82 % (ref 43–75)
NRBC BLD AUTO-RTO: 0 /100 WBCS
PLATELET # BLD AUTO: 562 THOUSANDS/UL (ref 149–390)
PMV BLD AUTO: 9.5 FL (ref 8.9–12.7)
POTASSIUM SERPL-SCNC: 3.6 MMOL/L (ref 3.5–5.3)
RBC # BLD AUTO: 3.32 MILLION/UL (ref 3.88–5.62)
SODIUM SERPL-SCNC: 139 MMOL/L (ref 135–147)
WBC # BLD AUTO: 13.28 THOUSAND/UL (ref 4.31–10.16)

## 2023-08-09 PROCEDURE — 99232 SBSQ HOSP IP/OBS MODERATE 35: CPT | Performed by: INTERNAL MEDICINE

## 2023-08-09 PROCEDURE — 82948 REAGENT STRIP/BLOOD GLUCOSE: CPT

## 2023-08-09 PROCEDURE — NC001 PR NO CHARGE: Performed by: INTERNAL MEDICINE

## 2023-08-09 PROCEDURE — 80048 BASIC METABOLIC PNL TOTAL CA: CPT

## 2023-08-09 PROCEDURE — 99233 SBSQ HOSP IP/OBS HIGH 50: CPT | Performed by: NURSE PRACTITIONER

## 2023-08-09 PROCEDURE — 85025 COMPLETE CBC W/AUTO DIFF WBC: CPT

## 2023-08-09 PROCEDURE — 83735 ASSAY OF MAGNESIUM: CPT

## 2023-08-09 RX ADMIN — ENOXAPARIN SODIUM 40 MG: 40 INJECTION SUBCUTANEOUS at 09:16

## 2023-08-09 RX ADMIN — ALTEPLASE 10 MG: 2.2 INJECTION, POWDER, LYOPHILIZED, FOR SOLUTION INTRAVENOUS at 10:49

## 2023-08-09 RX ADMIN — STANDARDIZED SENNA CONCENTRATE 8.6 MG: 8.6 TABLET ORAL at 22:56

## 2023-08-09 RX ADMIN — MEMANTINE 10 MG: 10 TABLET ORAL at 09:16

## 2023-08-09 RX ADMIN — GUAIFENESIN 1200 MG: 600 TABLET ORAL at 22:56

## 2023-08-09 RX ADMIN — DONEPEZIL HYDROCHLORIDE 5 MG: 5 TABLET ORAL at 22:56

## 2023-08-09 RX ADMIN — GUAIFENESIN 1200 MG: 600 TABLET ORAL at 09:16

## 2023-08-09 RX ADMIN — POLYETHYLENE GLYCOL 3350 17 G: 17 POWDER, FOR SOLUTION ORAL at 09:17

## 2023-08-09 RX ADMIN — DORNASE ALFA 5 MG: 1 SOLUTION RESPIRATORY (INHALATION) at 10:49

## 2023-08-09 RX ADMIN — CEFTRIAXONE 2000 MG: 2 INJECTION, POWDER, FOR SOLUTION INTRAMUSCULAR; INTRAVENOUS at 11:50

## 2023-08-09 NOTE — PROGRESS NOTES
Progress Note - Pulmonary   Rancho Baxter 82 y.o. male MRN: [id number]  Unit/Bed#: W -01 Encounter: [id number]    Assessment:  82year old man with progressive demential and concerns for aspiration presented for hypoxia, large right pleural effusion post thoracentesis and then IR CT placement.      Problem List:  1. Acute hypoxic respiratory failure   2. Right sided Streptococcus intermedius empyema   3. Suspected recurrent aspiration  4. Toxic/metabolic encephalopathy with underlying dementia  5. Fever - suspect related to pleural drainage/irritation of empyema, trend WBC and fever curve    Plan:  1. Acute hypoxic respiratory failure   2. Right sided Streptococcus intermedius empyema   3. Suspected recurrent aspiration  - Dose #5/6 TPA/Dornase given today   - Continue -20 cmH2O suction   - Will follow fluid culture  - Repeat CXR in AM   - Infectious disease following, continue abx     4. Toxic/metabolic encephalopathy with underlying dementia  Likely in the setting of right sided empyema  - Treatment as above     5. Fever   - suspect related to pleural drainage/irritation of empyema, trend WBC and fever curve    Subjective:   Patient seen and examined at bedside this morning. No acute events overnight. Patient resting comfortably in bed. Tolerated 4th dose of TPA/dornase well with 400cc out of CT in past 24h. Fever overnight to Tmax 101.2. Objective:     Vitals: Blood pressure 119/64, pulse 80, temperature 98.9 °F (37.2 °C), temperature source Axillary, resp. rate 18, height 5' 9" (1.753 m), weight 67.9 kg (149 lb 11.1 oz), SpO2 96 %. ,Body mass index is 22.11 kg/m².       Intake/Output Summary (Last 24 hours) at 8/9/2023 0842  Last data filed at 8/9/2023 9614  Gross per 24 hour   Intake 977.5 ml   Output 1850 ml   Net -872.5 ml       Invasive Devices     Peripheral Intravenous Line  Duration           Peripheral IV 08/08/23 Distal;Right;Ventral (anterior) Forearm <1 day          Drain  Duration Chest Tube Right 12 Fr. 4 days    External Urinary Catheter 2 days                Physical Exam:   Constitutional:       General: He is not in acute distress. Comments: Body mass index is 19.53 kg/m². HENT:      Head: Normocephalic and atraumatic. Mouth/Throat:      Mouth: Mucous membranes are dry. Eyes:      Comments: Does not open eyes during exam   Cardiovascular:      Rate and Rhythm: Normal rate. Heart sounds: Normal heart sounds. Pulmonary:      Breath sounds: No wheezing. Comments: Diminished breath sounds, chest tube in right hemithorax  Abdominal:      General: Abdomen is flat. There is no distension. Palpations: Abdomen is soft. Musculoskeletal:      Right lower leg: No edema. Left lower leg: No edema. Skin:     General: Skin is warm and dry. Labs: I have personally reviewed pertinent lab results.   Imaging and other studies: I have personally reviewed pertinent films in PACS

## 2023-08-09 NOTE — PROGRESS NOTES
Pulmonary attending note - full progress note to follow    No change reported, denied pain or dyspnea.       Vital Signs Reviewed, SpO2 94% 2NC, .2, .8  Exam  GEN older frail man in bed, sleeping but arouses nontoxic  HEENT MMM, no thrush, no oral lesions  Neck No accessory muscle use, JVP not elevated  CV reg, single s1/2, no m/r  Pulm improved rhonchi, no wheeze or rales, right CT -20cm, no AL, 400cc output charted, noted thick serous fluid drainage post dwell time  ABD +BS soft NTND, no rebound  EXT warm, brisk cap refill, no edema    Laboratory and Diagnostics  Results from last 7 days   Lab Units 08/09/23  0545 08/08/23  0506 08/07/23  0508 08/06/23  0537 08/05/23  1059 08/04/23  2202 08/04/23  0900 08/03/23  1049   WBC Thousand/uL 13.28* 16.90* 18.12* 16.74* 23.32* 17.01* 25.53* 27.00*   HEMOGLOBIN g/dL 9.7* 10.7* 10.3* 9.2* 8.9* 6.2* 8.0* 8.1*   HEMATOCRIT % 31.3* 34.5* 32.7* 29.1* 28.1* 20.6* 25.9* 26.1*   PLATELETS Thousands/uL 562* 523* 515* 475* 471* 363 488* 479*   NEUTROS PCT % 82* 79*  --  82*  --  84* 88* 91*   BANDS PCT %  --   --   --   --  2  --   --   --    MONOS PCT % 5 7  --  4  --  5 4 4   MONO PCT %  --   --   --   --  5  --   --   --    EOS PCT % 1 2  --  2 2 0 0 0     Results from last 7 days   Lab Units 08/09/23  0545 08/08/23  0506 08/07/23  0508 08/06/23  0537 08/05/23  0715 08/04/23 2011 08/04/23  0518 08/03/23  1049   SODIUM mmol/L 139 134* 136 138 142 146 148* 142   POTASSIUM mmol/L 3.6 4.3 3.4* 3.4* 3.4* 3.3* 3.2* 3.7   CHLORIDE mmol/L 104 102 103 106 110* 114* 114* 105   CO2 mmol/L 25 23 25 23 23 23 23 25   ANION GAP mmol/L 10 9 8 9 9 9 11 12   BUN mg/dL 14 12 13 14 17 22 23 35*   CREATININE mg/dL 0.89 0.92 0.90 0.94 0.87 1.07 0.95 1.29   CALCIUM mg/dL 8.2* 8.0* 8.0* 7.9* 8.3* 8.3* 8.7 9.2   GLUCOSE RANDOM mg/dL 87 125 113 139 186* 180* 161* 419*   ALT U/L  --   --   --   --   --   --   --  6*   AST U/L  --   --   --   --   --   --   --  7*   ALK PHOS U/L  --   --   -- --   --   --   --  131*   ALBUMIN g/dL  --   --   --   --   --   --   --  3.2*   TOTAL BILIRUBIN mg/dL  --   --   --   --   --   --   --  0.79     Results from last 7 days   Lab Units 08/09/23  0545 08/08/23  0506 08/07/23  0508 08/06/23  0537 08/05/23  0715 08/04/23 2011   MAGNESIUM mg/dL 1.9 2.0 2.0 1.8* 1.7* 1.7*   PHOSPHORUS mg/dL  --   --   --   --  3.4  --       Results from last 7 days   Lab Units 08/03/23  1716   INR  1.33*   PTT seconds 41*          Results from last 7 days   Lab Units 08/08/23  1643 08/08/23  1124 08/03/23  1355 08/03/23  1131   LACTIC ACID mmol/L 1.1 2.4* 1.5 3.2*     Results from last 7 days   Lab Units 08/03/23  1049   FERRITIN ng/mL 714*     Results from last 7 days   Lab Units 08/05/23  0715   LD U/L 172             Results from last 7 days   Lab Units 08/08/23  0506 08/04/23  0518 08/03/23  1049   PROCALCITONIN ng/ml 0.56* 1.45* 2.31*     MICRO  Bld Cx neg  Strep/legionella ag neg  Right Pleural fluid 8/4 - 3+ Streptococcus intermedius   Right pleural fluid - glu 22, pH 6.9, LDH 1560, TP 4.9, WBC/Diff not sent     Radiographs - personally reviewed  CXR 8/6 - large right pleural effusion, basilar pigtail CT in place, no PTX     Assessment  1. Acute hypoxic respiratory insufficiency  2. Right sided Streptococcus intermedius empyema   3. Suspected recurrent aspiration  4. Toxic/metabolic encephalopathy with underlying dementia  5.  Fever - suspect related to pleural drainage/irritation of empyema, trend WBC and fever curve     PLAN  • Suboptimal pleural drainage - Dose #5/6 TPA/Dornase  • Return to -[id number] suction when not on dwell time  • Continue abx per ID - D/W Dr. May Varghese  • Follow fluid output and fever/WBC curve, repeat CXR in AM  • Dysphagia per primary service and speech therapy  • Overall poor surgical candidate and his sister agrees, should source control not be achieved with TPA/Dornase, may consider transition to hospice care - continue ongoing [address] [place] discussions    Gloria Muñoz Lourdes Jean, DO

## 2023-08-09 NOTE — PLAN OF CARE
Problem: MOBILITY - ADULT  Goal: Maintain or return to baseline ADL function  Description: INTERVENTIONS:  -  Assess patient's ability to carry out ADLs; assess patient's baseline for ADL function and identify physical deficits which impact ability to perform ADLs (bathing, care of mouth/teeth, toileting, grooming, dressing, etc.)  - Assess/evaluate cause of self-care deficits   - Assess range of motion  - Assess patient's mobility; develop plan if impaired  - Assess patient's need for assistive devices and provide as appropriate  - Encourage maximum independence but intervene and supervise when necessary  - Involve family in performance of ADLs  - Assess for home care needs following discharge   - Consider OT consult to assist with ADL evaluation and planning for discharge  - Provide patient education as appropriate  Outcome: Progressing  Goal: Maintains/Returns to pre admission functional level  Description: INTERVENTIONS:  - Perform BMAT or MOVE assessment daily.   - Set and communicate daily mobility goal to care team and patient/family/caregiver. - Collaborate with rehabilitation services on mobility goals if consulted  - Perform Range of Motion 2 times a day. - Reposition patient every 2 hours.   - Dangle patient 2 times a day  - Stand patient 2 times a day  - Ambulate patient 2 times a day  - Out of bed to chair 2 times a day   - Out of bed for meals 2 times a day  - Out of bed for toileting  - Record patient progress and toleration of activity level   Outcome: Progressing     Problem: PAIN - ADULT  Goal: Verbalizes/displays adequate comfort level or baseline comfort level  Description: Interventions:  - Encourage patient to monitor pain and request assistance  - Assess pain using appropriate pain scale  - Administer analgesics based on type and severity of pain and evaluate response  - Implement non-pharmacological measures as appropriate and evaluate response  - Consider cultural and social influences on pain and pain management  - Notify physician/advanced practitioner if interventions unsuccessful or patient reports new pain  Outcome: Progressing     Problem: INFECTION - ADULT  Goal: Absence or prevention of progression during hospitalization  Description: INTERVENTIONS:  - Assess and monitor for signs and symptoms of infection  - Monitor lab/diagnostic results  - Monitor all insertion sites, i.e. indwelling lines, tubes, and drains  - Monitor endotracheal if appropriate and nasal secretions for changes in amount and color  - [place] appropriate cooling/warming therapies per order  - Administer medications as ordered  - Instruct and encourage patient and family to use good hand hygiene technique  - Identify and instruct in appropriate isolation precautions for identified infection/condition  Outcome: Progressing  Goal: Absence of fever/infection during neutropenic period  Description: INTERVENTIONS:  - Monitor WBC    Outcome: Progressing     Problem: SAFETY ADULT  Goal: Maintain or return to baseline ADL function  Description: INTERVENTIONS:  -  Assess patient's ability to carry out ADLs; assess patient's baseline for ADL function and identify physical deficits which impact ability to perform ADLs (bathing, care of mouth/teeth, toileting, grooming, dressing, etc.)  - Assess/evaluate cause of self-care deficits   - Assess range of motion  - Assess patient's mobility; develop plan if impaired  - Assess patient's need for assistive devices and provide as appropriate  - Encourage maximum independence but intervene and supervise when necessary  - Involve family in performance of ADLs  - Assess for home care needs following discharge   - Consider OT consult to assist with ADL evaluation and planning for discharge  - Provide patient education as appropriate  Outcome: Progressing  Goal: Maintains/Returns to pre admission functional level  Description: INTERVENTIONS:  - Perform BMAT or MOVE assessment daily.   - Set and communicate daily mobility goal to care team and patient/family/caregiver. - Collaborate with rehabilitation services on mobility goals if consulted  - Perform Range of Motion 2 times a day. - Reposition patient every 2 hours.   - Dangle patient 2 times a day  - Stand patient 2 times a day  - Ambulate patient 2 times a day  - Out of bed to chair 2 times a day   - Out of bed for meals 2 times a day  - Out of bed for toileting  - Record patient progress and toleration of activity level   Outcome: Progressing  Goal: Patient will remain free of falls  Description: INTERVENTIONS:  - Educate patient/family on patient safety including physical limitations  - Instruct patient to call for assistance with activity   - Consult OT/PT to assist with strengthening/mobility   - Keep Call bell within reach  - Keep bed low and locked with side rails adjusted as appropriate  - Keep care items and personal belongings within reach  - Initiate and maintain comfort rounds  - Make Fall Risk Sign visible to staff  - Offer Toileting every 2 Hours, in advance of need  - Initiate/Maintain bed alarm  - Obtain necessary fall risk management equipment:   - Apply yellow socks and bracelet for high fall risk patients  - Consider moving patient to room near nurses station  Outcome: Progressing     Problem: DISCHARGE PLANNING  Goal: Discharge to home or other facility with appropriate resources  Description: INTERVENTIONS:  - Identify barriers to discharge w/patient and caregiver  - Arrange for needed discharge resources and transportation as appropriate  - Identify discharge learning needs (meds, wound care, etc.)  - Arrange for interpretive services to assist at discharge as needed  - Refer to Case Management Department for coordinating discharge planning if the patient needs post-hospital services based on physician/advanced practitioner order or complex needs related to functional status, cognitive ability, or social support system  Outcome: Progressing     Problem: Knowledge Deficit  Goal: Patient/family/caregiver demonstrates understanding of disease process, treatment plan, medications, and discharge instructions  Description: Complete learning assessment and assess knowledge base.   Interventions:  - Provide teaching at level of understanding  - Provide teaching via preferred learning methods  Outcome: Progressing     Problem: Prexisting or High Potential for Compromised Skin Integrity  Goal: Skin integrity is maintained or improved  Description: INTERVENTIONS:  - Identify patients at risk for skin breakdown  - Assess and monitor skin integrity  - Assess and monitor nutrition and hydration status  - Monitor labs   - Assess for incontinence   - Turn and reposition patient  - Assist with mobility/ambulation  - Relieve pressure over bony prominences  - Avoid friction and shearing  - Provide appropriate hygiene as needed including keeping skin clean and dry  - Evaluate need for skin moisturizer/barrier cream  - Collaborate with interdisciplinary team   - Patient/family teaching  - Consider wound care consult   Outcome: Progressing     Problem: SAFETY,RESTRAINT: NV/NON-SELF DESTRUCTIVE BEHAVIOR  Goal: Remains free of harm/injury (restraint for non violent/non self-detsructive behavior)  Description: INTERVENTIONS:  - Instruct patient/family regarding restraint use   - Assess and monitor physiologic and psychological status   - Provide interventions and comfort measures to meet assessed patient needs   - Identify and implement measures to help patient regain control  - Assess readiness for release of restraint   Outcome: Progressing  Goal: Returns to optimal restraint-free functioning  Description: INTERVENTIONS:  - Assess the patient's behavior and symptoms that indicate continued need for restraint  - Identify and implement measures to help patient regain control  - Assess readiness for release of restraint   Outcome: Progressing Problem: Nutrition/Hydration-ADULT  Goal: Nutrient/Hydration intake appropriate for improving, restoring or maintaining nutritional needs  Description: Monitor and assess patient's nutrition/hydration status for malnutrition. Collaborate with interdisciplinary team and initiate plan and interventions as ordered. Monitor patient's weight and dietary intake as ordered or per policy. Utilize nutrition screening tool and intervene as necessary. Determine patient's food preferences and provide high-protein, high-caloric foods as appropriate.      INTERVENTIONS:  - Monitor oral intake, urinary output, labs, and treatment plans  - Assess nutrition and hydration status and recommend course of action  - Evaluate amount of meals eaten  - Assist patient with eating if necessary   - Allow adequate time for meals  - Recommend/ encourage appropriate diets, oral nutritional supplements, and vitamin/mineral supplements  - Order, calculate, and assess calorie counts as needed  - Recommend, monitor, and adjust tube feedings and TPN/PPN based on assessed needs  - Assess need for intravenous fluids  - Provide specific nutrition/hydration education as appropriate  - Include patient/family/caregiver in decisions related to nutrition  Outcome: Progressing

## 2023-08-09 NOTE — ASSESSMENT & PLAN NOTE
· Home meds Aricept 5 mg nightly, olanzapine 5 mg daily, Namenda 10 mg twice daily, trazodone 50 mg at bedtime  · Holding trazodone 50 mg at bedtime, Zyprexa 5 mg  · Delirium precaution  · Frequent reorientation  · Avoid restraints if able unless danger to himself  · Appreciate geriatric recommendations

## 2023-08-09 NOTE — PROGRESS NOTES
[address] [facility]  Progress Note  Name: Haider Lipscomb  MRN: [id number]  Unit/Bed#: W -01 I Date of Admission: 8/3/2023   Date of Service: 8/9/2023 I Hospital Day: 6    Assessment/Plan   * Severe sepsis [facility])  Assessment & Plan  On admission patient met criteria for severe sepsis secondary to pneumonia due to the loculated effusion  · Blood culture-negative  · Fluid culture-negative  IR chest tube placement 8/4  Procal 0.56  Blood culture- Negative     Plan-  Appreciate ID recommendations- Transition to ceftriaxone until sensitives return   Respiratory protocol  Monitor vital signs  Mucinex 1200 twice daily  See loculated pleural effusion plan               Delirium  Assessment & Plan  · Patient likely has hypoactive delirium secondary to sepsis  · Infectious disease consult appreciated and we will continue antibiotics and source control  · We will do nonpharmacological management at this point of time with frequent reorientation and reestablishment of the sleep-wake cycle    Loculated pleural effusion  Assessment & Plan  Chest x-ray- There is a right mid to lower lung opacity with well-circumscribed medial margin suggests loculated effusion. CT chest- Moderate right pleural effusion with compressive atelectasis over the right lower lobe and posterior right middle and upper lobes, Consolidation in the left lower lobe, probably atelectatic. Superimposed pneumonia cannot be excluded. Soft tissue densities in the bronchial tree as above, appearance favors secretions.   IR placed chest tube 8/4    Plan-  • Appreciate pulmonology recommendations- Suboptimal pleural drainage, trial of 5/6 TPA/Dornase BID per MIST2 trial, 1 hour dwell time post instillation, Return to -[id number] suction when not on dwell time, Continue unasyn - further abx per ID  Overall poor surgical candidate and his sister agrees, should source control not be achieved with TPA/Dornase, may consider transition to hospice care - continue ongoing [address] [place] discussions  Appreciate infectious disease recommendations- transition to ceftriaxone until sensitivities        Ambulatory dysfunction  Assessment & Plan  · At baseline ambulate without AD, noted unsteadiness new from baseline      Plan-  PT OT- postacute rehabilitation      Encephalopathy acute  Assessment & Plan  · POA at facility noted slurred speech, slow responsive, not at his baseline  · Encephalopathy likely metabolic in the setting of sepsis  · Check CT head : No acute intracranial leasion  · Continue antibiotic as above      Anemia  Assessment & Plan  · POA hemoglobin 8.0, pressure review hemoglobin back in September 2022 noted 10.1. · Unclear if history of melena, hematochezia  · Anemia unclear etiology at this time possibly secondary to iron deficiency given thrombocytosis although this could represent current setting of infection versus underlying malignancy? Check iron panel: Ferritin 714, Iron Sat: 14, TIBC: 182, Iron: 26  folate, vitamin B12 WNL     Plan  · Monitor output and consider transfusion if less than 7  ·  CBC a.m. 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 Sequential Compression Devices Ordered. Patient Centered Rounds: I performed bedside rounds with nursing staff today. Discussions with Specialists or Other Care Team Provider: Geriatrics, pulmonology, infectious disease    Education and Discussions with Family / Patient: Updated  (sister) via phone.     Current Length of Stay: 6 day(s)  Current Patient Status: Inpatient   Discharge Plan: Anticipate discharge in 48 hrs to discharge location to be determined pending rehab evaluations. Code Status: Level 3 - DNAR and DNI    Subjective:   Patient was seen and examined at bedside. Patient was resting in the bed in no acute distress. No overnight acute events are noted. Patient remained more lethargic and slightly harder to arouse this morning however does answer questions and follow commands. Patient denies any complaints at the time my evaluation. Objective:     Vitals:   Temp (24hrs), Av.7 °F (37.6 °C), Min:98.2 °F (36.8 °C), Max:101.2 °F (38.4 °C)    Temp:  [98.2 °F (36.8 °C)-101.2 °F (38.4 °C)] 99.3 °F (37.4 °C)  HR:  [76-97] 88  Resp:  [16-18] 16  BP: (105-126)/() 125/106  SpO2:  [90 %-96 %] 94 %  Body mass index is 22.11 kg/m². Input and Output Summary (last 24 hours): Intake/Output Summary (Last 24 hours) at 2023 1449  Last data filed at 2023 1300  Gross per 24 hour   Intake 797.5 ml   Output 2250 ml   Net -1452.5 ml       Physical Exam:   Physical Exam  Vitals and nursing note reviewed. Constitutional:       General: He is not in acute distress. Appearance: He is well-developed. He is ill-appearing. He is not toxic-appearing or diaphoretic. HENT:      Head: Normocephalic and atraumatic. Mouth/Throat:      Mouth: Mucous membranes are moist.   Eyes:      Extraocular Movements: Extraocular movements intact. Conjunctiva/sclera: Conjunctivae normal.      Pupils: Pupils are equal, round, and reactive to light. Cardiovascular:      Rate and Rhythm: Normal rate and regular rhythm. Pulses: Normal pulses. Heart sounds: Normal heart sounds. No murmur heard. Pulmonary:      Effort: Pulmonary effort is normal. No respiratory distress. Breath sounds: Rhonchi present. No wheezing. Abdominal:      General: Bowel sounds are normal. There is no distension. Palpations: Abdomen is soft. Tenderness: There is no abdominal tenderness. There is no guarding or rebound. Musculoskeletal:         General: No swelling or tenderness. Normal range of motion. Cervical back: Normal range of motion and neck supple. Right lower leg: No edema. Left lower leg: No edema. Skin:     General: Skin is warm and dry. Capillary Refill: Capillary refill takes less than 2 seconds. Neurological:      Mental Status: He is alert. Mental status is at baseline. He is disoriented. Cranial Nerves: No cranial nerve deficit. Sensory: No sensory deficit. Motor: Weakness present. Comments: Less awake today, harder to arouse, ultimately Follows some commands, able to converse and answer some questions appropriately   Psychiatric:         Mood and Affect: Mood normal.         Behavior: Behavior normal.          Additional Data:     Labs:  Results from last 7 days   Lab Units 08/09/23  0545 08/06/23  0537 08/05/23  1059   WBC Thousand/uL 13.28*   < > 23.32*   HEMOGLOBIN g/dL 9.7*   < > 8.9*   HEMATOCRIT % 31.3*   < > 28.1*   PLATELETS Thousands/uL 562*   < > 471*   BANDS PCT %  --   --  2   NEUTROS PCT % 82*   < >  --    LYMPHS PCT % 10*   < >  --    LYMPHO PCT %  --   --  4*   MONOS PCT % 5   < >  --    MONO PCT %  --   --  5   EOS PCT % 1   < > 2    < > = values in this interval not displayed. Results from last 7 days   Lab Units 08/09/23  0545 08/04/23  0518 08/03/23  1049   SODIUM mmol/L 139   < > 142   POTASSIUM mmol/L 3.6   < > 3.7   CHLORIDE mmol/L 104   < > 105   CO2 mmol/L 25   < > 25   BUN mg/dL 14   < > 35*   CREATININE mg/dL 0.89   < > 1.29   ANION GAP mmol/L 10   < > 12   CALCIUM mg/dL 8.2*   < > 9.2   ALBUMIN g/dL  --   --  3.2*   TOTAL BILIRUBIN mg/dL  --   --  0.79   ALK PHOS U/L  --   --  131*   ALT U/L  --   --  6*   AST U/L  --   --  7*   GLUCOSE RANDOM mg/dL 87   < > 419*    < > = values in this interval not displayed.      Results from last 7 days   Lab Units 08/03/23  1716   INR  1.33*     Results from last 7 days   Lab Units 08/09/23  1126 08/09/23  0738 08/08/23  2145 08/08/23  1609 08/08/23  1144 08/08/23  0708 08/07/23  2338 08/07/23  2126 08/07/23  1520 08/07/23  1128 08/07/23  0709 08/06/23  2053   POC GLUCOSE mg/dl 101 92 102 121 87 193* 125 65 156* 92 140 98     Results from last 7 days   Lab Units 08/03/23  1614   HEMOGLOBIN A1C % 7.0*     Results from last 7 days   Lab Units 08/08/23  1643 08/08/23  1124 08/08/23  0506 08/04/23  0518 08/03/23  1355 08/03/23  1131 08/03/23  1049   LACTIC ACID mmol/L 1.1 2.4*  --   --  1.5 3.2*  --    PROCALCITONIN ng/ml  --   --  0.56* 1.45*  --   --  2.31*       Lines/Drains:  Invasive Devices     Peripheral Intravenous Line  Duration           Peripheral IV 08/08/23 Distal;Right;Ventral (anterior) Forearm 1 day          Drain  Duration           Chest Tube Right 12 Fr. 4 days    External Urinary Catheter 2 days                      Imaging: Reviewed radiology reports from this admission including: chest xray, chest CT scan, CT head and procedure reports    Recent Cultures (last 7 days):   Results from last 7 days   Lab Units 08/08/23  1901 08/08/23  1137 08/04/23  1515 08/03/23  2251 08/03/23  1201 08/03/23  1131   BLOOD CULTURE  Received in Microbiology Lab. Culture in Progress. Received in Microbiology Lab. Culture in Progress. --   --  No Growth After 5 Days. No Growth After 5 Days.    GRAM STAIN RESULT   --   --  2+ Polys  No bacteria seen  --   --   --    BODY FLUID CULTURE, STERILE   --   --  No growth  --   --   --    LEGIONELLA URINARY ANTIGEN   --   --   --  Negative  --   --        Last 24 Hours Medication List:   Current Facility-Administered Medications   Medication Dose Route Frequency Provider Last Rate   • acetaminophen  650 mg Oral Q6H PRN Pepper Pulido MD     • cefTRIAXone  2,000 mg Intravenous Q24H Leroy Roberts MD 2,000 mg (08/09/23 1150)   • donepezil  5 mg Oral HS Frank Shepherd MD     • enoxaparin  40 mg Subcutaneous Daily Frank Shepherd MD     • guaiFENesin  1,200 mg Oral Q12H [address] [place], MD     • insulin glargine  10 Units Subcutaneous HS Frank Shepherd MD     • insulin lispro  1-5 Units Subcutaneous TID [address] [place], MD     • insulin lispro  5 Units Subcutaneous TID With Meals Frank Shepherd MD     • memantine  10 mg Oral BID Frank Shepherd MD     • OLANZapine  5 mg Intramuscular Once Tramaine Major DO     • polyethylene glycol  17 g Oral Daily Geoff Donaldson MD     • senna  1 tablet Oral HS Frank Shepherd MD          Today, Patient Was Seen By: Geoff Donaldson MD    **Please Note: This note may have been constructed using a voice recognition system. **

## 2023-08-09 NOTE — QUICK NOTE
At this time, patient's right sided CT was unclamped, and returned to -20 cmH2o suction. Patient tolerated dwell well and without incident.

## 2023-08-09 NOTE — PROGRESS NOTES
Progress Note - Geriatric Medicine   [facility] 82 y.o. 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  · History of Alzheimer's dementia  · Could also be dealing with component of hypoactive delirium  · vitamin B12 supplementation for goal of vitamin B12 level greater than 400  · Recommend checking a TSH with next set of lab  · Maintained at the facility on Aricept 5 mg nightly, olanzapine 5 mg daily, Namenda twice daily, and trazodone 50 mg nightly for insomnia  · Trazodone and Zyprexa remain held  · Alert to person only on exam  At risk for delirium due to Alzheimer's dementia, hospitalization, sepsis, sleep disturbance  Recommend delirium precautions  Maintain sleep-wake cycle, avoid nighttime interruptions  minimize overnight interruptions, group overnight vitals/labs/nursing checks as possible  dim lights, close blinds and turn off tv to minimize stimulation and encourage sleep environment in evenings  Provide adequate pain control  Avoid urinary retention and constipation  Provide frequent and early mobilization  Provide frequent redirection and reorientation as needed  Avoid medications that may worsen or precipitate delirium such as tramadol, benzodiazepines, anticholinergics, and Benadryl  Redirect unwanted behaviors as first-line therapy, avoid physical restraints as able to  · Continue to monitor    Severe sepsis  · Presented from [place] with hypoxia and altered mental status  · Met sepsis protocol with leukocytosis, tachycardia, and elevated lactic acid  · Blood cultures no growth after 5 days  · Legionella urine, strep pneumoniae urine negative  · Tmax 101.4  · CT the chest yesterday showed- IMPRESSION:Moderate right pleural effusion with compressive atelectasis over the right lower lobe and posterior right middle and upper lobes. Consolidation in the left lower lobe, probably atelectatic. Superimposed pneumonia cannot be excluded. Soft tissue densities in the bronchial tree as above, appearance favors secretions. · Currently on Rocephin daily  · Infectious disease following  · Management per primary    Loculated pleural effusion  · Underwent IR chest tube placement on 8/4/2023  · With suboptimal pleural drainage has received multiple doses of tPA/dornase  · Pulmonology and infectious disease following  · Poor surgical candidate  · Management per pulmonology and infectious disease    Insomnia  First-line treatment is behavior modification  Maintain sleep-wake cycle, avoid nighttime interruptions  Avoid caffeine throughout the day  Avoid napping throughout the day  Encourage physical activity throughout the day  · Avoid sedative hypnotics including benzodiazepines and benadryl  · Chronically takes trazodone 50 mg nightly-hold for now  · If needed could try melatonin 3 mg which is    Ambulatory dysfunction  At a baseline ambulates without AD  PT/OT following  Fall precautions  Out of bed as tolerated  Encourage early and frequent mobilization  Encourage adequate hydration and nutrition  Provide adequate pain management   Goal is undetermined   · Continue with PT/OT for continued strength and balance training     Subjective:   Patient is being seen for a geriatrics follow-up. Upon examination patient was lying in bed, resting. He was sleeping on exam, but did respond to name. He reports he was feeling okay and denied any pain. He did not eat any of his lunch. He moved his bowels this morning. Review of Systems   Unable to perform ROS: Mental status change         Objective:     Vitals: Blood pressure (!) 125/106, pulse 88, temperature 99.3 °F (37.4 °C), resp. rate 16, height 5' 9" (1.753 m), weight 67.9 kg (149 lb 11.1 oz), SpO2 94 %. ,Body mass index is 22.11 kg/m².       Intake/Output Summary (Last 24 hours) at 8/9/2023 1423  Last data filed at 8/9/2023 1300  Gross per 24 hour   Intake 797.5 ml Output 2250 ml   Net -1452.5 ml       Current Medications: Reviewed    Physical Exam:   Physical Exam  Vitals reviewed. Constitutional:       General: He is sleeping. He is not in acute distress. Appearance: He is well-developed. He is ill-appearing. Comments: Frail-appearing   HENT:      Head: Normocephalic and atraumatic. Cardiovascular:      Rate and Rhythm: Normal rate and regular rhythm. Heart sounds: No murmur heard. Pulmonary:      Effort: Pulmonary effort is normal. No respiratory distress. Breath sounds: Normal breath sounds. Comments: 2L nasal cannula  Chest tube in place  Abdominal:      General: Abdomen is flat. Bowel sounds are normal. There is no distension. Palpations: Abdomen is soft. Tenderness: There is no abdominal tenderness. Musculoskeletal:         General: No swelling. Right lower leg: No edema. Left lower leg: No edema. Skin:     General: Skin is warm and dry. Coloration: Skin is pale. Neurological:      Mental Status: He is easily aroused. Mental status is at baseline. Cranial Nerves: No cranial nerve deficit. Motor: Weakness present. Gait: Gait abnormal.      Comments: Alert to person only on exam          Invasive Devices     Peripheral Intravenous Line  Duration           Peripheral IV 08/08/23 Distal;Right;Ventral (anterior) Forearm <1 day          Drain  Duration           Chest Tube Right 12 Fr. 4 days    External Urinary Catheter 2 days                Lab, Imaging and other studies:    Lab Results:   I have personally reviewed pertinent lab results including the following:  -CBC, BMP, magnesium    I have personally reviewed the following imaging study reports in PACS:  No new imaging to review  - I have personally reviewed pertinent reports. Please note:  Voice-recognition software may have been used in the preparation of this document.   Occasional wrong word or "sound-alike" substitutions may have occurred due to the inherent limitations of voice recognition software. Interpretation should be guided by context.

## 2023-08-09 NOTE — ASSESSMENT & PLAN NOTE
Chest x-ray- There is a right mid to lower lung opacity with well-circumscribed medial margin suggests loculated effusion. CT chest- Moderate right pleural effusion with compressive atelectasis over the right lower lobe and posterior right middle and upper lobes, Consolidation in the left lower lobe, probably atelectatic. Superimposed pneumonia cannot be excluded. Soft tissue densities in the bronchial tree as above, appearance favors secretions.   IR placed chest tube 8/4    Plan-  • Appreciate pulmonology recommendations- Suboptimal pleural drainage, trial of 5/6 TPA/Dornase BID per MIST2 trial, 1 hour dwell time post instillation, Return to -[id number] suction when not on dwell time, Continue unasyn - further abx per ID  Overall poor surgical candidate and his sister agrees, should source control not be achieved with TPA/Dornase, may consider transition to hospice care - continue ongoing [address] [place] discussions  Appreciate infectious disease recommendations- transition to ceftriaxone until sensitivities

## 2023-08-09 NOTE — QUICK NOTE
I, personally instilled patient's right sided CT with dose #4 of 6 of tPA (30 mL) and dornase (30 mL), then clamped. RN aware that patient is clamped and will reposition patient throughout dwell to disperse medication. Patient tolerated instillation well and without incident. Will unclamp in 60 min.   RN aware to call for increased work of breathing, respiratory distress, increased FiO2 requirements, or complaints of pain in chest. Skin normal color for race, warm, dry and intact. No evidence of rash.

## 2023-08-09 NOTE — QUICK NOTE
I, personally administered dose #5 of 6 of tPA (30 mL) and dornase (30 mL) into right sided CT, then clamped. Tubed to be clamped for 60min, then will unclamp and placed back on -20 suction. RN aware.

## 2023-08-09 NOTE — PLAN OF CARE
Problem: MOBILITY - ADULT  Goal: Maintain or return to baseline ADL function  Description: INTERVENTIONS:  -  Assess patient's ability to carry out ADLs; assess patient's baseline for ADL function and identify physical deficits which impact ability to perform ADLs (bathing, care of mouth/teeth, toileting, grooming, dressing, etc.)  - Assess/evaluate cause of self-care deficits   - Assess range of motion  - Assess patient's mobility; develop plan if impaired  - Assess patient's need for assistive devices and provide as appropriate  - Encourage maximum independence but intervene and supervise when necessary  - Involve family in performance of ADLs  - Assess for home care needs following discharge   - Consider OT consult to assist with ADL evaluation and planning for discharge  - Provide patient education as appropriate  Outcome: Progressing  Goal: Maintains/Returns to pre admission functional level  Description: INTERVENTIONS:  - Perform BMAT or MOVE assessment daily.   - Set and communicate daily mobility goal to care team and patient/family/caregiver. - Collaborate with rehabilitation services on mobility goals if consulted  - Perform Range of Motion  times a day. - Reposition patient every  hours.   - Dangle patient  times a day  - Stand patient  times a day  - Ambulate patient  times a day  - Out of bed to chair  times a day   - Out of bed for meals  times a day  - Out of bed for toileting  - Record patient progress and toleration of activity level   Outcome: Progressing     Problem: PAIN - ADULT  Goal: Verbalizes/displays adequate comfort level or baseline comfort level  Description: Interventions:  - Encourage patient to monitor pain and request assistance  - Assess pain using appropriate pain scale  - Administer analgesics based on type and severity of pain and evaluate response  - Implement non-pharmacological measures as appropriate and evaluate response  - Consider cultural and social influences on pain and pain management  - Notify physician/advanced practitioner if interventions unsuccessful or patient reports new pain  Outcome: Progressing     Problem: INFECTION - ADULT  Goal: Absence or prevention of progression during hospitalization  Description: INTERVENTIONS:  - Assess and monitor for signs and symptoms of infection  - Monitor lab/diagnostic results  - Monitor all insertion sites, i.e. indwelling lines, tubes, and drains  - Monitor endotracheal if appropriate and nasal secretions for changes in amount and color  - [place] appropriate cooling/warming therapies per order  - Administer medications as ordered  - Instruct and encourage patient and family to use good hand hygiene technique  - Identify and instruct in appropriate isolation precautions for identified infection/condition  Outcome: Progressing  Goal: Absence of fever/infection during neutropenic period  Description: INTERVENTIONS:  - Monitor WBC    Outcome: Progressing     Problem: SAFETY ADULT  Goal: Maintain or return to baseline ADL function  Description: INTERVENTIONS:  -  Assess patient's ability to carry out ADLs; assess patient's baseline for ADL function and identify physical deficits which impact ability to perform ADLs (bathing, care of mouth/teeth, toileting, grooming, dressing, etc.)  - Assess/evaluate cause of self-care deficits   - Assess range of motion  - Assess patient's mobility; develop plan if impaired  - Assess patient's need for assistive devices and provide as appropriate  - Encourage maximum independence but intervene and supervise when necessary  - Involve family in performance of ADLs  - Assess for home care needs following discharge   - Consider OT consult to assist with ADL evaluation and planning for discharge  - Provide patient education as appropriate  Outcome: Progressing  Goal: Maintains/Returns to pre admission functional level  Description: INTERVENTIONS:  - Perform BMAT or MOVE assessment daily.   - Set and communicate daily mobility goal to care team and patient/family/caregiver. - Collaborate with rehabilitation services on mobility goals if consulted  - Perform Range of Motion  times a day. - Reposition patient every  hours.   - Dangle patient  times a day  - Stand patient  times a day  - Ambulate patient  times a day  - Out of bed to chair  times a day   - Out of bed for meals  times a day  - Out of bed for toileting  - Record patient progress and toleration of activity level   Outcome: Progressing  Goal: Patient will remain free of falls  Description: INTERVENTIONS:  - Educate patient/family on patient safety including physical limitations  - Instruct patient to call for assistance with activity   - Consult OT/PT to assist with strengthening/mobility   - Keep Call bell within reach  - Keep bed low and locked with side rails adjusted as appropriate  - Keep care items and personal belongings within reach  - Initiate and maintain comfort rounds  - Make Fall Risk Sign visible to staff  - Offer Toileting every  Hours, in advance of need  - Initiate/Maintain alarm  - Obtain necessary fall risk management equipment  - Apply yellow socks and bracelet for high fall risk patients  - Consider moving patient to room near nurses station  Outcome: Progressing     Problem: DISCHARGE PLANNING  Goal: Discharge to home or other facility with appropriate resources  Description: INTERVENTIONS:  - Identify barriers to discharge w/patient and caregiver  - Arrange for needed discharge resources and transportation as appropriate  - Identify discharge learning needs (meds, wound care, etc.)  - Arrange for interpretive services to assist at discharge as needed  - Refer to Case Management Department for coordinating discharge planning if the patient needs post-hospital services based on physician/advanced practitioner order or complex needs related to functional status, cognitive ability, or social support system  Outcome: Progressing Problem: Prexisting or High Potential for Compromised Skin Integrity  Goal: Skin integrity is maintained or improved  Description: INTERVENTIONS:  - Identify patients at risk for skin breakdown  - Assess and monitor skin integrity  - Assess and monitor nutrition and hydration status  - Monitor labs   - Assess for incontinence   - Turn and reposition patient  - Assist with mobility/ambulation  - Relieve pressure over bony prominences  - Avoid friction and shearing  - Provide appropriate hygiene as needed including keeping skin clean and dry  - Evaluate need for skin moisturizer/barrier cream  - Collaborate with interdisciplinary team   - Patient/family teaching  - Consider wound care consult   Outcome: Progressing     Problem: SAFETY,RESTRAINT: NV/NON-SELF DESTRUCTIVE BEHAVIOR  Goal: Remains free of harm/injury (restraint for non violent/non self-detsructive behavior)  Description: INTERVENTIONS:  - Instruct patient/family regarding restraint use   - Assess and monitor physiologic and psychological status   - Provide interventions and comfort measures to meet assessed patient needs   - Identify and implement measures to help patient regain control  - Assess readiness for release of restraint   Outcome: Progressing  Goal: Returns to optimal restraint-free functioning  Description: INTERVENTIONS:  - Assess the patient's behavior and symptoms that indicate continued need for restraint  - Identify and implement measures to help patient regain control  - Assess readiness for release of restraint   Outcome: Progressing     Problem: Nutrition/Hydration-ADULT  Goal: Nutrient/Hydration intake appropriate for improving, restoring or maintaining nutritional needs  Description: Monitor and assess patient's nutrition/hydration status for malnutrition. Collaborate with interdisciplinary team and initiate plan and interventions as ordered. Monitor patient's weight and dietary intake as ordered or per policy.  Utilize nutrition screening tool and intervene as necessary. Determine patient's food preferences and provide high-protein, high-caloric foods as appropriate.      INTERVENTIONS:  - Monitor oral intake, urinary output, labs, and treatment plans  - Assess nutrition and hydration status and recommend course of action  - Evaluate amount of meals eaten  - Assist patient with eating if necessary   - Allow adequate time for meals  - Recommend/ encourage appropriate diets, oral nutritional supplements, and vitamin/mineral supplements  - Order, calculate, and assess calorie counts as needed  - Recommend, monitor, and adjust tube feedings and TPN/PPN based on assessed needs  - Assess need for intravenous fluids  - Provide specific nutrition/hydration education as appropriate  - Include patient/family/caregiver in decisions related to nutrition  Outcome: Progressing

## 2023-08-09 NOTE — ASSESSMENT & PLAN NOTE
· Patient likely has hypoactive delirium secondary to sepsis  · Infectious disease consult appreciated and we will continue antibiotics and source control  · We will do nonpharmacological management at this point of time with frequent reorientation and reestablishment of the sleep-wake cycle

## 2023-08-09 NOTE — PROGRESS NOTES
Progress Note - Infectious Disease   Nayeli Cat 82 y.o. male MRN: [id number]  Unit/Bed#: W -01 Encounter: [id number]      Impression/Plan:  1.  Severe sepsis.  Present on admission with leukocytosis and tachycardia as well as a lactate level of 3.2.  Now subsequently has developed a fever.  Suspect secondary to a pneumonia with complicating empyema.  No other clear source appreciated.  Suspect the ongoing sepsis is secondary to inadequate source control. Cell count has now come down however and the temperature is trending down with increased chest tube output  -Antibiotics as below  -Source control measures as below  -Follow-up cultures and adjust the antibiotics needed  -Recheck CBC with differential and CMP  -Supportive care     2. Streptococcus intermedius empyema.  Suspect the ongoing clinical sepsis is related to inadequate source control. Increase pleural fluid output since tPA dornase instillation.  -Continue ceftriaxone  -Follow-up final sensitivities and adjust antibiotics needed  -Chest tube drainage  -tPA/dornase instillation  -Close pulmonary follow-up  -Not a surgical candidate for VATS per pulmonary     3.  Acute hypoxic respiratory failure.  Appears to be secondary to pneumonia with complicated parapneumonic effusion.  No other clear source appreciated.  Patient continues to require oxygen by nasal cannula.   -Oxygen support  -Monitor respiratory status  -Treatment of complicated effusion as above     4.  Acute encephalopathy.  Complicating baseline dementia.  Suspect related to the patient's acute infectious process.  Remains nonverbal.  Imaging of the brain without any abnormality.  -Monitor cognition  -Treat sepsis as above    Discussed the above management plan with the primary service    Antibiotics:  Ceftriaxone 2  Antibiotics 7  Post chest tube placement 5    Subjective:  Patient has no fever, chills, sweats; no reported vomiting, diarrhea; continues to require oxygen by nasal cannula; remains confused. Not really communicating well. Objective:  Vitals:  Temp:  [98.2 °F (36.8 °C)-101.2 °F (38.4 °C)] 98.9 °F (37.2 °C)  HR:  [76-97] 80  Resp:  [18-20] 18  BP: (101-126)/(55-66) 119/64  SpO2:  [90 %-96 %] 96 %  Temp (24hrs), Av.7 °F (37.6 °C), Min:98.2 °F (36.8 °C), Max:101.2 °F (38.4 °C)  Current: Temperature: 98.9 °F (37.2 °C)    Physical Exam:   General Appearance:  Alert, interactive, nontoxic, no acute distress. Throat: Oropharynx moist without lesions. Lungs:   Clear to auscultation bilaterally; no wheezes, rhonchi or rales; respirations unlabored   Heart:  RRR; no murmur, rub or gallop   Abdomen:   Soft, non-tender, non-distended, positive bowel sounds. Extremities: No clubbing, cyanosis or edema   Skin: No new rashes or lesions. No draining wounds noted. Labs, Imaging, & Other studies:   All pertinent labs and imaging studies were personally reviewed  Results from last 7 days   Lab Units 23  0545 23  0506 23  0508   WBC Thousand/uL 13.28* 16.90* 18.12*   HEMOGLOBIN g/dL 9.7* 10.7* 10.3*   PLATELETS Thousands/uL 562* 523* 515*     Results from last 7 days   Lab Units 23  0545 23  0506 23  0508 23  0518 23  1049   SODIUM mmol/L 139 134* 136   < > 142   POTASSIUM mmol/L 3.6 4.3 3.4*   < > 3.7   CHLORIDE mmol/L 104 102 103   < > 105   CO2 mmol/L 25 23 25   < > 25   BUN mg/dL 14 12 13   < > 35*   CREATININE mg/dL 0.89 0.92 0.90   < > 1.29   EGFR ml/min/1.73sq m 80 78 80   < > 52   CALCIUM mg/dL 8.2* 8.0* 8.0*   < > 9.2   AST U/L  --   --   --   --  7*   ALT U/L  --   --   --   --  6*   ALK PHOS U/L  --   --   --   --  131*    < > = values in this interval not displayed. Results from last 7 days   Lab Units 23  1901 23  1137 23  1515 23  0953 23  2251 23  1201 23  1131   BLOOD CULTURE  Received in Microbiology Lab. Culture in Progress. Received in Microbiology Lab.  Culture in Progress. --   --   --  No Growth After 5 Days. No Growth After 5 Days.    GRAM STAIN RESULT   --   --  2+ Polys  No bacteria seen  --   --   --   --    BODY FLUID CULTURE, STERILE   --   --  No growth  --   --   --   --    MRSA CULTURE ONLY   --   --   --  No Methicillin Resistant Staphlyococcus aureus (MRSA) isolated  --   --   --    LEGIONELLA URINARY ANTIGEN   --   --   --   --  Negative  --   --      Results from last 7 days   Lab Units 08/08/23  0506 08/04/23  0518 08/03/23  1049   PROCALCITONIN ng/ml 0.56* 1.45* 2.31*         Results from last 7 days   Lab Units 08/03/23  1049   FERRITIN ng/mL 714*

## 2023-08-09 NOTE — ASSESSMENT & PLAN NOTE
On admission patient met criteria for severe sepsis secondary to pneumonia due to the loculated effusion  · Blood culture-negative  · Fluid culture-negative  IR chest tube placement 8/4  Procal 0.56  Blood culture- Negative     Plan-  Appreciate ID recommendations- Transition to ceftriaxone until sensitives return   Respiratory protocol  Monitor vital signs  Mucinex 1200 twice daily  See loculated pleural effusion plan

## 2023-08-10 ENCOUNTER — APPOINTMENT (INPATIENT)
Dept: RADIOLOGY | Facility: HOSPITAL | Age: 83
DRG: 871 | End: 2023-08-10
Payer: COMMERCIAL

## 2023-08-10 LAB
ANION GAP SERPL CALCULATED.3IONS-SCNC: 11 MMOL/L
BACTERIA SPEC ANAEROBE CULT: ABNORMAL
BACTERIA SPEC ANAEROBE CULT: ABNORMAL
BASOPHILS # BLD AUTO: 0.03 THOUSANDS/ÂΜL (ref 0–0.1)
BASOPHILS NFR BLD AUTO: 0 % (ref 0–1)
BUN SERPL-MCNC: 20 MG/DL (ref 5–25)
CALCIUM SERPL-MCNC: 8.1 MG/DL (ref 8.4–10.2)
CHLORIDE SERPL-SCNC: 106 MMOL/L (ref 96–108)
CO2 SERPL-SCNC: 23 MMOL/L (ref 21–32)
CREAT SERPL-MCNC: 0.97 MG/DL (ref 0.6–1.3)
EOSINOPHIL # BLD AUTO: 0.08 THOUSAND/ÂΜL (ref 0–0.61)
EOSINOPHIL NFR BLD AUTO: 1 % (ref 0–6)
ERYTHROCYTE [DISTWIDTH] IN BLOOD BY AUTOMATED COUNT: 14.7 % (ref 11.6–15.1)
GFR SERPL CREATININE-BSD FRML MDRD: 73 ML/MIN/1.73SQ M
GLUCOSE SERPL-MCNC: 105 MG/DL (ref 65–140)
GLUCOSE SERPL-MCNC: 108 MG/DL (ref 65–140)
GLUCOSE SERPL-MCNC: 135 MG/DL (ref 65–140)
GLUCOSE SERPL-MCNC: 162 MG/DL (ref 65–140)
GLUCOSE SERPL-MCNC: 185 MG/DL (ref 65–140)
GLUCOSE SERPL-MCNC: 219 MG/DL (ref 65–140)
HCT VFR BLD AUTO: 28.1 % (ref 36.5–49.3)
HGB BLD-MCNC: 8.5 G/DL (ref 12–17)
IMM GRANULOCYTES # BLD AUTO: 0.11 THOUSAND/UL (ref 0–0.2)
IMM GRANULOCYTES NFR BLD AUTO: 1 % (ref 0–2)
LYMPHOCYTES # BLD AUTO: 1.7 THOUSANDS/ÂΜL (ref 0.6–4.47)
LYMPHOCYTES NFR BLD AUTO: 14 % (ref 14–44)
MAGNESIUM SERPL-MCNC: 2 MG/DL (ref 1.9–2.7)
MCH RBC QN AUTO: 28.5 PG (ref 26.8–34.3)
MCHC RBC AUTO-ENTMCNC: 30.2 G/DL (ref 31.4–37.4)
MCV RBC AUTO: 94 FL (ref 82–98)
MONOCYTES # BLD AUTO: 0.84 THOUSAND/ÂΜL (ref 0.17–1.22)
MONOCYTES NFR BLD AUTO: 7 % (ref 4–12)
NEUTROPHILS # BLD AUTO: 9.3 THOUSANDS/ÂΜL (ref 1.85–7.62)
NEUTS SEG NFR BLD AUTO: 77 % (ref 43–75)
NRBC BLD AUTO-RTO: 0 /100 WBCS
PLATELET # BLD AUTO: 587 THOUSANDS/UL (ref 149–390)
PMV BLD AUTO: 9.3 FL (ref 8.9–12.7)
POTASSIUM SERPL-SCNC: 3.8 MMOL/L (ref 3.5–5.3)
RBC # BLD AUTO: 2.98 MILLION/UL (ref 3.88–5.62)
SODIUM SERPL-SCNC: 140 MMOL/L (ref 135–147)
WBC # BLD AUTO: 12.06 THOUSAND/UL (ref 4.31–10.16)

## 2023-08-10 PROCEDURE — 99232 SBSQ HOSP IP/OBS MODERATE 35: CPT | Performed by: INTERNAL MEDICINE

## 2023-08-10 PROCEDURE — 80048 BASIC METABOLIC PNL TOTAL CA: CPT

## 2023-08-10 PROCEDURE — 83735 ASSAY OF MAGNESIUM: CPT

## 2023-08-10 PROCEDURE — 85025 COMPLETE CBC W/AUTO DIFF WBC: CPT

## 2023-08-10 PROCEDURE — 97530 THERAPEUTIC ACTIVITIES: CPT

## 2023-08-10 PROCEDURE — 82948 REAGENT STRIP/BLOOD GLUCOSE: CPT

## 2023-08-10 PROCEDURE — 71045 X-RAY EXAM CHEST 1 VIEW: CPT

## 2023-08-10 RX ADMIN — ALTEPLASE 10 MG: 2.2 INJECTION, POWDER, LYOPHILIZED, FOR SOLUTION INTRAVENOUS at 12:16

## 2023-08-10 RX ADMIN — ENOXAPARIN SODIUM 40 MG: 40 INJECTION SUBCUTANEOUS at 09:53

## 2023-08-10 RX ADMIN — INSULIN LISPRO 1 UNITS: 100 INJECTION, SOLUTION INTRAVENOUS; SUBCUTANEOUS at 12:18

## 2023-08-10 RX ADMIN — ACETAMINOPHEN 650 MG: 325 TABLET, FILM COATED ORAL at 19:25

## 2023-08-10 RX ADMIN — MEMANTINE 10 MG: 10 TABLET ORAL at 17:19

## 2023-08-10 RX ADMIN — MEMANTINE 10 MG: 10 TABLET ORAL at 09:53

## 2023-08-10 RX ADMIN — GUAIFENESIN 1200 MG: 600 TABLET ORAL at 09:53

## 2023-08-10 RX ADMIN — GUAIFENESIN 1200 MG: 600 TABLET ORAL at 21:17

## 2023-08-10 RX ADMIN — DORNASE ALFA 5 MG: 1 SOLUTION RESPIRATORY (INHALATION) at 12:16

## 2023-08-10 RX ADMIN — CEFTRIAXONE 2000 MG: 2 INJECTION, POWDER, FOR SOLUTION INTRAMUSCULAR; INTRAVENOUS at 09:53

## 2023-08-10 RX ADMIN — INSULIN GLARGINE 10 UNITS: 100 INJECTION, SOLUTION SUBCUTANEOUS at 21:16

## 2023-08-10 NOTE — PROGRESS NOTES
Progress Note - Pulmonary   Mozell Distance 82 y.o. male MRN: [id number]  Unit/Bed#: W -01 Encounter: [id number]    Assessment:  82year old man with progressive demential and concerns for aspiration presented for hypoxia, large right pleural effusion post thoracentesis and then IR CT placement.       Problem List:  1. Acute hypoxic respiratory failure   2. Right sided Streptococcus intermedius empyema   3. Suspected recurrent aspiration  4. Toxic/metabolic encephalopathy with underlying dementia  5. Fever - suspect related to pleural drainage/irritation of empyema, trend WBC and fever curve     Plan:  1. Acute hypoxic respiratory failure, improved    2. Right sided Streptococcus intermedius empyema   3. Suspected recurrent aspiration  - Right pleural fluid - glu 22, pH 6.9, LDH 1560, TP 4.9, WBC/Diff not sent  - Fluid culture(8/4): 3+ Streptococcus intermedius  - CXR 8/10 showing diminished right pleural effusion, bibasilar atelectasis, CT appears to be very peripheral but could be due to rotation    - Dose #6 TPA/Dornase given today, will give an additional dose this evening as he missed the PM dose on 8/9. Will repeat CXR in AM  - Continue -20 cmH2O suction   - Infectious disease following, continue abx      4. Toxic/metabolic encephalopathy with underlying dementia  Likely in the setting of right sided empyema  - Treatment as above      5. Fever, improved    - suspect related to pleural drainage/irritation of empyema, trend WBC and fever curve      Subjective:   Patient seen and examined at bedside this morning. He was sleeping on my examination, did not open his eyes to voice but did speak softly. Last fever noted to be yesterday (8/9) afternoon with a Tmax of 100.8 °F.  Patient did not receive his p.m. dose of dornase. He had 500 cc out from CT in past 24h, fluid yellow and turbid. Objective:     Vitals: Blood pressure 112/58, pulse 79, temperature 98.3 °F (36.8 °C), resp.  rate (!) 24, height 5' 9" (1.753 m), weight 61.9 kg (136 lb 7.4 oz), SpO2 100 %. ,Body mass index is 20.15 kg/m². Intake/Output Summary (Last 24 hours) at 8/10/2023 1309  Last data filed at 8/10/2023 4169  Gross per 24 hour   Intake 200 ml   Output 750 ml   Net -550 ml       Invasive Devices     Peripheral Intravenous Line  Duration           Peripheral IV 08/08/23 Distal;Right;Ventral (anterior) Forearm 1 day          Drain  Duration           Chest Tube Right 12 Fr. 5 days    External Urinary Catheter 3 days                Physical Exam:   Constitutional:       General: He is not in acute distress. Resting in bed with feet up   HENT:      Head: Normocephalic and atraumatic.      Mouth: Mucous membranes are dry. Eyes:      Comments: Does not open eyes during exam   Cardiovascular:      Rate and Rhythm: Normal rate.      Heart sounds: Normal heart sounds. Pulmonary:      Breath sounds: No wheezing.      Comments: Improved rhonchi right lung base, tube in right hemithorax, flushes well with aspiration of pleural fluid  Abdominal:      General: Abdomen is flat. There is no distension.      Palpations: Abdomen is soft. Musculoskeletal:      Right lower leg: No edema.      Left lower leg: No edema. Skin:     General: Skin is warm and dry.      Labs:   CBC:   Lab Results   Component Value Date    WBC 12.06 (H) 08/10/2023    HGB 8.5 (L) 08/10/2023    HCT 28.1 (L) 08/10/2023    MCV 94 08/10/2023     (H) 08/10/2023    RBC 2.98 (L) 08/10/2023    MCH 28.5 08/10/2023    MCHC 30.2 (L) 08/10/2023    RDW 14.7 08/10/2023    MPV 9.3 08/10/2023    NRBC 0 08/10/2023   , CMP:   Lab Results   Component Value Date    SODIUM 140 08/10/2023    K 3.8 08/10/2023     08/10/2023    CO2 23 08/10/2023    BUN 20 08/10/2023    CREATININE 0.97 08/10/2023    CALCIUM 8.1 (L) 08/10/2023    EGFR 73 08/10/2023     Imaging and other studies: I have personally reviewed pertinent films in PACS     CXR 8/6: large right pleural effusion, basilar pigtail CT in place, no PTX  CXR 8/10: showing diminished right pleural effusion, bibasilar atelectasis, CT appears to be very peripheral but could be due to patient rotation

## 2023-08-10 NOTE — QUICK NOTE
Mr. Anibal Huggins missed PM dose of TPA/dornase last night. I personally administered #6 dose today at 1215 via right sided chest tube. Medication flushed well. Chest tube is clamped to complete a 60 min dwell time. Will unclamp at 1315 and place back on -[id number] suction.

## 2023-08-10 NOTE — ASSESSMENT & PLAN NOTE
On admission patient met criteria for severe sepsis secondary to pneumonia due to the loculated effusion  · Blood culture-negative  · Fluid culture-negative  IR chest tube placement 8/4  Procal 0.56  Blood culture- Negative     Plan-  Appreciate ID recommendations-  continue ceftriaxone until sensitives return   Respiratory protocol  Monitor vital signs  Mucinex 1200 twice daily  See loculated pleural effusion plan

## 2023-08-10 NOTE — PROGRESS NOTES
[facility]  Progress Note  Name: Joanna Dawkins  MRN: [id number]  Unit/Bed#: W -01 I Date of Admission: 8/3/2023   Date of Service: 8/10/2023 I Hospital Day: 7    Assessment/Plan   * Severe sepsis [facility])  Assessment & Plan  On admission patient met criteria for severe sepsis secondary to pneumonia due to the loculated effusion  · Blood culture-negative  · Fluid culture-negative  IR chest tube placement 8/4  Procal 0.56  Blood culture- Negative     Plan-  Appreciate ID recommendations-  continue ceftriaxone until sensitives return   Respiratory protocol  Monitor vital signs  Mucinex 1200 twice daily  See loculated pleural effusion plan               Loculated pleural effusion  Assessment & Plan  Chest x-ray- There is a right mid to lower lung opacity with well-circumscribed medial margin suggests loculated effusion. CT chest- Moderate right pleural effusion with compressive atelectasis over the right lower lobe and posterior right middle and upper lobes, Consolidation in the left lower lobe, probably atelectatic. Superimposed pneumonia cannot be excluded. Soft tissue densities in the bronchial tree as above, appearance favors secretions.   IR placed chest tube 8/4    Plan-  • Appreciate pulmonology recommendations- Suboptimal pleural drainage, trial of 5/6 TPA/Dornase BID per MIST2 trial, 1 hour dwell time post instillation, Return to -[id number] suction when not on dwell time, Continue unasyn - further abx per ID  Overall poor surgical candidate and his sister agrees, should source control not be achieved with TPA/Dornase, may consider transition to hospice care - continue ongoing [facility] discussions  Appreciate infectious disease recommendations- transition to ceftriaxone until sensitivities        Delirium  Assessment & Plan  · Patient likely has hypoactive delirium secondary to sepsis  · Infectious disease consult appreciated and we will continue antibiotics and source control  · We will do nonpharmacological management at this point of time with frequent reorientation and reestablishment of the sleep-wake cycle    Ambulatory dysfunction  Assessment & Plan  · At baseline ambulate without AD, noted unsteadiness new from baseline      Plan-  PT OT- postacute rehabilitation      Encephalopathy acute  Assessment & Plan  · POA at facility noted slurred speech, slow responsive, not at his baseline  · Encephalopathy likely metabolic in the setting of sepsis  · Check CT head : No acute intracranial leasion  · Continue antibiotic as above      Anemia  Assessment & Plan  · POA hemoglobin 8.0, pressure review hemoglobin back in September 2022 noted 10.1. · Unclear if history of melena, hematochezia  · Anemia unclear etiology at this time possibly secondary to iron deficiency given thrombocytosis although this could represent current setting of infection versus underlying malignancy? Check iron panel: Ferritin 714, Iron Sat: 14, TIBC: 182, Iron: 26  folate, vitamin B12 WNL     Plan  · Monitor output and consider transfusion if less than 7  ·  CBC a.m. Late onset Alzheimer's dementia with behavioral disturbance ([address])  Assessment & Plan  · Hypoactive delirium and patient  · Home meds Aricept 5 mg nightly, olanzapine 5 mg daily, Namenda 10 mg twice daily, trazodone 50 mg at bedtime    Plan-  Holding trazodone 50 mg at bedtime, Zyprexa 5 mg  Delirium precaution  Frequent reorientation  Avoid restraints if able unless danger to himself  Appreciate geriatric recommendations         VTE Pharmacologic Prophylaxis: VTE Score: 5 High Risk (Score >/= 5) - Pharmacological DVT Prophylaxis Ordered: enoxaparin (Lovenox). Sequential Compression Devices Ordered. Patient Centered Rounds: I performed bedside rounds with nursing staff today.   Discussions with Specialists or Other Care Team Provider: Pulmonology, infectious disease    Education and Discussions with Family / Patient: Updated  (sister) via phone. Current Length of Stay: 7 day(s)  Current Patient Status: Inpatient   Discharge Plan: Anticipate discharge in 48 hrs to rehab facility. Code Status: Level 3 - DNAR and DNI    Subjective:   Patient was seen and examined at bedside. Patient was resting comfortably no acute distress. No overnight events were noted. Upon my evaluation patient was more awake and able to respond more appropriately today. Patient denied any complaints at the this time. Objective:     Vitals:   Temp (24hrs), Av.3 °F (37.4 °C), Min:98.3 °F (36.8 °C), Max:100.8 °F (38.2 °C)    Temp:  [98.3 °F (36.8 °C)-100.8 °F (38.2 °C)] 98.3 °F (36.8 °C)  HR:  [79] 79  Resp:  [17-24] 24  BP: (111-126)/(53-58) 112/58  SpO2:  [100 %] 100 %  Body mass index is 20.15 kg/m². Input and Output Summary (last 24 hours): Intake/Output Summary (Last 24 hours) at 8/10/2023 1400  Last data filed at 8/10/2023 0905  Gross per 24 hour   Intake 200 ml   Output 750 ml   Net -550 ml       Physical Exam:   Physical Exam  Vitals and nursing note reviewed. Constitutional:       General: He is not in acute distress. Appearance: He is well-developed. He is ill-appearing. He is not toxic-appearing or diaphoretic. HENT:      Head: Normocephalic and atraumatic. Mouth/Throat:      Mouth: Mucous membranes are moist.   Eyes:      Extraocular Movements: Extraocular movements intact. Conjunctiva/sclera: Conjunctivae normal.      Pupils: Pupils are equal, round, and reactive to light. Cardiovascular:      Rate and Rhythm: Normal rate and regular rhythm. Pulses: Normal pulses. Heart sounds: Normal heart sounds. No murmur heard. Pulmonary:      Effort: Pulmonary effort is normal. No respiratory distress. Breath sounds: Rhonchi present. No wheezing. Abdominal:      General: Bowel sounds are normal. There is no distension. Palpations: Abdomen is soft. Tenderness: There is no abdominal tenderness.  There is no guarding or rebound. Musculoskeletal:         General: No swelling or tenderness. Normal range of motion. Cervical back: Normal range of motion and neck supple. Right lower leg: No edema. Left lower leg: No edema. Skin:     General: Skin is warm and dry. Capillary Refill: Capillary refill takes less than 2 seconds. Neurological:      Mental Status: He is alert. Mental status is at baseline. He is disoriented. Cranial Nerves: No cranial nerve deficit. Sensory: No sensory deficit. Motor: Weakness present. Comments: More awake today and easier to arouse, Follows some commands, able to converse and answer some questions appropriately   Psychiatric:         Mood and Affect: Mood normal.         Behavior: Behavior normal.          Additional Data:     Labs:  Results from last 7 days   Lab Units 08/10/23  0447 08/06/23  0537 08/05/23  1059   WBC Thousand/uL 12.06*   < > 23.32*   HEMOGLOBIN g/dL 8.5*   < > 8.9*   HEMATOCRIT % 28.1*   < > 28.1*   PLATELETS Thousands/uL 587*   < > 471*   BANDS PCT %  --   --  2   NEUTROS PCT % 77*   < >  --    LYMPHS PCT % 14   < >  --    LYMPHO PCT %  --   --  4*   MONOS PCT % 7   < >  --    MONO PCT %  --   --  5   EOS PCT % 1   < > 2    < > = values in this interval not displayed.      Results from last 7 days   Lab Units 08/10/23  0447   SODIUM mmol/L 140   POTASSIUM mmol/L 3.8   CHLORIDE mmol/L 106   CO2 mmol/L 23   BUN mg/dL 20   CREATININE mg/dL 0.97   ANION GAP mmol/L 11   CALCIUM mg/dL 8.1*   GLUCOSE RANDOM mg/dL 108     Results from last 7 days   Lab Units 08/03/23  1716   INR  1.33*     Results from last 7 days   Lab Units 08/10/23  1130 08/10/23  0739 08/09/23  2126 08/09/23  1629 08/09/23  1126 08/09/23  0738 08/08/23  2145 08/08/23  1609 08/08/23  1144 08/08/23  0708 08/07/23  2338 08/07/23  2126   POC GLUCOSE mg/dl 162* 105 93 124 101 92 102 121 87 193* 125 65     Results from last 7 days   Lab Units 08/03/23  1618   HEMOGLOBIN A1C % 7.0*     Results from last 7 days   Lab Units 08/08/23  1643 08/08/23  1124 08/08/23  0506 08/04/23  0518   LACTIC ACID mmol/L 1.1 2.4*  --   --    PROCALCITONIN ng/ml  --   --  0.56* 1.45*       Lines/Drains:  Invasive Devices     Peripheral Intravenous Line  Duration           Peripheral IV 08/08/23 Distal;Right;Ventral (anterior) Forearm 1 day          Drain  Duration           Chest Tube Right 12 Fr. 5 days    External Urinary Catheter 3 days                      Imaging: Reviewed radiology reports from this admission including: chest xray, chest CT scan and CT head    Recent Cultures (last 7 days):   Results from last 7 days   Lab Units 08/08/23  1137 08/08/23  0809 08/04/23  1515 08/03/23  2251   BLOOD CULTURE  No Growth at 24 hrs.  No Growth at 24 hrs.  --   --    GRAM STAIN RESULT   --   --  2+ Polys  No bacteria seen  --    BODY FLUID CULTURE, STERILE   --   --  No growth  --    LEGIONELLA URINARY ANTIGEN   --   --   --  Negative       Last 24 Hours Medication List:   Current Facility-Administered Medications   Medication Dose Route Frequency Provider Last Rate   • acetaminophen  650 mg Oral Q6H PRN Mary Anne Rowell MD     • alteplase (TPA) 10 mg in SWFI 30 mL chest tube/drain tube instillation  10 mg Chest Tube Once Patricia Taylor, DO      And   • dornase ramón (PULMOZYME) 5 mg in 30 mL SWFI chest tube/drain tube instillation  5 mg Chest Tube Once Patricia Taylor, DO     • cefTRIAXone  2,000 mg Intravenous Q24H Wyatt Danielson MD 2,000 mg (08/10/23 0953)   • donepezil  5 mg Oral HS Mary Anne Rowell MD     • enoxaparin  40 mg Subcutaneous Daily Mary Anne Rowell MD     • guaiFENesin  1,200 mg Oral Q12H [address] Alanson, MD     • insulin glargine  10 Units Subcutaneous HS Mary Anne Rowell MD     • insulin lispro  1-5 Units Subcutaneous TID [address] Al[place], MD     • memantine  10 mg Oral BID Mary Anne Rowell MD     • OLANZapine  5 mg Intramuscular Once Shawnee Luba, DO     • polyethylene glycol  17 g Oral Daily No Hopper MD     • senna  1 tablet Oral HS Jaime Chambers MD          Today, Patient Was Seen By: No Hopper MD    **Please Note: This note may have been constructed using a voice recognition system. **

## 2023-08-10 NOTE — PHYSICAL THERAPY NOTE
PHYSICAL THERAPY NOTE    Patient Name: Gina Lawrence  LRDAL'M Date: 8/10/2023       08/10/23 0905   PT Last Visit   PT Visit Date 08/10/23   Note Type   Note Type Treatment   Pain Assessment   Pain Assessment Tool FLACC   Pain Rating: FLACC (Rest) - Face 0   Pain Rating: FLACC (Rest) - Legs 0   Pain Rating: FLACC (Rest) - Activity 0   Pain Rating: FLACC (Rest) - Cry 0   Pain Rating: FLACC (Rest) - Consolability 0   Score: FLACC (Rest) 0   Pain Rating: FLACC (Activity) - Face 0   Pain Rating: FLACC (Activity) - Legs 0   Pain Rating: FLACC (Activity) - Activity 1   Pain Rating: FLACC (Activity) - Cry 1   Pain Rating: FLACC (Activity) - Consolability 0   Score: FLACC (Activity) 2   Restrictions/Precautions   Other Precautions Cognitive; Chair Alarm; Bed Alarm; Fall Risk;Pain;Multiple lines  (2l O2 NC, chest tube)   General   Family/Caregiver Present No   Subjective   Subjective Pt supine in bed and identifers obtained from name & ID bracelet. Bed Mobility   Rolling R 3  Moderate assistance   Additional items Assist x 1; Increased time required;Verbal cues;LE management   Rolling L 3  Moderate assistance   Additional items Assist x 1; Increased time required;Verbal cues;LE management   Supine to Sit 2  Maximal assistance  (partial supine to sit)   Additional items Assist x 1;HOB elevated; Bedrails; Increased time required;Verbal cues;LE management   Additional Comments Pt supine in bed post session with bed alarm engaged, call bell and belongings in reach. Transfers   Additional Comments Bowel incontience requiring increased time and multiple bed positioning attempts   Activity Tolerance   Activity Tolerance Patient limited by fatigue; Other (Comment)  (limited cognition)   Nurse Made Aware Spoke to LPN   Assessment   Prognosis Fair   Problem List Decreased strength;Decreased endurance; Impaired balance;Decreased mobility; Decreased cognition; Impaired judgement;Decreased safety awareness;Decreased skin integrity   Assessment Patient seen for physical therapy session. Pt participated in bed mobility rolling to L and R side with mod ax1 to obtain and maintain positioning for multiple trials. Pt incontient of bowel requiring increased time and assistance. Partial supine to sit EOB with max ax1 however unable to complete transition to EOB secondary to patient fatigue and agitation. Continue to focus on bed mobility and sitting EOB tolerance with progression of transfers as appropriate and able. Goals   STG Expiration Date 08/17/23   PT Treatment Day 1   Plan   Treatment/Interventions Functional transfer training;LE strengthening/ROM; Therapeutic exercise; Endurance training;Cognitive reorientation;Patient/family training;Equipment eval/education; Bed mobility;Spoke to nursing   PT Frequency 3-5x/wk   Recommendation   PT Discharge Recommendation Post acute rehabilitation services  (vs return to facility w/ skilled PT services)   AM-PAC Basic Mobility Inpatient   Turning in Flat Bed Without Bedrails 2   Lying on Back to Sitting on Edge of Flat Bed Without Bedrails 2   Moving Bed to Chair 2   Standing Up From Chair Using Arms 2   Walk in Room 1   Climb 3-5 Stairs With Railing 1   Basic Mobility Inpatient Raw Score 10   Highest Level Of Mobility   -HLM Goal 4: Move to chair/commode   -HLM Achieved 2: Bed activities/Dependent transfer   End of Consult   Patient Position at End of Consult Supine;Bed/Chair alarm activated; All needs within reach     The patient's AM-PAC Basic Mobility Inpatient Short Form Raw Score is 10. A Raw score of less than or equal to 16 suggests the patient may benefit from discharge to post-acute rehabilitation services. Please also refer to the recommendation of the Physical Therapist for safe discharge planning.       Margie Portillo, PTA

## 2023-08-10 NOTE — ASSESSMENT & PLAN NOTE
· Hypoactive delirium and patient  · Home meds Aricept 5 mg nightly, olanzapine 5 mg daily, Namenda 10 mg twice daily, trazodone 50 mg at bedtime    Plan-  Holding trazodone 50 mg at bedtime, Zyprexa 5 mg  Delirium precaution  Frequent reorientation  Avoid restraints if able unless danger to himself  Appreciate geriatric recommendations

## 2023-08-10 NOTE — PLAN OF CARE
Problem: PAIN - ADULT  Goal: Verbalizes/displays adequate comfort level or baseline comfort level  Description: Interventions:  - Encourage patient to monitor pain and request assistance  - Assess pain using appropriate pain scale  - Administer analgesics based on type and severity of pain and evaluate response  - Implement non-pharmacological measures as appropriate and evaluate response  - Consider cultural and social influences on pain and pain management  - Notify physician/advanced practitioner if interventions unsuccessful or patient reports new pain  Outcome: Progressing     Problem: SAFETY ADULT  Goal: Maintain or return to baseline ADL function  Description: INTERVENTIONS:  -  Assess patient's ability to carry out ADLs; assess patient's baseline for ADL function and identify physical deficits which impact ability to perform ADLs (bathing, care of mouth/teeth, toileting, grooming, dressing, etc.)  - Assess/evaluate cause of self-care deficits   - Assess range of motion  - Assess patient's mobility; develop plan if impaired  - Assess patient's need for assistive devices and provide as appropriate  - Encourage maximum independence but intervene and supervise when necessary  - Involve family in performance of ADLs  - Assess for home care needs following discharge   - Consider OT consult to assist with ADL evaluation and planning for discharge  - Provide patient education as appropriate  Outcome: Progressing  Goal: Maintains/Returns to pre admission functional level  Description: INTERVENTIONS:  - Perform BMAT or MOVE assessment daily.   - Set and communicate daily mobility goal to care team and patient/family/caregiver. - Collaborate with rehabilitation services on mobility goals if consulted  - Perform Range of Motion  times a day. - Reposition patient every  hours.   - Dangle patient  times a day  - Stand patient  times a day  - Ambulate patient  times a day  - Out of bed to chair times a day   - Out of bed for meals  times a day  - Out of bed for toileting  - Record patient progress and toleration of activity level   Outcome: Progressing  Goal: Patient will remain free of falls  Description: INTERVENTIONS:  - Educate patient/family on patient safety including physical limitations  - Instruct patient to call for assistance with activity   - Consult OT/PT to assist with strengthening/mobility   - Keep Call bell within reach  - Keep bed low and locked with side rails adjusted as appropriate  - Keep care items and personal belongings within reach  - Initiate and maintain comfort rounds  - Make Fall Risk Sign visible to staff  - Offer Toileting every  Hours, in advance of need  - Initiate/Maintain alarm  - Obtain necessary fall risk management equipment:  - Apply yellow socks and bracelet for high fall risk patients  - Consider moving patient to room near nurses station  Outcome: Progressing     Problem: Prexisting or High Potential for Compromised Skin Integrity  Goal: Skin integrity is maintained or improved  Description: INTERVENTIONS:  - Identify patients at risk for skin breakdown  - Assess and monitor skin integrity  - Assess and monitor nutrition and hydration status  - Monitor labs   - Assess for incontinence   - Turn and reposition patient  - Assist with mobility/ambulation  - Relieve pressure over bony prominences  - Avoid friction and shearing  - Provide appropriate hygiene as needed including keeping skin clean and dry  - Evaluate need for skin moisturizer/barrier cream  - Collaborate with interdisciplinary team   - Patient/family teaching  - Consider wound care consult   Outcome: Progressing

## 2023-08-10 NOTE — ASSESSMENT & PLAN NOTE
Chest x-ray- There is a right mid to lower lung opacity with well-circumscribed medial margin suggests loculated effusion. CT chest- Moderate right pleural effusion with compressive atelectasis over the right lower lobe and posterior right middle and upper lobes, Consolidation in the left lower lobe, probably atelectatic. Superimposed pneumonia cannot be excluded. Soft tissue densities in the bronchial tree as above, appearance favors secretions.   IR placed chest tube 8/4    Plan-  • Appreciate pulmonology recommendations- Suboptimal pleural drainage, trial of 5/6 TPA/Dornase BID per MIST2 trial, 1 hour dwell time post instillation, Return to -[id number] suction when not on dwell time, Continue unasyn - further abx per ID  Overall poor surgical candidate and his sister agrees, should source control not be achieved with TPA/Dornase, may consider transition to hospice care - continue ongoing [address] [facility] discussions  Appreciate infectious disease recommendations- transition to ceftriaxone until sensitivities

## 2023-08-10 NOTE — PLAN OF CARE
Problem: MOBILITY - ADULT  Goal: Maintain or return to baseline ADL function  Description: INTERVENTIONS:  -  Assess patient's ability to carry out ADLs; assess patient's baseline for ADL function and identify physical deficits which impact ability to perform ADLs (bathing, care of mouth/teeth, toileting, grooming, dressing, etc.)  - Assess/evaluate cause of self-care deficits   - Assess range of motion  - Assess patient's mobility; develop plan if impaired  - Assess patient's need for assistive devices and provide as appropriate  - Encourage maximum independence but intervene and supervise when necessary  - Involve family in performance of ADLs  - Assess for home care needs following discharge   - Consider OT consult to assist with ADL evaluation and planning for discharge  - Provide patient education as appropriate  Outcome: Progressing  Goal: Maintains/Returns to pre admission functional level  Description: INTERVENTIONS:  - Perform BMAT or MOVE assessment daily.   - Set and communicate daily mobility goal to care team and patient/family/caregiver. - Collaborate with rehabilitation services on mobility goals if consulted  - Perform Range of Motion 2 times a day. - Reposition patient every 2 hours.   - Dangle patient 2 times a day  - Stand patient 2 times a day  - Ambulate patient 2 times a day  - Out of bed to chair 2 times a day   - Out of bed for meals 2 times a day  - Out of bed for toileting  - Record patient progress and toleration of activity level   Outcome: Progressing     Problem: PAIN - ADULT  Goal: Verbalizes/displays adequate comfort level or baseline comfort level  Description: Interventions:  - Encourage patient to monitor pain and request assistance  - Assess pain using appropriate pain scale  - Administer analgesics based on type and severity of pain and evaluate response  - Implement non-pharmacological measures as appropriate and evaluate response  - Consider cultural and social influences on pain and pain management  - Notify physician/advanced practitioner if interventions unsuccessful or patient reports new pain  Outcome: Progressing     Problem: INFECTION - ADULT  Goal: Absence or prevention of progression during hospitalization  Description: INTERVENTIONS:  - Assess and monitor for signs and symptoms of infection  - Monitor lab/diagnostic results  - Monitor all insertion sites, i.e. indwelling lines, tubes, and drains  - Monitor endotracheal if appropriate and nasal secretions for changes in amount and color  - [place] appropriate cooling/warming therapies per order  - Administer medications as ordered  - Instruct and encourage patient and family to use good hand hygiene technique  - Identify and instruct in appropriate isolation precautions for identified infection/condition  Outcome: Progressing  Goal: Absence of fever/infection during neutropenic period  Description: INTERVENTIONS:  - Monitor WBC    Outcome: Progressing     Problem: SAFETY ADULT  Goal: Maintain or return to baseline ADL function  Description: INTERVENTIONS:  -  Assess patient's ability to carry out ADLs; assess patient's baseline for ADL function and identify physical deficits which impact ability to perform ADLs (bathing, care of mouth/teeth, toileting, grooming, dressing, etc.)  - Assess/evaluate cause of self-care deficits   - Assess range of motion  - Assess patient's mobility; develop plan if impaired  - Assess patient's need for assistive devices and provide as appropriate  - Encourage maximum independence but intervene and supervise when necessary  - Involve family in performance of ADLs  - Assess for home care needs following discharge   - Consider OT consult to assist with ADL evaluation and planning for discharge  - Provide patient education as appropriate  Outcome: Progressing  Goal: Maintains/Returns to pre admission functional level  Description: INTERVENTIONS:  - Perform BMAT or MOVE assessment daily.   - Set and communicate daily mobility goal to care team and patient/family/caregiver. - Collaborate with rehabilitation services on mobility goals if consulted  - Perform Range of Motion 2 times a day. - Reposition patient every 2 hours.   - Dangle patient 2 times a day  - Stand patient 2 times a day  - Ambulate patient 2 times a day  - Out of bed to chair 2 times a day   - Out of bed for meals 2 times a day  - Out of bed for toileting  - Record patient progress and toleration of activity level   Outcome: Progressing  Goal: Patient will remain free of falls  Description: INTERVENTIONS:  - Educate patient/family on patient safety including physical limitations  - Instruct patient to call for assistance with activity   - Consult OT/PT to assist with strengthening/mobility   - Keep Call bell within reach  - Keep bed low and locked with side rails adjusted as appropriate  - Keep care items and personal belongings within reach  - Initiate and maintain comfort rounds  - Make Fall Risk Sign visible to staff  - Offer Toileting every 2 Hours, in advance of need  - Initiate/Maintain bed alarm  - Obtain necessary fall risk management equipment:   - Apply yellow socks and bracelet for high fall risk patients  - Consider moving patient to room near nurses station  Outcome: Progressing     Problem: DISCHARGE PLANNING  Goal: Discharge to home or other facility with appropriate resources  Description: INTERVENTIONS:  - Identify barriers to discharge w/patient and caregiver  - Arrange for needed discharge resources and transportation as appropriate  - Identify discharge learning needs (meds, wound care, etc.)  - Arrange for interpretive services to assist at discharge as needed  - Refer to Case Management Department for coordinating discharge planning if the patient needs post-hospital services based on physician/advanced practitioner order or complex needs related to functional status, cognitive ability, or social support system  Outcome: Progressing     Problem: Knowledge Deficit  Goal: Patient/family/caregiver demonstrates understanding of disease process, treatment plan, medications, and discharge instructions  Description: Complete learning assessment and assess knowledge base.   Interventions:  - Provide teaching at level of understanding  - Provide teaching via preferred learning methods  Outcome: Progressing     Problem: Prexisting or High Potential for Compromised Skin Integrity  Goal: Skin integrity is maintained or improved  Description: INTERVENTIONS:  - Identify patients at risk for skin breakdown  - Assess and monitor skin integrity  - Assess and monitor nutrition and hydration status  - Monitor labs   - Assess for incontinence   - Turn and reposition patient  - Assist with mobility/ambulation  - Relieve pressure over bony prominences  - Avoid friction and shearing  - Provide appropriate hygiene as needed including keeping skin clean and dry  - Evaluate need for skin moisturizer/barrier cream  - Collaborate with interdisciplinary team   - Patient/family teaching  - Consider wound care consult   Outcome: Progressing     Problem: SAFETY,RESTRAINT: NV/NON-SELF DESTRUCTIVE BEHAVIOR  Goal: Remains free of harm/injury (restraint for non violent/non self-detsructive behavior)  Description: INTERVENTIONS:  - Instruct patient/family regarding restraint use   - Assess and monitor physiologic and psychological status   - Provide interventions and comfort measures to meet assessed patient needs   - Identify and implement measures to help patient regain control  - Assess readiness for release of restraint   Outcome: Progressing  Goal: Returns to optimal restraint-free functioning  Description: INTERVENTIONS:  - Assess the patient's behavior and symptoms that indicate continued need for restraint  - Identify and implement measures to help patient regain control  - Assess readiness for release of restraint   Outcome: Progressing Problem: Nutrition/Hydration-ADULT  Goal: Nutrient/Hydration intake appropriate for improving, restoring or maintaining nutritional needs  Description: Monitor and assess patient's nutrition/hydration status for malnutrition. Collaborate with interdisciplinary team and initiate plan and interventions as ordered. Monitor patient's weight and dietary intake as ordered or per policy. Utilize nutrition screening tool and intervene as necessary. Determine patient's food preferences and provide high-protein, high-caloric foods as appropriate.      INTERVENTIONS:  - Monitor oral intake, urinary output, labs, and treatment plans  - Assess nutrition and hydration status and recommend course of action  - Evaluate amount of meals eaten  - Assist patient with eating if necessary   - Allow adequate time for meals  - Recommend/ encourage appropriate diets, oral nutritional supplements, and vitamin/mineral supplements  - Order, calculate, and assess calorie counts as needed  - Recommend, monitor, and adjust tube feedings and TPN/PPN based on assessed needs  - Assess need for intravenous fluids  - Provide specific nutrition/hydration education as appropriate  - Include patient/family/caregiver in decisions related to nutrition  Outcome: Progressing

## 2023-08-10 NOTE — CASE MANAGEMENT
Case Management Discharge Planning Note    Patient name Denia Steele  Location W /W -61 MRN [id number]  : 1940 Date 8/10/2023       Current Admission Date: 8/3/2023  Current Admission Diagnosis:Severe sepsis [facility])   Patient Active Problem List    Diagnosis Date Noted   • Delirium 2023   • Constipation 2023   • Loculated pleural effusion 2023   • Hypoxia 2023   • Slurred speech 2023   • Hyperglycemia 2023   • Severe sepsis ([address]) 2023   • Acute respiratory failure with hypoxia ([address]) 2023   • Anemia 2023   • Encephalopathy acute 2023   • Ambulatory dysfunction 2023   • Low BP 2023   • Depression 2022   • Late onset Alzheimer's dementia with behavioral disturbance ([address]) 2022   • Other hyperlipidemia 2022   • Unsteady gait 2022   • Insomnia 2022      LOS (days): 7  Geometric Mean LOS (GMLOS) (days): 5.00  Days to GMLOS:-2.1     OBJECTIVE:  Risk of Unplanned Readmission Score: 17.06         Current admission status: Inpatient   Preferred Pharmacy:   PATIENT/FAMILY REPORTS NO PREFERRED PHARMACY  No address on file      Primary Care Provider: Ryan Rodriguez MD    Primary Insurance: [facility]  Secondary Insurance:     DISCHARGE DETAILS:    Discharge planning discussed with[de-identified] sister Yulissa Garcia at [facility] from [id number] [place] [address] of Choice: Yes  Comments - Freedom of Choice: [facility]  CM contacted family/caregiver?: Yes  Were Treatment Team discharge recommendations reviewed with patient/caregiver?: Yes  Did patient/caregiver verbalize understanding of patient care needs?: N/A- going to facility  Were patient/caregiver advised of the risks associated with not following Treatment Team discharge recommendations?: Yes    Contacts  Patient Contacts: Katty/ Beltr[facility] (sister/ [facility] staff)  Relationship to Patient[de-identified] Family  Contact Method: Phone, In Person  Phone Number: [phone]  Reason/Outcome: Discharge Planning, Continuity of Care, Emergency Contact, Referral    Requested [address]         Is the patient interested in [facility] at discharge?: No    DME Referral Provided  Referral made for DME?: No    Other Referral/Resources/Interventions Provided:  Interventions: Assisted Living, HHC, Facility Return  Referral Comments: CM met with patients sister at bedside to discuss dcp. Sister aware patient will be needing rehab at d/c. Sister agreeable to NH Post Acute- reserved in [address] CM placed call to [facility] green/ Nicholas Varghese to follow up with essie. Mariano Jesus stating that they can accommodate to patient in house with rehab and can accept back once medically stable. Call made to sister Tyrone Middleton who states she would prefer this as this is patients home/ familiar environment. CM notified [facility] that patient is far off from baseline- [facility] aware and stating they can accommodate. CM to cancel referral in [address] and follow up with Wilburt Mcburney as able to provided all needed documentation.     Would you like to participate in our [address] [employer] service program?  : No - Declined    Treatment Team Recommendation: Assisted Living, Facility Return, Home with [address]  Discharge Destination Plan[de-identified] Assisted Living, Home with [address], Facility Return

## 2023-08-10 NOTE — PLAN OF CARE
Problem: PHYSICAL THERAPY ADULT  Goal: Performs mobility at highest level of function for planned discharge setting. See evaluation for individualized goals. Description: Treatment/Interventions: Functional transfer training, LE strengthening/ROM, Therapeutic exercise, Endurance training, Cognitive reorientation, Patient/family training, Equipment eval/education, Bed mobility, Gait training, Compensatory technique education          See flowsheet documentation for full assessment, interventions and recommendations. Outcome: Progressing  Note: Prognosis: Fair  Problem List: Decreased strength, Decreased endurance, Impaired balance, Decreased mobility, Decreased cognition, Impaired judgement, Decreased safety awareness, Decreased skin integrity  Assessment: Patient seen for physical therapy session. Pt participated in bed mobility rolling to L and R side with mod ax1 to obtain and maintain positioning for multiple trials. Pt incontient of bowel requiring increased time and assistance. Partial supine to sit EOB with max ax1 however unable to complete transition to EOB secondary to patient fatigue and agitation. Continue to focus on bed mobility and sitting EOB tolerance with progression of transfers as appropriate and able. PT Discharge Recommendation: Post acute rehabilitation services (vs return to facility w/ skilled PT services)    See flowsheet documentation for full assessment.

## 2023-08-11 ENCOUNTER — APPOINTMENT (INPATIENT)
Dept: RADIOLOGY | Facility: HOSPITAL | Age: 83
DRG: 871 | End: 2023-08-11
Payer: COMMERCIAL

## 2023-08-11 PROBLEM — E11.9 TYPE 2 DIABETES MELLITUS (HCC): Status: ACTIVE | Noted: 2023-08-11

## 2023-08-11 LAB
ALBUMIN SERPL BCP-MCNC: 2.5 G/DL (ref 3.5–5)
ALP SERPL-CCNC: 102 U/L (ref 34–104)
ALT SERPL W P-5'-P-CCNC: 9 U/L (ref 7–52)
ANION GAP SERPL CALCULATED.3IONS-SCNC: 8 MMOL/L
AST SERPL W P-5'-P-CCNC: 17 U/L (ref 13–39)
BASOPHILS # BLD AUTO: 0.05 THOUSANDS/ÂΜL (ref 0–0.1)
BASOPHILS NFR BLD AUTO: 0 % (ref 0–1)
BILIRUB DIRECT SERPL-MCNC: 0.15 MG/DL (ref 0–0.2)
BILIRUB SERPL-MCNC: 0.49 MG/DL (ref 0.2–1)
BUN SERPL-MCNC: 22 MG/DL (ref 5–25)
CALCIUM SERPL-MCNC: 7.9 MG/DL (ref 8.4–10.2)
CHLORIDE SERPL-SCNC: 109 MMOL/L (ref 96–108)
CO2 SERPL-SCNC: 24 MMOL/L (ref 21–32)
CREAT SERPL-MCNC: 0.95 MG/DL (ref 0.6–1.3)
EOSINOPHIL # BLD AUTO: 0.11 THOUSAND/ÂΜL (ref 0–0.61)
EOSINOPHIL NFR BLD AUTO: 1 % (ref 0–6)
ERYTHROCYTE [DISTWIDTH] IN BLOOD BY AUTOMATED COUNT: 14.6 % (ref 11.6–15.1)
GFR SERPL CREATININE-BSD FRML MDRD: 75 ML/MIN/1.73SQ M
GLUCOSE SERPL-MCNC: 113 MG/DL (ref 65–140)
GLUCOSE SERPL-MCNC: 118 MG/DL (ref 65–140)
GLUCOSE SERPL-MCNC: 193 MG/DL (ref 65–140)
GLUCOSE SERPL-MCNC: 219 MG/DL (ref 65–140)
GLUCOSE SERPL-MCNC: 92 MG/DL (ref 65–140)
HCT VFR BLD AUTO: 27.4 % (ref 36.5–49.3)
HGB BLD-MCNC: 8.3 G/DL (ref 12–17)
IMM GRANULOCYTES # BLD AUTO: 0.08 THOUSAND/UL (ref 0–0.2)
IMM GRANULOCYTES NFR BLD AUTO: 1 % (ref 0–2)
LYMPHOCYTES # BLD AUTO: 1.84 THOUSANDS/ÂΜL (ref 0.6–4.47)
LYMPHOCYTES NFR BLD AUTO: 15 % (ref 14–44)
MAGNESIUM SERPL-MCNC: 2 MG/DL (ref 1.9–2.7)
MCH RBC QN AUTO: 28.5 PG (ref 26.8–34.3)
MCHC RBC AUTO-ENTMCNC: 30.3 G/DL (ref 31.4–37.4)
MCV RBC AUTO: 94 FL (ref 82–98)
MONOCYTES # BLD AUTO: 0.96 THOUSAND/ÂΜL (ref 0.17–1.22)
MONOCYTES NFR BLD AUTO: 8 % (ref 4–12)
NEUTROPHILS # BLD AUTO: 9.09 THOUSANDS/ÂΜL (ref 1.85–7.62)
NEUTS SEG NFR BLD AUTO: 75 % (ref 43–75)
NRBC BLD AUTO-RTO: 0 /100 WBCS
PLATELET # BLD AUTO: 556 THOUSANDS/UL (ref 149–390)
PMV BLD AUTO: 9.2 FL (ref 8.9–12.7)
POTASSIUM SERPL-SCNC: 3.3 MMOL/L (ref 3.5–5.3)
PROT SERPL-MCNC: 5.5 G/DL (ref 6.4–8.4)
RBC # BLD AUTO: 2.91 MILLION/UL (ref 3.88–5.62)
SODIUM SERPL-SCNC: 141 MMOL/L (ref 135–147)
WBC # BLD AUTO: 12.13 THOUSAND/UL (ref 4.31–10.16)

## 2023-08-11 PROCEDURE — 99232 SBSQ HOSP IP/OBS MODERATE 35: CPT | Performed by: INTERNAL MEDICINE

## 2023-08-11 PROCEDURE — 82948 REAGENT STRIP/BLOOD GLUCOSE: CPT

## 2023-08-11 PROCEDURE — 85025 COMPLETE CBC W/AUTO DIFF WBC: CPT

## 2023-08-11 PROCEDURE — 71045 X-RAY EXAM CHEST 1 VIEW: CPT

## 2023-08-11 PROCEDURE — 99232 SBSQ HOSP IP/OBS MODERATE 35: CPT | Performed by: NURSE PRACTITIONER

## 2023-08-11 PROCEDURE — 99233 SBSQ HOSP IP/OBS HIGH 50: CPT | Performed by: INTERNAL MEDICINE

## 2023-08-11 PROCEDURE — 80076 HEPATIC FUNCTION PANEL: CPT

## 2023-08-11 PROCEDURE — 80048 BASIC METABOLIC PNL TOTAL CA: CPT

## 2023-08-11 PROCEDURE — 83735 ASSAY OF MAGNESIUM: CPT

## 2023-08-11 RX ORDER — INSULIN LISPRO 100 [IU]/ML
2 INJECTION, SOLUTION INTRAVENOUS; SUBCUTANEOUS
Status: DISCONTINUED | OUTPATIENT
Start: 2023-08-11 | End: 2023-08-19 | Stop reason: HOSPADM

## 2023-08-11 RX ORDER — ACETAMINOPHEN 650 MG/1
325 SUPPOSITORY RECTAL EVERY 4 HOURS PRN
Status: DISCONTINUED | OUTPATIENT
Start: 2023-08-11 | End: 2023-08-19 | Stop reason: HOSPADM

## 2023-08-11 RX ORDER — POTASSIUM CHLORIDE 20MEQ/15ML
40 LIQUID (ML) ORAL ONCE
Status: COMPLETED | OUTPATIENT
Start: 2023-08-11 | End: 2023-08-11

## 2023-08-11 RX ADMIN — INSULIN LISPRO 2 UNITS: 100 INJECTION, SOLUTION INTRAVENOUS; SUBCUTANEOUS at 08:28

## 2023-08-11 RX ADMIN — INSULIN GLARGINE 10 UNITS: 100 INJECTION, SOLUTION SUBCUTANEOUS at 21:03

## 2023-08-11 RX ADMIN — ENOXAPARIN SODIUM 40 MG: 40 INJECTION SUBCUTANEOUS at 08:27

## 2023-08-11 RX ADMIN — CEFTRIAXONE 2000 MG: 2 INJECTION, POWDER, FOR SOLUTION INTRAMUSCULAR; INTRAVENOUS at 10:20

## 2023-08-11 RX ADMIN — MEMANTINE 10 MG: 10 TABLET ORAL at 08:27

## 2023-08-11 RX ADMIN — ACETAMINOPHEN 325 MG: 650 SUPPOSITORY RECTAL at 21:54

## 2023-08-11 RX ADMIN — GUAIFENESIN 1200 MG: 600 TABLET ORAL at 08:27

## 2023-08-11 RX ADMIN — POTASSIUM CHLORIDE 40 MEQ: 1.5 SOLUTION ORAL at 08:27

## 2023-08-11 NOTE — ASSESSMENT & PLAN NOTE
Chest x-ray- There is a right mid to lower lung opacity with well-circumscribed medial margin suggests loculated effusion. CT chest- Moderate right pleural effusion with compressive atelectasis over the right lower lobe and posterior right middle and upper lobes, Consolidation in the left lower lobe, probably atelectatic. Superimposed pneumonia cannot be excluded. Soft tissue densities in the bronchial tree as above, appearance favors secretions. IR placed chest tube 8/4  Chest Xray- Diminishing right pleural effusion. Drainage catheter at the right lung base. Bibasilar atelectasis, right more than left.   Repeat C Xray- Appears to be continuing to improve in size, pending official read    Plan-  Appreciate pulmonology recommendations- Suboptimal pleural drainage, trial of 6/6 TPA/Dornase BID per MIST2 trial,  Overall poor surgical candidate and his sister agrees, should source control not be achieved with TPA/Dornase, may consider transition to hospice care - continue ongoing [address] [place] discussions  Chest PT, encourage cough, incentive spirometry   When chest tube output <100 recommend CT Chest   Appreciate infectious disease recommendations- Repeat Blood Cultures  Consider CT CAP with PO and IV contrast to evaluate for possible esophageal leak

## 2023-08-11 NOTE — ASSESSMENT & PLAN NOTE
On admission patient met criteria for severe sepsis secondary to pneumonia due to the loculated effusion  · Blood culture-negative  · Fluid culture-negative  IR chest tube placement 8/4  Procal 0.56  Blood culture- Negative     Plan-  Appreciate ID recommendations-  continue Ceftriaxone   Respiratory protocol  Monitor vital signs  Mucinex 1200 twice daily  See loculated pleural effusion plan

## 2023-08-11 NOTE — ASSESSMENT & PLAN NOTE
Hypoactive delirium and patient  Home meds Aricept 5 mg nightly, olanzapine 5 mg daily, Namenda 10 mg twice daily, trazodone 50 mg at bedtime    Plan-  Holding trazodone 50 mg at bedtime, Zyprexa 5 mg  Delirium precaution  Frequent reorientation  Avoid restraints if able unless danger to himself  Appreciate geriatric recommendations

## 2023-08-11 NOTE — PROGRESS NOTES
Progress Note - Geriatric Medicine   Kimberly Campbell 82 y.o. male MRN: [id number]  Unit/Bed#:  W -01 Encounter: [id number]      Assessment/Plan:  Late onset Alzheimer's dementia with behavior disturbance  · History of Alzheimer's dementia  · Mental status has been waxing and waning this entire hospitalization  · Recommend vitamin B12 supplementation for goal of vitamin B12 level greater than 400  · Recommend checking a TSH with next set of lab  · Maintained at the facility on Aricept 5 mg nightly, olanzapine 5 mg daily, Namenda twice daily, and trazodone 50 mg nightly for insomnia  · Trazodone and Zyprexa remain held-continue to hold  Alert to person only on exam-although did refuse to answer most questions   Alzheimer's dementia, hospitalization, sepsis, sleep disturbance  Recommend delirium precautions  Maintain sleep-wake cycle, avoid nighttime interruptions  minimize overnight interruptions, group overnight vitals/labs/nursing checks as possible  dim lights, close blinds and turn off tv to minimize stimulation and encourage sleep environment in evenings  Provide adequate pain control  Avoid urinary retention and constipation  Provide frequent and early mobilization  Provide frequent redirection and reorientation as needed  Avoid medications that may worsen or precipitate delirium such as tramadol, benzodiazepines, anticholinergics, and Benadryl  Redirect unwanted behaviors as first-line therapy, avoid physical restraints as able to  · Continue to monitor    Severe sepsis  · Presented from [address] [place] [address] with hypoxia and altered mental status  · Met sepsis protocol with leukocytosis, tachycardia, and elevated lactic acid  · Blood cultures no growth after 5 days  · Legionella urine, strep pneumoniae urine negative  · Tmax 101.4  · CT the chest yesterday showed- IMPRESSION:Moderate right pleural effusion with compressive atelectasis over the right lower lobe and posterior right middle and upper lobes.Consolidation in the left lower lobe, probably atelectatic. Superimposed pneumonia cannot be excluded. Soft tissue densities in the bronchial tree as above, appearance favors secretions. · Currently on Rocephin daily  · Infectious disease following  · Management per primary    Loculated pleural effusion  · Underwent IR chest tube placement on 8/4/2023  · With suboptimal pleural drainage has received multiple doses of tPA/dornase  · Pulmonology and infectious disease following  · Poor surgical candidate  · Management per pulmonology and infectious disease    Insomnia  First-line treatment is behavior modification  Maintain sleep-wake cycle, avoid nighttime interruptions  Avoid caffeine throughout the day  Avoid napping throughout the day  Encourage physical activity throughout the day  · Avoid sedative hypnotics including benzodiazepines and benadryl  · Chronically takes trazodone 50 mg nightly-hold for now  · If needed could try melatonin 3 mg which is  · Encouraged To keep patient awake during the day    Ambulatory dysfunction  At a baseline ambulates without AD  PT/OT following  Fall precautions  Out of bed as tolerated  Encourage early and frequent mobilization  Encourage adequate hydration and nutrition  Provide adequate pain management   Goal is undetermined   · Continue with PT/OT for continued strength and balance training     Subjective:   Patient is being seen for a geriatrics follow-up. Upon examination patient was lying in bed, resting. ROS and examination limited by patient participation. He appeared comfortable and was in no acute distress. He initially would not respond to me. Eventually he did tell me he had some pain and was hungry. I asked him more questions that he would not answer. I then asked him if he was ignoring me and he stated " yes". Nursing reports he has refused to eat anything today, they were able to get him to take a couple bites of applesauce with pills.   He did move his bowels yesterday. Per nursing no other acute concerns or issues at this time. Review of Systems   Unable to perform ROS: Dementia         Objective:     Vitals: Blood pressure 113/64, pulse 83, temperature 99.2 °F (37.3 °C), resp. rate 18, height 5' 9" (1.753 m), weight 57.6 kg (126 lb 15.8 oz), SpO2 96 %. ,Body mass index is 18.75 kg/m². Intake/Output Summary (Last 24 hours) at 8/11/2023 1302  Last data filed at 8/11/2023 0501  Gross per 24 hour   Intake 270 ml   Output 990 ml   Net -720 ml       Current Medications: Reviewed    Physical Exam:   Physical Exam  Vitals reviewed. Constitutional:       General: He is sleeping. He is not in acute distress. Appearance: He is well-developed. He is ill-appearing. Comments: Frail-appearing   HENT:      Head: Normocephalic and atraumatic. Cardiovascular:      Rate and Rhythm: Normal rate and regular rhythm. Heart sounds: No murmur heard. Pulmonary:      Effort: Pulmonary effort is normal. No respiratory distress. Breath sounds: Normal breath sounds. Comments: Chest tube in place  Abdominal:      General: Abdomen is flat. Bowel sounds are normal. There is no distension. Palpations: Abdomen is soft. Tenderness: There is no abdominal tenderness. Musculoskeletal:         General: No swelling. Right lower leg: No edema. Left lower leg: No edema. Skin:     General: Skin is warm and dry. Coloration: Skin is pale. Neurological:      Mental Status: He is easily aroused. Mental status is at baseline. Cranial Nerves: No cranial nerve deficit. Motor: Weakness present.       Gait: Gait abnormal.      Comments: Alert to person only on exam          Invasive Devices     Peripheral Intravenous Line  Duration           Peripheral IV 08/08/23 Distal;Right;Ventral (anterior) Forearm 2 days          Drain  Duration           Chest Tube Right 12 Fr. 6 days    External Urinary Catheter 4 days                Lab, Imaging and other studies:    Lab Results:   I have personally reviewed pertinent lab results including the following:  -CBC, BMP, magnesium    I have personally reviewed the following imaging study reports in PACS:  Chest x-ray  - I have personally reviewed pertinent reports. Please note:  Voice-recognition software may have been used in the preparation of this document. Occasional wrong word or "sound-alike" substitutions may have occurred due to the inherent limitations of voice recognition software. Interpretation should be guided by context.

## 2023-08-11 NOTE — ASSESSMENT & PLAN NOTE
Chest x-ray- There is a right mid to lower lung opacity with well-circumscribed medial margin suggests loculated effusion. CT chest- Moderate right pleural effusion with compressive atelectasis over the right lower lobe and posterior right middle and upper lobes, Consolidation in the left lower lobe, probably atelectatic. Superimposed pneumonia cannot be excluded. Soft tissue densities in the bronchial tree as above, appearance favors secretions. IR placed chest tube 8/4  Chest Xray- Diminishing right pleural effusion. Drainage catheter at the right lung base. Bibasilar atelectasis, right more than left.     Plan-  • Appreciate pulmonology recommendations- Suboptimal pleural drainage, trial of 6/6 TPA/Dornase BID per MIST2 trial, 1 hour dwell time post instillation, Return to -[id number] suction when not on dwell time, Continue unasyn - further abx per ID  Overall poor surgical candidate and his sister agrees, should source control not be achieved with TPA/Dornase, may consider transition to hospice care - continue ongoing [address] [place] discussions  Will consider additional TPA/Dornase  Appreciate infectious disease recommendations- transition to ceftriaxone   Chest Xray- Pending

## 2023-08-11 NOTE — PROGRESS NOTES
Progress Note - Infectious Disease   Yasir Foster 82 y.o. male MRN: [id number]  Unit/Bed#: W -01 Encounter: [id number]      Impression/Plan:  1.  Severe sepsis.  Present on admission with leukocytosis and tachycardia as well as a lactate level of 3.2.  Now subsequently has developed a fever. Suspect secondary to a pneumonia with complicating empyema.  No other clear source appreciated.  Suspect the ongoing sepsis is secondary to inadequate source control. Seems to have had improvement since instillation of tPA and dornase. However now spiking high fevers once again. Repeat blood cultures negative thus far  -Antibiotics as below  -Source control measures as below  -Follow-up cultures and adjust the antibiotics needed  -Recheck CBC with differential and CMP  -Follow-up repeat blood cultures  -Supportive care     2.  Streptococcus intermedius empyema.  Suspect the ongoing clinical sepsis is related to inadequate source control.  Increase pleural fluid output since tPA dornase instillation. Some improvement clinically noted. Chest x-ray improved. -Continue ceftriaxone  -Chest tube drainage  -tPA/dornase instillation  -Recheck CT chest once output has dropped to see if chest tube can be pulled  -Close pulmonary follow-up  -Not a surgical candidate for VATS per pulmonary  -Duration of antibiotics will largely depend upon adequacy of source control and whether plan is to continue aggressive treatment     3.  Acute hypoxic respiratory failure.  Appears to be secondary to pneumonia with complicated parapneumonic effusion.  No other clear source appreciated.   Patient is now weaned down to room air  -Oxygen support as needed  -Monitor respiratory status  -Treatment of complicated effusion as above     4.  Acute encephalopathy.  Complicating baseline dementia.  Suspect related to the patient's acute infectious process.  Remains nonverbal.  Imaging of the brain without any abnormality.  -Monitor cognition  -Treat sepsis as above    Discussed the above management plan with the primary service    Will see the patient again in 2023. Please [place] text me if questions over the weekend    Antibiotics:  Ceftriaxone 4  Antibiotics 9  Post chest tube placement 7    Subjective:  Patient has no fever, chills, sweats; no report vomiting, diarrhea; not having any respiratory distress; remains confused. Objective:  Vitals:  Temp:  [98.5 °F (36.9 °C)-102.5 °F (39.2 °C)] 99.2 °F (37.3 °C)  HR:  [] 83  Resp:  [16-18] 18  BP: (113-124)/(59-72) 113/64  SpO2:  [92 %-97 %] 96 %  Temp (24hrs), Av.7 °F (38.2 °C), Min:98.5 °F (36.9 °C), Max:102.5 °F (39.2 °C)  Current: Temperature: 99.2 °F (37.3 °C)    Physical Exam:   General Appearance:  Alert, interactive, nontoxic, no acute distress. Remains confused. Throat: Oropharynx moist without lesions. Lungs:   Decreased breath sounds on the right; no wheezes, rhonchi or rales; respirations unlabored. Right-sided chest tube in place   Heart:  RRR; no murmur, rub or gallop   Abdomen:   Soft, non-tender, non-distended, positive bowel sounds. Extremities: No clubbing, cyanosis or edema   Skin: No new rashes or lesions. No draining wounds noted. Labs, Imaging, & Other studies:   All pertinent labs and imaging studies were personally reviewed  Results from last 7 days   Lab Units 08/11/23  0454 08/10/23  0447 08/09/23  0545   WBC Thousand/uL 12.13* 12.06* 13.28*   HEMOGLOBIN g/dL 8.3* 8.5* 9.7*   PLATELETS Thousands/uL 556* 587* 562*     Results from last 7 days   Lab Units 08/11/23  0454 08/10/23  0447 08/09/23  0545   SODIUM mmol/L 141 140 139   POTASSIUM mmol/L 3.3* 3.8 3.6   CHLORIDE mmol/L 109* 106 104   CO2 mmol/L 24 23 25   BUN mg/dL 22 20 14   CREATININE mg/dL 0.95 0.97 0.89   EGFR ml/min/1.73sq m 75 73 80   CALCIUM mg/dL 7.9* 8.1* 8.2*     Results from last 7 days   Lab Units 23  1137 23  0809 23  1515   BLOOD CULTURE  No Growth at 48 hrs.  No Growth at 48 hrs.  --    GRAM STAIN RESULT   --   --  2+ Polys  No bacteria seen   BODY FLUID CULTURE, STERILE   --   --  No growth     Results from last 7 days   Lab Units 08/08/23  0506   PROCALCITONIN ng/ml 0.56*       Chest x-ray.   Decreased size of the right-sided pleural collection    Images personally reviewed by me in PACS

## 2023-08-11 NOTE — ASSESSMENT & PLAN NOTE
POA platelets 566, pressure review in September 2022 was noted unremarkable  Iron panel: Iron Sat: 14, TIBC: 182, iron: 26  Plt 559     Plan  Likely in the setting of sepsis   Continue to monitor

## 2023-08-11 NOTE — PROGRESS NOTES
Progress Note - Pulmonary   Rodger Self 82 y.o. male MRN: [id number]  Unit/Bed#: W -01 Encounter: [id number]    Assessment:  82year old man with progressive demential and concerns for aspiration presented for hypoxia, large right pleural effusion post thoracentesis and then IR CT placement.       Problem List:  1. Acute hypoxic respiratory failure   2. Right sided Streptococcus intermedius empyema   3. Suspected recurrent aspiration  4. Toxic/metabolic encephalopathy with underlying dementia  5. Fever - suspect related to pleural drainage/irritation of empyema, trend WBC and fever curve     Plan:  1. Acute hypoxic respiratory failure, improved    2. Right sided Streptococcus intermedius empyema   3. Suspected recurrent aspiration  - Right pleural fluid - glu 22, pH 6.9, LDH 1560, TP 4.9, WBC/Diff not sent  - Fluid culture(8/4): 3+ Streptococcus intermedius  - CXR 8/10 showing diminished right pleural effusion, bibasilar atelectasis, CT appears to be very peripheral but could be due to rotation    - CXR 8/11 largely unchanged  - s/p 6/6 TPA/Dornase 8/10   - Continue -20 cmH2O suction   - Flush CT qshift, will repeat CXR in AM   - Infectious disease following, continue abx      4. Toxic/metabolic encephalopathy with underlying dementia  Likely in the setting of right sided empyema  - Treatment as above      5. Fever, improved    - suspect related to pleural drainage/irritation of empyema, trend WBC and fever curve      Subjective:   Patient seen and examined at bedside, no acute events overnight. CT tube w/ 440cc out over 24h. CXR showing stable right pleural effusion. Objective:     Vitals: Blood pressure 117/64, pulse 80, temperature 97.8 °F (36.6 °C), resp. rate 16, height 5' 9" (1.753 m), weight 57.6 kg (126 lb 15.8 oz), SpO2 97 %. ,Body mass index is 18.75 kg/m².       Intake/Output Summary (Last 24 hours) at 8/11/2023 1606  Last data filed at 8/11/2023 1542  Gross per 24 hour   Intake 270 ml   Output 1240 ml   Net -970 ml       Invasive Devices     Peripheral Intravenous Line  Duration           Peripheral IV 08/08/23 Distal;Right;Ventral (anterior) Forearm 3 days          Drain  Duration           Chest Tube Right 12 Fr. 7 days    External Urinary Catheter 4 days                Physical Exam:    Constitutional:       General: He is not in acute distress. Resting in bed sleeping  HENT:      Head: Normocephalic and atraumatic.      Mouth: Mucous membranes are dry. Cardiovascular:      Rate and Rhythm: Normal rate.      Heart sounds: Normal heart sounds. Pulmonary:      Breath sounds: No wheezing.      Comments: Improved rhonchi right lung base, tube in right hemithorax, flushes well with aspiration of pleural fluid  Abdominal:      General: Abdomen is flat. There is no distension.      Palpations: Abdomen is soft. Musculoskeletal:      Right lower leg: No edema.      Left lower leg: No edema.    Skin:     General: Skin is warm and dry.     Labs:   CBC:   Lab Results   Component Value Date    WBC 12.13 (H) 08/11/2023    HGB 8.3 (L) 08/11/2023    HCT 27.4 (L) 08/11/2023    MCV 94 08/11/2023     (H) 08/11/2023    RBC 2.91 (L) 08/11/2023    MCH 28.5 08/11/2023    MCHC 30.3 (L) 08/11/2023    RDW 14.6 08/11/2023    MPV 9.2 08/11/2023    NRBC 0 08/11/2023   , CMP:   Lab Results   Component Value Date    SODIUM 141 08/11/2023    K 3.3 (L) 08/11/2023     (H) 08/11/2023    CO2 24 08/11/2023    BUN 22 08/11/2023    CREATININE 0.95 08/11/2023    CALCIUM 7.9 (L) 08/11/2023    AST 17 08/11/2023    ALT 9 08/11/2023    ALKPHOS 102 08/11/2023    EGFR 75 08/11/2023     Imaging and other studies: I have personally reviewed pertinent films in PACS       CXR 8/6: large right pleural effusion, basilar pigtail CT in place, no PTX  CXR 8/10: showing diminished right pleural effusion, bibasilar atelectasis, CT appears to be very peripheral but could be due to patient rotation    CXR 8/11: Small right pleural effusion, stable.  Atelectasis right lower lung

## 2023-08-11 NOTE — PROGRESS NOTES
[facility]  Progress Note  Name: Shukri Roberts  MRN: [id number]  Unit/Bed#: W -01 I Date of Admission: 8/3/2023   Date of Service: 8/11/2023 I Hospital Day: 8    Assessment/Plan   * Severe sepsis [facility])  Assessment & Plan  On admission patient met criteria for severe sepsis secondary to pneumonia due to the loculated effusion  · Blood culture-negative  · Fluid culture-negative  IR chest tube placement 8/4  Procal 0.56  Blood culture- Negative     Plan-  Appreciate ID recommendations-  continue ceftriaxone   Respiratory protocol  Monitor vital signs  Mucinex 1200 twice daily  See loculated pleural effusion plan               Loculated pleural effusion  Assessment & Plan  Chest x-ray- There is a right mid to lower lung opacity with well-circumscribed medial margin suggests loculated effusion. CT chest- Moderate right pleural effusion with compressive atelectasis over the right lower lobe and posterior right middle and upper lobes, Consolidation in the left lower lobe, probably atelectatic. Superimposed pneumonia cannot be excluded. Soft tissue densities in the bronchial tree as above, appearance favors secretions. IR placed chest tube 8/4  Chest Xray- Diminishing right pleural effusion. Drainage catheter at the right lung base. Bibasilar atelectasis, right more than left.     Plan-  • Appreciate pulmonology recommendations- Suboptimal pleural drainage, trial of 6/6 TPA/Dornase BID per MIST2 trial, 1 hour dwell time post instillation, Return to -[id number] suction when not on dwell time, Continue unasyn - further abx per ID  Overall poor surgical candidate and his sister agrees, should source control not be achieved with TPA/Dornase, may consider transition to hospice care - continue ongoing [address] [facility] discussions  Will consider additional TPA/Dornase  Appreciate infectious disease recommendations- transition to ceftriaxone   Chest Xray- Pending       Type 2 diabetes mellitus [facility])  Assessment & Plan  Lab Results   Component Value Date    HGBA1C 7.0 (H) 08/03/2023       Recent Labs     08/10/23  1626 08/10/23  2049 08/10/23  2306 08/11/23  0726   POCGLU 135 219* 185* 219*       Blood Sugar Average: Last 72 hrs:  (P) 138.8624116484177451   Not on any home regime   Hba1c- 7    Plan-   Was initially started on Lantus 10 and scheduled mealtime  Discontinued mealtime as pt has poor po intake   Hypoglycemia protocol   ISS     Delirium  Assessment & Plan  · Patient likely has hypoactive delirium secondary to sepsis  · Infectious disease consult appreciated and we will continue antibiotics and source control  · We will do nonpharmacological management at this point of time with frequent reorientation and reestablishment of the sleep-wake cycle    Ambulatory dysfunction  Assessment & Plan  · At baseline ambulate without AD, noted unsteadiness new from baseline      Plan-  PT OT- postacute rehabilitation      Encephalopathy acute  Assessment & Plan  · POA at facility noted slurred speech, slow responsive, not at his baseline  · Encephalopathy likely metabolic in the setting of sepsis  · Check CT head : No acute intracranial leasion  · Continue antibiotic as above      Thrombocytosis  Assessment & Plan  · POA platelets 354, pressure review in September 2022 was noted unremarkable  · Iron panel: Iron Sat: 14, TIBC: 182, iron: 26    Plan  Likely in the setting of sepsis   Continue to monitor    Anemia  Assessment & Plan  · POA hemoglobin 8.0, pressure review hemoglobin back in September 2022 noted 10.1. · Unclear if history of melena, hematochezia  · Anemia unclear etiology at this time possibly secondary to iron deficiency given thrombocytosis although this could represent current setting of infection versus underlying malignancy?   Check iron panel: Ferritin 714, Iron Sat: 14, TIBC: 182, Iron: 26  folate, vitamin B12 WNL     Plan  · Monitor output and consider transfusion if less than 7  ·  CBC a.m.      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 Sequential Compression Devices Ordered. Patient Centered Rounds: I performed bedside rounds with nursing staff today. Discussions with Specialists or Other Care Team Provider: Pulmonology, Infectious Disease    Education and Discussions with Family / Patient: Updated  (sister) via phone. Current Length of Stay: 8 day(s)  Current Patient Status: Inpatient   Discharge Plan: Anticipate discharge in 48 hrs to rehab facility. Code Status: Level 3 - DNAR and DNI    Subjective:   Patient was seen and examined at the bedside. Patient was resting comfortably no acute distress. Overnight patient had a Tmax of 102.5, blood pressure stable. Patient denied any complaints at the time my evaluation. Patient was able to respond to my questions remains hypoactive per my exam.    Objective:     Vitals:   Temp (24hrs), Av.7 °F (38.2 °C), Min:98.5 °F (36.9 °C), Max:102.5 °F (39.2 °C)    Temp:  [98.5 °F (36.9 °C)-102.5 °F (39.2 °C)] 99.2 °F (37.3 °C)  HR:  [] 83  Resp:  [16-18] 18  BP: (113-124)/(59-72) 113/64  SpO2:  [92 %-97 %] 96 %  Body mass index is 18.75 kg/m². Input and Output Summary (last 24 hours):      Intake/Output Summary (Last 24 hours) at 2023 08  Last data filed at 2023 0501  Gross per 24 hour   Intake 570 ml   Output 990 ml   Net -420 ml       Physical Exam:   Physical Exam  Vitals and nursing note reviewed. Constitutional:       General: He is not in acute distress. Appearance: He is well-developed. He is ill-appearing. He is not toxic-appearing or diaphoretic. HENT:      Head: Normocephalic and atraumatic. Mouth/Throat:      Mouth: Mucous membranes are moist.   Eyes:      Extraocular Movements: Extraocular movements intact. Conjunctiva/sclera: Conjunctivae normal.      Pupils: Pupils are equal, round, and reactive to light. Cardiovascular:      Rate and Rhythm: Normal rate and regular rhythm. Pulses: Normal pulses. Heart sounds: Normal heart sounds. No murmur heard. Pulmonary:      Effort: Pulmonary effort is normal. No respiratory distress. Breath sounds: Rhonchi present. No wheezing. Comments: Bilaterally    Abdominal:      General: Bowel sounds are normal. There is no distension. Palpations: Abdomen is soft. Tenderness: There is no abdominal tenderness. There is no guarding or rebound. Musculoskeletal:         General: No swelling or tenderness. Normal range of motion. Cervical back: Normal range of motion and neck supple. Right lower leg: No edema. Left lower leg: No edema. Skin:     General: Skin is warm and dry. Capillary Refill: Capillary refill takes less than 2 seconds. Neurological:      Mental Status: He is alert. Mental status is at baseline. He is disoriented. Cranial Nerves: No cranial nerve deficit. Sensory: No sensory deficit. Motor: Weakness present.       Comments: Awake and alert when prompted, easier to arouse, Follows some commands, able to converse and answer some questions appropriately, remains hypoactive    Psychiatric:         Mood and Affect: Mood normal.         Behavior: Behavior normal.          Additional Data:     Labs:  Results from last 7 days   Lab Units 08/11/23  0454 08/06/23  0537 08/05/23  1059   WBC Thousand/uL 12.13*   < > 23.32*   HEMOGLOBIN g/dL 8.3*   < > 8.9*   HEMATOCRIT % 27.4*   < > 28.1*   PLATELETS Thousands/uL 556*   < > 471*   BANDS PCT %  --   --  2   NEUTROS PCT % 75   < >  --    LYMPHS PCT % 15   < >  --    LYMPHO PCT %  --   --  4*   MONOS PCT % 8   < >  --    MONO PCT %  --   --  5   EOS PCT % 1   < > 2    < > = values in this interval not displayed. Results from last 7 days   Lab Units 08/11/23  0454   SODIUM mmol/L 141   POTASSIUM mmol/L 3.3*   CHLORIDE mmol/L 109*   CO2 mmol/L 24   BUN mg/dL 22   CREATININE mg/dL 0.95   ANION GAP mmol/L 8   CALCIUM mg/dL 7.9*   GLUCOSE RANDOM mg/dL 118         Results from last 7 days   Lab Units 08/11/23  0726 08/10/23  2306 08/10/23  2049 08/10/23  1626 08/10/23  1130 08/10/23  0739 08/09/23  2126 08/09/23  1629 08/09/23  1126 08/09/23  0738 08/08/23  2145 08/08/23  1609   POC GLUCOSE mg/dl 219* 185* 219* 135 162* 105 93 124 101 92 102 121         Results from last 7 days   Lab Units 08/08/23  1643 08/08/23  1124 08/08/23  0506   LACTIC ACID mmol/L 1.1 2.4*  --    PROCALCITONIN ng/ml  --   --  0.56*       Lines/Drains:  Invasive Devices     Peripheral Intravenous Line  Duration           Peripheral IV 08/08/23 Distal;Right;Ventral (anterior) Forearm 2 days          Drain  Duration           Chest Tube Right 12 Fr. 6 days    External Urinary Catheter 4 days                      Imaging: Reviewed radiology reports from this admission including: chest xray, chest CT scan, CT head and procedure reports    Recent Cultures (last 7 days):   Results from last 7 days   Lab Units 08/08/23  1137 08/08/23  0809 08/04/23  1515   BLOOD CULTURE  No Growth at 48 hrs.  No Growth at 48 hrs.  --    GRAM STAIN RESULT   --   --  2+ Polys  No bacteria seen   BODY FLUID CULTURE, STERILE   --   --  No growth       Last 24 Hours Medication List:   Current Facility-Administered Medications   Medication Dose Route Frequency Provider Last Rate   • acetaminophen  650 mg Oral Q6H PRN Rylan Rivas MD     • cefTRIAXone  2,000 mg Intravenous Q24H Jeni Lopes MD 2,000 mg (08/10/23 5413)   • donepezil  5 mg Oral HS Radha Cooper MD     • enoxaparin  40 mg Subcutaneous Daily Radha Cooepr MD     • guaiFENesin  1,200 mg Oral Q12H Nathaly King MD     • insulin glargine  10 Units Subcutaneous HS Radha Cooper MD     • insulin lispro  1-5 Units Subcutaneous TID Nathaly King MD     • memantine  10 mg Oral BID Radha Cooper MD     • OLANZapine  5 mg Intramuscular Once Griffin Cole DO     • polyethylene glycol  17 g Oral Daily Rayford Eisenmenger, MD     • senna  1 tablet Oral HS Radha Cooper MD          Today, Patient Was Seen By: Rayford Eisenmenger, MD    **Please Note: This note may have been constructed using a voice recognition system. **

## 2023-08-11 NOTE — ASSESSMENT & PLAN NOTE
Patient likely has hypoactive delirium secondary to sepsis  Infectious disease consult appreciated and we will continue antibiotics and source control  We will do nonpharmacological management at this point of time with frequent reorientation and reestablishment of the sleep-wake cycle

## 2023-08-11 NOTE — ASSESSMENT & PLAN NOTE
Lab Results   Component Value Date    HGBA1C 7.0 (H) 08/03/2023       Recent Labs     08/11/23  1544 08/11/23  2047 08/12/23  0730 08/12/23  1058   POCGLU 92 193* 95 151*       Blood Sugar Average: Last 72 hrs:  (P) 138.7577360165237893   Not on any home regime   Hba1c- 7  Was initially started on Lantus 10 and scheduled mealtime    Plan-   Decreased Lantus to 5U   Discontinued mealtime as pt has poor po intake   Hypoglycemia protocol   ISS

## 2023-08-12 ENCOUNTER — APPOINTMENT (INPATIENT)
Dept: RADIOLOGY | Facility: HOSPITAL | Age: 83
DRG: 871 | End: 2023-08-12
Payer: COMMERCIAL

## 2023-08-12 LAB
ALBUMIN SERPL BCP-MCNC: 2.7 G/DL (ref 3.5–5)
ALP SERPL-CCNC: 104 U/L (ref 34–104)
ALT SERPL W P-5'-P-CCNC: 8 U/L (ref 7–52)
ANION GAP SERPL CALCULATED.3IONS-SCNC: 6 MMOL/L
AST SERPL W P-5'-P-CCNC: 15 U/L (ref 13–39)
BILIRUB SERPL-MCNC: 0.5 MG/DL (ref 0.2–1)
BUN SERPL-MCNC: 16 MG/DL (ref 5–25)
CALCIUM ALBUM COR SERPL-MCNC: 9.2 MG/DL (ref 8.3–10.1)
CALCIUM SERPL-MCNC: 8.2 MG/DL (ref 8.4–10.2)
CHLORIDE SERPL-SCNC: 110 MMOL/L (ref 96–108)
CO2 SERPL-SCNC: 27 MMOL/L (ref 21–32)
CREAT SERPL-MCNC: 0.99 MG/DL (ref 0.6–1.3)
ERYTHROCYTE [DISTWIDTH] IN BLOOD BY AUTOMATED COUNT: 14.6 % (ref 11.6–15.1)
GFR SERPL CREATININE-BSD FRML MDRD: 71 ML/MIN/1.73SQ M
GLUCOSE SERPL-MCNC: 150 MG/DL (ref 65–140)
GLUCOSE SERPL-MCNC: 151 MG/DL (ref 65–140)
GLUCOSE SERPL-MCNC: 94 MG/DL (ref 65–140)
GLUCOSE SERPL-MCNC: 95 MG/DL (ref 65–140)
HCT VFR BLD AUTO: 28 % (ref 36.5–49.3)
HGB BLD-MCNC: 8.6 G/DL (ref 12–17)
MAGNESIUM SERPL-MCNC: 2 MG/DL (ref 1.9–2.7)
MCH RBC QN AUTO: 29.4 PG (ref 26.8–34.3)
MCHC RBC AUTO-ENTMCNC: 30.7 G/DL (ref 31.4–37.4)
MCV RBC AUTO: 96 FL (ref 82–98)
PLATELET # BLD AUTO: 559 THOUSANDS/UL (ref 149–390)
PMV BLD AUTO: 9 FL (ref 8.9–12.7)
POTASSIUM SERPL-SCNC: 3.3 MMOL/L (ref 3.5–5.3)
PROT SERPL-MCNC: 6.1 G/DL (ref 6.4–8.4)
RBC # BLD AUTO: 2.93 MILLION/UL (ref 3.88–5.62)
SODIUM SERPL-SCNC: 143 MMOL/L (ref 135–147)
WBC # BLD AUTO: 13.38 THOUSAND/UL (ref 4.31–10.16)

## 2023-08-12 PROCEDURE — 82948 REAGENT STRIP/BLOOD GLUCOSE: CPT

## 2023-08-12 PROCEDURE — 83735 ASSAY OF MAGNESIUM: CPT

## 2023-08-12 PROCEDURE — 99232 SBSQ HOSP IP/OBS MODERATE 35: CPT | Performed by: INTERNAL MEDICINE

## 2023-08-12 PROCEDURE — 71045 X-RAY EXAM CHEST 1 VIEW: CPT

## 2023-08-12 PROCEDURE — 85027 COMPLETE CBC AUTOMATED: CPT

## 2023-08-12 PROCEDURE — 87040 BLOOD CULTURE FOR BACTERIA: CPT

## 2023-08-12 PROCEDURE — 80053 COMPREHEN METABOLIC PANEL: CPT | Performed by: INTERNAL MEDICINE

## 2023-08-12 RX ORDER — INSULIN GLARGINE 100 [IU]/ML
5 INJECTION, SOLUTION SUBCUTANEOUS
Status: DISCONTINUED | OUTPATIENT
Start: 2023-08-12 | End: 2023-08-19 | Stop reason: HOSPADM

## 2023-08-12 RX ORDER — POTASSIUM CHLORIDE 20MEQ/15ML
40 LIQUID (ML) ORAL ONCE
Status: COMPLETED | OUTPATIENT
Start: 2023-08-12 | End: 2023-08-13

## 2023-08-12 RX ADMIN — INSULIN GLARGINE 5 UNITS: 100 INJECTION, SOLUTION SUBCUTANEOUS at 21:29

## 2023-08-12 RX ADMIN — ENOXAPARIN SODIUM 40 MG: 40 INJECTION SUBCUTANEOUS at 09:38

## 2023-08-12 RX ADMIN — CEFTRIAXONE 2000 MG: 2 INJECTION, POWDER, FOR SOLUTION INTRAMUSCULAR; INTRAVENOUS at 11:03

## 2023-08-12 NOTE — PLAN OF CARE
Problem: SAFETY ADULT  Goal: Maintain or return to baseline ADL function  Description: INTERVENTIONS:  -  Assess patient's ability to carry out ADLs; assess patient's baseline for ADL function and identify physical deficits which impact ability to perform ADLs (bathing, care of mouth/teeth, toileting, grooming, dressing, etc.)  - Assess/evaluate cause of self-care deficits   - Assess range of motion  - Assess patient's mobility; develop plan if impaired  - Assess patient's need for assistive devices and provide as appropriate  - Encourage maximum independence but intervene and supervise when necessary  - Involve family in performance of ADLs  - Assess for home care needs following discharge   - Consider OT consult to assist with ADL evaluation and planning for discharge  - Provide patient education as appropriate  Outcome: Not Progressing  Goal: Maintains/Returns to pre admission functional level  Description: INTERVENTIONS:  - Perform BMAT or MOVE assessment daily.   - Set and communicate daily mobility goal to care team and patient/family/caregiver. - Collaborate with rehabilitation services on mobility goals if consulted  - Perform Range of Motion  times a day. - Reposition patient every  hours.   - Dangle patient  times a day  - Stand patient  times a day  - Ambulate patient  times a day  - Out of bed to chair  times a day   - Out of bed for meals  times a day  - Out of bed for toileting  - Record patient progress and toleration of activity level   Outcome: Not Progressing  Goal: Patient will remain free of falls  Description: INTERVENTIONS:  -  Assess patient's ability to carry out ADLs; assess patient's baseline for ADL function and identify physical deficits which impact ability to perform ADLs (bathing, care of mouth/teeth, toileting, grooming, dressing, etc.)  - Assess/evaluate cause of self-care deficits   - Assess range of motion  - Assess patient's mobility; develop plan if impaired  - Assess patient's need for assistive devices and provide as appropriate  - Encourage maximum independence but intervene and supervise when necessary  - Involve family in performance of ADLs  - Assess for home care needs following discharge   - Consider OT consult to assist with ADL evaluation and planning for discharge  - Provide patient education as appropriate  Outcome: Not Progressing     Problem: INFECTION - ADULT  Goal: Absence or prevention of progression during hospitalization  Description: INTERVENTIONS:  - Assess and monitor for signs and symptoms of infection  - Monitor lab/diagnostic results  - Monitor all insertion sites, i.e. indwelling lines, tubes, and drains  - Monitor endotracheal if appropriate and nasal secretions for changes in amount and color  - [place] appropriate cooling/warming therapies per order  - Administer medications as ordered  - Instruct and encourage patient and family to use good hand hygiene technique  - Identify and instruct in appropriate isolation precautions for identified infection/condition  Outcome: Not Progressing  Goal: Absence of fever/infection during neutropenic period  Description: INTERVENTIONS:  - Monitor WBC    Outcome: Not Progressing     Problem: PAIN - ADULT  Goal: Verbalizes/displays adequate comfort level or baseline comfort level  Description: Interventions:  - Encourage patient to monitor pain and request assistance  - Assess pain using appropriate pain scale  - Administer analgesics based on type and severity of pain and evaluate response  - Implement non-pharmacological measures as appropriate and evaluate response  - Consider cultural and social influences on pain and pain management  - Notify physician/advanced practitioner if interventions unsuccessful or patient reports new pain  Outcome: Not Progressing     Problem: MOBILITY - ADULT  Goal: Maintain or return to baseline ADL function  Description: INTERVENTIONS:  -  Assess patient's ability to carry out ADLs; assess patient's baseline for ADL function and identify physical deficits which impact ability to perform ADLs (bathing, care of mouth/teeth, toileting, grooming, dressing, etc.)  - Assess/evaluate cause of self-care deficits   - Assess range of motion  - Assess patient's mobility; develop plan if impaired  - Assess patient's need for assistive devices and provide as appropriate  - Encourage maximum independence but intervene and supervise when necessary  - Involve family in performance of ADLs  - Assess for home care needs following discharge   - Consider OT consult to assist with ADL evaluation and planning for discharge  - Provide patient education as appropriate  Outcome: Not Progressing  Goal: Maintains/Returns to pre admission functional level  Description: INTERVENTIONS:  - Perform BMAT or MOVE assessment daily.   - Set and communicate daily mobility goal to care team and patient/family/caregiver. - Collaborate with rehabilitation services on mobility goals if consulted  - Perform Range of Motion  times a day. - Reposition patient every  hours.   - Dangle patient  times a day  - Stand patient  times a day  - Ambulate patient  times a day  - Out of bed to chair  times a day   - Out of bed for meals  times a day  - Out of bed for toileting  - Record patient progress and toleration of activity level   Outcome: Not Progressing     Problem: DISCHARGE PLANNING  Goal: Discharge to home or other facility with appropriate resources  Description: INTERVENTIONS:  - Identify barriers to discharge w/patient and caregiver  - Arrange for needed discharge resources and transportation as appropriate  - Identify discharge learning needs (meds, wound care, etc.)  - Arrange for interpretive services to assist at discharge as needed  - Refer to Case Management Department for coordinating discharge planning if the patient needs post-hospital services based on physician/advanced practitioner order or complex needs related to functional status, cognitive ability, or social support system  Outcome: Not Progressing     Problem: Knowledge Deficit  Goal: Patient/family/caregiver demonstrates understanding of disease process, treatment plan, medications, and discharge instructions  Description: Complete learning assessment and assess knowledge base.   Interventions:  - Provide teaching at level of understanding  - Provide teaching via preferred learning methods  Outcome: Not Progressing     Problem: Prexisting or High Potential for Compromised Skin Integrity  Goal: Skin integrity is maintained or improved  Description: INTERVENTIONS:  - Identify patients at risk for skin breakdown  - Assess and monitor skin integrity  - Assess and monitor nutrition and hydration status  - Monitor labs   - Assess for incontinence   - Turn and reposition patient  - Assist with mobility/ambulation  - Relieve pressure over bony prominences  - Avoid friction and shearing  - Provide appropriate hygiene as needed including keeping skin clean and dry  - Evaluate need for skin moisturizer/barrier cream  - Collaborate with interdisciplinary team   - Patient/family teaching  - Consider wound care consult   Outcome: Not Progressing     Problem: SAFETY,RESTRAINT: NV/NON-SELF DESTRUCTIVE BEHAVIOR  Goal: Remains free of harm/injury (restraint for non violent/non self-detsructive behavior)  Description: INTERVENTIONS:  - Instruct patient/family regarding restraint use   - Assess and monitor physiologic and psychological status   - Provide interventions and comfort measures to meet assessed patient needs   - Identify and implement measures to help patient regain control  - Assess readiness for release of restraint   Outcome: Not Progressing  Goal: Returns to optimal restraint-free functioning  Description: INTERVENTIONS:  - Assess the patient's behavior and symptoms that indicate continued need for restraint  - Identify and implement measures to help patient regain control  - Assess readiness for release of restraint   Outcome: Not Progressing     Problem: Nutrition/Hydration-ADULT  Goal: Nutrient/Hydration intake appropriate for improving, restoring or maintaining nutritional needs  Description: Monitor and assess patient's nutrition/hydration status for malnutrition. Collaborate with interdisciplinary team and initiate plan and interventions as ordered. Monitor patient's weight and dietary intake as ordered or per policy. Utilize nutrition screening tool and intervene as necessary. Determine patient's food preferences and provide high-protein, high-caloric foods as appropriate.      INTERVENTIONS:  - Monitor oral intake, urinary output, labs, and treatment plans  - Assess nutrition and hydration status and recommend course of action  - Evaluate amount of meals eaten  - Assist patient with eating if necessary   - Allow adequate time for meals  - Recommend/ encourage appropriate diets, oral nutritional supplements, and vitamin/mineral supplements  - Order, calculate, and assess calorie counts as needed  - Recommend, monitor, and adjust tube feedings and TPN/PPN based on assessed needs  - Assess need for intravenous fluids  - Provide specific nutrition/hydration education as appropriate  - Include patient/family/caregiver in decisions related to nutrition  Outcome: Not Progressing

## 2023-08-12 NOTE — PLAN OF CARE
Problem: MOBILITY - ADULT  Goal: Maintain or return to baseline ADL function  Description: INTERVENTIONS:  -  Assess patient's ability to carry out ADLs; assess patient's baseline for ADL function and identify physical deficits which impact ability to perform ADLs (bathing, care of mouth/teeth, toileting, grooming, dressing, etc.)  - Assess/evaluate cause of self-care deficits   - Assess range of motion  - Assess patient's mobility; develop plan if impaired  - Assess patient's need for assistive devices and provide as appropriate  - Encourage maximum independence but intervene and supervise when necessary  - Involve family in performance of ADLs  - Assess for home care needs following discharge   - Consider OT consult to assist with ADL evaluation and planning for discharge  - Provide patient education as appropriate  Outcome: Progressing  Goal: Maintains/Returns to pre admission functional level  Description: INTERVENTIONS:  - Perform BMAT or MOVE assessment daily.   - Set and communicate daily mobility goal to care team and patient/family/caregiver.    - Collaborate with rehabilitation services on mobility goals if consulted  - Out of bed for toileting  - Record patient progress and toleration of activity level   Outcome: Progressing     Problem: PAIN - ADULT  Goal: Verbalizes/displays adequate comfort level or baseline comfort level  Description: Interventions:  - Encourage patient to monitor pain and request assistance  - Assess pain using appropriate pain scale  - Administer analgesics based on type and severity of pain and evaluate response  - Implement non-pharmacological measures as appropriate and evaluate response  - Consider cultural and social influences on pain and pain management  - Notify physician/advanced practitioner if interventions unsuccessful or patient reports new pain  Outcome: Progressing     Problem: INFECTION - ADULT  Goal: Absence or prevention of progression during hospitalization  Description: INTERVENTIONS:  - Assess and monitor for signs and symptoms of infection  - Monitor lab/diagnostic results  - Monitor all insertion sites, i.e. indwelling lines, tubes, and drains  - Monitor endotracheal if appropriate and nasal secretions for changes in amount and color  - [place] appropriate cooling/warming therapies per order  - Administer medications as ordered  - Instruct and encourage patient and family to use good hand hygiene technique  - Identify and instruct in appropriate isolation precautions for identified infection/condition  Outcome: Progressing  Goal: Absence of fever/infection during neutropenic period  Description: INTERVENTIONS:  - Monitor WBC    Outcome: Progressing     Problem: SAFETY ADULT  Goal: Maintain or return to baseline ADL function  Description: INTERVENTIONS:  -  Assess patient's ability to carry out ADLs; assess patient's baseline for ADL function and identify physical deficits which impact ability to perform ADLs (bathing, care of mouth/teeth, toileting, grooming, dressing, etc.)  - Assess/evaluate cause of self-care deficits   - Assess range of motion  - Assess patient's mobility; develop plan if impaired  - Assess patient's need for assistive devices and provide as appropriate  - Encourage maximum independence but intervene and supervise when necessary  - Involve family in performance of ADLs  - Assess for home care needs following discharge   - Consider OT consult to assist with ADL evaluation and planning for discharge  - Provide patient education as appropriate  Outcome: Progressing  Goal: Maintains/Returns to pre admission functional level  Description: INTERVENTIONS:  - Perform BMAT or MOVE assessment daily.   - Set and communicate daily mobility goal to care team and patient/family/caregiver.    - Collaborate with rehabilitation services on mobility goals if consulted  - Out of bed for toileting  - Record patient progress and toleration of activity level   Outcome: Progressing  Goal: Patient will remain free of falls  Description: INTERVENTIONS:  - Educate patient/family on patient safety including physical limitations  - Instruct patient to call for assistance with activity   - Consult OT/PT to assist with strengthening/mobility   - Keep Call bell within reach  - Keep bed low and locked with side rails adjusted as appropriate  - Keep care items and personal belongings within reach  - Initiate and maintain comfort rounds  - Make Fall Risk Sign visible to staff  - Apply yellow socks and bracelet for high fall risk patients  - Consider moving patient to room near nurses station  Outcome: Progressing     Problem: DISCHARGE PLANNING  Goal: Discharge to home or other facility with appropriate resources  Description: INTERVENTIONS:  - Identify barriers to discharge w/patient and caregiver  - Arrange for needed discharge resources and transportation as appropriate  - Identify discharge learning needs (meds, wound care, etc.)  - Arrange for interpretive services to assist at discharge as needed  - Refer to Case Management Department for coordinating discharge planning if the patient needs post-hospital services based on physician/advanced practitioner order or complex needs related to functional status, cognitive ability, or social support system  Outcome: Progressing     Problem: Knowledge Deficit  Goal: Patient/family/caregiver demonstrates understanding of disease process, treatment plan, medications, and discharge instructions  Description: Complete learning assessment and assess knowledge base.   Interventions:  - Provide teaching at level of understanding  - Provide teaching via preferred learning methods  Outcome: Progressing     Problem: Prexisting or High Potential for Compromised Skin Integrity  Goal: Skin integrity is maintained or improved  Description: INTERVENTIONS:  - Identify patients at risk for skin breakdown  - Assess and monitor skin integrity  - Assess and monitor nutrition and hydration status  - Monitor labs   - Assess for incontinence   - Turn and reposition patient  - Assist with mobility/ambulation  - Relieve pressure over bony prominences  - Avoid friction and shearing  - Provide appropriate hygiene as needed including keeping skin clean and dry  - Evaluate need for skin moisturizer/barrier cream  - Collaborate with interdisciplinary team   - Patient/family teaching  - Consider wound care consult   Outcome: Progressing     Problem: SAFETY,RESTRAINT: NV/NON-SELF DESTRUCTIVE BEHAVIOR  Goal: Remains free of harm/injury (restraint for non violent/non self-detsructive behavior)  Description: INTERVENTIONS:  - Instruct patient/family regarding restraint use   - Assess and monitor physiologic and psychological status   - Provide interventions and comfort measures to meet assessed patient needs   - Identify and implement measures to help patient regain control  - Assess readiness for release of restraint   Outcome: Progressing  Goal: Returns to optimal restraint-free functioning  Description: INTERVENTIONS:  - Assess the patient's behavior and symptoms that indicate continued need for restraint  - Identify and implement measures to help patient regain control  - Assess readiness for release of restraint   Outcome: Progressing     Problem: Nutrition/Hydration-ADULT  Goal: Nutrient/Hydration intake appropriate for improving, restoring or maintaining nutritional needs  Description: Monitor and assess patient's nutrition/hydration status for malnutrition. Collaborate with interdisciplinary team and initiate plan and interventions as ordered. Monitor patient's weight and dietary intake as ordered or per policy. Utilize nutrition screening tool and intervene as necessary. Determine patient's food preferences and provide high-protein, high-caloric foods as appropriate.      INTERVENTIONS:  - Monitor oral intake, urinary output, labs, and treatment plans  - Assess nutrition and hydration status and recommend course of action  - Evaluate amount of meals eaten  - Assist patient with eating if necessary   - Allow adequate time for meals  - Recommend/ encourage appropriate diets, oral nutritional supplements, and vitamin/mineral supplements  - Order, calculate, and assess calorie counts as needed  - Recommend, monitor, and adjust tube feedings and TPN/PPN based on assessed needs  - Assess need for intravenous fluids  - Provide specific nutrition/hydration education as appropriate  - Include patient/family/caregiver in decisions related to nutrition  Outcome: Progressing

## 2023-08-12 NOTE — PROGRESS NOTES
[id number] [facility]  Progress Note  Name: Maddy Melendrez  MRN: [id number]  Unit/Bed#: W -01 I Date of Admission: 8/3/2023   Date of Service: 8/12/2023 I Hospital Day: 9    Assessment/Plan   * Severe sepsis [facility])  Assessment & Plan  On admission patient met criteria for severe sepsis secondary to pneumonia due to the loculated effusion  · Blood culture-negative  · Fluid culture-negative  IR chest tube placement 8/4  Procal 0.56  Blood culture- Negative     Plan-  Appreciate ID recommendations-  continue Ceftriaxone   Respiratory protocol  Monitor vital signs  Mucinex 1200 twice daily  See loculated pleural effusion plan               Loculated pleural effusion  Assessment & Plan  Chest x-ray- There is a right mid to lower lung opacity with well-circumscribed medial margin suggests loculated effusion. CT chest- Moderate right pleural effusion with compressive atelectasis over the right lower lobe and posterior right middle and upper lobes, Consolidation in the left lower lobe, probably atelectatic. Superimposed pneumonia cannot be excluded. Soft tissue densities in the bronchial tree as above, appearance favors secretions. IR placed chest tube 8/4  Chest Xray- Diminishing right pleural effusion. Drainage catheter at the right lung base. Bibasilar atelectasis, right more than left.   Repeat C Xray- Appears to be continuing to improve in size, pending official read    Plan-  Appreciate pulmonology recommendations- Suboptimal pleural drainage, trial of 6/6 TPA/Dornase BID per MIST2 trial,  Overall poor surgical candidate and his sister agrees, should source control not be achieved with TPA/Dornase, may consider transition to hospice care - continue ongoing [address] discussions  Chest PT, encourage cough, incentive spirometry   When chest tube output <100 recommend CT Chest   Appreciate infectious disease recommendations- Repeat Blood Cultures  Consider CT CAP with PO and IV contrast to evaluate for possible esophageal leak        Type 2 diabetes mellitus [facility])  Assessment & Plan  Lab Results   Component Value Date    HGBA1C 7.0 (H) 08/03/2023       Recent Labs     08/11/23  1544 08/11/23  2047 08/12/23  0730 08/12/23  1058   POCGLU 92 193* 95 151*       Blood Sugar Average: Last 72 hrs:  (P) [id number]   Not on any home regime   Hba1c- 7  Was initially started on Lantus 10 and scheduled mealtime    Plan-   Decreased Lantus to 5U   Discontinued mealtime as pt has poor po intake   Hypoglycemia protocol   ISS     Delirium  Assessment & Plan  Patient likely has hypoactive delirium secondary to sepsis  Infectious disease consult appreciated and we will continue antibiotics and source control  We will do nonpharmacological management at this point of time with frequent reorientation and reestablishment of the sleep-wake cycle    Ambulatory dysfunction  Assessment & Plan  · At baseline ambulate without AD, noted unsteadiness new from baseline      Plan-  PT OT- postacute rehabilitation      Encephalopathy acute  Assessment & Plan  · POA at facility noted slurred speech, slow responsive, not at his baseline  · Encephalopathy likely metabolic in the setting of sepsis  · Check CT head : No acute intracranial leasion  · Continue antibiotic as above      Thrombocytosis  Assessment & Plan  POA platelets 509, pressure review in September 2022 was noted unremarkable  Iron panel: Iron Sat: 14, TIBC: 182, iron: 26  Plt 559     Plan  Likely in the setting of sepsis   Continue to monitor    Anemia  Assessment & Plan  · POA hemoglobin 8.0, pressure review hemoglobin back in September 2022 noted 10.1. · Unclear if history of melena, hematochezia  · Anemia unclear etiology at this time possibly secondary to iron deficiency given thrombocytosis although this could represent current setting of infection versus underlying malignancy?   Check iron panel: Ferritin 714, Iron Sat: 14, TIBC: 182, Iron: 26  folate, vitamin B12 WNL     Plan  · Monitor output and consider transfusion if less than 7  ·  CBC a.m. Late onset Alzheimer's dementia with behavioral disturbance ([address])  Assessment & Plan  Hypoactive delirium and patient  Home meds Aricept 5 mg nightly, olanzapine 5 mg daily, Namenda 10 mg twice daily, trazodone 50 mg at bedtime    Plan-  Holding trazodone 50 mg at bedtime, Zyprexa 5 mg  Delirium precaution  Frequent reorientation  Avoid restraints if able unless danger to himself  Appreciate geriatric recommendations           VTE Pharmacologic Prophylaxis: VTE Score: 5 High Risk (Score >/= 5) - Pharmacological DVT Prophylaxis Ordered: enoxaparin (Lovenox). Sequential Compression Devices Ordered. Patient Centered Rounds: I performed bedside rounds with nursing staff today. Discussions with Specialists or Other Care Team Provider: Pulmonology, infectious disease    Education and Discussions with Family / Patient: Updated  (sister) via phone. Current Length of Stay: 9 day(s)  Current Patient Status: Inpatient   Discharge Plan: Anticipate discharge in 48-72 hrs to rehab facility. Code Status: Level 3 - DNAR and DNI    Subjective:   Patient was seen and examined at the bedside. Patient was resting comfortably in no overt acute distress. Upon my evaluation patient was more difficult to arouse this morning however was able to open eyes and answer minimal questions. After revisiting the patient patient was seen upright alert talkative and eating breakfast with the help of patient care assistant. Patient denied any complaints. Objective:     Vitals:   Temp (24hrs), Av.2 °F (37.3 °C), Min:97.7 °F (36.5 °C), Max:101.6 °F (38.7 °C)    Temp:  [97.7 °F (36.5 °C)-101.6 °F (38.7 °C)] 97.7 °F (36.5 °C)  HR:  [] 81  Resp:  [15-28] 15  BP: (108-149)/(56-69) 108/56  SpO2:  [89 %-99 %] 98 %  Body mass index is 18.26 kg/m². Input and Output Summary (last 24 hours):      Intake/Output Summary (Last 24 hours) at 8/12/2023 1348  Last data filed at 8/12/2023 1259  Gross per 24 hour   Intake 320 ml   Output 686 ml   Net -366 ml       Physical Exam:   Physical Exam  Vitals and nursing note reviewed. Constitutional:       General: He is not in acute distress. Appearance: He is well-developed. He is ill-appearing. He is not toxic-appearing or diaphoretic. HENT:      Head: Normocephalic and atraumatic. Mouth/Throat:      Mouth: Mucous membranes are moist.   Eyes:      Extraocular Movements: Extraocular movements intact. Conjunctiva/sclera: Conjunctivae normal.      Pupils: Pupils are equal, round, and reactive to light. Cardiovascular:      Rate and Rhythm: Normal rate and regular rhythm. Pulses: Normal pulses. Heart sounds: Normal heart sounds. No murmur heard. Pulmonary:      Effort: Pulmonary effort is normal. No respiratory distress. Breath sounds: Rhonchi present. No wheezing. Comments: Bilaterally    Abdominal:      General: Bowel sounds are normal. There is no distension. Palpations: Abdomen is soft. Tenderness: There is no abdominal tenderness. There is no guarding or rebound. Musculoskeletal:         General: No swelling or tenderness. Normal range of motion. Cervical back: Normal range of motion and neck supple. Right lower leg: No edema. Left lower leg: No edema. Skin:     General: Skin is warm and dry. Capillary Refill: Capillary refill takes less than 2 seconds. Neurological:      Mental Status: He is alert. Mental status is at baseline. He is disoriented. Cranial Nerves: No cranial nerve deficit. Sensory: No sensory deficit. Motor: Weakness present. Comments: Awake and alert when thoroughly prompted, more difficult to arouse today, follows some commands, able to converse and answer some questions appropriately, remains hypoactive overall.   Does have bouts throughout the day of awake and alertness   Psychiatric: Mood and Affect: Mood normal.          Additional Data:     Labs:  Results from last 7 days   Lab Units 08/12/23  0517 08/11/23  0454   WBC Thousand/uL 13.38* 12.13*   HEMOGLOBIN g/dL 8.6* 8.3*   HEMATOCRIT % 28.0* 27.4*   PLATELETS Thousands/uL 559* 556*   NEUTROS PCT %  --  75   LYMPHS PCT %  --  15   MONOS PCT %  --  8   EOS PCT %  --  1     Results from last 7 days   Lab Units 08/12/23  0517   SODIUM mmol/L 143   POTASSIUM mmol/L 3.3*   CHLORIDE mmol/L 110*   CO2 mmol/L 27   BUN mg/dL 16   CREATININE mg/dL 0.99   ANION GAP mmol/L 6   CALCIUM mg/dL 8.2*   ALBUMIN g/dL 2.7*   TOTAL BILIRUBIN mg/dL 0.50   ALK PHOS U/L 104   ALT U/L 8   AST U/L 15   GLUCOSE RANDOM mg/dL 94         Results from last 7 days   Lab Units 08/12/23  1058 08/12/23  0730 08/11/23  2047 08/11/23  1544 08/11/23  1108 08/11/23  0726 08/10/23  2306 08/10/23  2049 08/10/23  1626 08/10/23  1130 08/10/23  0739 08/09/23  2126   POC GLUCOSE mg/dl 151* 95 193* 92 113 219* 185* 219* 135 162* 105 93         Results from last 7 days   Lab Units 08/08/23  1643 08/08/23  1124 08/08/23  0506   LACTIC ACID mmol/L 1.1 2.4*  --    PROCALCITONIN ng/ml  --   --  0.56*       Lines/Drains:  Invasive Devices     Peripheral Intravenous Line  Duration           Peripheral IV 08/11/23 Proximal;Right;Ventral (anterior) Forearm <1 day          Drain  Duration           Chest Tube Right 12 Fr. 7 days    External Urinary Catheter 5 days                      Imaging: Reviewed radiology reports from this admission including: chest xray, chest CT scan, CT head and procedure reports    Recent Cultures (last 7 days):   Results from last 7 days   Lab Units 08/08/23  1137 08/08/23  0809   BLOOD CULTURE  No Growth at 72 hrs. No Growth at 72 hrs.        Last 24 Hours Medication List:   Current Facility-Administered Medications   Medication Dose Route Frequency Provider Last Rate   • acetaminophen  325 mg Rectal Q4H PRN Shen Russell DO     • acetaminophen  650 mg Oral Q6H PRN Mary Anne Rowell MD     • cefTRIAXone  2,000 mg Intravenous Q24H Wyatt Danielson MD 2,000 mg (08/12/23 1103)   • donepezil  5 mg Oral HS Mary Anne Rowell MD     • enoxaparin  40 mg Subcutaneous Daily Mary Anne Rowell MD     • guaiFENesin  1,200 mg Oral Q12H [facility] MD Kim     • insulin glargine  5 Units Subcutaneous HS Andrew Coreas MD     • insulin lispro  1-5 Units Subcutaneous TID [facility] Mary Anne Rowell MD     • insulin lispro  2 Units Subcutaneous TID With Meals Andrew Coreas MD     • memantine  10 mg Oral BID Mary Anne Rowell MD     • OLANZapine  5 mg Intramuscular Once Nahomi More, DO     • polyethylene glycol  17 g Oral Daily Andrew Coreas MD     • potassium chloride  40 mEq Oral Once Andrew Coreas MD     • senna  1 tablet Oral HS Mary Anne Rowell MD          Today, Patient Was Seen By: Andrew Coreas MD    **Please Note: This note may have been constructed using a voice recognition system. **

## 2023-08-12 NOTE — PROGRESS NOTES
Progress Note - Pulmonary   Lo Dux 82 y.o. male MRN: [id number]  Unit/Bed#: W -01 Encounter: [id number]    Assessment/Plan:    1. Acute pulmonary insufficiency likely multifaceted as listed below        -Currently on room air-98%, does not wear home O2        -Maintain saturations greater than 88%        -Encourage pulmonary toileting: Chest PT, encourage deep breathing and coughing    2. Streptococcus intermedius empyema        -8/4/2023- 12Fr CT placed        -s/p 6 doses of tPA/dornase        -output: 24hr- 296mL   total output- 3211        -Suspect recurrent aspiration        -Chest x-ray shows improving empyema        -Chest tube flushed with 10 mL NSS        -Given output over the last 24 hours we will continue chest tube for today to -20 cm med wall suction        -Once chest tube output is between 50-100mL we will obtain chest CT        -ID following        -Day # 5-ceftriaxone, total of 10 days antibiotics        -Extremely poor candidate for VATS/decortication          3. Toxic/metabolic encephalopathy with underlying dementia        -Continue supportive care        -Maintain appropriate sleep-wake cycles          Subjective: Cheyenne Leonard was comfortably sitting in bed. Patient was extremely difficult to arouse. No significant overnight events reported. Patient currently denying any fevers, chills, abscess, headaches, night sweats, pleuritic chest pain, or palpations. Objective:    Vitals: Blood pressure 108/56, pulse 81, temperature 97.7 °F (36.5 °C), resp. rate 15, height 5' 9" (1.753 m), weight 56.1 kg (123 lb 10.9 oz), SpO2 98 %. ra,Body mass index is 18.26 kg/m².       Intake/Output Summary (Last 24 hours) at 8/12/2023 1127  Last data filed at 8/12/2023 1854  Gross per 24 hour   Intake 100 ml   Output 686 ml   Net -586 ml       Invasive Devices     Peripheral Intravenous Line  Duration           Peripheral IV 08/11/23 Proximal;Right;Ventral (anterior) Forearm <1 day          Drain Duration           Chest Tube Right 12 Fr. 7 days    External Urinary Catheter 5 days                Physical Exam:   Physical Exam  Constitutional:       General: He is not in acute distress. Appearance: Normal appearance. He is normal weight. He is ill-appearing. HENT:      Head: Normocephalic and atraumatic. Nose: Nose normal. No congestion or rhinorrhea. Mouth/Throat:      Mouth: Mucous membranes are dry. Pharynx: Oropharynx is clear. No oropharyngeal exudate or posterior oropharyngeal erythema. Cardiovascular:      Rate and Rhythm: Normal rate and regular rhythm. Pulses: Normal pulses. Heart sounds: Normal heart sounds. No murmur heard. No friction rub. No gallop. Pulmonary:      Effort: Pulmonary effort is normal. No tachypnea, bradypnea, accessory muscle usage or respiratory distress. Breath sounds: Decreased air movement present. No stridor. Decreased breath sounds and rhonchi present. No wheezing or rales. Chest:      Chest wall: No tenderness. Abdominal:      General: Abdomen is flat. Bowel sounds are normal. There is no distension. Palpations: Abdomen is soft. There is no mass. Musculoskeletal:         General: No swelling or tenderness. Normal range of motion. Cervical back: Normal range of motion. No rigidity or tenderness. Skin:     General: Skin is warm and dry. Coloration: Skin is not jaundiced or pale. Neurological:      General: No focal deficit present. Mental Status: He is alert. Mental status is at baseline. Psychiatric:         Mood and Affect: Mood normal.         Behavior: Behavior normal.         Labs: I have personally reviewed pertinent lab results. , BNP: No results found for: "BNP", CBC:   Lab Results   Component Value Date    WBC 13.38 (H) 08/12/2023    HGB 8.6 (L) 08/12/2023    HCT 28.0 (L) 08/12/2023    MCV 96 08/12/2023     (H) 08/12/2023    RBC 2.93 (L) 08/12/2023    MCH 29.4 08/12/2023    MCHC 30.7 (L) 08/12/2023    RDW 14.6 08/12/2023    MPV 9.0 08/12/2023   , CMP:   Lab Results   Component Value Date    SODIUM 143 08/12/2023    K 3.3 (L) 08/12/2023     (H) 08/12/2023    CO2 27 08/12/2023    BUN 16 08/12/2023    CREATININE 0.99 08/12/2023    CALCIUM 8.2 (L) 08/12/2023    AST 15 08/12/2023    ALT 8 08/12/2023    ALKPHOS 104 08/12/2023    EGFR 71 08/12/2023   , PT/INR: No results found for: "PT", "INR"      Imaging and other studies: I have personally reviewed pertinent films in PACS     Sent large right pleural effusion

## 2023-08-13 LAB
ANION GAP SERPL CALCULATED.3IONS-SCNC: 8 MMOL/L
BACTERIA BLD CULT: NORMAL
BACTERIA BLD CULT: NORMAL
BASOPHILS # BLD AUTO: 0.04 THOUSANDS/ÂΜL (ref 0–0.1)
BASOPHILS NFR BLD AUTO: 1 % (ref 0–1)
BUN SERPL-MCNC: 14 MG/DL (ref 5–25)
CALCIUM SERPL-MCNC: 8 MG/DL (ref 8.4–10.2)
CHLORIDE SERPL-SCNC: 109 MMOL/L (ref 96–108)
CO2 SERPL-SCNC: 27 MMOL/L (ref 21–32)
CREAT SERPL-MCNC: 0.89 MG/DL (ref 0.6–1.3)
EOSINOPHIL # BLD AUTO: 0.19 THOUSAND/ÂΜL (ref 0–0.61)
EOSINOPHIL NFR BLD AUTO: 2 % (ref 0–6)
ERYTHROCYTE [DISTWIDTH] IN BLOOD BY AUTOMATED COUNT: 14.6 % (ref 11.6–15.1)
GFR SERPL CREATININE-BSD FRML MDRD: 80 ML/MIN/1.73SQ M
GLUCOSE SERPL-MCNC: 101 MG/DL (ref 65–140)
GLUCOSE SERPL-MCNC: 101 MG/DL (ref 65–140)
GLUCOSE SERPL-MCNC: 143 MG/DL (ref 65–140)
GLUCOSE SERPL-MCNC: 144 MG/DL (ref 65–140)
GLUCOSE SERPL-MCNC: 210 MG/DL (ref 65–140)
HCT VFR BLD AUTO: 26.9 % (ref 36.5–49.3)
HGB BLD-MCNC: 8.3 G/DL (ref 12–17)
IMM GRANULOCYTES # BLD AUTO: 0.04 THOUSAND/UL (ref 0–0.2)
IMM GRANULOCYTES NFR BLD AUTO: 1 % (ref 0–2)
LYMPHOCYTES # BLD AUTO: 1.58 THOUSANDS/ÂΜL (ref 0.6–4.47)
LYMPHOCYTES NFR BLD AUTO: 19 % (ref 14–44)
MAGNESIUM SERPL-MCNC: 2.1 MG/DL (ref 1.9–2.7)
MCH RBC QN AUTO: 29.4 PG (ref 26.8–34.3)
MCHC RBC AUTO-ENTMCNC: 30.9 G/DL (ref 31.4–37.4)
MCV RBC AUTO: 95 FL (ref 82–98)
MONOCYTES # BLD AUTO: 0.63 THOUSAND/ÂΜL (ref 0.17–1.22)
MONOCYTES NFR BLD AUTO: 8 % (ref 4–12)
NEUTROPHILS # BLD AUTO: 5.95 THOUSANDS/ÂΜL (ref 1.85–7.62)
NEUTS SEG NFR BLD AUTO: 69 % (ref 43–75)
NRBC BLD AUTO-RTO: 0 /100 WBCS
PLATELET # BLD AUTO: 543 THOUSANDS/UL (ref 149–390)
PMV BLD AUTO: 9 FL (ref 8.9–12.7)
POTASSIUM SERPL-SCNC: 3.2 MMOL/L (ref 3.5–5.3)
RBC # BLD AUTO: 2.82 MILLION/UL (ref 3.88–5.62)
SODIUM SERPL-SCNC: 144 MMOL/L (ref 135–147)
WBC # BLD AUTO: 8.43 THOUSAND/UL (ref 4.31–10.16)

## 2023-08-13 PROCEDURE — 82948 REAGENT STRIP/BLOOD GLUCOSE: CPT

## 2023-08-13 PROCEDURE — 99232 SBSQ HOSP IP/OBS MODERATE 35: CPT | Performed by: INTERNAL MEDICINE

## 2023-08-13 PROCEDURE — 80048 BASIC METABOLIC PNL TOTAL CA: CPT

## 2023-08-13 PROCEDURE — 92526 ORAL FUNCTION THERAPY: CPT

## 2023-08-13 PROCEDURE — 85025 COMPLETE CBC W/AUTO DIFF WBC: CPT

## 2023-08-13 PROCEDURE — 83735 ASSAY OF MAGNESIUM: CPT

## 2023-08-13 RX ORDER — POTASSIUM CHLORIDE 20 MEQ/1
20 TABLET, EXTENDED RELEASE ORAL ONCE
Status: DISCONTINUED | OUTPATIENT
Start: 2023-08-13 | End: 2023-08-13

## 2023-08-13 RX ORDER — POTASSIUM CHLORIDE 20MEQ/15ML
20 LIQUID (ML) ORAL ONCE
Status: COMPLETED | OUTPATIENT
Start: 2023-08-13 | End: 2023-08-13

## 2023-08-13 RX ORDER — POTASSIUM CHLORIDE 14.9 MG/ML
20 INJECTION INTRAVENOUS
Status: DISCONTINUED | OUTPATIENT
Start: 2023-08-13 | End: 2023-08-13

## 2023-08-13 RX ADMIN — POTASSIUM CHLORIDE 20 MEQ: 20 SOLUTION ORAL at 11:30

## 2023-08-13 RX ADMIN — GUAIFENESIN 1200 MG: 600 TABLET ORAL at 10:49

## 2023-08-13 RX ADMIN — ENOXAPARIN SODIUM 40 MG: 40 INJECTION SUBCUTANEOUS at 10:49

## 2023-08-13 RX ADMIN — CEFTRIAXONE 2000 MG: 2 INJECTION, POWDER, FOR SOLUTION INTRAMUSCULAR; INTRAVENOUS at 11:07

## 2023-08-13 RX ADMIN — POTASSIUM CHLORIDE 40 MEQ: 1.5 SOLUTION ORAL at 10:53

## 2023-08-13 RX ADMIN — POTASSIUM CHLORIDE 20 MEQ: 14.9 INJECTION, SOLUTION INTRAVENOUS at 08:14

## 2023-08-13 RX ADMIN — MEMANTINE 10 MG: 10 TABLET ORAL at 10:49

## 2023-08-13 RX ADMIN — MEMANTINE 10 MG: 10 TABLET ORAL at 18:30

## 2023-08-13 RX ADMIN — INSULIN GLARGINE 5 UNITS: 100 INJECTION, SOLUTION SUBCUTANEOUS at 21:28

## 2023-08-13 NOTE — QUICK NOTE
I have seen and examined the patient. I agree with progress note by internal medicine resident Dr. Felix Walker MD    Patient is lethargic, poor appetite. RN reports an episode of diarrhea. Vitals:    08/13/23 0600 08/13/23 0743 08/13/23 1541 08/13/23 1542   BP:  121/60 123/64 123/64   Pulse:  81  81   Resp:  21     Temp:  98.1 °F (36.7 °C) 98 °F (36.7 °C) 98 °F (36.7 °C)   TempSrc:       SpO2:  93%  95%   Weight: 56.4 kg (124 lb 5.4 oz)      Height:           Comfortably in bed, features of protein calorie malnutrition noted, asthenia noted, neck supple, lungs diminished breath sounds, chest tube noted right side, heart sounds S1-S2 noted, abdomen soft, awake, nonverbal, no rash        Assessment and plan:    • Severe sepsis present on admission  • Loculated right pleural effusion tPA/dornase per pulmonary. Chest tube in place. Chest tube management per pulmonary. Presently on IV ceftriaxone 2 g every 24 hours per infectious disease. • Acute hypoxic respiratory failure multifactorial with empyema/loculated pleural effusion contributing, continue supplemental oxygen to keep with sats more than 90 to 92%, encourage incentive spirometry as able  • Dysphagia on modified dysphagia diet, aspiration precautions speech therapy following  • Encephalopathy multifactorial waxing and waning mental state noted, optimize metabolic parameters, avoid neurotoxins  • Anemia multifactorial monitor hemoglobin  • Dementia monitor for delirium, reorientation, sleep hygiene  • Goals of care overall guarded prognosis, goals of care ongoing    Called updated POA Sister Tyrone Middleton in detail, questions answered.   Overall guarded prognosis      Wilder Judge MD

## 2023-08-13 NOTE — ASSESSMENT & PLAN NOTE
Chest x-ray- There is a right mid to lower lung opacity with well-circumscribed medial margin suggests loculated effusion. CT chest- Moderate right pleural effusion with compressive atelectasis over the right lower lobe and posterior right middle and upper lobes, Consolidation in the left lower lobe, probably atelectatic. Superimposed pneumonia cannot be excluded. Soft tissue densities in the bronchial tree as above, appearance favors secretions. IR placed chest tube 8/4  Chest Xray- Diminishing right pleural effusion. Drainage catheter at the right lung base. Bibasilar atelectasis, right more than left.   Repeat C Xray- Appears to be continuing to improve in size, pending official read    Plan-  Appreciate pulmonology recommendations- Suboptimal pleural drainage, trial of 6/6 TPA/Dornase BID per MIST2 trial,  Overall poor surgical candidate and his sister agrees, should source control not be achieved with TPA/Dornase, may consider transition to hospice care - continue ongoing [address] [place] discussions  Chest PT, encourage cough, incentive spirometry   When chest tube output <100 recommend CT Chest   Appreciate infectious disease recommendations- Repeat Blood Cultures  Consider CT CAP with PO and IV contrast to evaluate for possible esophageal leak

## 2023-08-13 NOTE — PROGRESS NOTES
Progress Note - Pulmonary   Leigh Ann Lima 82 y.o. male MRN: [id number]  Unit/Bed#: W -01 Encounter: [id number]    Assessment/Plan:    1. Acute pulmonary insufficiency likely multifaceted as listed below        -Currently on room air-91%, does not wear home O2        -Maintain saturations greater than 88%        -Pulmonary toileting: Deep breathing cough, chest PT, attempt OOB     2.  Streptococcus intermedius empyema        -8/4/2023- 12Fr CT placed        -s/p 6 doses of tPA/dornase        -Output: 24 hr- 220    Total- 3306        -Likely secondary to recurrent aspiration        -Continue CT -20 med wall suction until output is < 100ml        -Once output is less than 100 mL will obtain chest CT        -Day # 6-ceftriaxone, total 11 days of antibiotic          3. Toxic/metabolic encephalopathy with underlying dementia        -Likely secondary to acute infectious process        -Mental status improving        -Continue appropriate sleep-wake cycle        Subjective: Maddy Temple was comfortably sitting in his bed. Patient currently denying any fevers, chills, abscess, headaches, night sweats, pleuritic chest pain, or palpations. Objective:    Vitals: Blood pressure 121/60, pulse 81, temperature 98.1 °F (36.7 °C), resp. rate 21, height 5' 9" (1.753 m), weight 56.4 kg (124 lb 5.4 oz), SpO2 93 %. 2,Body mass index is 18.36 kg/m². Intake/Output Summary (Last 24 hours) at 8/13/2023 1203  Last data filed at 8/13/2023 3394  Gross per 24 hour   Intake 130 ml   Output 470 ml   Net -340 ml       Invasive Devices     Peripheral Intravenous Line  Duration           Peripheral IV 08/11/23 Proximal;Right;Ventral (anterior) Forearm 1 day          Drain  Duration           Chest Tube Right 12 Fr. 8 days    External Urinary Catheter 6 days                Physical Exam:   Physical Exam  Constitutional:       General: He is not in acute distress. Appearance: Normal appearance. He is normal weight.  He is not ill-appearing. HENT:      Head: Normocephalic and atraumatic. Nose: Nose normal. No congestion or rhinorrhea. Mouth/Throat:      Mouth: Mucous membranes are dry. Pharynx: Oropharynx is clear. No oropharyngeal exudate or posterior oropharyngeal erythema. Cardiovascular:      Rate and Rhythm: Normal rate and regular rhythm. Pulses: Normal pulses. Heart sounds: Normal heart sounds. No murmur heard. No friction rub. No gallop. Pulmonary:      Effort: Pulmonary effort is normal. No tachypnea, bradypnea, accessory muscle usage or respiratory distress. Breath sounds: Decreased air movement present. No stridor. Decreased breath sounds present. No wheezing, rhonchi or rales. Comments: Diminished aeration  Chest:      Chest wall: No tenderness. Abdominal:      General: Abdomen is flat. Bowel sounds are normal. There is no distension. Palpations: Abdomen is soft. There is no mass. Musculoskeletal:         General: No swelling or tenderness. Normal range of motion. Cervical back: Normal range of motion. No rigidity or tenderness. Skin:     General: Skin is warm and dry. Coloration: Skin is not jaundiced or pale. Neurological:      General: No focal deficit present. Mental Status: He is alert and oriented to person, place, and time. Mental status is at baseline. Psychiatric:         Mood and Affect: Mood normal.         Behavior: Behavior normal.         Labs: I have personally reviewed pertinent lab results. , ABG: No results found for: "PHART", "LLU2LOH", "PO2ART", "WNO0NHI", "C1HSELFG", "BEART", "SOURCE", BNP: No results found for: "BNP", CBC:   Lab Results   Component Value Date    WBC 8.43 08/13/2023    HGB 8.3 (L) 08/13/2023    HCT 26.9 (L) 08/13/2023    MCV 95 08/13/2023     (H) 08/13/2023    RBC 2.82 (L) 08/13/2023    MCH 29.4 08/13/2023    MCHC 30.9 (L) 08/13/2023    RDW 14.6 08/13/2023    MPV 9.0 08/13/2023    NRBC 0 08/13/2023   , CMP: Lab Results   Component Value Date    SODIUM 144 08/13/2023    K 3.2 (L) 08/13/2023     (H) 08/13/2023    CO2 27 08/13/2023    BUN 14 08/13/2023    CREATININE 0.89 08/13/2023    CALCIUM 8.0 (L) 08/13/2023    EGFR 80 08/13/2023         Imaging and other studies: I have personally reviewed pertinent films in PACS     Sent large right pleural effusion

## 2023-08-13 NOTE — ASSESSMENT & PLAN NOTE
Lab Results   Component Value Date    HGBA1C 7.0 (H) 08/03/2023       Recent Labs     08/12/23 2059 08/13/23  0741 08/13/23  1152 08/13/23  1539   POCGLU 150* 101 144* 143*       Blood Sugar Average: Last 72 hrs:  (P) 188.9855405720979581   Not on any home regime   Hba1c- 7  Was initially started on Lantus 10 and scheduled mealtime    Plan-   Decreased Lantus to 5U   Discontinued mealtime as pt has poor po intake   Hypoglycemia protocol   ISS

## 2023-08-13 NOTE — PLAN OF CARE
Problem: MOBILITY - ADULT  Goal: Maintain or return to baseline ADL function  Description: INTERVENTIONS:  -  Assess patient's ability to carry out ADLs; assess patient's baseline for ADL function and identify physical deficits which impact ability to perform ADLs (bathing, care of mouth/teeth, toileting, grooming, dressing, etc.)  - Assess/evaluate cause of self-care deficits   - Assess range of motion  - Assess patient's mobility; develop plan if impaired  - Assess patient's need for assistive devices and provide as appropriate  - Encourage maximum independence but intervene and supervise when necessary  - Involve family in performance of ADLs  - Assess for home care needs following discharge   - Consider OT consult to assist with ADL evaluation and planning for discharge  - Provide patient education as appropriate  Outcome: Progressing  Goal: Maintains/Returns to pre admission functional level  Description: INTERVENTIONS:  - Perform BMAT or MOVE assessment daily.   - Set and communicate daily mobility goal to care team and patient/family/caregiver. - Collaborate with rehabilitation services on mobility goals if consulted  - Perform Range of Motion  times a day. - Reposition patient every  hours.   - Dangle patient  times a day  - Stand patient  times a day  - Ambulate patient  times a day  - Out of bed to chair  times a day   - Out of bed for meals  times a day  - Out of bed for toileting  - Record patient progress and toleration of activity level   Outcome: Progressing     Problem: PAIN - ADULT  Goal: Verbalizes/displays adequate comfort level or baseline comfort level  Description: Interventions:  - Encourage patient to monitor pain and request assistance  - Assess pain using appropriate pain scale  - Administer analgesics based on type and severity of pain and evaluate response  - Implement non-pharmacological measures as appropriate and evaluate response  - Consider cultural and social influences on pain and pain management  - Notify physician/advanced practitioner if interventions unsuccessful or patient reports new pain  Outcome: Progressing     Problem: INFECTION - ADULT  Goal: Absence or prevention of progression during hospitalization  Description: INTERVENTIONS:  - Assess and monitor for signs and symptoms of infection  - Monitor lab/diagnostic results  - Monitor all insertion sites, i.e. indwelling lines, tubes, and drains  - Monitor endotracheal if appropriate and nasal secretions for changes in amount and color  - [place] appropriate cooling/warming therapies per order  - Administer medications as ordered  - Instruct and encourage patient and family to use good hand hygiene technique  - Identify and instruct in appropriate isolation precautions for identified infection/condition  Outcome: Progressing  Goal: Absence of fever/infection during neutropenic period  Description: INTERVENTIONS:  - Monitor WBC    Outcome: Progressing     Problem: SAFETY ADULT  Goal: Maintain or return to baseline ADL function  Description: INTERVENTIONS:  -  Assess patient's ability to carry out ADLs; assess patient's baseline for ADL function and identify physical deficits which impact ability to perform ADLs (bathing, care of mouth/teeth, toileting, grooming, dressing, etc.)  - Assess/evaluate cause of self-care deficits   - Assess range of motion  - Assess patient's mobility; develop plan if impaired  - Assess patient's need for assistive devices and provide as appropriate  - Encourage maximum independence but intervene and supervise when necessary  - Involve family in performance of ADLs  - Assess for home care needs following discharge   - Consider OT consult to assist with ADL evaluation and planning for discharge  - Provide patient education as appropriate  Outcome: Progressing  Goal: Maintains/Returns to pre admission functional level  Description: INTERVENTIONS:  - Perform BMAT or MOVE assessment daily.   - Set and communicate daily mobility goal to care team and patient/family/caregiver. - Collaborate with rehabilitation services on mobility goals if consulted  - Perform Range of Motion  times a day. - Reposition patient every  hours.   - Dangle patient  times a day  - Stand patient  times a day  - Ambulate patient  times a day  - Out of bed to chair  times a day   - Out of bed for meals  times a day  - Out of bed for toileting  - Record patient progress and toleration of activity level   Outcome: Progressing  Goal: Patient will remain free of falls  Description: INTERVENTIONS:  -  Assess patient's ability to carry out ADLs; assess patient's baseline for ADL function and identify physical deficits which impact ability to perform ADLs (bathing, care of mouth/teeth, toileting, grooming, dressing, etc.)  - Assess/evaluate cause of self-care deficits   - Assess range of motion  - Assess patient's mobility; develop plan if impaired  - Assess patient's need for assistive devices and provide as appropriate  - Encourage maximum independence but intervene and supervise when necessary  - Involve family in performance of ADLs  - Assess for home care needs following discharge   - Consider OT consult to assist with ADL evaluation and planning for discharge  - Provide patient education as appropriate  Outcome: Progressing     Problem: DISCHARGE PLANNING  Goal: Discharge to home or other facility with appropriate resources  Description: INTERVENTIONS:  - Identify barriers to discharge w/patient and caregiver  - Arrange for needed discharge resources and transportation as appropriate  - Identify discharge learning needs (meds, wound care, etc.)  - Arrange for interpretive services to assist at discharge as needed  - Refer to Case Management Department for coordinating discharge planning if the patient needs post-hospital services based on physician/advanced practitioner order or complex needs related to functional status, cognitive ability, or social support system  Outcome: Progressing     Problem: Knowledge Deficit  Goal: Patient/family/caregiver demonstrates understanding of disease process, treatment plan, medications, and discharge instructions  Description: Complete learning assessment and assess knowledge base.   Interventions:  - Provide teaching at level of understanding  - Provide teaching via preferred learning methods  Outcome: Progressing     Problem: Prexisting or High Potential for Compromised Skin Integrity  Goal: Skin integrity is maintained or improved  Description: INTERVENTIONS:  - Identify patients at risk for skin breakdown  - Assess and monitor skin integrity  - Assess and monitor nutrition and hydration status  - Monitor labs   - Assess for incontinence   - Turn and reposition patient  - Assist with mobility/ambulation  - Relieve pressure over bony prominences  - Avoid friction and shearing  - Provide appropriate hygiene as needed including keeping skin clean and dry  - Evaluate need for skin moisturizer/barrier cream  - Collaborate with interdisciplinary team   - Patient/family teaching  - Consider wound care consult   Outcome: Progressing     Problem: SAFETY,RESTRAINT: NV/NON-SELF DESTRUCTIVE BEHAVIOR  Goal: Remains free of harm/injury (restraint for non violent/non self-detsructive behavior)  Description: INTERVENTIONS:  - Instruct patient/family regarding restraint use   - Assess and monitor physiologic and psychological status   - Provide interventions and comfort measures to meet assessed patient needs   - Identify and implement measures to help patient regain control  - Assess readiness for release of restraint   Outcome: Progressing  Goal: Returns to optimal restraint-free functioning  Description: INTERVENTIONS:  - Assess the patient's behavior and symptoms that indicate continued need for restraint  - Identify and implement measures to help patient regain control  - Assess readiness for release of restraint Outcome: Progressing     Problem: Nutrition/Hydration-ADULT  Goal: Nutrient/Hydration intake appropriate for improving, restoring or maintaining nutritional needs  Description: Monitor and assess patient's nutrition/hydration status for malnutrition. Collaborate with interdisciplinary team and initiate plan and interventions as ordered. Monitor patient's weight and dietary intake as ordered or per policy. Utilize nutrition screening tool and intervene as necessary. Determine patient's food preferences and provide high-protein, high-caloric foods as appropriate.      INTERVENTIONS:  - Monitor oral intake, urinary output, labs, and treatment plans  - Assess nutrition and hydration status and recommend course of action  - Evaluate amount of meals eaten  - Assist patient with eating if necessary   - Allow adequate time for meals  - Recommend/ encourage appropriate diets, oral nutritional supplements, and vitamin/mineral supplements  - Order, calculate, and assess calorie counts as needed  - Recommend, monitor, and adjust tube feedings and TPN/PPN based on assessed needs  - Assess need for intravenous fluids  - Provide specific nutrition/hydration education as appropriate  - Include patient/family/caregiver in decisions related to nutrition  Outcome: Progressing

## 2023-08-13 NOTE — ASSESSMENT & PLAN NOTE
On admission patient met criteria for severe sepsis secondary to pneumonia due to the loculated effusion  · Blood culture-negative  · Fluid culture-negative  IR chest tube placement 8/4  Procal 0.56  Blood culture- Negative     Plan-  Appreciate ID recommendations-  continue Ceftriaxone   Respiratory protocol  Monitor vital signs  Mucinex 1200 twice daily  See loculated pleural effusion plan     08/13: Nursing staff reported that patient had an episode of watery explosive diarrhea with yellow-brownish stool this morning. Patient denied nausea, vomiting, fever, chills, abdominal pain. Patient was alert awake and oriented. Holding off stool for C. difficile test at present time. ID is on board and recommendations appreciated.

## 2023-08-13 NOTE — PLAN OF CARE
Problem: MOBILITY - ADULT  Goal: Maintain or return to baseline ADL function  Description: INTERVENTIONS:  -  Assess patient's ability to carry out ADLs; assess patient's baseline for ADL function and identify physical deficits which impact ability to perform ADLs (bathing, care of mouth/teeth, toileting, grooming, dressing, etc.)  - Assess/evaluate cause of self-care deficits   - Assess range of motion  - Assess patient's mobility; develop plan if impaired  - Assess patient's need for assistive devices and provide as appropriate  - Encourage maximum independence but intervene and supervise when necessary  - Involve family in performance of ADLs  - Assess for home care needs following discharge   - Consider OT consult to assist with ADL evaluation and planning for discharge  - Provide patient education as appropriate  Outcome: Progressing  Goal: Maintains/Returns to pre admission functional level  Description: INTERVENTIONS:  - Perform BMAT or MOVE assessment daily.   - Set and communicate daily mobility goal to care team and patient/family/caregiver. - Collaborate with rehabilitation services on mobility goals if consulted  - Perform Range of Motion  times a day. - Reposition patient every  hours.   - Dangle patient  times a day  - Stand patient  times a day  - Ambulate patient  times a day  - Out of bed to chair  times a day   - Out of bed for meals  times a day  - Out of bed for toileting  - Record patient progress and toleration of activity level   Outcome: Progressing     Problem: PAIN - ADULT  Goal: Verbalizes/displays adequate comfort level or baseline comfort level  Description: Interventions:  - Encourage patient to monitor pain and request assistance  - Assess pain using appropriate pain scale  - Administer analgesics based on type and severity of pain and evaluate response  - Implement non-pharmacological measures as appropriate and evaluate response  - Consider cultural and social influences on pain and pain management  - Notify physician/advanced practitioner if interventions unsuccessful or patient reports new pain  Outcome: Progressing     Problem: INFECTION - ADULT  Goal: Absence or prevention of progression during hospitalization  Description: INTERVENTIONS:  - Assess and monitor for signs and symptoms of infection  - Monitor lab/diagnostic results  - Monitor all insertion sites, i.e. indwelling lines, tubes, and drains  - Monitor endotracheal if appropriate and nasal secretions for changes in amount and color  - [place] appropriate cooling/warming therapies per order  - Administer medications as ordered  - Instruct and encourage patient and family to use good hand hygiene technique  - Identify and instruct in appropriate isolation precautions for identified infection/condition  Outcome: Progressing  Goal: Absence of fever/infection during neutropenic period  Description: INTERVENTIONS:  - Monitor WBC    Outcome: Progressing     Problem: SAFETY ADULT  Goal: Maintain or return to baseline ADL function  Description: INTERVENTIONS:  -  Assess patient's ability to carry out ADLs; assess patient's baseline for ADL function and identify physical deficits which impact ability to perform ADLs (bathing, care of mouth/teeth, toileting, grooming, dressing, etc.)  - Assess/evaluate cause of self-care deficits   - Assess range of motion  - Assess patient's mobility; develop plan if impaired  - Assess patient's need for assistive devices and provide as appropriate  - Encourage maximum independence but intervene and supervise when necessary  - Involve family in performance of ADLs  - Assess for home care needs following discharge   - Consider OT consult to assist with ADL evaluation and planning for discharge  - Provide patient education as appropriate  Outcome: Progressing  Goal: Maintains/Returns to pre admission functional level  Description: INTERVENTIONS:  - Perform BMAT or MOVE assessment daily.   - Set and communicate daily mobility goal to care team and patient/family/caregiver. - Collaborate with rehabilitation services on mobility goals if consulted  - Perform Range of Motion  times a day. - Reposition patient every  hours.   - Dangle patient  times a day  - Stand patient  times a day  - Ambulate patient  times a day  - Out of bed to chair  times a day   - Out of bed for meals times a day  - Out of bed for toileting  - Record patient progress and toleration of activity level   Outcome: Progressing  Goal: Patient will remain free of falls  Description: INTERVENTIONS:  -  Assess patient's ability to carry out ADLs; assess patient's baseline for ADL function and identify physical deficits which impact ability to perform ADLs (bathing, care of mouth/teeth, toileting, grooming, dressing, etc.)  - Assess/evaluate cause of self-care deficits   - Assess range of motion  - Assess patient's mobility; develop plan if impaired  - Assess patient's need for assistive devices and provide as appropriate  - Encourage maximum independence but intervene and supervise when necessary  - Involve family in performance of ADLs  - Assess for home care needs following discharge   - Consider OT consult to assist with ADL evaluation and planning for discharge  - Provide patient education as appropriate  Outcome: Progressing     Problem: DISCHARGE PLANNING  Goal: Discharge to home or other facility with appropriate resources  Description: INTERVENTIONS:  - Identify barriers to discharge w/patient and caregiver  - Arrange for needed discharge resources and transportation as appropriate  - Identify discharge learning needs (meds, wound care, etc.)  - Arrange for interpretive services to assist at discharge as needed  - Refer to Case Management Department for coordinating discharge planning if the patient needs post-hospital services based on physician/advanced practitioner order or complex needs related to functional status, cognitive ability, or social support system  Outcome: Progressing     Problem: Knowledge Deficit  Goal: Patient/family/caregiver demonstrates understanding of disease process, treatment plan, medications, and discharge instructions  Description: Complete learning assessment and assess knowledge base.   Interventions:  - Provide teaching at level of understanding  - Provide teaching via preferred learning methods  Outcome: Progressing     Problem: Prexisting or High Potential for Compromised Skin Integrity  Goal: Skin integrity is maintained or improved  Description: INTERVENTIONS:  - Identify patients at risk for skin breakdown  - Assess and monitor skin integrity  - Assess and monitor nutrition and hydration status  - Monitor labs   - Assess for incontinence   - Turn and reposition patient  - Assist with mobility/ambulation  - Relieve pressure over bony prominences  - Avoid friction and shearing  - Provide appropriate hygiene as needed including keeping skin clean and dry  - Evaluate need for skin moisturizer/barrier cream  - Collaborate with interdisciplinary team   - Patient/family teaching  - Consider wound care consult   Outcome: Progressing     Problem: SAFETY,RESTRAINT: NV/NON-SELF DESTRUCTIVE BEHAVIOR  Goal: Remains free of harm/injury (restraint for non violent/non self-detsructive behavior)  Description: INTERVENTIONS:  - Instruct patient/family regarding restraint use   - Assess and monitor physiologic and psychological status   - Provide interventions and comfort measures to meet assessed patient needs   - Identify and implement measures to help patient regain control  - Assess readiness for release of restraint   Outcome: Progressing  Goal: Returns to optimal restraint-free functioning  Description: INTERVENTIONS:  - Assess the patient's behavior and symptoms that indicate continued need for restraint  - Identify and implement measures to help patient regain control  - Assess readiness for release of restraint   Outcome: Progressing     Problem: Nutrition/Hydration-ADULT  Goal: Nutrient/Hydration intake appropriate for improving, restoring or maintaining nutritional needs  Description: Monitor and assess patient's nutrition/hydration status for malnutrition. Collaborate with interdisciplinary team and initiate plan and interventions as ordered. Monitor patient's weight and dietary intake as ordered or per policy. Utilize nutrition screening tool and intervene as necessary. Determine patient's food preferences and provide high-protein, high-caloric foods as appropriate.      INTERVENTIONS:  - Monitor oral intake, urinary output, labs, and treatment plans  - Assess nutrition and hydration status and recommend course of action  - Evaluate amount of meals eaten  - Assist patient with eating if necessary   - Allow adequate time for meals  - Recommend/ encourage appropriate diets, oral nutritional supplements, and vitamin/mineral supplements  - Order, calculate, and assess calorie counts as needed  - Recommend, monitor, and adjust tube feedings and TPN/PPN based on assessed needs  - Assess need for intravenous fluids  - Provide specific nutrition/hydration education as appropriate  - Include patient/family/caregiver in decisions related to nutrition  Outcome: Progressing

## 2023-08-13 NOTE — ASSESSMENT & PLAN NOTE
POA platelets 485, pressure review in September 2022 was noted unremarkable  Iron panel: Iron Sat: 14, TIBC: 182, iron: 26  Plt 559     Plan  Likely in the setting of sepsis   Continue to monitor

## 2023-08-13 NOTE — SPEECH THERAPY NOTE
Speech Language/Pathology    Speech/Language Pathology Progress Note    Patient Name: Chava Fraire  [id number] Date: 8/13/2023     Problem List  Principal Problem:    Severe sepsis ([address])  Active Problems:    Late onset Alzheimer's dementia with behavioral disturbance ([address])    Anemia    Thrombocytosis    Encephalopathy acute    Ambulatory dysfunction    Loculated pleural effusion    Constipation    Delirium    Type 2 diabetes mellitus ([address])       Past Medical History  Past Medical History:   Diagnosis Date   • Late onset Alzheimer's dementia with behavioral disturbance ([address])    • Other hyperlipidemia         Past Surgical History  Past Surgical History:   Procedure Laterality Date   • IR CHEST TUBE PLACEMENT  8/4/2023         Subjective:  Per RN, min-absent PO intake and inconsistent med acceptance. SLIM messaged yesterday via [employer] inquiring about safety of swallowing PO contrast.     Current Diet:  Puree w/ NTL     Objective:  Pt seen for dx dysphagia tx f/u to assess oropharyngeal swallow skill/determine if potential upgrades may be appropriate. Pt very lethargic, requiring max stimulation to alert though keeps eyes shut and provides minimal verbal output. When presented w/ PO (puree), pt opens oral cavity for first presentation. Immediately upon presentation pt tightly seals lips and purses tongue forward to reject material. W/ max cueing, x3 tsps of puree accepted. Oral holding of puree followed by slow/labored lingual pumping manipulation/transfer. Pt unable to draw liquids via straw and anterior loss of material w/ attempted cup sips. Small amounts of liquid presented via piped straw. No overt s/s w/ puree or NTL, though only min amounts able to be assessed 2/2 pt refusal. Thin liquid accepted x1 from piped straw, wet vocal quality and audible congestion immediately s/p swallow. Pt places hand over mouth and pushes ST away declining further participation/tx.        Assessment:  Pt w/ significant AMS impacting PO intake/swallow safety. At this time, pt appears w/ high risk of aspiration, malnutrition/dehydration 2/2 mental status.     Plan/Recommendations:  Puree/nectar thick liquids only when fully awake/alert/participating  ?ability for consistent safe PO intake to met needs, may need to consider alt means nutrition if within [facility] ongoing [address] [employer] [place] discussions  Frequent/thorough oral care  ST to f/u as able/appropriate

## 2023-08-13 NOTE — PLAN OF CARE
Continue current diet when fully awake/alert/participating  Pt w/ high risk of aspiration 2/2 mental status   Frequent/thorough oral care   ST to f/u as able/appropriate pending GOC

## 2023-08-13 NOTE — PROGRESS NOTES
[facility]  Progress Note  Name: Haider Lipscomb  MRN: [id number]  Unit/Bed#: W -01 I Date of Admission: 8/3/2023   Date of Service: 8/13/2023 I Hospital Day: 10    Assessment/Plan   * Severe sepsis [facility])  Assessment & Plan  On admission patient met criteria for severe sepsis secondary to pneumonia due to the loculated effusion  · Blood culture-negative  · Fluid culture-negative  IR chest tube placement 8/4  Procal 0.56  Blood culture- Negative     Plan-  Appreciate ID recommendations-  continue Ceftriaxone   Respiratory protocol  Monitor vital signs  Mucinex 1200 twice daily  See loculated pleural effusion plan     08/13: Nursing staff reported that patient had an episode of watery explosive diarrhea with yellow-brownish stool this morning. Patient denied nausea, vomiting, fever, chills, abdominal pain. Patient was alert awake and oriented. Holding off stool for C. difficile test at present time. ID is on board and recommendations appreciated.               Type 2 diabetes mellitus [facility])  Assessment & Plan  Lab Results   Component Value Date    HGBA1C 7.0 (H) 08/03/2023       Recent Labs     08/12/23  2059 08/13/23  0741 08/13/23  1152 08/13/23  1539   POCGLU 150* 101 144* 143*       Blood Sugar Average: Last 72 hrs:  (P) [id number]   Not on any home regime   Hba1c- 7  Was initially started on Lantus 10 and scheduled mealtime    Plan-   Decreased Lantus to 5U   Discontinued mealtime as pt has poor po intake   Hypoglycemia protocol   ISS     Delirium  Assessment & Plan  Patient likely has hypoactive delirium secondary to sepsis  Infectious disease consult appreciated and we will continue antibiotics and source control  We will do nonpharmacological management at this point of time with frequent reorientation and reestablishment of the sleep-wake cycle    Loculated pleural effusion  Assessment & Plan  Chest x-ray- There is a right mid to lower lung opacity with well-circumscribed medial margin suggests loculated effusion. CT chest- Moderate right pleural effusion with compressive atelectasis over the right lower lobe and posterior right middle and upper lobes, Consolidation in the left lower lobe, probably atelectatic. Superimposed pneumonia cannot be excluded. Soft tissue densities in the bronchial tree as above, appearance favors secretions. IR placed chest tube 8/4  Chest Xray- Diminishing right pleural effusion. Drainage catheter at the right lung base. Bibasilar atelectasis, right more than left.   Repeat C Xray- Appears to be continuing to improve in size, pending official read    Plan-  Appreciate pulmonology recommendations- Suboptimal pleural drainage, trial of 6/6 TPA/Dornase BID per MIST2 trial,  Overall poor surgical candidate and his sister agrees, should source control not be achieved with TPA/Dornase, may consider transition to hospice care - continue ongoing [address] [place] discussions  Chest PT, encourage cough, incentive spirometry   When chest tube output <100 recommend CT Chest   Appreciate infectious disease recommendations- Repeat Blood Cultures  Consider CT CAP with PO and IV contrast to evaluate for possible esophageal leak        Ambulatory dysfunction  Assessment & Plan  · At baseline ambulate without AD, noted unsteadiness new from baseline      Plan-  PT OT- postacute rehabilitation      Encephalopathy acute  Assessment & Plan  · POA at facility noted slurred speech, slow responsive, not at his baseline  · Encephalopathy likely metabolic in the setting of sepsis  · Check CT head : No acute intracranial leasion  · Continue antibiotic as above      Thrombocytosis  Assessment & Plan  POA platelets 096, pressure review in September 2022 was noted unremarkable  Iron panel: Iron Sat: 14, TIBC: 182, iron: 26  Plt 559     Plan  Likely in the setting of sepsis   Continue to monitor    Anemia  Assessment & Plan  · POA hemoglobin 8.0, pressure review hemoglobin back in 2022 noted 10.1. · Unclear if history of melena, hematochezia  · Anemia unclear etiology at this time possibly secondary to iron deficiency given thrombocytosis although this could represent current setting of infection versus underlying malignancy? Check iron panel: Ferritin 714, Iron Sat: 14, TIBC: 182, Iron: 26  folate, vitamin B12 WNL     Plan  · Monitor output and consider transfusion if less than 7  ·  CBC a.m. Late onset Alzheimer's dementia with behavioral disturbance ([address])  Assessment & Plan  Hypoactive delirium and patient  Home meds Aricept 5 mg nightly, olanzapine 5 mg daily, Namenda 10 mg twice daily, trazodone 50 mg at bedtime    Plan-  Holding trazodone 50 mg at bedtime, Zyprexa 5 mg  Delirium precaution  Frequent reorientation  Avoid restraints if able unless danger to himself  Appreciate geriatric recommendations               VTE Pharmacologic Prophylaxis: VTE Score: 5 High Risk (Score >/= 5) - Pharmacological DVT Prophylaxis Ordered: enoxaparin (Lovenox). Sequential Compression Devices Ordered. Patient Centered Rounds: I performed bedside rounds with nursing staff today. Discussions with Specialists or Other Care Team Provider:     Education and Discussions with Family / Patient: Updated  (sister) via phone. Current Length of Stay: 10 day(s)  Current Patient Status: Inpatient   Discharge Plan: Anticipate discharge in 24-48 hrs to rehab facility. Code Status: Level 3 - DNAR and DNI    Subjective:   Patient was seen and examined at bedside this morning. He was lying on the bed and answer to my questions. He denied fever, chills, chest pain, shortness of breath. Nursing staff informed that patient had an explosive diarrhea episode this morning. No other significant overnight event.     Objective:     Vitals:   Temp (24hrs), Av.9 °F (36.6 °C), Min:97.6 °F (36.4 °C), Max:98.1 °F (36.7 °C)    Temp:  [97.6 °F (36.4 °C)-98.1 °F (36.7 °C)] 98 °F (36.7 °C)  HR:  [81-90] 81  Resp:  [21] 21  BP: (121-126)/(60-64) 123/64  SpO2:  [93 %-95 %] 95 %  Body mass index is 18.36 kg/m². Input and Output Summary (last 24 hours): Intake/Output Summary (Last 24 hours) at 8/13/2023 1650  Last data filed at 8/13/2023 0657  Gross per 24 hour   Intake 30 ml   Output 220 ml   Net -190 ml       Physical Exam:   Physical Exam  Vitals and nursing note reviewed. Constitutional:       General: He is not in acute distress. Appearance: He is well-developed. He is ill-appearing. HENT:      Head: Normocephalic and atraumatic. Mouth/Throat:      Pharynx: Oropharynx is clear. Eyes:      Extraocular Movements: Extraocular movements intact. Conjunctiva/sclera: Conjunctivae normal.   Cardiovascular:      Rate and Rhythm: Normal rate and regular rhythm. Heart sounds: No murmur heard. Pulmonary:      Effort: Pulmonary effort is normal. No respiratory distress. Breath sounds: Normal breath sounds. No wheezing or rales. Comments: Chest tube wall suction, functioning well. Abdominal:      General: Bowel sounds are normal. There is no distension. Palpations: Abdomen is soft. Tenderness: There is no abdominal tenderness. Musculoskeletal:         General: No swelling. Cervical back: Neck supple. Skin:     General: Skin is warm and dry. Capillary Refill: Capillary refill takes less than 2 seconds. Coloration: Skin is not jaundiced or pale. Findings: No lesion or rash. Neurological:      Mental Status: He is alert. Mental status is at baseline.    Psychiatric:         Mood and Affect: Mood normal.          Additional Data:     Labs:  Results from last 7 days   Lab Units 08/13/23  0504   WBC Thousand/uL 8.43   HEMOGLOBIN g/dL 8.3*   HEMATOCRIT % 26.9*   PLATELETS Thousands/uL 543*   NEUTROS PCT % 69   LYMPHS PCT % 19   MONOS PCT % 8   EOS PCT % 2     Results from last 7 days   Lab Units 08/13/23  0504 08/12/23  0517   SODIUM mmol/L 144 143   POTASSIUM mmol/L 3.2* 3.3*   CHLORIDE mmol/L 109* 110*   CO2 mmol/L 27 27   BUN mg/dL 14 16   CREATININE mg/dL 0.89 0.99   ANION GAP mmol/L 8 6   CALCIUM mg/dL 8.0* 8.2*   ALBUMIN g/dL  --  2.7*   TOTAL BILIRUBIN mg/dL  --  0.50   ALK PHOS U/L  --  104   ALT U/L  --  8   AST U/L  --  15   GLUCOSE RANDOM mg/dL 101 94         Results from last 7 days   Lab Units 08/13/23  1539 08/13/23  1152 08/13/23  0741 08/12/23  2059 08/12/23  1058 08/12/23  0730 08/11/23  2047 08/11/23  1544 08/11/23  1108 08/11/23  0726 08/10/23  2306 08/10/23  2049   POC GLUCOSE mg/dl 143* 144* 101 150* 151* 95 193* 92 113 219* 185* 219*         Results from last 7 days   Lab Units 08/08/23  1643 08/08/23  1124 08/08/23  0506   LACTIC ACID mmol/L 1.1 2.4*  --    PROCALCITONIN ng/ml  --   --  0.56*       Lines/Drains:  Invasive Devices     Peripheral Intravenous Line  Duration           Peripheral IV 08/11/23 Proximal;Right;Ventral (anterior) Forearm 1 day          Drain  Duration           Chest Tube Right 12 Fr. 9 days    External Urinary Catheter 6 days                      Imaging: Reviewed radiology reports from this admission including: chest xray  XR chest portable   Final Result by Guille Turner MD (08/11 1118)      Stable small right pleural effusion. Workstation performed: [id number]         XR chest portable   Final Result by Roxy Estrella MD (08/10 3180)      Diminishing right pleural effusion. Drainage catheter at the right lung base. Bibasilar atelectasis, right more than left. Workstation performed: [id number]         XR chest pa & lateral   Final Result by Karel Son MD (08/07 1215)      Persistent large right pleural effusion         Workstation performed: [id number]         IR chest tube placement   Final Result by Brennon Stauffer MD (08/09 1637)   Impression:      1.   Successful placement of a 12 [place] catheter into ultrasound under ultrasound guidance, and 10 cc of clear yellow fluid was aspirated and a specimen sent to the lab as described above. Workstation performed: [id number]         CT chest wo contrast   Final Result by Veena De Jesus MD (08/04 9598)      Moderate right pleural effusion with compressive atelectasis over the right lower lobe and posterior right middle and upper lobes. Consolidation in the left lower lobe, probably atelectatic. Superimposed pneumonia cannot be excluded. Soft tissue densities in the bronchial tree as above, appearance favors secretions. Other findings, as per the body of the report. Workstation performed: [id number]         CT head wo contrast   Final Result by Perfecto Francis MD (08/04 0248)      No acute intracranial abnormality. Workstation performed: [id number]         XR chest 1 view portable   Final Result by Ngihat Temple MD (08/03 1501)      There is a right mid to lower lung opacity with well-circumscribed medial margin suggests loculated effusion. Suggest CT chest for further evaluation                  Workstation performed: [id number]         XR chest portable    (Results Pending)       Recent Cultures (last 7 days):   Results from last 7 days   Lab Units 08/12/23  1503 08/08/23  1137 08/08/23  0809   BLOOD CULTURE  Received in Microbiology Lab. Culture in Progress. Received in Microbiology Lab. Culture in Progress. No Growth After 4 Days. No Growth After 4 Days.        Last 24 Hours Medication List:   Current Facility-Administered Medications   Medication Dose Route Frequency Provider Last Rate   • acetaminophen  325 mg Rectal Q4H PRN Niles Sandhu DO     • acetaminophen  650 mg Oral Q6H PRN Bryanna Franks MD     • cefTRIAXone  2,000 mg Intravenous Q24H James Andersen MD 2,000 mg (08/13/23 1107)   • donepezil  5 mg Oral HS Bryanna Franks MD     • enoxaparin  40 mg Subcutaneous Daily Bryanna Franks MD     • guaiFENesin  1,200 mg Oral Q12H [address] [place], [place]     • insulin glargine  5 Units Subcutaneous HS Blossom Macias MD     • insulin lispro  1-5 Units Subcutaneous TID [address] [place], [place]     • insulin lispro  2 Units Subcutaneous TID With Meals Blossom Macias MD     • memantine  10 mg Oral BID Marisel Hernandez MD     • OLANZapine  5 mg Intramuscular Once Tran Longoria DO     • polyethylene glycol  17 g Oral Daily Blossom Macias MD     • senna  1 tablet Oral HS Marisel Hernandez MD          Today, Patient Was Seen By: Abi Silva MD    **Please Note: This note may have been constructed using a voice recognition system. **

## 2023-08-14 PROBLEM — Z71.89 GOALS OF CARE, COUNSELING/DISCUSSION: Status: ACTIVE | Noted: 2023-08-14

## 2023-08-14 LAB
ANION GAP SERPL CALCULATED.3IONS-SCNC: 8 MMOL/L
BUN SERPL-MCNC: 17 MG/DL (ref 5–25)
CALCIUM SERPL-MCNC: 8.2 MG/DL (ref 8.4–10.2)
CHLORIDE SERPL-SCNC: 113 MMOL/L (ref 96–108)
CO2 SERPL-SCNC: 25 MMOL/L (ref 21–32)
CREAT SERPL-MCNC: 0.97 MG/DL (ref 0.6–1.3)
ERYTHROCYTE [DISTWIDTH] IN BLOOD BY AUTOMATED COUNT: 14.8 % (ref 11.6–15.1)
GFR SERPL CREATININE-BSD FRML MDRD: 73 ML/MIN/1.73SQ M
GLUCOSE SERPL-MCNC: 145 MG/DL (ref 65–140)
GLUCOSE SERPL-MCNC: 168 MG/DL (ref 65–140)
GLUCOSE SERPL-MCNC: 191 MG/DL (ref 65–140)
GLUCOSE SERPL-MCNC: 201 MG/DL (ref 65–140)
GLUCOSE SERPL-MCNC: 229 MG/DL (ref 65–140)
HCT VFR BLD AUTO: 27.4 % (ref 36.5–49.3)
HGB BLD-MCNC: 8.5 G/DL (ref 12–17)
MCH RBC QN AUTO: 29.5 PG (ref 26.8–34.3)
MCHC RBC AUTO-ENTMCNC: 31 G/DL (ref 31.4–37.4)
MCV RBC AUTO: 95 FL (ref 82–98)
PLATELET # BLD AUTO: 545 THOUSANDS/UL (ref 149–390)
PMV BLD AUTO: 8.8 FL (ref 8.9–12.7)
POTASSIUM SERPL-SCNC: 3.6 MMOL/L (ref 3.5–5.3)
RBC # BLD AUTO: 2.88 MILLION/UL (ref 3.88–5.62)
SODIUM SERPL-SCNC: 146 MMOL/L (ref 135–147)
WBC # BLD AUTO: 10.39 THOUSAND/UL (ref 4.31–10.16)

## 2023-08-14 PROCEDURE — 85027 COMPLETE CBC AUTOMATED: CPT

## 2023-08-14 PROCEDURE — 97530 THERAPEUTIC ACTIVITIES: CPT

## 2023-08-14 PROCEDURE — 99232 SBSQ HOSP IP/OBS MODERATE 35: CPT | Performed by: INTERNAL MEDICINE

## 2023-08-14 PROCEDURE — 82948 REAGENT STRIP/BLOOD GLUCOSE: CPT

## 2023-08-14 PROCEDURE — 80048 BASIC METABOLIC PNL TOTAL CA: CPT

## 2023-08-14 PROCEDURE — NC001 PR NO CHARGE: Performed by: INTERNAL MEDICINE

## 2023-08-14 RX ADMIN — INSULIN LISPRO 1 UNITS: 100 INJECTION, SOLUTION INTRAVENOUS; SUBCUTANEOUS at 10:00

## 2023-08-14 RX ADMIN — INSULIN LISPRO 2 UNITS: 100 INJECTION, SOLUTION INTRAVENOUS; SUBCUTANEOUS at 10:00

## 2023-08-14 RX ADMIN — GUAIFENESIN 1200 MG: 600 TABLET ORAL at 10:00

## 2023-08-14 RX ADMIN — INSULIN LISPRO 2 UNITS: 100 INJECTION, SOLUTION INTRAVENOUS; SUBCUTANEOUS at 11:30

## 2023-08-14 RX ADMIN — CEFTRIAXONE 2000 MG: 2 INJECTION, POWDER, FOR SOLUTION INTRAMUSCULAR; INTRAVENOUS at 11:11

## 2023-08-14 RX ADMIN — MEMANTINE 10 MG: 10 TABLET ORAL at 17:09

## 2023-08-14 RX ADMIN — ENOXAPARIN SODIUM 40 MG: 40 INJECTION SUBCUTANEOUS at 10:00

## 2023-08-14 RX ADMIN — INSULIN LISPRO 2 UNITS: 100 INJECTION, SOLUTION INTRAVENOUS; SUBCUTANEOUS at 16:54

## 2023-08-14 RX ADMIN — DONEPEZIL HYDROCHLORIDE 5 MG: 5 TABLET ORAL at 22:24

## 2023-08-14 RX ADMIN — INSULIN GLARGINE 5 UNITS: 100 INJECTION, SOLUTION SUBCUTANEOUS at 22:31

## 2023-08-14 RX ADMIN — STANDARDIZED SENNA CONCENTRATE 8.6 MG: 8.6 TABLET ORAL at 22:24

## 2023-08-14 RX ADMIN — INSULIN LISPRO 2 UNITS: 100 INJECTION, SOLUTION INTRAVENOUS; SUBCUTANEOUS at 16:53

## 2023-08-14 RX ADMIN — INSULIN LISPRO 1 UNITS: 100 INJECTION, SOLUTION INTRAVENOUS; SUBCUTANEOUS at 11:27

## 2023-08-14 RX ADMIN — MEMANTINE 10 MG: 10 TABLET ORAL at 10:00

## 2023-08-14 RX ADMIN — POLYETHYLENE GLYCOL 3350 17 G: 17 POWDER, FOR SOLUTION ORAL at 10:00

## 2023-08-14 RX ADMIN — GUAIFENESIN 1200 MG: 600 TABLET ORAL at 22:24

## 2023-08-14 NOTE — PROGRESS NOTES
Progress Note - Infectious Disease   Guera Sonny 82 y.o. male MRN: [id number]  Unit/Bed#: W -01 Encounter: [id number]      Impression/Plan:  1.  Severe sepsis.  Present on admission with leukocytosis and tachycardia as well as a lactate level of 3.2.  Now subsequently has developed a fever. Suspect secondary to a pneumonia with complicating empyema.  No other clear source appreciated.  Suspect the ongoing sepsis is secondary to inadequate source control.  Seems to have had improvement since instillation of tPA and dornase. Temperatures been down over the last 24-48 hours. Repeat blood cultures negative thus far  -Antibiotics as below  -Source control measures as below  -Follow-up cultures and adjust the antibiotics needed  -Follow-up repeat blood cultures  -Recheck CBC with differential and CMP  -Supportive care     2.  Streptococcus intermedius empyema.  Suspect the ongoing clinical sepsis is related to inadequate source control.  Increase pleural fluid output since tPA dornase instillation.  Some improvement clinically noted. Chest x-ray improved.   -Continue ceftriaxone  -Chest tube drainage  -tPA/dornase instillation  -Recheck CT chest once output has dropped to see if chest tube can be pulled  -Close pulmonary follow-up  -Not a surgical candidate for VATS per pulmonary  -Duration of antibiotics will largely depend upon adequacy of source control and whether plan is to continue aggressive treatment     3.  Acute hypoxic respiratory failure.  Appears to be secondary to pneumonia with complicated parapneumonic effusion.  No other clear source appreciated.  Patient is now weaned down to room air  -Oxygen support as needed  -Monitor respiratory status  -Treatment of complicated effusion as above     4.  Acute encephalopathy.  Complicating baseline dementia.  Suspect related to the patient's acute infectious process.  Remains nonverbal.  Imaging of the brain without any abnormality.  -Monitor cognition  -Treat sepsis as above    Discussed the above management plan with the primary service    Antibiotics:  Ceftriaxone 7  Antibiotics 12  Post chest tube placement 10    Subjective:  Patient has no fever, chills, sweats; no reported vomiting, diarrhea; no cough, shortness of breath; remains minimally verbal.    Objective:  Vitals:  Temp:  [97.1 °F (36.2 °C)-98.4 °F (36.9 °C)] 98.4 °F (36.9 °C)  HR:  [] 96  Resp:  [28] 28  BP: (120-134)/(55-69) 120/55  SpO2:  [93 %-95 %] 93 %  Temp (24hrs), Av.9 °F (36.6 °C), Min:97.1 °F (36.2 °C), Max:98.4 °F (36.9 °C)  Current: Temperature: 98.4 °F (36.9 °C)    Physical Exam:   General Appearance:  Alert, interactive, nontoxic, no acute distress. Throat: Oropharynx moist without lesions. Lungs:   Decreased breath sounds bilaterally; no wheezes, rhonchi or rales; respirations unlabored. Right-sided chest tube in place   Heart:  RRR; no murmur, rub or gallop   Abdomen:   Soft, non-tender, non-distended, positive bowel sounds. Extremities: No clubbing, cyanosis or edema   Skin: No new rashes or lesions. No draining wounds noted.        Labs, Imaging, & Other studies:   All pertinent labs and imaging studies were personally reviewed  Results from last 7 days   Lab Units 23  0525 23  0504 23  0517   WBC Thousand/uL 10.39* 8.43 13.38*   HEMOGLOBIN g/dL 8.5* 8.3* 8.6*   PLATELETS Thousands/uL 545* 543* 559*     Results from last 7 days   Lab Units 23  0525 23  0504 23  0517 23  0454   SODIUM mmol/L 146 144 143 141   POTASSIUM mmol/L 3.6 3.2* 3.3* 3.3*   CHLORIDE mmol/L 113* 109* 110* 109*   CO2 mmol/L 25 27 27 24   BUN mg/dL 17 14 16 22   CREATININE mg/dL 0.97 0.89 0.99 0.95   EGFR ml/min/1.73sq m 73 80 71 75   CALCIUM mg/dL 8.2* 8.0* 8.2* 7.9*   AST U/L  --   --  15 17   ALT U/L  --   --  8 9   ALK PHOS U/L  --   --  104 102     Results from last 7 days   Lab Units 23  1503 23  1137 23  0809   BLOOD CULTURE  No Growth at 24 hrs. No Growth at 24 hrs. No Growth After 5 Days. No Growth After 5 Days. Results from last 7 days   Lab Units 08/08/23  0506   PROCALCITONIN ng/ml 0.56*        Chest x-ray.   Loculated right effusion with pneumothorax    Images personally reviewed by me in PACS

## 2023-08-14 NOTE — PLAN OF CARE
Problem: MOBILITY - ADULT  Goal: Maintain or return to baseline ADL function  Description: INTERVENTIONS:  -  Assess patient's ability to carry out ADLs; assess patient's baseline for ADL function and identify physical deficits which impact ability to perform ADLs (bathing, care of mouth/teeth, toileting, grooming, dressing, etc.)  - Assess/evaluate cause of self-care deficits   - Assess range of motion  - Assess patient's mobility; develop plan if impaired  - Assess patient's need for assistive devices and provide as appropriate  - Encourage maximum independence but intervene and supervise when necessary  - Involve family in performance of ADLs  - Assess for home care needs following discharge   - Consider OT consult to assist with ADL evaluation and planning for discharge  - Provide patient education as appropriate  Outcome: Progressing  Goal: Maintains/Returns to pre admission functional level  Description: INTERVENTIONS:  - Perform BMAT or MOVE assessment daily.   - Set and communicate daily mobility goal to care team and patient/family/caregiver. - Collaborate with rehabilitation services on mobility goals if consulted  - Perform Range of Motion  times a day. - Reposition patient every  hours.   - Dangle patient  times a day  - Stand patient  times a day  - Ambulate patient  times a day  - Out of bed to chair  times a day   - Out of bed for meals  times a day  - Out of bed for toileting  - Record patient progress and toleration of activity level   Outcome: Progressing     Problem: PAIN - ADULT  Goal: Verbalizes/displays adequate comfort level or baseline comfort level  Description: Interventions:  - Encourage patient to monitor pain and request assistance  - Assess pain using appropriate pain scale  - Administer analgesics based on type and severity of pain and evaluate response  - Implement non-pharmacological measures as appropriate and evaluate response  - Consider cultural and social influences on pain and pain management  - Notify physician/advanced practitioner if interventions unsuccessful or patient reports new pain  Outcome: Progressing     Problem: INFECTION - ADULT  Goal: Absence or prevention of progression during hospitalization  Description: INTERVENTIONS:  - Assess and monitor for signs and symptoms of infection  - Monitor lab/diagnostic results  - Monitor all insertion sites, i.e. indwelling lines, tubes, and drains  - Monitor endotracheal if appropriate and nasal secretions for changes in amount and color  - [place] appropriate cooling/warming therapies per order  - Administer medications as ordered  - Instruct and encourage patient and family to use good hand hygiene technique  - Identify and instruct in appropriate isolation precautions for identified infection/condition  Outcome: Progressing  Goal: Absence of fever/infection during neutropenic period  Description: INTERVENTIONS:  - Monitor WBC    Outcome: Progressing     Problem: SAFETY ADULT  Goal: Maintain or return to baseline ADL function  Description: INTERVENTIONS:  -  Assess patient's ability to carry out ADLs; assess patient's baseline for ADL function and identify physical deficits which impact ability to perform ADLs (bathing, care of mouth/teeth, toileting, grooming, dressing, etc.)  - Assess/evaluate cause of self-care deficits   - Assess range of motion  - Assess patient's mobility; develop plan if impaired  - Assess patient's need for assistive devices and provide as appropriate  - Encourage maximum independence but intervene and supervise when necessary  - Involve family in performance of ADLs  - Assess for home care needs following discharge   - Consider OT consult to assist with ADL evaluation and planning for discharge  - Provide patient education as appropriate  Outcome: Progressing  Goal: Maintains/Returns to pre admission functional level  Description: INTERVENTIONS:  - Perform BMAT or MOVE assessment daily.   - Set and communicate daily mobility goal to care team and patient/family/caregiver. - Collaborate with rehabilitation services on mobility goals if consulted  - Perform Range of Motion  times a day. - Reposition patient every hours.   - Dangle patient  times a day  - Stand patient  times a day  - Ambulate patient  times a day  - Out of bed to chair  times a day   - Out of bed for meals  times a day  - Out of bed for toileting  - Record patient progress and toleration of activity level   Outcome: Progressing  Goal: Patient will remain free of falls  Description: INTERVENTIONS:  - Educate patient/family on patient safety including physical limitations  - Instruct patient to call for assistance with activity   - Consult OT/PT to assist with strengthening/mobility   - Keep Call bell within reach  - Keep bed low and locked with side rails adjusted as appropriate  - Keep care items and personal belongings within reach  - Initiate and maintain comfort rounds  - Make Fall Risk Sign visible to staff  - Offer Toileting every  Hours, in advance of need  - Initiate/Maintain alarm  - Obtain necessary fall risk management equipment:   - Apply yellow socks and bracelet for high fall risk patients  - Consider moving patient to room near nurses station  Outcome: Progressing     Problem: DISCHARGE PLANNING  Goal: Discharge to home or other facility with appropriate resources  Description: INTERVENTIONS:  - Identify barriers to discharge w/patient and caregiver  - Arrange for needed discharge resources and transportation as appropriate  - Identify discharge learning needs (meds, wound care, etc.)  - Arrange for interpretive services to assist at discharge as needed  - Refer to Case Management Department for coordinating discharge planning if the patient needs post-hospital services based on physician/advanced practitioner order or complex needs related to functional status, cognitive ability, or social support system  Outcome: Progressing Problem: Knowledge Deficit  Goal: Patient/family/caregiver demonstrates understanding of disease process, treatment plan, medications, and discharge instructions  Description: Complete learning assessment and assess knowledge base.   Interventions:  - Provide teaching at level of understanding  - Provide teaching via preferred learning methods  Outcome: Progressing     Problem: Prexisting or High Potential for Compromised Skin Integrity  Goal: Skin integrity is maintained or improved  Description: INTERVENTIONS:  - Identify patients at risk for skin breakdown  - Assess and monitor skin integrity  - Assess and monitor nutrition and hydration status  - Monitor labs   - Assess for incontinence   - Turn and reposition patient  - Assist with mobility/ambulation  - Relieve pressure over bony prominences  - Avoid friction and shearing  - Provide appropriate hygiene as needed including keeping skin clean and dry  - Evaluate need for skin moisturizer/barrier cream  - Collaborate with interdisciplinary team   - Patient/family teaching  - Consider wound care consult   Outcome: Progressing     Problem: SAFETY,RESTRAINT: NV/NON-SELF DESTRUCTIVE BEHAVIOR  Goal: Remains free of harm/injury (restraint for non violent/non self-detsructive behavior)  Description: INTERVENTIONS:  - Instruct patient/family regarding restraint use   - Assess and monitor physiologic and psychological status   - Provide interventions and comfort measures to meet assessed patient needs   - Identify and implement measures to help patient regain control  - Assess readiness for release of restraint   Outcome: Progressing  Goal: Returns to optimal restraint-free functioning  Description: INTERVENTIONS:  - Assess the patient's behavior and symptoms that indicate continued need for restraint  - Identify and implement measures to help patient regain control  - Assess readiness for release of restraint   Outcome: Progressing     Problem: Nutrition/Hydration-ADULT  Goal: Nutrient/Hydration intake appropriate for improving, restoring or maintaining nutritional needs  Description: Monitor and assess patient's nutrition/hydration status for malnutrition. Collaborate with interdisciplinary team and initiate plan and interventions as ordered. Monitor patient's weight and dietary intake as ordered or per policy. Utilize nutrition screening tool and intervene as necessary. Determine patient's food preferences and provide high-protein, high-caloric foods as appropriate.      INTERVENTIONS:  - Monitor oral intake, urinary output, labs, and treatment plans  - Assess nutrition and hydration status and recommend course of action  - Evaluate amount of meals eaten  - Assist patient with eating if necessary   - Allow adequate time for meals  - Recommend/ encourage appropriate diets, oral nutritional supplements, and vitamin/mineral supplements  - Order, calculate, and assess calorie counts as needed  - Recommend, monitor, and adjust tube feedings and TPN/PPN based on assessed needs  - Assess need for intravenous fluids  - Provide specific nutrition/hydration education as appropriate  - Include patient/family/caregiver in decisions related to nutrition  Outcome: Progressing

## 2023-08-14 NOTE — PLAN OF CARE
Problem: MOBILITY - ADULT  Goal: Maintain or return to baseline ADL function  Description: INTERVENTIONS:  -  Assess patient's ability to carry out ADLs; assess patient's baseline for ADL function and identify physical deficits which impact ability to perform ADLs (bathing, care of mouth/teeth, toileting, grooming, dressing, etc.)  - Assess/evaluate cause of self-care deficits   - Assess range of motion  - Assess patient's mobility; develop plan if impaired  - Assess patient's need for assistive devices and provide as appropriate  - Encourage maximum independence but intervene and supervise when necessary  - Involve family in performance of ADLs  - Assess for home care needs following discharge   - Consider OT consult to assist with ADL evaluation and planning for discharge  - Provide patient education as appropriate  Outcome: Progressing  Goal: Maintains/Returns to pre admission functional level  Description: INTERVENTIONS:  - Perform BMAT or MOVE assessment daily.   - Set and communicate daily mobility goal to care team and patient/family/caregiver. - Collaborate with rehabilitation services on mobility goals if consulted  - Perform Range of Motion  times a day. - Reposition patient every  hours.   - Dangle patient  times a day  - Stand patient  times a day  - Ambulate patient  times a day  - Out of bed to chair  times a day   - Out of bed for meals  times a day  - Out of bed for toileting  - Record patient progress and toleration of activity level   Outcome: Progressing     Problem: PAIN - ADULT  Goal: Verbalizes/displays adequate comfort level or baseline comfort level  Description: Interventions:  - Encourage patient to monitor pain and request assistance  - Assess pain using appropriate pain scale  - Administer analgesics based on type and severity of pain and evaluate response  - Implement non-pharmacological measures as appropriate and evaluate response  - Consider cultural and social influences on pain and pain management  - Notify physician/advanced practitioner if interventions unsuccessful or patient reports new pain  Outcome: Progressing     Problem: INFECTION - ADULT  Goal: Absence or prevention of progression during hospitalization  Description: INTERVENTIONS:  - Assess and monitor for signs and symptoms of infection  - Monitor lab/diagnostic results  - Monitor all insertion sites, i.e. indwelling lines, tubes, and drains  - Monitor endotracheal if appropriate and nasal secretions for changes in amount and color  - [place] appropriate cooling/warming therapies per order  - Administer medications as ordered  - Instruct and encourage patient and family to use good hand hygiene technique  - Identify and instruct in appropriate isolation precautions for identified infection/condition  Outcome: Progressing  Goal: Absence of fever/infection during neutropenic period  Description: INTERVENTIONS:  - Monitor WBC    Outcome: Progressing     Problem: SAFETY ADULT  Goal: Maintain or return to baseline ADL function  Description: INTERVENTIONS:  -  Assess patient's ability to carry out ADLs; assess patient's baseline for ADL function and identify physical deficits which impact ability to perform ADLs (bathing, care of mouth/teeth, toileting, grooming, dressing, etc.)  - Assess/evaluate cause of self-care deficits   - Assess range of motion  - Assess patient's mobility; develop plan if impaired  - Assess patient's need for assistive devices and provide as appropriate  - Encourage maximum independence but intervene and supervise when necessary  - Involve family in performance of ADLs  - Assess for home care needs following discharge   - Consider OT consult to assist with ADL evaluation and planning for discharge  - Provide patient education as appropriate  Outcome: Progressing  Goal: Maintains/Returns to pre admission functional level  Description: INTERVENTIONS:  - Perform BMAT or MOVE assessment daily.   - Set and communicate daily mobility goal to care team and patient/family/caregiver. - Collaborate with rehabilitation services on mobility goals if consulted  - Perform Range of Motion  times a day. - Reposition patient every hours.   - Dangle patient  times a day  - Stand patient  times a day  - Ambulate patient  times a day  - Out of bed to chair  times a day   - Out of bed for meals  times a day  - Out of bed for toileting  - Record patient progress and toleration of activity level   Outcome: Progressing  Goal: Patient will remain free of falls  Description: INTERVENTIONS:  - Educate patient/family on patient safety including physical limitations  - Instruct patient to call for assistance with activity   - Consult OT/PT to assist with strengthening/mobility   - Keep Call bell within reach  - Keep bed low and locked with side rails adjusted as appropriate  - Keep care items and personal belongings within reach  - Initiate and maintain comfort rounds  - Make Fall Risk Sign visible to staff  - Offer Toileting every  Hours, in advance of need  - Initiate/Maintain alarm  - Obtain necessary fall risk management equipment:   - Apply yellow socks and bracelet for high fall risk patients  - Consider moving patient to room near nurses station  Outcome: Progressing     Problem: DISCHARGE PLANNING  Goal: Discharge to home or other facility with appropriate resources  Description: INTERVENTIONS:  - Identify barriers to discharge w/patient and caregiver  - Arrange for needed discharge resources and transportation as appropriate  - Identify discharge learning needs (meds, wound care, etc.)  - Arrange for interpretive services to assist at discharge as needed  - Refer to Case Management Department for coordinating discharge planning if the patient needs post-hospital services based on physician/advanced practitioner order or complex needs related to functional status, cognitive ability, or social support system  Outcome: Progressing Problem: Knowledge Deficit  Goal: Patient/family/caregiver demonstrates understanding of disease process, treatment plan, medications, and discharge instructions  Description: Complete learning assessment and assess knowledge base.   Interventions:  - Provide teaching at level of understanding  - Provide teaching via preferred learning methods  Outcome: Progressing     Problem: Prexisting or High Potential for Compromised Skin Integrity  Goal: Skin integrity is maintained or improved  Description: INTERVENTIONS:  - Identify patients at risk for skin breakdown  - Assess and monitor skin integrity  - Assess and monitor nutrition and hydration status  - Monitor labs   - Assess for incontinence   - Turn and reposition patient  - Assist with mobility/ambulation  - Relieve pressure over bony prominences  - Avoid friction and shearing  - Provide appropriate hygiene as needed including keeping skin clean and dry  - Evaluate need for skin moisturizer/barrier cream  - Collaborate with interdisciplinary team   - Patient/family teaching  - Consider wound care consult   Outcome: Progressing     Problem: SAFETY,RESTRAINT: NV/NON-SELF DESTRUCTIVE BEHAVIOR  Goal: Remains free of harm/injury (restraint for non violent/non self-detsructive behavior)  Description: INTERVENTIONS:  - Instruct patient/family regarding restraint use   - Assess and monitor physiologic and psychological status   - Provide interventions and comfort measures to meet assessed patient needs   - Identify and implement measures to help patient regain control  - Assess readiness for release of restraint   Outcome: Progressing  Goal: Returns to optimal restraint-free functioning  Description: INTERVENTIONS:  - Assess the patient's behavior and symptoms that indicate continued need for restraint  - Identify and implement measures to help patient regain control  - Assess readiness for release of restraint   Outcome: Progressing     Problem: Nutrition/Hydration-ADULT  Goal: Nutrient/Hydration intake appropriate for improving, restoring or maintaining nutritional needs  Description: Monitor and assess patient's nutrition/hydration status for malnutrition. Collaborate with interdisciplinary team and initiate plan and interventions as ordered. Monitor patient's weight and dietary intake as ordered or per policy. Utilize nutrition screening tool and intervene as necessary. Determine patient's food preferences and provide high-protein, high-caloric foods as appropriate.      INTERVENTIONS:  - Monitor oral intake, urinary output, labs, and treatment plans  - Assess nutrition and hydration status and recommend course of action  - Evaluate amount of meals eaten  - Assist patient with eating if necessary   - Allow adequate time for meals  - Recommend/ encourage appropriate diets, oral nutritional supplements, and vitamin/mineral supplements  - Order, calculate, and assess calorie counts as needed  - Recommend, monitor, and adjust tube feedings and TPN/PPN based on assessed needs  - Assess need for intravenous fluids  - Provide specific nutrition/hydration education as appropriate  - Include patient/family/caregiver in decisions related to nutrition  Outcome: Progressing

## 2023-08-14 NOTE — PROGRESS NOTES
[address] [facility]  Progress Note  Name: Shanna Lorenz  MRN: [id number]  Unit/Bed#: W -01 I Date of Admission: 8/3/2023   Date of Service: 8/14/2023 I Hospital Day: 11    Assessment/Plan   * Severe sepsis [facility])  Assessment & Plan  On admission patient met criteria for severe sepsis secondary to pneumonia due to the loculated effusion  · Blood culture-negative  · Fluid culture-negative  IR chest tube placement 8/4  Procal 0.56  Repeat Bcx- Negative     Plan-  Appreciate ID recommendations-  continue Ceftriaxone   Respiratory protocol  Monitor vital signs  Mucinex 1200 twice daily  See loculated pleural effusion plan            Loculated pleural effusion  Assessment & Plan  Chest x-ray- There is a right mid to lower lung opacity with well-circumscribed medial margin suggests loculated effusion. CT chest- Moderate right pleural effusion with compressive atelectasis over the right lower lobe and posterior right middle and upper lobes, Consolidation in the left lower lobe, probably atelectatic. Superimposed pneumonia cannot be excluded. Soft tissue densities in the bronchial tree as above, appearance favors secretions. IR placed chest tube 8/4  Chest Xray- Diminishing right pleural effusion. Drainage catheter at the right lung base. Bibasilar atelectasis, right more than left.   Repeat C Xray- Appears to be continuing to improve in size, pending official read    Plan-  Appreciate pulmonology recommendations- Suboptimal pleural drainage, trial of 6/6 TPA/Dornase BID per MIST2 trial,  Overall poor surgical candidate and his sister agrees, should source control not be achieved with TPA/Dornase, may consider transition to hospice care - continue ongoing [address] [facility] discussions  Chest PT, encourage cough, incentive spirometry   When chest tube output <100 recommend CT Chest   Appreciate infectious disease recommendations  Consider CT CAP with PO and IV contrast to evaluate for possible esophageal leak        Goals of care, counseling/discussion  Assessment & Plan  Patient's prognosis remains guarded as he is not a candidate for VATS procedure  Continuing source control with chest tube and drainage  Continuing IV antibiotics  Multiple goals of care discussions with sister; sister was hopeful of patient's recovery and to return back to baseline  Extensive conversation explained that his prognosis is poor    Plan-  Consider palliative care consult        Type 2 diabetes mellitus [facility])  Assessment & Plan  Lab Results   Component Value Date    HGBA1C 7.0 (H) 08/03/2023       Recent Labs     08/13/23  1152 08/13/23  1539 08/13/23  2034 08/14/23  0745   POCGLU 144* 143* 210* 168*       Blood Sugar Average: Last 72 hrs:  (P) 148.25   Not on any home regime   Hba1c- 7  Was initially started on Lantus 10 and scheduled mealtime    Plan-   Decreased Lantus to 5U   Discontinued mealtime as pt has poor po intake   Hypoglycemia protocol   ISS     Delirium  Assessment & Plan  Patient likely has hypoactive delirium secondary to sepsis  Infectious disease consult appreciated and we will continue antibiotics and source control  We will do nonpharmacological management at this point of time with frequent reorientation and reestablishment of the sleep-wake cycle    Ambulatory dysfunction  Assessment & Plan  · At baseline ambulate without AD, noted unsteadiness new from baseline      Plan-  PT OT- postacute rehabilitation      Encephalopathy acute  Assessment & Plan  · POA at facility noted slurred speech, slow responsive, not at his baseline  · Encephalopathy likely metabolic in the setting of sepsis  · Check CT head : No acute intracranial leasion  · Continue antibiotic as above      Thrombocytosis  Assessment & Plan  POA platelets 127, pressure review in September 2022 was noted unremarkable  Iron panel: Iron Sat: 14, TIBC: 182, iron: 26  Plt 545    Plan  Likely in the setting of sepsis   Continue to monitor    Anemia  Assessment & Plan  · POA hemoglobin 8.0, pressure review hemoglobin back in 2022 noted 10.1. · Unclear if history of melena, hematochezia  · Anemia unclear etiology at this time possibly secondary to iron deficiency given thrombocytosis although this could represent current setting of infection versus underlying malignancy? Check iron panel: Ferritin 714, Iron Sat: 14, TIBC: 182, Iron: 26  folate, vitamin B12 WNL     Plan  · Monitor output and consider transfusion if less than 7  ·  CBC a.m. Late onset Alzheimer's dementia with behavioral disturbance ([address])  Assessment & Plan  Hypoactive delirium and patient  Home meds Aricept 5 mg nightly, olanzapine 5 mg daily, Namenda 10 mg twice daily, trazodone 50 mg at bedtime    Plan-  Holding trazodone 50 mg at bedtime, Zyprexa 5 mg  Delirium precaution  Frequent reorientation  Avoid restraints if able unless danger to himself  Appreciate geriatric recommendations         VTE Pharmacologic Prophylaxis: VTE Score: 5 High Risk (Score >/= 5) - Pharmacological DVT Prophylaxis Ordered: enoxaparin (Lovenox). Sequential Compression Devices Ordered. Patient Centered Rounds: I performed bedside rounds with nursing staff today. Discussions with Specialists or Other Care Team Provider: Pulmonology, infectious disease    Education and Discussions with Family / Patient: Updated  (sister) via phone. Current Length of Stay: 11 day(s)  Current Patient Status: Inpatient   Discharge Plan: Anticipate discharge in 48 hrs to rehab facility. Code Status: Level 3 - DNAR and DNI    Subjective:   Patient was seen and examined at bedside. Patient was resting comfortably in no overt acute distress. Upon my evaluation this morning patient was sitting upright awake watching TV. Patient was complaining of fatigue and denied any other complaints at the time.   No overnight events were reported    Objective:     Vitals:   Temp (24hrs), Av.9 °F (36.6 °C), Min:97.1 °F (36.2 °C), Max:98.4 °F (36.9 °C)    Temp:  [97.1 °F (36.2 °C)-98.4 °F (36.9 °C)] 98.4 °F (36.9 °C)  HR:  [] 96  Resp:  [28] 28  BP: (120-134)/(55-69) 120/55  SpO2:  [93 %-95 %] 93 %  Body mass index is 18.36 kg/m². Input and Output Summary (last 24 hours): Intake/Output Summary (Last 24 hours) at 8/14/2023 0926  Last data filed at 8/14/2023 0601  Gross per 24 hour   Intake 90 ml   Output 220 ml   Net -130 ml       Physical Exam:   Physical Exam  Vitals and nursing note reviewed. Constitutional:       General: He is not in acute distress. Appearance: He is well-developed. He is ill-appearing. He is not toxic-appearing or diaphoretic. HENT:      Head: Normocephalic and atraumatic. Mouth/Throat:      Mouth: Mucous membranes are moist.   Eyes:      Extraocular Movements: Extraocular movements intact. Conjunctiva/sclera: Conjunctivae normal.      Pupils: Pupils are equal, round, and reactive to light. Cardiovascular:      Rate and Rhythm: Normal rate and regular rhythm. Pulses: Normal pulses. Heart sounds: Normal heart sounds. No murmur heard. Pulmonary:      Effort: Pulmonary effort is normal. No respiratory distress. Breath sounds: Rhonchi present. No wheezing. Comments: Bilaterally, improving    Abdominal:      General: Bowel sounds are normal. There is no distension. Palpations: Abdomen is soft. Tenderness: There is no abdominal tenderness. There is no guarding or rebound. Musculoskeletal:         General: No swelling or tenderness. Normal range of motion. Cervical back: Normal range of motion and neck supple. Right lower leg: No edema. Left lower leg: No edema. Skin:     General: Skin is warm and dry. Capillary Refill: Capillary refill takes less than 2 seconds. Neurological:      Mental Status: He is alert. Mental status is at baseline. He is disoriented.       Cranial Nerves: No cranial nerve deficit. Sensory: No sensory deficit. Motor: Weakness present. Comments: Awake and alert when prompted, easier to arouse today,, follows some commands, able to converse and answer some questions appropriately, remains hypoactive overall. Does have bouts throughout the day of awake and alertness   Psychiatric:         Mood and Affect: Mood normal.          Additional Data:     Labs:  Results from last 7 days   Lab Units 08/14/23  0525 08/13/23  0504   WBC Thousand/uL 10.39* 8.43   HEMOGLOBIN g/dL 8.5* 8.3*   HEMATOCRIT % 27.4* 26.9*   PLATELETS Thousands/uL 545* 543*   NEUTROS PCT %  --  69   LYMPHS PCT %  --  19   MONOS PCT %  --  8   EOS PCT %  --  2     Results from last 7 days   Lab Units 08/14/23  0525 08/13/23  0504 08/12/23  0517   SODIUM mmol/L 146   < > 143   POTASSIUM mmol/L 3.6   < > 3.3*   CHLORIDE mmol/L 113*   < > 110*   CO2 mmol/L 25   < > 27   BUN mg/dL 17   < > 16   CREATININE mg/dL 0.97   < > 0.99   ANION GAP mmol/L 8   < > 6   CALCIUM mg/dL 8.2*   < > 8.2*   ALBUMIN g/dL  --   --  2.7*   TOTAL BILIRUBIN mg/dL  --   --  0.50   ALK PHOS U/L  --   --  104   ALT U/L  --   --  8   AST U/L  --   --  15   GLUCOSE RANDOM mg/dL 145*   < > 94    < > = values in this interval not displayed.          Results from last 7 days   Lab Units 08/14/23  0745 08/13/23  2034 08/13/23  1539 08/13/23  1152 08/13/23  0741 08/12/23  2059 08/12/23  1058 08/12/23  0730 08/11/23  2047 08/11/23  1544 08/11/23  1108 08/11/23  0726   POC GLUCOSE mg/dl 168* 210* 143* 144* 101 150* 151* 95 193* 92 113 219*         Results from last 7 days   Lab Units 08/08/23  1643 08/08/23  1124 08/08/23  0506   LACTIC ACID mmol/L 1.1 2.4*  --    PROCALCITONIN ng/ml  --   --  0.56*       Lines/Drains:  Invasive Devices     Peripheral Intravenous Line  Duration           Peripheral IV 08/11/23 Proximal;Right;Ventral (anterior) Forearm 2 days          Drain  Duration           Chest Tube Right 12 Fr. 9 days    External Urinary Catheter 7 days                      Imaging: Reviewed radiology reports from this admission including: chest xray, chest CT scan, CT head and procedure reports    Recent Cultures (last 7 days):   Results from last 7 days   Lab Units 08/12/23  1503 08/08/23  1137 08/08/23  0809   BLOOD CULTURE  No Growth at 24 hrs. No Growth at 24 hrs. No Growth After 5 Days. No Growth After 5 Days. Last 24 Hours Medication List:   Current Facility-Administered Medications   Medication Dose Route Frequency Provider Last Rate   • acetaminophen  325 mg Rectal Q4H PRN Luther Thao DO     • acetaminophen  650 mg Oral Q6H PRN Leonel Rivas MD     • cefTRIAXone  2,000 mg Intravenous Q24H Larissa Escudero MD 2,000 mg (08/13/23 1107)   • donepezil  5 mg Oral HS Leonel Rivas MD     • enoxaparin  40 mg Subcutaneous Daily Leonel Rivas MD     • guaiFENesin  1,200 mg Oral Q12H [facility] MD Kim     • insulin glargine  5 Units Subcutaneous HS Sterling Chen MD     • insulin lispro  1-5 Units Subcutaneous TID [facility] Leonel Rivas MD     • insulin lispro  2 Units Subcutaneous TID With Meals Sterling Chen MD     • memantine  10 mg Oral BID Leonel Rivas MD     • OLANZapine  5 mg Intramuscular Once Mariela Coad, DO     • polyethylene glycol  17 g Oral Daily Sterling Chen MD     • senna  1 tablet Oral HS Leonel Rivas MD          Today, Patient Was Seen By: Sterling Chen MD    **Please Note: This note may have been constructed using a voice recognition system. **

## 2023-08-14 NOTE — ASSESSMENT & PLAN NOTE
Patient's prognosis remains guarded as he is not a candidate for VATS procedure  Continuing source control with chest tube and drainage  Continuing IV antibiotics  Multiple goals of care discussions with sister; sister was hopeful of patient's recovery and to return back to baseline  Extensive conversation explained that his prognosis is poor    Plan-  Consider palliative care consult

## 2023-08-14 NOTE — PROGRESS NOTES
Progress Note - Pulmonary   David Winston 82 y.o. male MRN: [id number]  Unit/Bed#: W -01 Encouter:[id number]    Assessment/Plan:    1. Streptococcus intermedius empyema  • 12 Fr chest tube placed on August 4  • S/p 6 doses of tPA/dornase  • Output since 7 AM today morning is 30 mL, total 3336 mL  • Continue chest tube -20 mild wall suction. • Day 7 ceftriaxone, total 11 days of antibiotics  • High-resolution CT chest  • Recommend incentive spirometry  • Due to recurrent aspirations      2. Dementia with toxic metabolic encephalopathy  • Toxic metabolic encephalopathy likely from a pneumonia/sepsis  • Patient was drowsy, disoriented at the time of examination  • Encourage frequent reorientation  • Continue appropriate sleep-wake cycles        Subjective:  Patient seen and examined at bedside this morning. Patient was disoriented, nonverbal, sleepy. Objective:    Vitals: Blood pressure 120/55, pulse 96, temperature 98.4 °F (36.9 °C), resp. rate (!) 28, height 5' 9" (1.753 m), weight 56.4 kg (124 lb 5.4 oz), SpO2 93 %. On room air,Body mass index is 18.36 kg/m². Intake/Output Summary (Last 24 hours) at 8/14/2023 1106  Last data filed at 8/14/2023 0944  Gross per 24 hour   Intake 210 ml   Output 220 ml   Net -10 ml       Invasive Devices     Peripheral Intravenous Line  Duration           Peripheral IV 08/11/23 Proximal;Right;Ventral (anterior) Forearm 2 days          Drain  Duration           Chest Tube Right 12 Fr. 9 days    External Urinary Catheter 7 days                Physical Exam:    Physical Exam  Constitutional:       Appearance: He is ill-appearing. Comments: Disoriented, unable to recall why he is in the hospital  Looks weak and frail   HENT:      Head: Normocephalic and atraumatic. Mouth/Throat:      Mouth: Mucous membranes are dry. Eyes:      Pupils: Pupils are equal, round, and reactive to light. Cardiovascular:      Rate and Rhythm: Normal rate and regular rhythm. Heart sounds: Normal heart sounds. Pulmonary:      Breath sounds: No rhonchi or rales. Comments: Decreased breath sounds on the right  Abdominal:      General: Abdomen is flat. Palpations: Abdomen is soft. Tenderness: There is no abdominal tenderness. Skin:     General: Skin is warm and dry. Coloration: Skin is not jaundiced. Neurological:      Mental Status: Mental status is at baseline. He is disoriented. Sensory: No sensory deficit. Motor: No weakness. Psychiatric:      Comments: Confused, anxious         Labs: I have personally reviewed pertinent lab results. Imaging and other studies: I have personally reviewed pertinent reports.

## 2023-08-14 NOTE — ASSESSMENT & PLAN NOTE
Lab Results   Component Value Date    HGBA1C 7.0 (H) 08/03/2023       Recent Labs     08/13/23  1152 08/13/23  1539 08/13/23 2034 08/14/23  0745   POCGLU 144* 143* 210* 168*       Blood Sugar Average: Last 72 hrs:  (P) 148.25   Not on any home regime   Hba1c- 7  Was initially started on Lantus 10 and scheduled mealtime    Plan-   Decreased Lantus to 5U   Discontinued mealtime as pt has poor po intake   Hypoglycemia protocol   ISS

## 2023-08-14 NOTE — PLAN OF CARE
Problem: PHYSICAL THERAPY ADULT  Goal: Performs mobility at highest level of function for planned discharge setting. See evaluation for individualized goals. Description: Treatment/Interventions: Functional transfer training, LE strengthening/ROM, Therapeutic exercise, Endurance training, Cognitive reorientation, Patient/family training, Equipment eval/education, Bed mobility, Gait training, Compensatory technique education          See flowsheet documentation for full assessment, interventions and recommendations. Note: Prognosis: Fair  Problem List: Decreased strength, Decreased range of motion, Decreased endurance, Impaired balance, Decreased mobility, Decreased coordination, Decreased cognition, Impaired judgement, Decreased safety awareness  Assessment: Pt seen for PT session this pm. Pt able to sit EOB with assist of PT x 8 mins. Pt did attempt trans x 1 with dependent assist of PT but unable to clear buttocks from the bed. At end of session, pt fatigued and returned to supine in bed with max A + 2. While adm, pt will cont to benefit from skilled PT services to increase pt's functional mobility, strength and gait. When medically stable for dc, pt remains appropriate for post acute rehab services. PT Discharge Recommendation: Post acute rehabilitation services (vs return to facility with skilled PT services pending pt's functional mobility/level of assist)    See flowsheet documentation for full assessment.

## 2023-08-14 NOTE — ASSESSMENT & PLAN NOTE
On admission patient met criteria for severe sepsis secondary to pneumonia due to the loculated effusion  · Blood culture-negative  · Fluid culture-negative  IR chest tube placement 8/4  Procal 0.56  Repeat Bcx- Negative     Plan-  Appreciate ID recommendations-  continue Ceftriaxone   Respiratory protocol  Monitor vital signs  Mucinex 1200 twice daily  See loculated pleural effusion plan

## 2023-08-14 NOTE — PHYSICAL THERAPY NOTE
23 1356   PT Last Visit   PT Visit Date 23   Note Type   Note Type Treatment   Pain Assessment   Pain Assessment Tool FLACC   Pain Rating: FLACC (Rest) - Face 0   Pain Rating: FLACC (Rest) - Legs 0   Pain Rating: FLACC (Rest) - Activity 0   Pain Rating: FLACC (Rest) - Cry 0   Pain Rating: FLACC (Rest) - Consolability 0   Score: FLACC (Rest) 0   Pain Rating: FLACC (Activity) - Face 0   Pain Rating: FLACC (Activity) - Legs 0   Pain Rating: FLACC (Activity) - Activity 0   Pain Rating: FLACC (Activity) - Cry 0   Pain Rating: FLACC (Activity) - Consolability 0   Score: FLACC (Activity) 0   Restrictions/Precautions   Other Precautions Cognitive; Fall Risk;Bed Alarm; Chair Alarm;Multiple lines  (chest tube;ayon catheter;tino)   General   Chart Reviewed Yes   Additional Pertinent History Pt has a history of dementia   Family/Caregiver Present No   Cognition   Overall Cognitive Status Impaired   Arousal/Participation Persistent stimuli required   Attention Difficulty attending to directions   Orientation Level Oriented to person   Memory Decreased long term memory;Decreased recall of biographical information;Decreased short term memory;Decreased recall of recent events;Decreased recall of precautions   Following Commands Follows one step commands inconsistently   Comments At least 2 pt identifiers including name and    Subjective   Subjective Pt with little to no verbalization during PT session. Pt received supine in bed removing gown and pt had also removed Tino - RN aware. PT assisted pt to don his pt gown   Bed Mobility   Supine to Sit 2  Maximal assistance   Additional items Assist x 1;Verbal cues; Increased time required   Sit to Supine 2  Maximal assistance   Additional items Assist x 2;Verbal cues; Increased time required   Additional Comments Pt sat EOB working on sitting balance x 8 mins;while seated EOB, pt continually holds feet off of the floor which increases his posterior lean/loss of balance despite verbal/tactile cues from PT   Transfers   Sit to Stand   (Attempted with dependent assist of PT - pt's call bell utilized to request a 2nd person for assist but no one available - staff with other pt's. Pt unable to stand with dependent assist of PT.)   Balance   Static Sitting   (varies from Poor to brief episodes of F-/F)   Activity Tolerance   Activity Tolerance Patient limited by fatigue;Treatment limited secondary to medical complications (Comment)  (impaired cognition)   Assessment   Problem List Decreased strength;Decreased range of motion;Decreased endurance; Impaired balance;Decreased mobility; Decreased coordination;Decreased cognition; Impaired judgement;Decreased safety awareness   Assessment Pt seen for PT session this pm. Pt able to sit EOB with assist of PT x 8 mins. Pt did attempt trans x 1 with dependent assist of PT but unable to clear buttocks from the bed. At end of session, pt fatigued and returned to supine in bed with max A + 2. While adm, pt will cont to benefit from skilled PT services to increase pt's functional mobility, strength and gait. When medically stable for dc, pt remains appropriate for post acute rehab services vs return to facility with PT services. The patient's AM-PAC Basic Mobility Inpatient Short Form Raw Score is 8. A Raw score of less than or equal to 16 suggests the patient may benefit from discharge to post-acute rehabilitation services. Please also refer to the recommendation of the Physical Therapist for safe discharge planning.    Goals   [facility] Expiration Date 08/17/23   Short Term Goal #1 Pt will: perform bed mobility w/ supervision to decrease pt's burden of care and increase pt's independence w/ repositioning in bed; perform transfers w/ supervision to promote increased OOB mobility; ambulate at least 75' w/ LRAD and min Ax1 to increase pt's ambulatory endurance/tolerance; increase all balance ratings by at least 1 grade to decrease pt's risk of falls Plan   Treatment/Interventions ADL retraining;Functional transfer training;LE strengthening/ROM; Therapeutic exercise; Endurance training;Cognitive reorientation;Patient/family training;Equipment eval/education; Bed mobility;Gait training; Compensatory technique education   PT Frequency 3-5x/wk   Recommendation   PT Discharge Recommendation Post acute rehabilitation services  (vs return to facility with skilled PT services pending pt's functional mobility/level of assist)   AM-PAC Basic Mobility Inpatient   Turning in Flat Bed Without Bedrails 2   Lying on Back to Sitting on Edge of Flat Bed Without Bedrails 2   Moving Bed to Chair 1   Standing Up From Chair Using Arms 1   Walk in Room 1   Climb 3-5 Stairs With Railing 1   Basic Mobility Inpatient Raw Score 8   Turning Head Towards Sound 3   Follow Simple Instructions 2   Low Function Basic Mobility Raw Score  13   Low Function Basic Mobility Standardized Score  20.14   Highest Level Of Mobility   JH-HLM Goal 3: Sit at edge of bed   JH-HLM Achieved 3: Sit at edge of bed   Education   Education Provided Mobility training   Patient Reinforcement needed   End of Consult   Patient Position at End of Consult Supine; All needs within reach;Bed/Chair alarm activated   Licensure   [place] License Number  Pop Mehta, [id number] [employer]

## 2023-08-14 NOTE — ASSESSMENT & PLAN NOTE
POA platelets 182, pressure review in September 2022 was noted unremarkable  Iron panel: Iron Sat: 14, TIBC: 182, iron: 26  Plt 545    Plan  Likely in the setting of sepsis   Continue to monitor

## 2023-08-14 NOTE — ASSESSMENT & PLAN NOTE
Chest x-ray- There is a right mid to lower lung opacity with well-circumscribed medial margin suggests loculated effusion. CT chest- Moderate right pleural effusion with compressive atelectasis over the right lower lobe and posterior right middle and upper lobes, Consolidation in the left lower lobe, probably atelectatic. Superimposed pneumonia cannot be excluded. Soft tissue densities in the bronchial tree as above, appearance favors secretions. IR placed chest tube 8/4  Chest Xray- Diminishing right pleural effusion. Drainage catheter at the right lung base. Bibasilar atelectasis, right more than left.   Repeat C Xray- Appears to be continuing to improve in size, pending official read    Plan-  Appreciate pulmonology recommendations- Suboptimal pleural drainage, trial of 6/6 TPA/Dornase BID per MIST2 trial,  Overall poor surgical candidate and his sister agrees, should source control not be achieved with TPA/Dornase, may consider transition to hospice care - continue ongoing [address] [facility] discussions  Chest PT, encourage cough, incentive spirometry   When chest tube output <100 recommend CT Chest   Appreciate infectious disease recommendations  Consider CT CAP with PO and IV contrast to evaluate for possible esophageal leak

## 2023-08-15 PROBLEM — E87.0 HYPERNATREMIA: Status: ACTIVE | Noted: 2023-08-15

## 2023-08-15 PROBLEM — E44.0 MODERATE PROTEIN-CALORIE MALNUTRITION (HCC): Status: ACTIVE | Noted: 2023-08-15

## 2023-08-15 LAB
ANION GAP SERPL CALCULATED.3IONS-SCNC: 8 MMOL/L
ANION GAP SERPL CALCULATED.3IONS-SCNC: 9 MMOL/L
BUN SERPL-MCNC: 16 MG/DL (ref 5–25)
BUN SERPL-MCNC: 17 MG/DL (ref 5–25)
CALCIUM SERPL-MCNC: 8.4 MG/DL (ref 8.4–10.2)
CALCIUM SERPL-MCNC: 8.6 MG/DL (ref 8.4–10.2)
CHLORIDE SERPL-SCNC: 111 MMOL/L (ref 96–108)
CHLORIDE SERPL-SCNC: 113 MMOL/L (ref 96–108)
CO2 SERPL-SCNC: 26 MMOL/L (ref 21–32)
CO2 SERPL-SCNC: 29 MMOL/L (ref 21–32)
CREAT SERPL-MCNC: 0.88 MG/DL (ref 0.6–1.3)
CREAT SERPL-MCNC: 0.91 MG/DL (ref 0.6–1.3)
ERYTHROCYTE [DISTWIDTH] IN BLOOD BY AUTOMATED COUNT: 15 % (ref 11.6–15.1)
GFR SERPL CREATININE-BSD FRML MDRD: 79 ML/MIN/1.73SQ M
GFR SERPL CREATININE-BSD FRML MDRD: 81 ML/MIN/1.73SQ M
GLUCOSE SERPL-MCNC: 110 MG/DL (ref 65–140)
GLUCOSE SERPL-MCNC: 121 MG/DL (ref 65–140)
GLUCOSE SERPL-MCNC: 155 MG/DL (ref 65–140)
GLUCOSE SERPL-MCNC: 165 MG/DL (ref 65–140)
GLUCOSE SERPL-MCNC: 179 MG/DL (ref 65–140)
GLUCOSE SERPL-MCNC: 201 MG/DL (ref 65–140)
HCT VFR BLD AUTO: 26.6 % (ref 36.5–49.3)
HGB BLD-MCNC: 8 G/DL (ref 12–17)
MCH RBC QN AUTO: 29.3 PG (ref 26.8–34.3)
MCHC RBC AUTO-ENTMCNC: 30.1 G/DL (ref 31.4–37.4)
MCV RBC AUTO: 97 FL (ref 82–98)
PLATELET # BLD AUTO: 537 THOUSANDS/UL (ref 149–390)
PMV BLD AUTO: 8.8 FL (ref 8.9–12.7)
POTASSIUM SERPL-SCNC: 3.6 MMOL/L (ref 3.5–5.3)
POTASSIUM SERPL-SCNC: 3.7 MMOL/L (ref 3.5–5.3)
RBC # BLD AUTO: 2.73 MILLION/UL (ref 3.88–5.62)
SODIUM SERPL-SCNC: 146 MMOL/L (ref 135–147)
SODIUM SERPL-SCNC: 150 MMOL/L (ref 135–147)
WBC # BLD AUTO: 10.43 THOUSAND/UL (ref 4.31–10.16)

## 2023-08-15 PROCEDURE — 85027 COMPLETE CBC AUTOMATED: CPT

## 2023-08-15 PROCEDURE — 99232 SBSQ HOSP IP/OBS MODERATE 35: CPT | Performed by: INTERNAL MEDICINE

## 2023-08-15 PROCEDURE — NC001 PR NO CHARGE: Performed by: STUDENT IN AN ORGANIZED HEALTH CARE EDUCATION/TRAINING PROGRAM

## 2023-08-15 PROCEDURE — 82948 REAGENT STRIP/BLOOD GLUCOSE: CPT

## 2023-08-15 PROCEDURE — 99233 SBSQ HOSP IP/OBS HIGH 50: CPT | Performed by: FAMILY MEDICINE

## 2023-08-15 PROCEDURE — 80048 BASIC METABOLIC PNL TOTAL CA: CPT

## 2023-08-15 RX ORDER — DEXTROSE MONOHYDRATE 50 MG/ML
75 INJECTION, SOLUTION INTRAVENOUS CONTINUOUS
Status: DISCONTINUED | OUTPATIENT
Start: 2023-08-15 | End: 2023-08-15

## 2023-08-15 RX ORDER — DEXTROSE MONOHYDRATE 50 MG/ML
75 INJECTION, SOLUTION INTRAVENOUS CONTINUOUS
Status: DISCONTINUED | OUTPATIENT
Start: 2023-08-15 | End: 2023-08-17

## 2023-08-15 RX ADMIN — ENOXAPARIN SODIUM 40 MG: 40 INJECTION SUBCUTANEOUS at 08:04

## 2023-08-15 RX ADMIN — MEMANTINE 10 MG: 10 TABLET ORAL at 08:10

## 2023-08-15 RX ADMIN — DEXTROSE 75 ML/HR: 5 SOLUTION INTRAVENOUS at 07:58

## 2023-08-15 RX ADMIN — MEMANTINE 10 MG: 10 TABLET ORAL at 17:07

## 2023-08-15 RX ADMIN — INSULIN LISPRO 1 UNITS: 100 INJECTION, SOLUTION INTRAVENOUS; SUBCUTANEOUS at 12:04

## 2023-08-15 RX ADMIN — INSULIN LISPRO 2 UNITS: 100 INJECTION, SOLUTION INTRAVENOUS; SUBCUTANEOUS at 17:08

## 2023-08-15 RX ADMIN — INSULIN LISPRO 2 UNITS: 100 INJECTION, SOLUTION INTRAVENOUS; SUBCUTANEOUS at 12:04

## 2023-08-15 RX ADMIN — GUAIFENESIN 1200 MG: 600 TABLET ORAL at 21:07

## 2023-08-15 RX ADMIN — INSULIN LISPRO 2 UNITS: 100 INJECTION, SOLUTION INTRAVENOUS; SUBCUTANEOUS at 08:09

## 2023-08-15 RX ADMIN — POLYETHYLENE GLYCOL 3350 17 G: 17 POWDER, FOR SOLUTION ORAL at 08:04

## 2023-08-15 RX ADMIN — DEXTROSE 75 ML/HR: 5 SOLUTION INTRAVENOUS at 23:07

## 2023-08-15 RX ADMIN — INSULIN LISPRO 1 UNITS: 100 INJECTION, SOLUTION INTRAVENOUS; SUBCUTANEOUS at 08:09

## 2023-08-15 RX ADMIN — DONEPEZIL HYDROCHLORIDE 5 MG: 5 TABLET ORAL at 21:07

## 2023-08-15 RX ADMIN — CEFTRIAXONE 2000 MG: 2 INJECTION, POWDER, FOR SOLUTION INTRAMUSCULAR; INTRAVENOUS at 10:26

## 2023-08-15 RX ADMIN — INSULIN GLARGINE 5 UNITS: 100 INJECTION, SOLUTION SUBCUTANEOUS at 21:07

## 2023-08-15 RX ADMIN — GUAIFENESIN 1200 MG: 600 TABLET ORAL at 08:10

## 2023-08-15 NOTE — PLAN OF CARE
Problem: MOBILITY - ADULT  Goal: Maintain or return to baseline ADL function  Description: INTERVENTIONS:  -  Assess patient's ability to carry out ADLs; assess patient's baseline for ADL function and identify physical deficits which impact ability to perform ADLs (bathing, care of mouth/teeth, toileting, grooming, dressing, etc.)  - Assess/evaluate cause of self-care deficits   - Assess range of motion  - Assess patient's mobility; develop plan if impaired  - Assess patient's need for assistive devices and provide as appropriate  - Encourage maximum independence but intervene and supervise when necessary  - Involve family in performance of ADLs  - Assess for home care needs following discharge   - Consider OT consult to assist with ADL evaluation and planning for discharge  - Provide patient education as appropriate  Outcome: Progressing  Goal: Maintains/Returns to pre admission functional level  Description: INTERVENTIONS:  - Perform BMAT or MOVE assessment daily.   - Set and communicate daily mobility goal to care team and patient/family/caregiver. - Collaborate with rehabilitation services on mobility goals if consulted  - Perform Range of Motion times a day. - Reposition patient every  hours.   - Dangle patient  times a day  - Stand patient  times a day  - Ambulate patient  times a day  - Out of bed to chair  times a day   - Out of bed for meals times a day  - Out of bed for toileting  - Record patient progress and toleration of activity level   Outcome: Progressing     Problem: PAIN - ADULT  Goal: Verbalizes/displays adequate comfort level or baseline comfort level  Description: Interventions:  - Encourage patient to monitor pain and request assistance  - Assess pain using appropriate pain scale  - Administer analgesics based on type and severity of pain and evaluate response  - Implement non-pharmacological measures as appropriate and evaluate response  - Consider cultural and social influences on pain and pain management  - Notify physician/advanced practitioner if interventions unsuccessful or patient reports new pain  Outcome: Progressing     Problem: INFECTION - ADULT  Goal: Absence or prevention of progression during hospitalization  Description: INTERVENTIONS:  - Assess and monitor for signs and symptoms of infection  - Monitor lab/diagnostic results  - Monitor all insertion sites, i.e. indwelling lines, tubes, and drains  - Monitor endotracheal if appropriate and nasal secretions for changes in amount and color  - [place] appropriate cooling/warming therapies per order  - Administer medications as ordered  - Instruct and encourage patient and family to use good hand hygiene technique  - Identify and instruct in appropriate isolation precautions for identified infection/condition  Outcome: Progressing  Goal: Absence of fever/infection during neutropenic period  Description: INTERVENTIONS:  - Monitor WBC    Outcome: Progressing     Problem: SAFETY ADULT  Goal: Maintain or return to baseline ADL function  Description: INTERVENTIONS:  -  Assess patient's ability to carry out ADLs; assess patient's baseline for ADL function and identify physical deficits which impact ability to perform ADLs (bathing, care of mouth/teeth, toileting, grooming, dressing, etc.)  - Assess/evaluate cause of self-care deficits   - Assess range of motion  - Assess patient's mobility; develop plan if impaired  - Assess patient's need for assistive devices and provide as appropriate  - Encourage maximum independence but intervene and supervise when necessary  - Involve family in performance of ADLs  - Assess for home care needs following discharge   - Consider OT consult to assist with ADL evaluation and planning for discharge  - Provide patient education as appropriate  Outcome: Progressing  Goal: Maintains/Returns to pre admission functional level  Description: INTERVENTIONS:  - Perform BMAT or MOVE assessment daily.   - Set and communicate daily mobility goal to care team and patient/family/caregiver. - Collaborate with rehabilitation services on mobility goals if consulted  - Perform Range of Motion  times a day. - Reposition patient every  hours.   - Dangle patient  times a day  - Stand patient times a day  - Ambulate patient  times a day  - Out of bed to chair  times a day   - Out of bed for meals times a day  - Out of bed for toileting  - Record patient progress and toleration of activity level   Outcome: Progressing  Goal: Patient will remain free of falls  Description: INTERVENTIONS:  - Educate patient/family on patient safety including physical limitations  - Instruct patient to call for assistance with activity   - Consult OT/PT to assist with strengthening/mobility   - Keep Call bell within reach  - Keep bed low and locked with side rails adjusted as appropriate  - Keep care items and personal belongings within reach  - Initiate and maintain comfort rounds  - Make Fall Risk Sign visible to staff  - Offer Toileting every  Hours, in advance of need  - Initiate/Maintain alarm  - Obtain necessary fall risk management equipment:   - Apply yellow socks and bracelet for high fall risk patients  - Consider moving patient to room near nurses station  Outcome: Progressing

## 2023-08-15 NOTE — ASSESSMENT & PLAN NOTE
Chest x-ray- There is a right mid to lower lung opacity with well-circumscribed medial margin suggests loculated effusion. CT chest- Moderate right pleural effusion with compressive atelectasis over the right lower lobe and posterior right middle and upper lobes, Consolidation in the left lower lobe, probably atelectatic. Superimposed pneumonia cannot be excluded. Soft tissue densities in the bronchial tree as above, appearance favors secretions. IR placed chest tube 8/4  Chest Xray- Diminishing right pleural effusion. Drainage catheter at the right lung base. Bibasilar atelectasis, right more than left.   Repeat C Xray- Appears to be continuing to improve in size, pending official read    Plan-  Appreciate pulmonology recommendations- Suboptimal pleural drainage, trial of 6/6 TPA/Dornase BID per MIST2 trial,  Overall poor surgical candidate and his sister agrees, should source control not be achieved with TPA/Dornase, may consider transition to hospice care - continue ongoing [address] [place] discussions  Chest PT, encourage cough, incentive spirometry   When chest tube output <100 recommend CT Chest   Appreciate infectious disease recommendations  Consider CT CAP with PO and IV contrast to evaluate for possible esophageal leak when drainage is <100cc

## 2023-08-15 NOTE — ASSESSMENT & PLAN NOTE
Lab Results   Component Value Date    HGBA1C 7.0 (H) 08/03/2023       Recent Labs     08/14/23  1122 08/14/23  1603 08/14/23  2109 08/15/23  0738   POCGLU 191* 229* 201* 155*       Blood Sugar Average: Last 72 hrs:  (P) 161.5     Continue SSI   Monitor blood glucose periodically   Avoid hypoglycemia   Goal A1c given patient age and comorbidities is 8.5 to 5

## 2023-08-15 NOTE — ASSESSMENT & PLAN NOTE
Patient responds to name only   Continue aricept, namenda, and zyprexa   Monitor for constipation and urinary retention   Maintain sleep/matthew cycle   Frequent repositioning   Avoid tramadol, benzodiazepines, anticholinergics, and antihistamines   Encourage PO hydration and food intake. Assist with feeding   Continue aspiration precautions   Redirect unwanted behaviors as first line.  Avoid physical restraints

## 2023-08-15 NOTE — PROGRESS NOTES
[facility]  Progress Note  Name: Jana Richard  MRN: [id number]  Unit/Bed#: W -01 I Date of Admission: 8/3/2023   Date of Service: 8/15/2023 I Hospital Day: 12    Assessment/Plan   * Severe sepsis [facility])  Assessment & Plan  On admission patient met criteria for severe sepsis secondary to pneumonia due to the loculated effusion  · Blood culture-negative  · Fluid culture-negative  IR chest tube placement 8/4  Procal 0.56  Repeat Bcx- Negative     Plan-  Appreciate ID recommendations-  continue Ceftriaxone   Respiratory protocol  Monitor vital signs  Mucinex 1200 twice daily  See loculated pleural effusion plan        Loculated pleural effusion  Assessment & Plan  Chest x-ray- There is a right mid to lower lung opacity with well-circumscribed medial margin suggests loculated effusion. CT chest- Moderate right pleural effusion with compressive atelectasis over the right lower lobe and posterior right middle and upper lobes, Consolidation in the left lower lobe, probably atelectatic. Superimposed pneumonia cannot be excluded. Soft tissue densities in the bronchial tree as above, appearance favors secretions. IR placed chest tube 8/4  Chest Xray- Diminishing right pleural effusion. Drainage catheter at the right lung base. Bibasilar atelectasis, right more than left.   Repeat C Xray- Appears to be continuing to improve in size, pending official read    Plan-  Appreciate pulmonology recommendations- Suboptimal pleural drainage, trial of 6/6 TPA/Dornase BID per MIST2 trial,  Overall poor surgical candidate and his sister agrees, should source control not be achieved with TPA/Dornase, may consider transition to hospice care - continue ongoing [address] [place] discussions  Chest PT, encourage cough, incentive spirometry   When chest tube output <100 recommend CT Chest   Appreciate infectious disease recommendations  Consider CT CAP with PO and IV contrast to evaluate for possible esophageal leak when drainage is <100cc        Moderate protein-calorie malnutrition (HCC)  Assessment & Plan  Malnutrition Findings:   Adult Malnutrition type: Acute illness (in the setting of chronic illness)  Adult Degree of Malnutrition: Malnutrition of moderate degree  Malnutrition Characteristics: Fat loss, Muscle loss                  360 Statement: Moderate malnutrition related to dysphagia, inadequate oral intake as evidenced by loss of subcutaneous fat and muscle extremities, temples, orbitals, calves. BMI 18.17 Currently treated with oral supplementation    BMI Findings:  Adult BMI Classifications: Underweight < 18.5        Body mass index is 18.17 kg/m².    Plan-   Ensure supplements   Encourage eating with assistance from staff to prompt       Hypernatremia  Assessment & Plan  Likely in the setting of poor po intake   Na 150  Repeat Na 146    Plan-   D5 infusion, continuous  Reassess tomorrow in the setting of electrolytes and poor po intake            Goals of care, counseling/discussion  Assessment & Plan  Patient's prognosis remains guarded as he is not a candidate for VATS procedure  Continuing source control with chest tube and drainage  Continuing IV antibiotics  Multiple goals of care discussions with sister; sister was hopeful of patient's recovery and to return back to baseline  Extensive conversation explained that his prognosis is poor    Plan-  Appreciate Palliative Care recommendations        Type 2 diabetes mellitus [facility])  Assessment & Plan  Lab Results   Component Value Date    HGBA1C 7.0 (H) 08/03/2023       Recent Labs     08/14/23  2109 08/15/23  0738 08/15/23  1129 08/15/23  1550   POCGLU 201* 155* 201* 121       Blood Sugar Average: Last 72 hrs:  (P) 161.0090747292395026   Not on any home regime   Hba1c- 7  Was initially started on Lantus 10 and scheduled mealtime    Plan-   Decreased Lantus to 5U   Discontinued mealtime as pt has poor po intake   Hypoglycemia protocol   ISS     Delirium  Assessment & Plan  Patient likely has hypoactive delirium secondary to sepsis  Infectious disease consult appreciated and we will continue antibiotics and source control  We will do nonpharmacological management at this point of time with frequent reorientation and reestablishment of the sleep-wake cycle    Ambulatory dysfunction  Assessment & Plan  · At baseline ambulate without AD, noted unsteadiness new from baseline      Plan-  PT OT- postacute rehabilitation      Encephalopathy acute  Assessment & Plan  · POA at facility noted slurred speech, slow responsive, not at his baseline  · Encephalopathy likely metabolic in the setting of sepsis  · Check CT head : No acute intracranial leasion  · Continue antibiotic as above      Thrombocytosis  Assessment & Plan  POA platelets 131, pressure review in September 2022 was noted unremarkable  Iron panel: Iron Sat: 14, TIBC: 182, iron: 26  plt 537    Plan  Likely in the setting of sepsis   Continue to monitor    Anemia  Assessment & Plan  · POA hemoglobin 8.0, pressure review hemoglobin back in September 2022 noted 10.1. · Unclear if history of melena, hematochezia  · Anemia unclear etiology at this time possibly secondary to iron deficiency given thrombocytosis although this could represent current setting of infection versus underlying malignancy? Check iron panel: Ferritin 714, Iron Sat: 14, TIBC: 182, Iron: 26  folate, vitamin B12 WNL     Plan  · Monitor output and consider transfusion if less than 7  ·  CBC a.m.        Late onset Alzheimer's dementia with behavioral disturbance ([address])  Assessment & Plan  Hypoactive delirium and patient  Home meds Aricept 5 mg nightly, olanzapine 5 mg daily, Namenda 10 mg twice daily, trazodone 50 mg at bedtime    Plan-  Holding trazodone 50 mg at bedtime, Zyprexa 5 mg  Delirium precaution  Frequent reorientation  Avoid restraints if able unless danger to himself  Appreciate geriatric recommendations            VTE Pharmacologic Prophylaxis: VTE Score: 5 Moderate Risk (Score 3-4) - Pharmacological DVT Prophylaxis Ordered: enoxaparin (Lovenox). Patient Centered Rounds: I performed bedside rounds with nursing staff today. Discussions with Specialists or Other Care Team Provider: Pulmonology, infectious disease    Education and Discussions with Family / Patient: Updated  (sister) via phone. Current Length of Stay: 12 day(s)  Current Patient Status: Inpatient   Discharge Plan: Anticipate discharge in 48-72 hrs to rehab facility. Code Status: Level 3 - DNAR and DNI    Subjective:   Patient was seen and examined at the bedside. Patient was resting comfortably in no acute distress. Upon my evaluation this morning patient was more active and actively trying to get out of the bed. No overnight events were reported. Patient denies any complaints at the time of my evaluation. Objective:     Vitals:   Temp (24hrs), Av.1 °F (37.3 °C), Min:98.3 °F (36.8 °C), Max:100.5 °F (38.1 °C)    Temp:  [98.3 °F (36.8 °C)-100.5 °F (38.1 °C)] 98.7 °F (37.1 °C)  HR:  [] 137  Resp:  [16-22] 16  BP: (105-130)/(67-71) 105/67  SpO2:  [92 %-99 %] 98 %  Body mass index is 18.17 kg/m². Input and Output Summary (last 24 hours): Intake/Output Summary (Last 24 hours) at 8/15/2023 1653  Last data filed at 8/15/2023 1430  Gross per 24 hour   Intake 120 ml   Output 390 ml   Net -270 ml       Physical Exam:   Physical Exam  Vitals and nursing note reviewed. Constitutional:       General: He is not in acute distress. Appearance: He is well-developed. He is ill-appearing. He is not toxic-appearing or diaphoretic. HENT:      Head: Normocephalic and atraumatic. Mouth/Throat:      Mouth: Mucous membranes are moist.   Eyes:      Extraocular Movements: Extraocular movements intact. Conjunctiva/sclera: Conjunctivae normal.      Pupils: Pupils are equal, round, and reactive to light.    Cardiovascular:      Rate and Rhythm: Normal rate and regular rhythm. Pulses: Normal pulses. Heart sounds: Normal heart sounds. No murmur heard. Pulmonary:      Effort: Pulmonary effort is normal. No respiratory distress. Breath sounds: Rhonchi present. No wheezing. Comments: Bilaterally, slightly improving    Abdominal:      General: Bowel sounds are normal. There is no distension. Palpations: Abdomen is soft. Tenderness: There is no abdominal tenderness. There is no guarding or rebound. Musculoskeletal:         General: No swelling or tenderness. Normal range of motion. Cervical back: Normal range of motion and neck supple. Right lower leg: No edema. Left lower leg: No edema. Skin:     General: Skin is warm and dry. Capillary Refill: Capillary refill takes less than 2 seconds. Neurological:      Mental Status: He is alert. Mental status is at baseline. He is disoriented. Cranial Nerves: No cranial nerve deficit. Sensory: No sensory deficit. Motor: Weakness present. Comments: Awake and alert when prompted, follows some commands, able to minimally converse and answer some questions appropriately, remains hypoactive overall. Does have bouts throughout the day of awake and alertness   Psychiatric:         Mood and Affect: Mood normal.          Additional Data:     Labs:  Results from last 7 days   Lab Units 08/15/23  0454 08/14/23  0525 08/13/23  0504   WBC Thousand/uL 10.43*   < > 8.43   HEMOGLOBIN g/dL 8.0*   < > 8.3*   HEMATOCRIT % 26.6*   < > 26.9*   PLATELETS Thousands/uL 537*   < > 543*   NEUTROS PCT %  --   --  69   LYMPHS PCT %  --   --  19   MONOS PCT %  --   --  8   EOS PCT %  --   --  2    < > = values in this interval not displayed.      Results from last 7 days   Lab Units 08/15/23  1555 08/13/23  0504 08/12/23  0517   SODIUM mmol/L 146   < > 143   POTASSIUM mmol/L 3.7   < > 3.3*   CHLORIDE mmol/L 111*   < > 110*   CO2 mmol/L 26   < > 27   BUN mg/dL 16   < > 16   CREATININE mg/dL 0.88   < > 0.99   ANION GAP mmol/L 9   < > 6   CALCIUM mg/dL 8.6   < > 8.2*   ALBUMIN g/dL  --   --  2.7*   TOTAL BILIRUBIN mg/dL  --   --  0.50   ALK PHOS U/L  --   --  104   ALT U/L  --   --  8   AST U/L  --   --  15   GLUCOSE RANDOM mg/dL 110   < > 94    < > = values in this interval not displayed. Results from last 7 days   Lab Units 08/15/23  1550 08/15/23  1129 08/15/23  0738 08/14/23  2109 08/14/23  1603 08/14/23  1122 08/14/23  0745 08/13/23  2034 08/13/23  1539 08/13/23  1152 08/13/23  0741 08/12/23 2059   POC GLUCOSE mg/dl 121 201* 155* 201* 229* 191* 168* 210* 143* 144* 101 150*               Lines/Drains:  Invasive Devices     Peripheral Intravenous Line  Duration           Peripheral IV 08/15/23 Left Antecubital <1 day    Peripheral IV 08/15/23 Left;Ventral (anterior) Forearm <1 day          Drain  Duration           Chest Tube Right 12 Fr. 11 days    External Urinary Catheter 8 days                      Imaging: Reviewed radiology reports from this admission including: chest xray, chest CT scan and CT head    Recent Cultures (last 7 days):   Results from last 7 days   Lab Units 08/12/23  1503   BLOOD CULTURE  No Growth at 48 hrs. No Growth at 48 hrs.        Last 24 Hours Medication List:   Current Facility-Administered Medications   Medication Dose Route Frequency Provider Last Rate   • acetaminophen  325 mg Rectal Q4H PRN Ghazala Leigh DO     • acetaminophen  650 mg Oral Q6H PRN Marlin Rick MD     • cefTRIAXone  2,000 mg Intravenous Q24H Goldy King MD 2,000 mg (08/15/23 1026)   • dextrose  75 mL/hr Intravenous Continuous Yane Bass MD     • donepezil  5 mg Oral HS Marlin Rick MD     • enoxaparin  40 mg Subcutaneous Daily Marlin Rick MD     • guaiFENesin  1,200 mg Oral Q12H [address] [facility] Shorty Alvarez MD     • insulin glargine  5 Units Subcutaneous HS Yane Bass MD     • insulin lispro  1-5 Units Subcutaneous TID [address] Glo Beckford MD     • insulin lispro  2 Units Subcutaneous TID With Meals Liz Hall MD     • memantine  10 mg Oral BID Bryanna Franks MD     • OLANZapine  5 mg Intramuscular Once Valdemar Olmos DO          Today, Patient Was Seen By: Liz Hall MD    **Please Note: This note may have been constructed using a voice recognition system. **

## 2023-08-15 NOTE — PROGRESS NOTES
Progress Note - Pulmonary   Seema Lizett 82 y.o. male MRN: [id number]  Unit/Bed#: W -01 Encouter:[id number]    Assessment/Plan:    1. Streptococcus intermedius empyema  • 12 Fr chest tube placed on August 4  • S/p 6 doses of tPA/dornase. Subjective improvement after tPA/Dornase  • Output since 3 PM yesterday is about 100 mL until 10am today  • Continue chest tube -20 mild wall suction. • Day 8 ceftriaxone, total 11 days of antibiotics  • CT chest to determine pulling out the chest tube once drainage is less than 100 ml per 24 hr.  • Recommend incentive spirometry  • Possibly due to recurrent aspirations      2. Dementia with toxic metabolic encephalopathy  • Toxic metabolic encephalopathy likely from a pneumonia/sepsis  • Patient was drowsy, disoriented at the time of examination  • Patient has poor oral intake resulting in hypernatremia, primary care team started the patient on gentle hydration. • Encourage frequent reorientation  • Continue appropriate sleep-wake cycles        Subjective:  Patient seen and examined at bedside this morning. Patient was disoriented, overall non communicating, sleepy reportedly feels cold and has experienced chills. Objective:    Vitals: Blood pressure 126/70, pulse 82, temperature 98.3 °F (36.8 °C), resp. rate 16, height 5' 9" (1.753 m), weight 55.8 kg (123 lb 0.3 oz), SpO2 94 %. On room air,Body mass index is 18.17 kg/m². Intake/Output Summary (Last 24 hours) at 8/15/2023 1032  Last data filed at 8/15/2023 0900  Gross per 24 hour   Intake 180 ml   Output 300 ml   Net -120 ml       Invasive Devices     Peripheral Intravenous Line  Duration           Peripheral IV 08/15/23 Left Antecubital <1 day    Peripheral IV 08/15/23 Left;Ventral (anterior) Forearm <1 day          Drain  Duration           Chest Tube Right 12 Fr. 10 days    External Urinary Catheter 8 days                Physical Exam:    Physical Exam  Constitutional:       Appearance: He is ill-appearing. Comments: Disoriented, unable to recall why he is in the hospital  Looks weak and frail   HENT:      Head: Normocephalic and atraumatic. Mouth/Throat:      Mouth: Mucous membranes are dry. Eyes:      Pupils: Pupils are equal, round, and reactive to light. Cardiovascular:      Rate and Rhythm: Normal rate and regular rhythm. Heart sounds: Normal heart sounds. Pulmonary:      Breath sounds: No rhonchi or rales. Comments: Decreased breath sounds on the right  Abdominal:      General: Abdomen is flat. Palpations: Abdomen is soft. Tenderness: There is no abdominal tenderness. Skin:     General: Skin is dry. Coloration: Skin is not jaundiced. Comments: Skin is cold to touch     Neurological:      Mental Status: Mental status is at baseline. He is disoriented. Sensory: No sensory deficit. Motor: No weakness. Psychiatric:      Comments: Confused, anxious         Labs: I have personally reviewed pertinent lab results. Imaging and other studies: I have personally reviewed pertinent reports.

## 2023-08-15 NOTE — ASSESSMENT & PLAN NOTE
Patient's prognosis remains guarded as he is not a candidate for VATS procedure  Continuing source control with chest tube and drainage  Continuing IV antibiotics  Multiple goals of care discussions with sister; sister was hopeful of patient's recovery and to return back to baseline  Extensive conversation explained that his prognosis is poor    Plan-  Appreciate Palliative Care recommendations

## 2023-08-15 NOTE — PROGRESS NOTES
Progress Note - Geriatric Medicine   Jarred Hayes 82 y.o. male MRN: [id number]  Unit/Bed#: W -01 Encounter: [id number]      Assessment/Plan:  Hypernatremia  Assessment & Plan  Recent Na 150   Manage as per primary team, most likely contributes to his change in mental staus      Type 2 diabetes mellitus [facility])  Assessment & Plan  Lab Results   Component Value Date    HGBA1C 7.0 (H) 08/03/2023       Recent Labs     08/14/23  1122 08/14/23  1603 08/14/23  2109 08/15/23  0738   POCGLU 191* 229* 201* 155*       Blood Sugar Average: Last 72 hrs:  (P) 161.5     Continue SSI   Monitor blood glucose periodically   Avoid hypoglycemia   Goal A1c given patient age and comorbidities is 8.5 to 9    Constipation  Assessment & Plan  Last BM this morning   Continue senna 2 tablets BID   Continue miralax prn   Encourage PO hydration and food intake  Optimize bowel regimen as needed     Loculated pleural effusion  Assessment & Plan  S/p R chest tube placement 8/4  Continue IV antibiotics and monitor closely   Consider VATS  Add tylenol 975 mg scheduled TID   Consider adding oxycodone 2.5 mg prn for severe pain   Pulmonology and ID follows     Encephalopathy acute  Assessment & Plan  Hypoactive delirium. Patient responds to name only   Multiple etiologies: cognitive impairment at baseline, acute infection, dehydration, suspect pain, immobility, change in environment, hypernatremia   Continue delirium precautions   Maintain sleep/wake cycle. Minimize overnight interruptions  Dim lights, close blinds, turn off TV to minimize stimulation and encourage sleep environment in evenings   Ensure pain is well controlled.  Consider scheduled tylenol 975 mg TID  Monitor for constipation and urinary retention   Encourage early mobilization and ambulation   Provide frequent reorientation and redirection   Encourage family and friends at bedside   Avoid tramadol, benzodiazepines, anticholinergics, and antihistamines  Encourage PO hydration and food intake. Assist with feedings   Continue aspiration precautions   Redirect unwanted behaviors as first line. Avoid physical restraints  Continue IV antibiotics as per primary care team     Per discussion with sister ( POA) - she is considering comfort measures. Anemia  Assessment & Plan  Hgb today 8   Consider IV transfusion if Hgb < 7   Continue to monitor CBC     Late onset Alzheimer's dementia with behavioral disturbance [facility])  Assessment & Plan  Patient responds to name only   Continue aricept, namenda, and zyprexa   Monitor for constipation and urinary retention   Maintain sleep/matthew cycle   Frequent repositioning   Avoid tramadol, benzodiazepines, anticholinergics, and antihistamines   Encourage PO hydration and food intake. Assist with feeding   Continue aspiration precautions   Redirect unwanted behaviors as first line. Avoid physical restraints         Subjective:   Patient is an 83 yo M who was seen today for a follow up. ROS limited due to change in mental status. Patient was laying in bed and somnolent for the entire encounter. He responded to his name but was unable to answer any other questions. Per nursing staff, most recent BM was this morning (diarrhea). He slept well last night and had a good appetite this morning. No behavioral disturbances overnight. Discussed with his sister (POA) at bedside about goals of care, comfort measures/hospice- seems to consider it. Review of Systems   Unable to perform ROS: Mental status change         Objective:     Vitals: Blood pressure 126/70, pulse 82, temperature 98.3 °F (36.8 °C), resp. rate 16, height 5' 9" (1.753 m), weight 55.8 kg (123 lb 0.3 oz), SpO2 99 %. ,Body mass index is 18.17 kg/m².       Intake/Output Summary (Last 24 hours) at 8/15/2023 1230  Last data filed at 8/15/2023 1120  Gross per 24 hour   Intake 180 ml   Output 400 ml   Net -220 ml       Current Medications: Reviewed    Physical Exam:   Physical Exam  Constitutional: General: He is not in acute distress. Appearance: He is ill-appearing. Comments: Frail appearing   HENT:      Head: Normocephalic and atraumatic. Right Ear: External ear normal.      Left Ear: External ear normal.      Nose: Nose normal.      Comments: Nasal canula in place     Mouth/Throat:      Mouth: Mucous membranes are dry. Pharynx: Oropharynx is clear. Neck:      Vascular: No carotid bruit. Cardiovascular:      Rate and Rhythm: Normal rate and regular rhythm. Pulses: Normal pulses. Heart sounds: Heart sounds are distant. No murmur heard. Pulmonary:      Effort: Tachypnea present. No respiratory distress. Breath sounds: Normal breath sounds. Comments: Chest tube in place  Abdominal:      General: Abdomen is flat. Bowel sounds are normal.      Palpations: Abdomen is soft. Tenderness: There is no guarding. Genitourinary:     Comments: Catheter draining clear yellow urine  Musculoskeletal:      Cervical back: Neck supple. Right lower leg: No edema. Left lower leg: No edema. Skin:     General: Skin is warm and dry. Capillary Refill: Capillary refill takes less than 2 seconds. Coloration: Skin is not pale. Neurological:      Mental Status: He is disoriented. Motor: Weakness present. Gait: Gait normal.      Comments: Patient responds to name only   Psychiatric:      Comments: Patient somnolent          Invasive Devices     Peripheral Intravenous Line  Duration           Peripheral IV 08/15/23 Left Antecubital <1 day    Peripheral IV 08/15/23 Left;Ventral (anterior) Forearm <1 day          Drain  Duration           Chest Tube Right 12 Fr. 10 days    External Urinary Catheter 8 days                Lab, Imaging and other studies: I have personally reviewed pertinent reports.

## 2023-08-15 NOTE — ASSESSMENT & PLAN NOTE
POA platelets 777, pressure review in September 2022 was noted unremarkable  Iron panel: Iron Sat: 14, TIBC: 182, iron: 26  plt 537    Plan  Likely in the setting of sepsis   Continue to monitor Do EKG, vitals, decrease Coreg.

## 2023-08-15 NOTE — ASSESSMENT & PLAN NOTE
Likely in the setting of poor po intake   Na 150  Repeat Na 146    Plan-   D5 infusion, continuous  Reassess tomorrow in the setting of electrolytes and poor po intake

## 2023-08-15 NOTE — ASSESSMENT & PLAN NOTE
Lab Results   Component Value Date    HGBA1C 7.0 (H) 08/03/2023       Recent Labs     08/14/23  2109 08/15/23  0738 08/15/23  1129 08/15/23  1550   POCGLU 201* 155* 201* 121       Blood Sugar Average: Last 72 hrs:  (P) 161.1971276300404375   Not on any home regime   Hba1c- 7  Was initially started on Lantus 10 and scheduled mealtime    Plan-   Decreased Lantus to 5U   Discontinued mealtime as pt has poor po intake   Hypoglycemia protocol   ISS

## 2023-08-15 NOTE — ASSESSMENT & PLAN NOTE
Malnutrition Findings:   Adult Malnutrition type: Acute illness (in the setting of chronic illness)  Adult Degree of Malnutrition: Malnutrition of moderate degree  Malnutrition Characteristics: Fat loss, Muscle loss                  360 Statement: Moderate malnutrition related to dysphagia, inadequate oral intake as evidenced by loss of subcutaneous fat and muscle extremities, temples, orbitals, calves. BMI 18.17 Currently treated with oral supplementation    BMI Findings:  Adult BMI Classifications: Underweight < 18.5        Body mass index is 18.17 kg/m².    Plan-   Ensure supplements   Encourage eating with assistance from staff to prompt

## 2023-08-15 NOTE — PROGRESS NOTES
Progress Note - Infectious Disease   [facility] 82 y.o. male MRN: [id number]  Unit/Bed#: W -01 Encounter: [id number]      Impression/Plan:  1.  Severe sepsis.  Present on admission with leukocytosis and tachycardia as well as a lactate level of 3.2.  Now subsequently has developed a fever. Suspect secondary to a pneumonia with complicating empyema.  No other clear source appreciated.  Suspect the ongoing sepsis is secondary to inadequate source control.  Seems to have had improvement since instillation of tPA and dornase. Temperatures had been down but had a low-grade fever in the last 24 hours.  Repeat blood cultures negative thus far. May still be having intermittent aspiration events.  -Antibiotics as below  -Source control measures as below  -Follow-up repeat blood cultures  -Recheck CBC with differential and CMP  -Supportive care     2.  Streptococcus intermedius empyema.  Suspect the ongoing clinical sepsis is related to inadequate source control.  Increase pleural fluid output since tPA dornase instillation.  Some improvement clinically noted.  Chest x-ray improved. -Continue ceftriaxone  -Chest tube drainage  -Recheck CT chest once output has dropped to see if chest tube can be pulled  -Close pulmonary follow-up  -Not a surgical candidate for VATS per pulmonary  -Duration of antibiotics will largely depend upon adequacy of source control and whether plan is to continue aggressive treatment     3.  Acute hypoxic respiratory failure.  Appears to be secondary to pneumonia with complicated parapneumonic effusion.  No other clear source appreciated.  Patient now back on oxygen by nasal cannula.   -Oxygen support as needed  -Monitor respiratory status  -Treatment of complicated effusion as above     4.  Acute encephalopathy.  Complicating baseline dementia.  Suspect related to the patient's acute infectious process.  Remains nonverbal.  Imaging of the brain without any abnormality.  -Monitor cognition  -Treat sepsis as above    5. Diarrhea. The patient still receiving MiraLAX and Senokot. Possible medication effect. Less likely C. difficile colitis.  -Discontinue MiraLAX and Senokot  -Monitor stool output    Discussed the above management plan with the primary service    Antibiotics:  Ceftriaxone 8  Antibiotics 13  Post chest tube placement 11    Subjective:  Patient has no fever, chills, sweats; no report vomiting, has had 2 large bowel movements this morning; no cough, shortness of breath; no reported pain. No new symptoms. He remains minimally verbal and confused. Objective:  Vitals:  Temp:  [98.3 °F (36.8 °C)-100.5 °F (38.1 °C)] 98.3 °F (36.8 °C)  HR:  [] 82  Resp:  [16-24] 16  BP: (126-130)/(70-71) 126/70  SpO2:  [92 %-94 %] 94 %  Temp (24hrs), Av.3 °F (37.4 °C), Min:98.3 °F (36.8 °C), Max:100.5 °F (38.1 °C)  Current: Temperature: 98.3 °F (36.8 °C)    Physical Exam:   General Appearance:  Elderly, debilitated, nontoxic, no acute distress. Throat: Oropharynx moist without lesions. Lungs:   Decreased breath sounds on the right; no wheezes, rhonchi or rales; respirations unlabored. Right-sided chest tube in place. Heart:  RRR; no murmur, rub or gallop   Abdomen:   Soft, non-tender, non-distended, positive bowel sounds. Extremities: No clubbing, cyanosis or edema   Skin: No new rashes or lesions. No draining wounds noted.        Labs, Imaging, & Other studies:   All pertinent labs and imaging studies were personally reviewed  Results from last 7 days   Lab Units 08/15/23  0454 23  0525 23  0504   WBC Thousand/uL 10.43* 10.39* 8.43   HEMOGLOBIN g/dL 8.0* 8.5* 8.3*   PLATELETS Thousands/uL 537* 545* 543*     Results from last 7 days   Lab Units 08/15/23  0454 23  0525 23  0504 23  0517 23  0454   SODIUM mmol/L 150* 146 144 143 141   POTASSIUM mmol/L 3.6 3.6 3.2* 3.3* 3.3*   CHLORIDE mmol/L 113* 113* 109* 110* 109*   CO2 mmol/L 29 25 27 27 24   BUN mg/dL 17 17 14 16 22   CREATININE mg/dL 0.91 0.97 0.89 0.99 0.95   EGFR ml/min/1.73sq m 79 73 80 71 75   CALCIUM mg/dL 8.4 8.2* 8.0* 8.2* 7.9*   AST U/L  --   --   --  15 17   ALT U/L  --   --   --  8 9   ALK PHOS U/L  --   --   --  104 102     Results from last 7 days   Lab Units 08/12/23  1503 08/08/23  1137   BLOOD CULTURE  No Growth at 48 hrs. No Growth at 48 hrs. No Growth After 5 Days.

## 2023-08-15 NOTE — CASE MANAGEMENT
Case Management Progress Note    Patient name Bladimir Stephenson  Location W /W -72 MRN [id number]  : 1940 Date 8/15/2023       LOS (days): 12  Geometric Mean LOS (GMLOS) (days): 5.00  Days to GMLOS:-7.1        OBJECTIVE:        Current admission status: Inpatient  Preferred Pharmacy:   PATIENT/FAMILY REPORTS NO PREFERRED PHARMACY  No address on file      Primary Care Provider: Anshu Back MD    Primary Insurance: [employer] Coronel [facility]  Secondary Insurance:     PROGRESS NOTE:  Spoke with Beltran from [employer] today (in person). General update provided. Mayela Blair took patients dentures back to [facility].

## 2023-08-15 NOTE — PLAN OF CARE
Problem: MOBILITY - ADULT  Goal: Maintain or return to baseline ADL function  Description: INTERVENTIONS:  -  Assess patient's ability to carry out ADLs; assess patient's baseline for ADL function and identify physical deficits which impact ability to perform ADLs (bathing, care of mouth/teeth, toileting, grooming, dressing, etc.)  - Assess/evaluate cause of self-care deficits   - Assess range of motion  - Assess patient's mobility; develop plan if impaired  - Assess patient's need for assistive devices and provide as appropriate  - Encourage maximum independence but intervene and supervise when necessary  - Involve family in performance of ADLs  - Assess for home care needs following discharge   - Consider OT consult to assist with ADL evaluation and planning for discharge  - Provide patient education as appropriate  Outcome: Progressing     Problem: PAIN - ADULT  Goal: Verbalizes/displays adequate comfort level or baseline comfort level  Description: Interventions:  - Encourage patient to monitor pain and request assistance  - Assess pain using appropriate pain scale  - Administer analgesics based on type and severity of pain and evaluate response  - Implement non-pharmacological measures as appropriate and evaluate response  - Consider cultural and social influences on pain and pain management  - Notify physician/advanced practitioner if interventions unsuccessful or patient reports new pain  Outcome: Progressing     Problem: INFECTION - ADULT  Goal: Absence or prevention of progression during hospitalization  Description: INTERVENTIONS:  - Assess and monitor for signs and symptoms of infection  - Monitor lab/diagnostic results  - Monitor all insertion sites, i.e. indwelling lines, tubes, and drains  - Monitor endotracheal if appropriate and nasal secretions for changes in amount and color  - [place] appropriate cooling/warming therapies per order  - Administer medications as ordered  - Instruct and encourage patient and family to use good hand hygiene technique  - Identify and instruct in appropriate isolation precautions for identified infection/condition  Outcome: Progressing  Goal: Absence of fever/infection during neutropenic period  Description: INTERVENTIONS:  - Monitor WBC    Outcome: Progressing     Problem: SAFETY ADULT  Goal: Maintain or return to baseline ADL function  Description: INTERVENTIONS:  -  Assess patient's ability to carry out ADLs; assess patient's baseline for ADL function and identify physical deficits which impact ability to perform ADLs (bathing, care of mouth/teeth, toileting, grooming, dressing, etc.)  - Assess/evaluate cause of self-care deficits   - Assess range of motion  - Assess patient's mobility; develop plan if impaired  - Assess patient's need for assistive devices and provide as appropriate  - Encourage maximum independence but intervene and supervise when necessary  - Involve family in performance of ADLs  - Assess for home care needs following discharge   - Consider OT consult to assist with ADL evaluation and planning for discharge  - Provide patient education as appropriate  Outcome: Progressing  Goal: Maintains/Returns to pre admission functional level  Description: INTERVENTIONS:  - Perform BMAT or MOVE assessment daily.   - Set and communicate daily mobility goal to care team and patient/family/caregiver. - Collaborate with rehabilitation services on mobility goals if consulted  - Perform Range of Motion 3 times a day. - Reposition patient every 2 hours.   - Dangle patient 3 times a day  - Stand patient 3 times a day  - Ambulate patient 3 times a day  - Out of bed to chair 3 times a day   - Out of bed for meals 3 times a day  - Out of bed for toileting  - Record patient progress and toleration of activity level   Outcome: Progressing  Goal: Patient will remain free of falls  Description: INTERVENTIONS:  - Educate patient/family on patient safety including physical limitations  - Instruct patient to call for assistance with activity   - Consult OT/PT to assist with strengthening/mobility   - Keep Call bell within reach  - Keep bed low and locked with side rails adjusted as appropriate  - Keep care items and personal belongings within reach  - Initiate and maintain comfort rounds  - Make Fall Risk Sign visible to staff  - Offer Toileting every 2 Hours, in advance of need  - Initiate/Maintain bed alarm  - Apply yellow socks and bracelet for high fall risk patients  - Consider moving patient to room near nurses station  Outcome: Progressing     Problem: DISCHARGE PLANNING  Goal: Discharge to home or other facility with appropriate resources  Description: INTERVENTIONS:  - Identify barriers to discharge w/patient and caregiver  - Arrange for needed discharge resources and transportation as appropriate  - Identify discharge learning needs (meds, wound care, etc.)  - Arrange for interpretive services to assist at discharge as needed  - Refer to Case Management Department for coordinating discharge planning if the patient needs post-hospital services based on physician/advanced practitioner order or complex needs related to functional status, cognitive ability, or social support system  Outcome: Progressing     Problem: Prexisting or High Potential for Compromised Skin Integrity  Goal: Skin integrity is maintained or improved  Description: INTERVENTIONS:  - Identify patients at risk for skin breakdown  - Assess and monitor skin integrity  - Assess and monitor nutrition and hydration status  - Monitor labs   - Assess for incontinence   - Turn and reposition patient  - Assist with mobility/ambulation  - Relieve pressure over bony prominences  - Avoid friction and shearing  - Provide appropriate hygiene as needed including keeping skin clean and dry  - Evaluate need for skin moisturizer/barrier cream  - Collaborate with interdisciplinary team   - Patient/family teaching  - Consider wound care consult   Outcome: Progressing     Problem: Nutrition/Hydration-ADULT  Goal: Nutrient/Hydration intake appropriate for improving, restoring or maintaining nutritional needs  Description: Monitor and assess patient's nutrition/hydration status for malnutrition. Collaborate with interdisciplinary team and initiate plan and interventions as ordered. Monitor patient's weight and dietary intake as ordered or per policy. Utilize nutrition screening tool and intervene as necessary. Determine patient's food preferences and provide high-protein, high-caloric foods as appropriate.      INTERVENTIONS:  - Monitor oral intake, urinary output, labs, and treatment plans  - Assess nutrition and hydration status and recommend course of action  - Evaluate amount of meals eaten  - Assist patient with eating if necessary   - Allow adequate time for meals  - Recommend/ encourage appropriate diets, oral nutritional supplements, and vitamin/mineral supplements  - Order, calculate, and assess calorie counts as needed  - Assess need for intravenous fluids  - Provide specific nutrition/hydration education as appropriate  - Include patient/family/caregiver in decisions related to nutrition  Outcome: Progressing

## 2023-08-15 NOTE — MALNUTRITION/BMI
This medical record reflects one or more clinical indicators suggestive of malnutrition and/or morbid obesity. Malnutrition Findings:   Adult Malnutrition type: Acute illness (in the setting of chronic illness)  Adult Degree of Malnutrition: Malnutrition of moderate degree  Malnutrition Characteristics: Fat loss, Muscle loss                  360 Statement: Moderate malnutrition related to dysphagia, inadequate oral intake as evidenced by loss of subcutaneous fat and muscle extremities, temples, orbitals, calves. BMI 18.17 Currently treated with oral supplementation    BMI Findings:  Adult BMI Classifications: Underweight < 18.5        Body mass index is 18.17 kg/m². See Nutrition note dated 8/15/2023 for additional details. Completed nutrition assessment is viewable in the nutrition documentation.

## 2023-08-16 ENCOUNTER — APPOINTMENT (INPATIENT)
Dept: RADIOLOGY | Facility: HOSPITAL | Age: 83
DRG: 871 | End: 2023-08-16
Payer: COMMERCIAL

## 2023-08-16 LAB
ANION GAP SERPL CALCULATED.3IONS-SCNC: 7 MMOL/L
BUN SERPL-MCNC: 14 MG/DL (ref 5–25)
CALCIUM SERPL-MCNC: 7.8 MG/DL (ref 8.4–10.2)
CHLORIDE SERPL-SCNC: 109 MMOL/L (ref 96–108)
CO2 SERPL-SCNC: 27 MMOL/L (ref 21–32)
CREAT SERPL-MCNC: 0.82 MG/DL (ref 0.6–1.3)
ERYTHROCYTE [DISTWIDTH] IN BLOOD BY AUTOMATED COUNT: 14.8 % (ref 11.6–15.1)
GFR SERPL CREATININE-BSD FRML MDRD: 83 ML/MIN/1.73SQ M
GLUCOSE SERPL-MCNC: 131 MG/DL (ref 65–140)
GLUCOSE SERPL-MCNC: 132 MG/DL (ref 65–140)
GLUCOSE SERPL-MCNC: 153 MG/DL (ref 65–140)
GLUCOSE SERPL-MCNC: 155 MG/DL (ref 65–140)
GLUCOSE SERPL-MCNC: 168 MG/DL (ref 65–140)
HCT VFR BLD AUTO: 24 % (ref 36.5–49.3)
HGB BLD-MCNC: 7.3 G/DL (ref 12–17)
MCH RBC QN AUTO: 29.3 PG (ref 26.8–34.3)
MCHC RBC AUTO-ENTMCNC: 30.4 G/DL (ref 31.4–37.4)
MCV RBC AUTO: 96 FL (ref 82–98)
PLATELET # BLD AUTO: 429 THOUSANDS/UL (ref 149–390)
PMV BLD AUTO: 8.8 FL (ref 8.9–12.7)
POTASSIUM SERPL-SCNC: 3.3 MMOL/L (ref 3.5–5.3)
RBC # BLD AUTO: 2.49 MILLION/UL (ref 3.88–5.62)
SODIUM SERPL-SCNC: 143 MMOL/L (ref 135–147)
WBC # BLD AUTO: 7.3 THOUSAND/UL (ref 4.31–10.16)

## 2023-08-16 PROCEDURE — 82948 REAGENT STRIP/BLOOD GLUCOSE: CPT

## 2023-08-16 PROCEDURE — 71045 X-RAY EXAM CHEST 1 VIEW: CPT

## 2023-08-16 PROCEDURE — 99232 SBSQ HOSP IP/OBS MODERATE 35: CPT | Performed by: INTERNAL MEDICINE

## 2023-08-16 PROCEDURE — 99232 SBSQ HOSP IP/OBS MODERATE 35: CPT | Performed by: STUDENT IN AN ORGANIZED HEALTH CARE EDUCATION/TRAINING PROGRAM

## 2023-08-16 PROCEDURE — 97530 THERAPEUTIC ACTIVITIES: CPT

## 2023-08-16 PROCEDURE — 99223 1ST HOSP IP/OBS HIGH 75: CPT | Performed by: NURSE PRACTITIONER

## 2023-08-16 PROCEDURE — 99233 SBSQ HOSP IP/OBS HIGH 50: CPT | Performed by: NURSE PRACTITIONER

## 2023-08-16 PROCEDURE — 99418 PROLNG IP/OBS E/M EA 15 MIN: CPT | Performed by: NURSE PRACTITIONER

## 2023-08-16 PROCEDURE — 80048 BASIC METABOLIC PNL TOTAL CA: CPT

## 2023-08-16 PROCEDURE — 85027 COMPLETE CBC AUTOMATED: CPT

## 2023-08-16 PROCEDURE — 97110 THERAPEUTIC EXERCISES: CPT

## 2023-08-16 RX ORDER — POTASSIUM CHLORIDE 20MEQ/15ML
40 LIQUID (ML) ORAL ONCE
Status: COMPLETED | OUTPATIENT
Start: 2023-08-16 | End: 2023-08-16

## 2023-08-16 RX ADMIN — POTASSIUM CHLORIDE 40 MEQ: 1.5 SOLUTION ORAL at 09:01

## 2023-08-16 RX ADMIN — CEFTRIAXONE 2000 MG: 2 INJECTION, POWDER, FOR SOLUTION INTRAMUSCULAR; INTRAVENOUS at 09:18

## 2023-08-16 RX ADMIN — INSULIN LISPRO 2 UNITS: 100 INJECTION, SOLUTION INTRAVENOUS; SUBCUTANEOUS at 11:53

## 2023-08-16 RX ADMIN — INSULIN LISPRO 1 UNITS: 100 INJECTION, SOLUTION INTRAVENOUS; SUBCUTANEOUS at 17:34

## 2023-08-16 RX ADMIN — INSULIN LISPRO 2 UNITS: 100 INJECTION, SOLUTION INTRAVENOUS; SUBCUTANEOUS at 17:35

## 2023-08-16 RX ADMIN — DONEPEZIL HYDROCHLORIDE 5 MG: 5 TABLET ORAL at 21:49

## 2023-08-16 RX ADMIN — MEMANTINE 10 MG: 10 TABLET ORAL at 09:01

## 2023-08-16 RX ADMIN — INSULIN LISPRO 1 UNITS: 100 INJECTION, SOLUTION INTRAVENOUS; SUBCUTANEOUS at 11:53

## 2023-08-16 RX ADMIN — MEMANTINE 10 MG: 10 TABLET ORAL at 17:35

## 2023-08-16 RX ADMIN — INSULIN LISPRO 1 UNITS: 100 INJECTION, SOLUTION INTRAVENOUS; SUBCUTANEOUS at 09:02

## 2023-08-16 RX ADMIN — INSULIN GLARGINE 5 UNITS: 100 INJECTION, SOLUTION SUBCUTANEOUS at 21:48

## 2023-08-16 RX ADMIN — DEXTROSE 75 ML/HR: 5 SOLUTION INTRAVENOUS at 09:17

## 2023-08-16 RX ADMIN — INSULIN LISPRO 2 UNITS: 100 INJECTION, SOLUTION INTRAVENOUS; SUBCUTANEOUS at 09:02

## 2023-08-16 RX ADMIN — ENOXAPARIN SODIUM 40 MG: 40 INJECTION SUBCUTANEOUS at 09:01

## 2023-08-16 NOTE — PLAN OF CARE
Problem: PHYSICAL THERAPY ADULT  Goal: Performs mobility at highest level of function for planned discharge setting. See evaluation for individualized goals. Description: Treatment/Interventions: Functional transfer training, LE strengthening/ROM, Therapeutic exercise, Endurance training, Cognitive reorientation, Patient/family training, Equipment eval/education, Bed mobility, Gait training, Compensatory technique education          See flowsheet documentation for full assessment, interventions and recommendations. Outcome: Progressing  Note: Prognosis: Fair  Problem List: Decreased strength, Decreased range of motion, Decreased endurance, Impaired balance, Decreased mobility, Decreased coordination, Decreased cognition, Impaired judgement, Decreased safety awareness  Assessment: pt began tx session lying supine in the bed and was agreeable to participate in PT intervention. Due to progressive disease pt continues to demonstrate fluctuations in bed mobility, functional transfers, seated EOB balance and tolerance. continual education is required w/ verbal/visual demonstration in order to increase carryover for next PT intervention. pt required max Ax1 for all bed mobility such as rolling and repositoning in the bed from L to R with LE and trunk management. pt unable to maintain sidelying position. pt also max ax1 in order to sit EOB from supine w/ LE and trunk management. Progress was noted as pt was able to sit EOB and perform TE activities with min Ax1 as pt demonstrated slight posterior lean. pt was also able to sit EOB, hold a conversation, look out the window and discuss how pretty the flowers are w/o LOB and /s. pt completed 2 STS w/ RW but was limited to 45 second stand and 30 second stand. pt did demonstrate a posterior lean w/ static standing and RW. pt would benefit from cotninued skilled PT intervention in order to decrease assistance required for bed mobility and functional transfers.  Post tx pt in bed with call bell and all needs met. Bed alarm activated as well        PT Discharge Recommendation: Post acute rehabilitation services (vs return to facility with skilled PT intervention)    See flowsheet documentation for full assessment.

## 2023-08-16 NOTE — ASSESSMENT & PLAN NOTE
Likely in the setting of poor po intake   Na 150  Na 143, resolved with fluids     Plan-   D5 infusion, continuous will dc if po intake improves   Reassess tomorrow in the setting of electrolytes and poor po intake

## 2023-08-16 NOTE — PROGRESS NOTES
Progress Note - Pulmonary   Navdeep Dancer 82 y.o. male MRN: [id number]  Unit/Bed#: W -01 Encouter:[id number]    Assessment/Plan:    1. Streptococcus intermedius empyema  • 12 Fr chest tube placed on August 4  • S/p 6 doses of tPA/dornase. Subjective improvement after tPA/Dornase  • Output since 3 PM yesterday is about 120 mL until 10am today  • Continue chest tube -20 mild wall suction. • Day 9 ceftriaxone, total 11 days of antibiotics  • CT chest to determine pulling out the chest tube once drainage is less than 100 ml per 24 hr.  • Recommend incentive spirometry  • Possibly due to recurrent aspirations  • Repeat chest x-ray  • Continue antibiotics as scheduled  • Will consider repeating sputum culture      2. Dementia with toxic metabolic encephalopathy  • Toxic metabolic encephalopathy likely from a pneumonia/sepsis  • Patient was drowsy, disoriented at the time of examination  • Patient has poor oral intake resulting in hypernatremia, primary care team started the patient on gentle hydration. • Encourage frequent reorientation  • Continue appropriate sleep-wake cycles        Subjective:  Patient seen and examined at bedside this morning. During the encounter, patient coughed up green-colored sputum. Patient was disoriented, overall non communicating, sleepy reportedly feels cold and has experienced chills. Objective:    Vitals: Blood pressure 110/59, pulse 74, temperature (!) 97.3 °F (36.3 °C), resp. rate 16, height 5' 9" (1.753 m), weight 57.3 kg (126 lb 5.2 oz), SpO2 93 %. On room air,Body mass index is 18.65 kg/m².       Intake/Output Summary (Last 24 hours) at 8/16/2023 1018  Last data filed at 8/16/2023 1005  Gross per 24 hour   Intake 1155 ml   Output 675 ml   Net 480 ml       Invasive Devices     Peripheral Intravenous Line  Duration           Peripheral IV 08/15/23 Left Antecubital 1 day    Peripheral IV 08/15/23 Left;Ventral (anterior) Forearm 1 day          Drain  Duration Chest Tube Right 12 Fr. 11 days    External Urinary Catheter 9 days                Physical Exam:    Physical Exam  Constitutional:       Appearance: He is ill-appearing. Comments: Disoriented, unable to recall why he is in the hospital  Looks weak and frail   HENT:      Head: Normocephalic and atraumatic. Mouth/Throat:      Mouth: Mucous membranes are dry. Comments: Green-colored sputum and oral cavity  Eyes:      Pupils: Pupils are equal, round, and reactive to light. Cardiovascular:      Rate and Rhythm: Normal rate and regular rhythm. Heart sounds: Normal heart sounds. Pulmonary:      Breath sounds: Rales present. No rhonchi. Comments: Decreased breath sounds on the right  Abdominal:      General: Abdomen is flat. Palpations: Abdomen is soft. Tenderness: There is no abdominal tenderness. Skin:     General: Skin is dry. Coloration: Skin is not jaundiced. Comments: Skin is cold to touch     Neurological:      Mental Status: Mental status is at baseline. He is disoriented. Sensory: No sensory deficit. Motor: No weakness. Psychiatric:      Comments: Confused, anxious         Labs: I have personally reviewed pertinent lab results. Imaging and other studies: I have personally reviewed pertinent reports.

## 2023-08-16 NOTE — SOCIAL WORK
A family meeting was necessary to allow for thorough discussion regarding patient's clinical presentation, expected disease trajectory, and comfort care versus continuing disease directed therapy. Please refer to [facility] provider note for medical specifics. Questions related to medical diagnoses, comfort care and prognosis were answered and all agree:     • Pt's sister Edson Petersen reflected on pt's medical status and and his decline over the last few years. Pt has been residing at [facility] for the past year. Edson Petersen is very pleased with the staff and care pt has received. Should family elect for hospice in the future, they would want hospice services to be provided at [facility]. • Edson Petersen understanding of pt's current medical status and that he is not a candidate for surgery. • She is able to express that pt had been struggling with his QOL for years. Edson Petersen has been considering hospice for pt, but is uncertain if she would want to make that decision today. She would like to speak further with Pulmonary team prior to making any further decisions. • Level 3 code status confirmed. • Emotional support provided to pt's family. • Individuals present at meeting include: Pt's sister Cynthia David, and [facility] team.  • I have spent 60 minutes with Patient and family today in which greater than 50% of this time was spent in counseling/coordination of care regarding Counseling / Coordination of care.   • Another conversation related to patient's goals will need to be held after pt's sister Edson Petersen speaks with Pulmonary team.

## 2023-08-16 NOTE — ASSESSMENT & PLAN NOTE
Lab Results   Component Value Date    HGBA1C 7.0 (H) 08/03/2023       Recent Labs     08/15/23  1129 08/15/23  1550 08/15/23  2045 08/16/23  0747   POCGLU 201* 121 179* 155*       Blood Sugar Average: Last 72 hrs:  (P) [id number]   Not on any home regime   Hba1c- 7  Was initially started on Lantus 10 and scheduled mealtime    Plan-   Decreased Lantus to 5U   Discontinued mealtime as pt has poor po intake   Hypoglycemia protocol   ISS

## 2023-08-16 NOTE — PROGRESS NOTES
Progress Note - Infectious Disease   Armitz Her 82 y.o. male MRN: [id number]  Unit/Bed#: W -01 Encounter: [id number]      Impression/Plan:  1.  Severe sepsis.  Present on admission with leukocytosis and tachycardia as well as a lactate level of 3.2.  Now subsequently has developed a fever. Suspect secondary to a pneumonia with complicating empyema.  No other clear source appreciated.  Suspect the ongoing sepsis is secondary to inadequate source control.  Seems to have had improvement since instillation of tPA and dornase.  Temperatures had been down but had a low-grade fever in the last 24 hours.  Repeat blood cultures negative. May still be having intermittent aspiration events.  -Antibiotics as below  -Source control measures as below  -Follow-up repeat blood cultures  -Recheck CBC with differential and CMP  -Supportive care     2.  Streptococcus intermedius empyema.  Suspect the ongoing clinical sepsis is related to inadequate source control.  Increase pleural fluid output since tPA dornase instillation.  Some improvement clinically noted.  Chest x-ray improved. -Continue ceftriaxone  -Chest tube drainage  -Recheck CT chest once output has dropped to see if chest tube can be pulled  -Close pulmonary follow-up  -Not a surgical candidate for VATS per pulmonary  -Duration of antibiotics will largely depend upon adequacy of source control and whether plan is to continue aggressive treatment     3.  Acute hypoxic respiratory failure.  Appears to be secondary to pneumonia with complicated parapneumonic effusion.  No other clear source appreciated.  Patient  has been on and off nasal cannula oxygen support  -Oxygen support as needed  -Monitor respiratory status  -Treatment of complicated effusion as above     4.  Acute encephalopathy.  Complicating baseline dementia.  Suspect related to the patient's acute infectious process.  Remains nonverbal.  Imaging of the brain without any abnormality.   Continues to wax and wane as far as cognition  -Monitor cognition  -Treat sepsis as above     5. Diarrhea. The patient still receiving MiraLAX and Senokot. Possible medication effect. Less likely C. difficile colitis.  -Hold MiraLAX and Senokot  -Monitor stool output    Discussed the above management plan with the primary service    Antibiotics:  Ceftriaxone 9  Antibiotics 14  Post chest tube placement 12    Subjective:  Patient has no fever, chills, sweats; no nausea, vomiting, diarrhea; no cough, shortness of breath; no pain. No new symptoms. He is more alert and interactive and answering simple questions today    Objective:  Vitals:  Temp:  [97.3 °F (36.3 °C)-98.7 °F (37.1 °C)] 97.3 °F (36.3 °C)  HR:  [] 74  Resp:  [16] 16  BP: ()/(56-67) 110/59  SpO2:  [93 %-99 %] 93 %  Temp (24hrs), Av.8 °F (36.6 °C), Min:97.3 °F (36.3 °C), Max:98.7 °F (37.1 °C)  Current: Temperature: (!) 97.3 °F (36.3 °C)    Physical Exam:   General Appearance:  Alert, interactive, nontoxic, no acute distress. Throat: Oropharynx moist without lesions. Lungs:   Decreased breath sounds bilaterally right greater than left; no wheezes, rhonchi or rales; respirations unlabored. Right-sided chest tube in place   Heart:  RRR; no murmur, rub or gallop   Abdomen:   Soft, non-tender, non-distended, positive bowel sounds. Extremities: No clubbing, cyanosis or edema   Skin: No new rashes or lesions. No draining wounds noted.        Labs, Imaging, & Other studies:   All pertinent labs and imaging studies were personally reviewed  Results from last 7 days   Lab Units 23  0516 08/15/23  0454 23  0525   WBC Thousand/uL 7.30 10.43* 10.39*   HEMOGLOBIN g/dL 7.3* 8.0* 8.5*   PLATELETS Thousands/uL 429* 537* 545*     Results from last 7 days   Lab Units 23  0516 08/15/23  1555 08/15/23  0454 23  0504 23  0517 23  0454   SODIUM mmol/L 143 146 150*   < > 143 141   POTASSIUM mmol/L 3.3* 3.7 3.6   < > 3.3* 3.3* CHLORIDE mmol/L 109* 111* 113*   < > 110* 109*   CO2 mmol/L 27 26 29   < > 27 24   BUN mg/dL 14 16 17   < > 16 22   CREATININE mg/dL 0.82 0.88 0.91   < > 0.99 0.95   EGFR ml/min/1.73sq m 83 81 79   < > 71 75   CALCIUM mg/dL 7.8* 8.6 8.4   < > 8.2* 7.9*   AST U/L  --   --   --   --  15 17   ALT U/L  --   --   --   --  8 9   ALK PHOS U/L  --   --   --   --  104 102    < > = values in this interval not displayed. Results from last 7 days   Lab Units 08/12/23  1503   BLOOD CULTURE  No Growth at 72 hrs. No Growth at 72 hrs.

## 2023-08-16 NOTE — ASSESSMENT & PLAN NOTE
POA platelets 685, pressure review in September 2022 was noted unremarkable  Iron panel: Iron Sat: 14, TIBC: 182, iron: 26  plt 429 improving      Plan  Likely in the setting of sepsis   Continue to monitor

## 2023-08-16 NOTE — ASSESSMENT & PLAN NOTE
Chest x-ray- There is a right mid to lower lung opacity with well-circumscribed medial margin suggests loculated effusion. CT chest- Moderate right pleural effusion with compressive atelectasis over the right lower lobe and posterior right middle and upper lobes, Consolidation in the left lower lobe, probably atelectatic. Superimposed pneumonia cannot be excluded. Soft tissue densities in the bronchial tree as above, appearance favors secretions. IR placed chest tube 8/4  Chest Xray- Diminishing right pleural effusion. Drainage catheter at the right lung base. Bibasilar atelectasis, right more than left.   Repeat C Xray- Appears to be continuing to improve in size, pending official read    Plan-  Appreciate pulmonology recommendations- Suboptimal pleural drainage, trial of 6/6 TPA/Dornase BID per MIST2 trial,  Overall poor surgical candidate and his sister agrees, should source control not be achieved with TPA/Dornase, may consider transition to hospice care - continue ongoing [address] [place] discussions  Chest PT, encourage cough, incentive spirometry   When chest tube output <100 recommend CT Chest   Appreciate infectious disease recommendations  Consider CT CAP with PO and IV contrast to evaluate for possible esophageal leak when drainage is <100cc

## 2023-08-16 NOTE — PHYSICAL THERAPY NOTE
PHYSICAL THERAPY NOTE          Patient Name: Ramu Coleman  DCWFR'N Date: 8/16/2023 08/16/23 1034   PT Last Visit   PT Visit Date 08/16/23   Note Type   Note Type Treatment   Pain Assessment   Pain Assessment Tool FLACC   Pain Score No Pain   Yates-Baker FACES Pain Rating 0   Pain Location/Orientation Location: Generalized   Pain Rating: FLACC (Rest) - Face 0   Pain Rating: FLACC (Rest) - Legs 0   Pain Rating: FLACC (Rest) - Activity 0   Pain Rating: FLACC (Rest) - Cry 0   Pain Rating: FLACC (Rest) - Consolability 0   Score: FLACC (Rest) 0   Pain Rating: FLACC (Activity) - Face 0   Pain Rating: FLACC (Activity) - Legs 0   Pain Rating: FLACC (Activity) - Activity 0   Pain Rating: FLACC (Activity) - Cry 0   Pain Rating: FLACC (Activity) - Consolability 0   Score: FLACC (Activity) 0   Restrictions/Precautions   Other Precautions Cognitive; Chair Alarm; Bed Alarm; Fall Risk;Multiple lines  (chest tube, ayon catheter, masimo)   General   Chart Reviewed Yes   Response to Previous Treatment Patient with no complaints from previous session. Family/Caregiver Present No  (family present upon arrival but left as soon as PT intervention began)   Cognition   Overall Cognitive Status Impaired   Arousal/Participation Cooperative;Responsive   Attention Attends with cues to redirect   Orientation Level Oriented to place; Disoriented to place; Disoriented to time;Disoriented to situation   Memory Decreased long term memory;Decreased recall of biographical information;Decreased short term memory;Decreased recall of recent events;Decreased recall of precautions   Following Commands Follows one step commands with increased time or repetition   Comments pt was able to follow direction when perrforming TE activities at EOB   Subjective   Subjective pt was agreeable to participate in PT intervention   Bed Mobility   Rolling R 2  Maximal assistance Additional items Assist x 1;HOB elevated; Bedrails; Increased time required;Verbal cues;LE management; Other  (trunk managmeent)   Rolling L 2  Maximal assistance   Additional items Assist x 1;HOB elevated; Bedrails; Increased time required;Verbal cues;LE management; Other  (trunk management)   Supine to Sit 2  Maximal assistance   Additional items Assist x 1;HOB elevated; Bedrails; Increased time required;Verbal cues;LE management; Other  (trunk management)   Sit to Supine 2  Maximal assistance   Additional items Assist x 1;HOB elevated; Bedrails; Increased time required;Verbal cues;LE management  (trunk mangement)   Additional Comments pt was able to sit EOB 15 minutes while performing TE activities and increasing static seated balance   Transfers   Sit to Stand 2  Maximal assistance   Additional items Assist x 1; Increased time required;Verbal cues  (w/ RW)   Stand to Sit 3  Moderate assistance   Additional items Assist x 1; Increased time required;Verbal cues  (w/ RW)   Stand pivot Unable to assess   Additional Comments pt continues to require a significant amount of assistanec for all functional transfers to and from RW with VC's for hand placement while ascending to RW and descending back to seated EOB   Ambulation/Elevation   Gait pattern Not tested   Balance   Static Sitting Fair -   Dynamic Sitting Poor +   Static Standing Poor  (w/ RW)   Endurance Deficit   Endurance Deficit Yes   Endurance Deficit Description limited bed mobility, functional transfers and standing balance/tolerance   Activity Tolerance   Activity Tolerance Patient limited by fatigue; Other (Comment)  (generalized weakness)   Nurse Made Aware Spoke to RN   Exercises   Hip Abduction Sitting;10 reps;AROM; Bilateral   Hip Adduction Sitting;10 reps;AROM; Bilateral  (pillow squeezes)   Knee AROM Long Arc Quad Sitting;10 reps;AROM; Bilateral   Ankle Pumps Supine;10 reps;AROM; Bilateral   Marching Sitting;10 reps;AROM; Bilateral   Balance training  seated balance as pt was able to complete TE activities at EOB in order to increase dynamic sitting balance. pt also sat EOB w/o LOB while holding a conversation about flowers out his [place] in order to increase static sitting balance   Assessment   Prognosis Fair   Problem List Decreased strength;Decreased range of motion;Decreased endurance; Impaired balance;Decreased mobility; Decreased coordination;Decreased cognition; Impaired judgement;Decreased safety awareness   Assessment pt began tx session lying supine in the bed and was agreeable to participate in PT intervention. Due to progressive disease pt continues to demonstrate fluctuations in bed mobility, functional transfers, seated EOB balance and tolerance. continual education is required w/ verbal/visual demonstration in order to increase carryover for next PT intervention. pt required max Ax1 for all bed mobility such as rolling and repositoning in the bed from L to R with LE and trunk management. pt unable to maintain sidelying position. pt also max ax1 in order to sit EOB from supine w/ LE and trunk management. Progress was noted as pt was able to sit EOB and perform TE activities with min Ax1 as pt demonstrated slight posterior lean. pt was also able to sit EOB, hold a conversation, look out the window and discuss how pretty the flowers are w/o LOB and /s. pt completed 2 STS w/ RW but was limited to 45 second stand and 30 second stand. pt did demonstrate a posterior lean w/ static standing and RW. pt would benefit from cotninued skilled PT intervention in order to decrease assistance required for bed mobility and functional transfers. Post tx pt in bed with call bell and all needs met. Bed alarm activated as well   Goals   Patient Goals none stated   STG Expiration Date 08/17/23   PT Treatment Day 2   Plan   Treatment/Interventions Functional transfer training;LE strengthening/ROM; Therapeutic exercise; Endurance training;Patient/family training;Cognitive reorientation;Equipment eval/education; Bed mobility;Gait training; Compensatory technique education   Progress Slow progress, decreased activity tolerance   PT Frequency 3-5x/wk   Recommendation   PT Discharge Recommendation Post acute rehabilitation services  (vs return to facility with skilled PT intervention)   AM-PAC Basic Mobility Inpatient   Turning in Flat Bed Without Bedrails 2   Lying on Back to Sitting on Edge of Flat Bed Without Bedrails 2   Moving Bed to Chair 2   Standing Up From Chair Using Arms 2   Walk in Room 1   Climb 3-5 Stairs With Railing 1   Basic Mobility Inpatient Raw Score 10   Highest Level Of Mobility   JH-HLM Goal 4: Move to chair/commode   JH-HLM Achieved 3: Sit at edge of bed   Education   Education Provided Other  (bed mobility and functional transfers)   Patient Reinforcement needed   End of Consult   Patient Position at End of Consult Supine;Bed/Chair alarm activated; All needs within reach   The patient's AM-PAC Basic Mobility Inpatient Short Form Raw Score is 10. A Raw score of less than or equal to 16 suggests the patient may benefit from discharge to post-acute rehabilitation services.  Please also refer to the recommendation of the Physical Therapist for safe discharge planning    Deb Sanabria

## 2023-08-16 NOTE — ASSESSMENT & PLAN NOTE
Malnutrition Findings:   Adult Malnutrition type: Acute illness (in the setting of chronic illness)  Adult Degree of Malnutrition: Malnutrition of moderate degree  Malnutrition Characteristics: Fat loss, Muscle loss                  360 Statement: Moderate malnutrition related to dysphagia, inadequate oral intake as evidenced by loss of subcutaneous fat and muscle extremities, temples, orbitals, calves. BMI 18.17 Currently treated with oral supplementation    BMI Findings:  Adult BMI Classifications: Underweight < 18.5        Body mass index is 18.65 kg/m².    Plan-   Ensure supplements   Encourage eating with assistance from staff to prompt

## 2023-08-16 NOTE — PLAN OF CARE
Problem: MOBILITY - ADULT  Goal: Maintain or return to baseline ADL function  Description: INTERVENTIONS:  -  Assess patient's ability to carry out ADLs; assess patient's baseline for ADL function and identify physical deficits which impact ability to perform ADLs (bathing, care of mouth/teeth, toileting, grooming, dressing, etc.)  - Assess/evaluate cause of self-care deficits   - Assess range of motion  - Assess patient's mobility; develop plan if impaired  - Assess patient's need for assistive devices and provide as appropriate  - Encourage maximum independence but intervene and supervise when necessary  - Involve family in performance of ADLs  - Assess for home care needs following discharge   - Consider OT consult to assist with ADL evaluation and planning for discharge  - Provide patient education as appropriate  Outcome: Progressing  Goal: Maintains/Returns to pre admission functional level  Description: INTERVENTIONS:  - Perform BMAT or MOVE assessment daily.   - Set and communicate daily mobility goal to care team and patient/family/caregiver. - Collaborate with rehabilitation services on mobility goals if consulted  - Perform Range of Motion  times a day. - Reposition patient every  hours.   - Dangle patient  times a day  - Stand patient  times a day  - Ambulate patient  times a day  - Out of bed to chair  times a day   - Out of bed for meals  times a day  - Out of bed for toileting  - Record patient progress and toleration of activity level   Outcome: Progressing     Problem: PAIN - ADULT  Goal: Verbalizes/displays adequate comfort level or baseline comfort level  Description: Interventions:  - Encourage patient to monitor pain and request assistance  - Assess pain using appropriate pain scale  - Administer analgesics based on type and severity of pain and evaluate response  - Implement non-pharmacological measures as appropriate and evaluate response  - Consider cultural and social influences on pain and pain management  - Notify physician/advanced practitioner if interventions unsuccessful or patient reports new pain  Outcome: Progressing     Problem: SAFETY ADULT  Goal: Maintain or return to baseline ADL function  Description: INTERVENTIONS:  -  Assess patient's ability to carry out ADLs; assess patient's baseline for ADL function and identify physical deficits which impact ability to perform ADLs (bathing, care of mouth/teeth, toileting, grooming, dressing, etc.)  - Assess/evaluate cause of self-care deficits   - Assess range of motion  - Assess patient's mobility; develop plan if impaired  - Assess patient's need for assistive devices and provide as appropriate  - Encourage maximum independence but intervene and supervise when necessary  - Involve family in performance of ADLs  - Assess for home care needs following discharge   - Consider OT consult to assist with ADL evaluation and planning for discharge  - Provide patient education as appropriate  Outcome: Progressing  Goal: Maintains/Returns to pre admission functional level  Description: INTERVENTIONS:  - Perform BMAT or MOVE assessment daily.   - Set and communicate daily mobility goal to care team and patient/family/caregiver. - Collaborate with rehabilitation services on mobility goals if consulted  - Perform Range of Motion  times a day. - Reposition patient every  hours.   - Dangle patient  times a day  - Stand patient  times a day  - Ambulate patient  times a day  - Out of bed to chair  times a day   - Out of bed for meals  times a day  - Out of bed for toileting  - Record patient progress and toleration of activity level   Outcome: Progressing  Goal: Patient will remain free of falls  Description: INTERVENTIONS:  - Educate patient/family on patient safety including physical limitations  - Instruct patient to call for assistance with activity   - Consult OT/PT to assist with strengthening/mobility   - Keep Call bell within reach  - Keep bed low and locked with side rails adjusted as appropriate  - Keep care items and personal belongings within reach  - Initiate and maintain comfort rounds  - Make Fall Risk Sign visible to staff  - Offer Toileting every  Hours, in advance of need  - Initiate/Maintain alarm  - Obtain necessary fall risk management equipment:   - Apply yellow socks and bracelet for high fall risk patients  - Consider moving patient to room near nurses station  Outcome: Progressing     Problem: DISCHARGE PLANNING  Goal: Discharge to home or other facility with appropriate resources  Description: INTERVENTIONS:  - Identify barriers to discharge w/patient and caregiver  - Arrange for needed discharge resources and transportation as appropriate  - Identify discharge learning needs (meds, wound care, etc.)  - Arrange for interpretive services to assist at discharge as needed  - Refer to Case Management Department for coordinating discharge planning if the patient needs post-hospital services based on physician/advanced practitioner order or complex needs related to functional status, cognitive ability, or social support system  Outcome: Progressing     Problem: Knowledge Deficit  Goal: Patient/family/caregiver demonstrates understanding of disease process, treatment plan, medications, and discharge instructions  Description: Complete learning assessment and assess knowledge base.   Interventions:  - Provide teaching at level of understanding  - Provide teaching via preferred learning methods  Outcome: Progressing     Problem: Prexisting or High Potential for Compromised Skin Integrity  Goal: Skin integrity is maintained or improved  Description: INTERVENTIONS:  - Identify patients at risk for skin breakdown  - Assess and monitor skin integrity  - Assess and monitor nutrition and hydration status  - Monitor labs   - Assess for incontinence   - Turn and reposition patient  - Assist with mobility/ambulation  - Relieve pressure over bony prominences  - Avoid friction and shearing  - Provide appropriate hygiene as needed including keeping skin clean and dry  - Evaluate need for skin moisturizer/barrier cream  - Collaborate with interdisciplinary team   - Patient/family teaching  - Consider wound care consult   Outcome: Progressing     Problem: SAFETY,RESTRAINT: NV/NON-SELF DESTRUCTIVE BEHAVIOR  Goal: Remains free of harm/injury (restraint for non violent/non self-detsructive behavior)  Description: INTERVENTIONS:  - Instruct patient/family regarding restraint use   - Assess and monitor physiologic and psychological status   - Provide interventions and comfort measures to meet assessed patient needs   - Identify and implement measures to help patient regain control  - Assess readiness for release of restraint   Outcome: Progressing  Goal: Returns to optimal restraint-free functioning  Description: INTERVENTIONS:  - Assess the patient's behavior and symptoms that indicate continued need for restraint  - Identify and implement measures to help patient regain control  - Assess readiness for release of restraint   Outcome: Progressing     Problem: Nutrition/Hydration-ADULT  Goal: Nutrient/Hydration intake appropriate for improving, restoring or maintaining nutritional needs  Description: Monitor and assess patient's nutrition/hydration status for malnutrition. Collaborate with interdisciplinary team and initiate plan and interventions as ordered. Monitor patient's weight and dietary intake as ordered or per policy. Utilize nutrition screening tool and intervene as necessary. Determine patient's food preferences and provide high-protein, high-caloric foods as appropriate.      INTERVENTIONS:  - Monitor oral intake, urinary output, labs, and treatment plans  - Assess nutrition and hydration status and recommend course of action  - Evaluate amount of meals eaten  - Assist patient with eating if necessary   - Allow adequate time for meals  - Recommend/ encourage appropriate diets, oral nutritional supplements, and vitamin/mineral supplements  - Order, calculate, and assess calorie counts as needed  - Recommend, monitor, and adjust tube feedings and TPN/PPN based on assessed needs  - Assess need for intravenous fluids  - Provide specific nutrition/hydration education as appropriate  - Include patient/family/caregiver in decisions related to nutrition  Outcome: Progressing

## 2023-08-16 NOTE — CASE MANAGEMENT
Case Management Progress Note    Patient name Carmen Cabello  Location W /W -07 MRN [id number]  : 1940 Date 2023       LOS (days): 13  Geometric Mean LOS (GMLOS) (days): 5.00  Days to GMLOS:-8.1        OBJECTIVE:        Current admission status: Inpatient  Preferred Pharmacy:   PATIENT/FAMILY REPORTS NO PREFERRED PHARMACY  No address on file      Primary Care Provider: Alvarez Pennington MD    Primary Insurance: [employer] [facility]  Secondary Insurance:     PROGRESS NOTE:  Spoke with patients sister Jewell Munoz and SOO at bedside today. Discussed plan going forward with patient- Jewell Munoz stating she would like to talk with Palliative- TT to April who met with family at 12:00 PM.     Spoke with Jewell Munoz again after who states she would like to discuss plan with pulm first re: chest tube while on hospice. CM notified they will assist with dcp and notify [facility] once appropriate to see if they can accept patient back with the tube, as they have stated in the past they cannot (unsure if they could if it was for comfort measures). Will wait to hear plan from pulm and SLIM and follow up. CM to continue to follow to assist with dcp.

## 2023-08-16 NOTE — PROGRESS NOTES
[facility]  Progress Note  Name: Karis Holland  MRN: [id number]  Unit/Bed#: W -01 I Date of Admission: 8/3/2023   Date of Service: 8/16/2023 I Hospital Day: 13    Assessment/Plan   * Severe sepsis [facility])  Assessment & Plan  On admission patient met criteria for severe sepsis secondary to pneumonia due to the loculated effusion  · Blood culture-negative  · Fluid culture-negative  IR chest tube placement 8/4  Procal 0.56  Repeat Bcx- Negative     Plan-  Appreciate ID recommendations-  continue Ceftriaxone   Respiratory protocol  Monitor vital signs  Mucinex 1200 twice daily  See loculated pleural effusion plan        Loculated pleural effusion  Assessment & Plan  Chest x-ray- There is a right mid to lower lung opacity with well-circumscribed medial margin suggests loculated effusion. CT chest- Moderate right pleural effusion with compressive atelectasis over the right lower lobe and posterior right middle and upper lobes, Consolidation in the left lower lobe, probably atelectatic. Superimposed pneumonia cannot be excluded. Soft tissue densities in the bronchial tree as above, appearance favors secretions. IR placed chest tube 8/4  Chest Xray- Diminishing right pleural effusion. Drainage catheter at the right lung base. Bibasilar atelectasis, right more than left.   Repeat C Xray- Appears to be continuing to improve in size, pending official read    Plan-  Appreciate pulmonology recommendations- Suboptimal pleural drainage, trial of 6/6 TPA/Dornase BID per MIST2 trial,  Overall poor surgical candidate and his sister agrees, should source control not be achieved with TPA/Dornase, may consider transition to hospice care - continue ongoing [address] [place] discussions  Chest PT, encourage cough, incentive spirometry   When chest tube output <100 recommend CT Chest   Appreciate infectious disease recommendations  Consider CT CAP with PO and IV contrast to evaluate for possible esophageal leak when drainage is <100cc        Moderate protein-calorie malnutrition (HCC)  Assessment & Plan  Malnutrition Findings:   Adult Malnutrition type: Acute illness (in the setting of chronic illness)  Adult Degree of Malnutrition: Malnutrition of moderate degree  Malnutrition Characteristics: Fat loss, Muscle loss                  360 Statement: Moderate malnutrition related to dysphagia, inadequate oral intake as evidenced by loss of subcutaneous fat and muscle extremities, temples, orbitals, calves. BMI 18.17 Currently treated with oral supplementation    BMI Findings:  Adult BMI Classifications: Underweight < 18.5        Body mass index is 18.65 kg/m².    Plan-   Ensure supplements   Encourage eating with assistance from staff to prompt       Hypernatremia  Assessment & Plan  Likely in the setting of poor po intake   Na 150  Na 143, resolved with fluids     Plan-   D5 infusion, continuous will dc if po intake improves   Reassess tomorrow in the setting of electrolytes and poor po intake            Goals of care, counseling/discussion  Assessment & Plan  Patient's prognosis remains guarded as he is not a candidate for VATS procedure  Continuing source control with chest tube and drainage  Continuing IV antibiotics  Multiple goals of care discussions with sister; sister was hopeful of patient's recovery and to return back to baseline  Extensive conversation explained that his prognosis is poor    Plan-  Appreciate Palliative Care recommendations    Type 2 diabetes mellitus [facility])  Assessment & Plan  Lab Results   Component Value Date    HGBA1C 7.0 (H) 08/03/2023       Recent Labs     08/15/23  1129 08/15/23  1550 08/15/23  2045 08/16/23  0747   POCGLU 201* 121 179* 155*       Blood Sugar Average: Last 72 hrs:  (P) [id number]   Not on any home regime   Hba1c- 7  Was initially started on Lantus 10 and scheduled mealtime    Plan-   Decreased Lantus to 5U   Discontinued mealtime as pt has poor po intake   Hypoglycemia protocol   ISS     Delirium  Assessment & Plan  Patient likely has hypoactive delirium secondary to sepsis  Infectious disease consult appreciated and we will continue antibiotics and source control  We will do nonpharmacological management at this point of time with frequent reorientation and reestablishment of the sleep-wake cycle    Ambulatory dysfunction  Assessment & Plan  · At baseline ambulate without AD, noted unsteadiness new from baseline      Plan-  PT OT- postacute rehabilitation      Encephalopathy acute  Assessment & Plan  · POA at facility noted slurred speech, slow responsive, not at his baseline  · Encephalopathy likely metabolic in the setting of sepsis  · Check CT head : No acute intracranial leasion  · Continue antibiotic as above      Thrombocytosis  Assessment & Plan  POA platelets 859, pressure review in September 2022 was noted unremarkable  Iron panel: Iron Sat: 14, TIBC: 182, iron: 26  plt 429 improving      Plan  Likely in the setting of sepsis   Continue to monitor    Anemia  Assessment & Plan  · POA hemoglobin 8.0, pressure review hemoglobin back in September 2022 noted 10.1. · Unclear if history of melena, hematochezia  · Anemia unclear etiology at this time possibly secondary to iron deficiency given thrombocytosis although this could represent current setting of infection versus underlying malignancy? Check iron panel: Ferritin 714, Iron Sat: 14, TIBC: 182, Iron: 26  folate, vitamin B12 WNL     Plan  · Monitor output and consider transfusion if less than 7  ·  CBC a.m.        Late onset Alzheimer's dementia with behavioral disturbance ([address])  Assessment & Plan  Hypoactive delirium and patient  Home meds Aricept 5 mg nightly, olanzapine 5 mg daily, Namenda 10 mg twice daily, trazodone 50 mg at bedtime    Plan-  Holding trazodone 50 mg at bedtime, Zyprexa 5 mg  Delirium precaution  Frequent reorientation  Avoid restraints if able unless danger to himself  Appreciate geriatric recommendations            VTE Pharmacologic Prophylaxis: VTE Score: 5 High Risk (Score >/= 5) - Pharmacological DVT Prophylaxis Ordered: enoxaparin (Lovenox). Sequential Compression Devices Ordered. Patient Centered Rounds: I performed bedside rounds with nursing staff today. Discussions with Specialists or Other Care Team Provider: Pulmonology, infectious disease, palliative care    Education and Discussions with Family / Patient: Updated  (sister) via phone. Current Length of Stay: 13 day(s)  Current Patient Status: Inpatient   Discharge Plan: Anticipate discharge in 48-72 hrs to rehab facility. Code Status: Level 3 - DNAR and DNI    Subjective:   Patient was seen and examined at bedside. Patient was resting comfortably in no overt acute distress. Patient was awake and alert upon my evaluation. He was conversive and able to answer questions somewhat appropriately. He complained of pain all over. Patient denied any chest pain, shortness of breath, abdominal pain, or any other symptoms at this time. Objective:     Vitals:   Temp (24hrs), Av.8 °F (36.6 °C), Min:97.3 °F (36.3 °C), Max:98.7 °F (37.1 °C)    Temp:  [97.3 °F (36.3 °C)-98.7 °F (37.1 °C)] 97.3 °F (36.3 °C)  HR:  [] 74  Resp:  [16] 16  BP: ()/(56-67) 110/59  SpO2:  [93 %-99 %] 93 %  Body mass index is 18.65 kg/m². Input and Output Summary (last 24 hours): Intake/Output Summary (Last 24 hours) at 2023 0957  Last data filed at 2023 0601  Gross per 24 hour   Intake 675 ml   Output 675 ml   Net 0 ml       Physical Exam:   Physical Exam  Vitals and nursing note reviewed. Constitutional:       General: He is not in acute distress. Appearance: He is well-developed. He is ill-appearing. He is not toxic-appearing or diaphoretic. HENT:      Head: Normocephalic and atraumatic.       Mouth/Throat:      Mouth: Mucous membranes are moist.   Eyes:      Extraocular Movements: Extraocular movements intact. Conjunctiva/sclera: Conjunctivae normal.      Pupils: Pupils are equal, round, and reactive to light. Cardiovascular:      Rate and Rhythm: Normal rate and regular rhythm. Pulses: Normal pulses. Heart sounds: Normal heart sounds. No murmur heard. Pulmonary:      Effort: Pulmonary effort is normal. No respiratory distress. Breath sounds: Rhonchi present. No wheezing. Comments: Bilaterally, slightly improving    Abdominal:      General: Bowel sounds are normal. There is no distension. Palpations: Abdomen is soft. Tenderness: There is no abdominal tenderness. There is no guarding or rebound. Musculoskeletal:         General: No swelling or tenderness. Normal range of motion. Cervical back: Normal range of motion and neck supple. Right lower leg: No edema. Left lower leg: No edema. Skin:     General: Skin is warm and dry. Capillary Refill: Capillary refill takes less than 2 seconds. Neurological:      Mental Status: He is alert. Mental status is at baseline. He is disoriented. Cranial Nerves: No cranial nerve deficit. Sensory: No sensory deficit. Motor: Weakness present. Comments: Awake and alert, follows some commands, able to converse and answer some questions appropriately more today , remains hypoactive overall   Psychiatric:         Mood and Affect: Mood normal.          Additional Data:     Labs:  Results from last 7 days   Lab Units 08/16/23  0516 08/14/23  0525 08/13/23  0504   WBC Thousand/uL 7.30   < > 8.43   HEMOGLOBIN g/dL 7.3*   < > 8.3*   HEMATOCRIT % 24.0*   < > 26.9*   PLATELETS Thousands/uL 429*   < > 543*   NEUTROS PCT %  --   --  69   LYMPHS PCT %  --   --  19   MONOS PCT %  --   --  8   EOS PCT %  --   --  2    < > = values in this interval not displayed.      Results from last 7 days   Lab Units 08/16/23  0516 08/13/23  0504 08/12/23  0517   SODIUM mmol/L 143   < > 143   POTASSIUM mmol/L 3.3*   < > 3.3* CHLORIDE mmol/L 109*   < > 110*   CO2 mmol/L 27   < > 27   BUN mg/dL 14   < > 16   CREATININE mg/dL 0.82   < > 0.99   ANION GAP mmol/L 7   < > 6   CALCIUM mg/dL 7.8*   < > 8.2*   ALBUMIN g/dL  --   --  2.7*   TOTAL BILIRUBIN mg/dL  --   --  0.50   ALK PHOS U/L  --   --  104   ALT U/L  --   --  8   AST U/L  --   --  15   GLUCOSE RANDOM mg/dL 131   < > 94    < > = values in this interval not displayed. Results from last 7 days   Lab Units 08/16/23  0747 08/15/23  2045 08/15/23  1550 08/15/23  1129 08/15/23  0738 08/14/23  2109 08/14/23  1603 08/14/23  1122 08/14/23  0745 08/13/23  2034 08/13/23  1539 08/13/23  1152   POC GLUCOSE mg/dl 155* 179* 121 201* 155* 201* 229* 191* 168* 210* 143* 144*               Lines/Drains:  Invasive Devices     Peripheral Intravenous Line  Duration           Peripheral IV 08/15/23 Left Antecubital 1 day    Peripheral IV 08/15/23 Left;Ventral (anterior) Forearm <1 day          Drain  Duration           Chest Tube Right 12 Fr. 11 days    External Urinary Catheter 9 days                      Imaging: Reviewed radiology reports from this admission including: chest xray, chest CT scan, CT head and procedure reports    Recent Cultures (last 7 days):   Results from last 7 days   Lab Units 08/12/23  1503   BLOOD CULTURE  No Growth at 72 hrs. No Growth at 72 hrs.        Last 24 Hours Medication List:   Current Facility-Administered Medications   Medication Dose Route Frequency Provider Last Rate   • acetaminophen  325 mg Rectal Q4H PRN Aristeo Prasad DO     • acetaminophen  650 mg Oral Q6H PRN Bess Merino MD     • cefTRIAXone  2,000 mg Intravenous Q24H Nohemi Gant MD 2,000 mg (08/16/23 0577)   • dextrose  75 mL/hr Intravenous Continuous Charisse Archer MD 75 mL/hr (08/16/23 0917)   • donepezil  5 mg Oral HS Bess Merino MD     • enoxaparin  40 mg Subcutaneous Daily Bess Merino MD     • guaiFENesin  1,200 mg Oral Q12H [address] [facility] [address] Robbie Wheeler MD     • insulin glargine  5 Units Subcutaneous HS Becky Mckeon MD     • insulin lispro  1-5 Units Subcutaneous TID [facility] Rylan Rivas MD     • insulin lispro  2 Units Subcutaneous TID With Meals Becky Mckeon MD     • memantine  10 mg Oral BID Rylan Rivas MD          Today, Patient Was Seen By: Becky Mckeon MD    **Please Note: This note may have been constructed using a voice recognition system. **

## 2023-08-16 NOTE — CONSULTS
Consultation - Palliative and Supportive Care   Zita Gabriel 82 y.o. male [id number]    Patient Active Problem List   Diagnosis   • Depression   • Late onset Alzheimer's dementia with behavioral disturbance ([address])   • Other hyperlipidemia   • Unsteady gait   • Insomnia   • Low BP   • Hypoxia   • Slurred speech   • Hyperglycemia   • Severe sepsis (HCC)   • Acute respiratory failure with hypoxia (HCC)   • Anemia   • Thrombocytosis   • Encephalopathy acute   • Ambulatory dysfunction   • Loculated pleural effusion   • Constipation   • Delirium   • Type 2 diabetes mellitus (HCC)   • Goals of care, counseling/discussion   • Hypernatremia   • Moderate protein-calorie malnutrition (HCC)     Active issues specifically addressed today include:  Goals of care  Advanced Alzheimer's dementia  Severe sepsis  Respiratory failure with hypoxia  Streptococcal empyema    Plan:  1. Symptom management -   -Per primary team    2. Goals -  -limits set on cares as DNR/DNI.  -Goals of care discussions with patient's Sister Lesvia Stanley. Lesvia Stanley understands that the patient's condition is not something that is curable. She also verbalizes that she wants the patient on hospice care at some point however she is not sure she is ready to make this decision today  -  Patient's sister wants to speak to pulmonary to decide. Code Status: DNAR/DNI  - Level 3     Decisional apparatus:  Patient is not competent on my exam today. If competence is lost, patient's substitute decision maker would default to sister, Lesvia Stanley  by [place] Act 169. Advance Directive / Living Will / POLST:  None on file      I have reviewed the patient's controlled substance dispensing history in the Prescription Drug Monitoring Program in compliance with the [facility] regulations before prescribing any controlled substances. We appreciate the invitation to be involved in this patient's care. We will continue to follow.   Please do not hesitate to reach our on call provider through our clinic answering service at [phone] should you have acute symptom control concerns. ALEXANDRA Gold  Palliative and Supportive Care  Clinic/Answering Service: [phone]  You can find me on [employer]! IDENTIFICATION:  Inpatient consult to Palliative Care  Consult performed by: ALEXANDRA Gold  Consult ordered by: Bernardo Butterfield MD        Physician Requesting Consult: Travis Fleming DO  Reason for Consult / Principal Problem: goals of care  Hx and PE limited by: patient unable to participate     HISTORY OF PRESENT ILLNESS:       Dali Aguilar is a 82 y.o. male who presented to the emergency department on August 3 with complaints of shortness of breath and hypoxia. The patient lives at an assisted living facility and is typically on room air. The patient was found to have a complicated parapneumonic effusion in setting of streptococcal empyema and a chest tube has been placed. Number even with the use of intrapleural lytics multiple times, chest tube drainage has been unsuccessful. The patient has been evaluated by pulmonology and he is not a candidate for VATS procedure. The patient has remained encephalopathic in addition to having history of Alzheimer's and dementia. The patient is seen lying in bed appearing comfortable. He states he is cold. He is pleasantly confused. His Sister Yuan Birch is present. Had a care meeting with his sister, Yuan Birch as well as Katty's , Alvin Haq. Yuan Birch has been assisting with Rubén's care for many years. Yuan Birch explains that Rubén's confusion started many years ago. She previously hired [facility] in home care for him. Once he declined further he was place din [facility] memory care unit where he has been for the past year. Yuan Birch states that they are very happy with the care there and that if they decide on hospice they would like him to go back to [facility]. Yuan Birch is encouraged because Makayla Davies is doing very well today.        Yuan Birch understands that Phi Thomas has had a significant decline in his health. She is able to explain that he has an infection in his lung and he is not a candidate for surgery. She states that because he is not a candidate for surgery they will not be able to fix the infection in his lung. She states that she knows eventually it will be time for hospice but is not clear if she is ready to make that decision now. Discussed with Arlene Murray and Jessy that if,  Source control is not achievable the patient will get sicker again. Explained that even best case scenario the patient will end up back in the hospital requiring more care. Paul is able to express that Phi Thomas has not been happy with his quality of life for years. At some point he did express that he would not want life prolonging cares if he did not have a good quality of life. Reiterated to Jessy and Arlene Murray that Rubén's feelings about quality of life should be at focus of the decision of adela ortega not to pursue hospice care or further medical interventions. Jessy explained that typically she is a very decisive and clear person and she sees everything in "black and white" however she is unclear with this decision which is causing her stress. Review that if Deandres would not be satisfied with his current quality of life, life prolonging cares would likely not be consistent with his wishes. Jessy expressed that she wanted to talk with the pulmonary team before deciding what steps to take next. Reached out to Dr. Susan Munoz  to reachout to family to answer questions. Review of Systems   Unable to perform ROS: dementia       Past Medical History:   Diagnosis Date   • Late onset Alzheimer's dementia with behavioral disturbance ([address])    • Other hyperlipidemia      Past Surgical History:   Procedure Laterality Date   • IR CHEST TUBE PLACEMENT  8/4/2023     Social History     Socioeconomic History   • Marital status:       Spouse name: Not on file   • Number of children: Not on file • Years of education: Not on file   • Highest education level: Not on file   Occupational History   • Not on file   Tobacco Use   • Smoking status: Never   • Smokeless tobacco: Never   Vaping Use   • Vaping Use: Not on file   Substance and Sexual Activity   • Alcohol use: Not Currently   • Drug use: Not Currently   • Sexual activity: Not Currently   Other Topics Concern   • Not on file   Social History Narrative   • Not on file     Social Determinants of Health     Financial Resource Strain: Not on file   Food Insecurity: No Food Insecurity (8/7/2023)    Hunger Vital Sign    • Worried About Running Out of Food in the Last Year: Never true    • Ran Out of Food in the Last Year: Never true   Transportation Needs: No Transportation Needs (8/7/2023)    PRAPARE - Transportation    • Lack of Transportation (Medical): No    • Lack of Transportation (Non-Medical): No   Physical Activity: Not on file   Stress: Not on file   Social Connections: Not on file   Intimate Partner Violence: Not on file   Housing Stability: Low Risk  (8/7/2023)    Housing Stability Vital Sign    • Unable to Pay for Housing in the Last Year: No    • Number of Places Lived in the Last Year: 1    • Unstable Housing in the Last Year: No     History reviewed. No pertinent family history.     MEDICATIONS / ALLERGIES:    current meds:   Current Facility-Administered Medications   Medication Dose Route Frequency   • acetaminophen (TYLENOL) rectal suppository 325 mg  325 mg Rectal Q4H PRN   • acetaminophen (TYLENOL) tablet 650 mg  650 mg Oral Q6H PRN   • cefTRIAXone (ROCEPHIN) 2,000 mg in dextrose 5 % 50 mL IVPB  2,000 mg Intravenous Q24H   • dextrose 5 % infusion  75 mL/hr Intravenous Continuous   • donepezil (ARICEPT) tablet 5 mg  5 mg Oral HS   • enoxaparin (LOVENOX) subcutaneous injection 40 mg  40 mg Subcutaneous Daily   • guaiFENesin (MUCINEX) 12 hr tablet 1,200 mg  1,200 mg Oral Q12H [address]   • insulin glargine (LANTUS) subcutaneous injection 5 Units 0.05 mL  5 Units Subcutaneous HS   • insulin lispro (HumaLOG) 100 units/mL subcutaneous injection 1-5 Units  1-5 Units Subcutaneous TID AC   • insulin lispro (HumaLOG) 100 units/mL subcutaneous injection 2 Units  2 Units Subcutaneous TID With Meals   • memantine (NAMENDA) tablet 10 mg  10 mg Oral BID       No Known Allergies    OBJECTIVE:    Physical Exam  Physical Exam  Vitals and nursing note reviewed. Constitutional:       General: He is not in acute distress. Appearance: He is underweight. He is ill-appearing. Interventions: Nasal cannula in place. HENT:      Head: Normocephalic and atraumatic. Jaw: There is normal jaw occlusion. Eyes:      Conjunctiva/sclera: Conjunctivae normal.   Cardiovascular:      Rate and Rhythm: Normal rate. Pulmonary:      Effort: Pulmonary effort is normal. No respiratory distress or retractions. Abdominal:      Palpations: Abdomen is soft. Tenderness: There is no abdominal tenderness. Musculoskeletal:         General: No swelling. Cervical back: Neck supple. Skin:     General: Skin is cool and dry. Coloration: Skin is pale. Neurological:      General: No focal deficit present. Mental Status: He is alert. Mental status is at baseline. He is confused. Psychiatric:         Attention and Perception: Attention normal.         Behavior: Behavior is cooperative.          Lab Results:   CBC:   Lab Results   Component Value Date    WBC 7.30 08/16/2023    HGB 7.3 (L) 08/16/2023    HCT 24.0 (L) 08/16/2023    MCV 96 08/16/2023     (H) 08/16/2023    RBC 2.49 (L) 08/16/2023    MCH 29.3 08/16/2023    MCHC 30.4 (L) 08/16/2023    RDW 14.8 08/16/2023    MPV 8.8 (L) 08/16/2023   , CMP:   Lab Results   Component Value Date    SODIUM 143 08/16/2023    K 3.3 (L) 08/16/2023     (H) 08/16/2023    CO2 27 08/16/2023    BUN 14 08/16/2023    CREATININE 0.82 08/16/2023    CALCIUM 7.8 (L) 08/16/2023    EGFR 83 08/16/2023   , PT/PTT:No results found for: "PT", "PTT"  Imaging Studies: review of chest CT     Counseling / Coordination of Care    Total floor / unit time spent today 90+  minutes. Greater than 50% of total time was spent with the patient and / or family counseling and / or coordination of care. A description of the counseling / coordination of care: family meeting goals of care, chart review, supportive listening, offered information,  Discussion of hospice.

## 2023-08-16 NOTE — PROGRESS NOTES
Progress Note - Geriatric Medicine   Erik Kohler 82 y.o. 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/ acute encephalopathy  · History of Alzheimer's dementia  · Mental status has been waxing and waning this entire hospitalization  · Recommend checking a TSH with next set of lab  · Maintained at the facility on Aricept 5 mg nightly, olanzapine 5 mg daily, Namenda twice daily, and trazodone 50 mg nightly for insomnia  · Trazodone and Zyprexa remain held-continue to hold  Alert to person only on exam-although did refuse to answer most questions   Reports he was ignoring me  At risk for delirium secondary to Alzheimer's dementia, hospitalization, sepsis, sleep disturbance  Recommend delirium precautions  Maintain sleep-wake cycle, avoid nighttime interruptions  minimize overnight interruptions, group overnight vitals/labs/nursing checks as possible  dim lights, close blinds and turn off tv to minimize stimulation and encourage sleep environment in evenings  Provide adequate pain control  Avoid urinary retention and constipation  Provide frequent and early mobilization  Provide frequent redirection and reorientation as needed  Avoid medications that may worsen or precipitate delirium such as tramadol, benzodiazepines, anticholinergics, and Benadryl  Redirect unwanted behaviors as first-line therapy, avoid physical restraints as able to  · Continue to monitor    Severe sepsis  · Presented from [address] [place] [address] with hypoxia and altered mental status  · Met sepsis protocol with leukocytosis, tachycardia, and elevated lactic acid  · Blood cultures no growth after 5 days  · Legionella urine, strep pneumoniae urine negative  · Currently on Rocephin 2000 mg daily  · Infectious disease following  · Management per primary    Loculated pleural effusion  · Underwent IR chest tube placement on 8/4/2023  · has received multiple doses of tPA/dornase  · Pulmonology and infectious disease following  · Poor surgical candidate for VATS procedure  · Palliative care was consulted for goals of care  · Management per pulmonology and infectious disease    Insomnia  First-line treatment is behavior modification  Maintain sleep-wake cycle, avoid nighttime interruptions  Avoid caffeine throughout the day  Avoid napping throughout the day  Encourage physical activity throughout the day  · Avoid sedative hypnotics including benzodiazepines and benadryl  · Was taking trazodone 50 mg nightly at facility-has been on hold since hospitalized  · Encouraged To keep patient awake during the day    Ambulatory dysfunction  At a baseline ambulates without AD  PT/OT following  Fall precautions  Out of bed as tolerated  Encourage early and frequent mobilization  Encourage adequate hydration and nutrition  Provide adequate pain management   Goal is undetermined   · Continue with PT/OT for continued strength and balance training     Subjective:   Patient is being seen for a geriatrics follow-up. Upon examination pt was lying in bed, resting. He appeared comfortable and was in no acute distress. He reports some generalized pain. He only answer certain questions and then refused to answer other questions. He ate a large amount of his breakfast and was very talkative this morning. Per nursing no acute concerns or issues at this time. Review of Systems   Unable to perform ROS: Other (Patient refusing to participate in exam)         Objective:     Vitals: Blood pressure 110/59, pulse 74, temperature (!) 97.3 °F (36.3 °C), resp. rate 16, height 5' 9" (1.753 m), weight 57.3 kg (126 lb 5.2 oz), SpO2 93 %. ,Body mass index is 18.65 kg/m².       Intake/Output Summary (Last 24 hours) at 8/16/2023 1048  Last data filed at 8/16/2023 1005  Gross per 24 hour   Intake 1155 ml   Output 675 ml   Net 480 ml       Current Medications: Reviewed    Physical Exam:   Physical Exam  Vitals reviewed. Constitutional:       General: He is not in acute distress. Appearance: He is well-developed. He is ill-appearing. Comments: Frail-appearing   HENT:      Head: Normocephalic and atraumatic. Cardiovascular:      Rate and Rhythm: Normal rate and regular rhythm. Heart sounds: No murmur heard. Pulmonary:      Effort: Pulmonary effort is normal. No respiratory distress. Breath sounds: Normal breath sounds. Comments: Chest tube in place  Abdominal:      General: Abdomen is flat. Bowel sounds are normal. There is no distension. Palpations: Abdomen is soft. Tenderness: There is no abdominal tenderness. Musculoskeletal:         General: No swelling. Right lower leg: No edema. Left lower leg: No edema. Skin:     General: Skin is warm and dry. Coloration: Skin is pale. Neurological:      Mental Status: He is alert and easily aroused. Mental status is at baseline. Cranial Nerves: No cranial nerve deficit. Motor: Weakness present. Gait: Gait abnormal.      Comments: Alert to person only on exam          Invasive Devices     Peripheral Intravenous Line  Duration           Peripheral IV 08/15/23 Left Antecubital 1 day    Peripheral IV 08/15/23 Left;Ventral (anterior) Forearm 1 day          Drain  Duration           Chest Tube Right 12 Fr. 11 days    External Urinary Catheter 9 days                Lab, Imaging and other studies:    Lab Results:   I have personally reviewed pertinent lab results including the following:  -CBC, BMP, magnesium    I have personally reviewed the following imaging study reports in PACS:  No recent imaging to review  - I have personally reviewed pertinent reports. Please note:  Voice-recognition software may have been used in the preparation of this document. Occasional wrong word or "sound-alike" substitutions may have occurred due to the inherent limitations of voice recognition software. Interpretation should be guided by context.

## 2023-08-17 LAB
ANION GAP SERPL CALCULATED.3IONS-SCNC: 5 MMOL/L
BUN SERPL-MCNC: 14 MG/DL (ref 5–25)
CALCIUM SERPL-MCNC: 7.9 MG/DL (ref 8.4–10.2)
CHLORIDE SERPL-SCNC: 106 MMOL/L (ref 96–108)
CO2 SERPL-SCNC: 28 MMOL/L (ref 21–32)
CREAT SERPL-MCNC: 0.76 MG/DL (ref 0.6–1.3)
GFR SERPL CREATININE-BSD FRML MDRD: 86 ML/MIN/1.73SQ M
GLUCOSE SERPL-MCNC: 109 MG/DL (ref 65–140)
GLUCOSE SERPL-MCNC: 121 MG/DL (ref 65–140)
GLUCOSE SERPL-MCNC: 129 MG/DL (ref 65–140)
GLUCOSE SERPL-MCNC: 133 MG/DL (ref 65–140)
GLUCOSE SERPL-MCNC: 153 MG/DL (ref 65–140)
GLUCOSE SERPL-MCNC: 164 MG/DL (ref 65–140)
MAGNESIUM SERPL-MCNC: 1.8 MG/DL (ref 1.9–2.7)
POTASSIUM SERPL-SCNC: 3.8 MMOL/L (ref 3.5–5.3)
SODIUM SERPL-SCNC: 139 MMOL/L (ref 135–147)

## 2023-08-17 PROCEDURE — 80048 BASIC METABOLIC PNL TOTAL CA: CPT

## 2023-08-17 PROCEDURE — 99232 SBSQ HOSP IP/OBS MODERATE 35: CPT | Performed by: INTERNAL MEDICINE

## 2023-08-17 PROCEDURE — 99231 SBSQ HOSP IP/OBS SF/LOW 25: CPT

## 2023-08-17 PROCEDURE — 99232 SBSQ HOSP IP/OBS MODERATE 35: CPT | Performed by: NURSE PRACTITIONER

## 2023-08-17 PROCEDURE — 99232 SBSQ HOSP IP/OBS MODERATE 35: CPT | Performed by: STUDENT IN AN ORGANIZED HEALTH CARE EDUCATION/TRAINING PROGRAM

## 2023-08-17 PROCEDURE — 83735 ASSAY OF MAGNESIUM: CPT

## 2023-08-17 PROCEDURE — 97530 THERAPEUTIC ACTIVITIES: CPT

## 2023-08-17 PROCEDURE — 82948 REAGENT STRIP/BLOOD GLUCOSE: CPT

## 2023-08-17 RX ORDER — MAGNESIUM SULFATE HEPTAHYDRATE 40 MG/ML
2 INJECTION, SOLUTION INTRAVENOUS ONCE
Status: COMPLETED | OUTPATIENT
Start: 2023-08-17 | End: 2023-08-17

## 2023-08-17 RX ORDER — SODIUM CHLORIDE 9 MG/ML
50 INJECTION, SOLUTION INTRAVENOUS CONTINUOUS
Status: DISCONTINUED | OUTPATIENT
Start: 2023-08-17 | End: 2023-08-18

## 2023-08-17 RX ADMIN — INSULIN LISPRO 2 UNITS: 100 INJECTION, SOLUTION INTRAVENOUS; SUBCUTANEOUS at 09:30

## 2023-08-17 RX ADMIN — CEFTRIAXONE 2000 MG: 2 INJECTION, POWDER, FOR SOLUTION INTRAMUSCULAR; INTRAVENOUS at 09:28

## 2023-08-17 RX ADMIN — INSULIN LISPRO 2 UNITS: 100 INJECTION, SOLUTION INTRAVENOUS; SUBCUTANEOUS at 11:21

## 2023-08-17 RX ADMIN — INSULIN LISPRO 1 UNITS: 100 INJECTION, SOLUTION INTRAVENOUS; SUBCUTANEOUS at 16:34

## 2023-08-17 RX ADMIN — MAGNESIUM SULFATE HEPTAHYDRATE 2 G: 40 INJECTION, SOLUTION INTRAVENOUS at 09:42

## 2023-08-17 RX ADMIN — DONEPEZIL HYDROCHLORIDE 5 MG: 5 TABLET ORAL at 22:23

## 2023-08-17 RX ADMIN — MEMANTINE 10 MG: 10 TABLET ORAL at 09:28

## 2023-08-17 RX ADMIN — DEXTROSE 75 ML/HR: 5 SOLUTION INTRAVENOUS at 09:27

## 2023-08-17 RX ADMIN — SODIUM CHLORIDE 50 ML/HR: 0.9 INJECTION, SOLUTION INTRAVENOUS at 11:19

## 2023-08-17 RX ADMIN — MEMANTINE 10 MG: 10 TABLET ORAL at 16:35

## 2023-08-17 RX ADMIN — INSULIN LISPRO 2 UNITS: 100 INJECTION, SOLUTION INTRAVENOUS; SUBCUTANEOUS at 16:34

## 2023-08-17 RX ADMIN — ENOXAPARIN SODIUM 40 MG: 40 INJECTION SUBCUTANEOUS at 09:27

## 2023-08-17 RX ADMIN — INSULIN GLARGINE 5 UNITS: 100 INJECTION, SOLUTION SUBCUTANEOUS at 22:23

## 2023-08-17 NOTE — SOCIAL WORK
Palliative LSW saw patient at the bedside today. LSW appreciates the opportunity to provide patient/family with inpatient emotional support and guidance while patient continues to receive medical attention from the medical team.     Topics discussed: Met with pt at bedside with [facility]. Pt reports he is feeling "fine" today. He denies any discomfort currently. Pt appears confused at times--only able to respond appropriately to some questions asked. Pt's sister Jessy not present for visit. Attempt made to reach pt's sister Jessy for f/u on [address] conversation from yesterday. [facility] team unable to reach pt's sister and left  requesting c/b to discuss further. Areas that need follow-up: F/u [employer] conversation  Resources given: None  Others present: [facility] team      I have spent 20 minutes with Patient and family today in which greater than 50% of this time was spent in counseling/coordination of care regarding Counseling / Coordination of care.        LSW will continue to follow as requested by the medical team, patient, or family

## 2023-08-17 NOTE — PLAN OF CARE
Problem: PHYSICAL THERAPY ADULT  Goal: Performs mobility at highest level of function for planned discharge setting. See evaluation for individualized goals. Description: Treatment/Interventions: Functional transfer training, LE strengthening/ROM, Therapeutic exercise, Endurance training, Cognitive reorientation, Patient/family training, Equipment eval/education, Bed mobility, Gait training, Compensatory technique education          See flowsheet documentation for full assessment, interventions and recommendations. Outcome: Not Progressing  Note: Prognosis: Fair  Problem List: Decreased strength, Decreased range of motion, Decreased endurance, Impaired balance, Decreased mobility, Decreased coordination, Decreased cognition, Impaired judgement, Decreased safety awareness  Assessment: pt began tx session lying supine in the bed and was agreeable to participate in PT intervention. Upon arrival to pt room pt with bowel incontinence and required cleaning and changing of gown. pt continues to require max ax1 for all bed mobility such as rolling and repositioning in the bed from L to R with LE and trunk managmeent. pt also required max ax1 in order to sit EOB from supine position w/ LE and trunk management. pt demonstrated posterior lean while seated EOB that required min ax1 to correct. pt continues to require max ax1 for all functional transfers w/ HHA and bilateral knee blocks for increase safety and balance while standing OOB. pt with limited standing tolerance as pt remains at risk for falls and injuries. pt would benefit from continued skilled PT intervention in order to address deficits listed above.  Post tx session pt in recliner with call bell, chair alarm activated, legs elevated, family present post tx session and PCA setting up breakfast for pt        PT Discharge Recommendation: Post acute rehabilitation services (vs return to facility with skilled PT intervention)    See flowsheet documentation for full assessment.

## 2023-08-17 NOTE — ASSESSMENT & PLAN NOTE
Likely in the setting of poor po intake   Na 150  Na 139, resolved     Plan-   D5 infusion, continuous will dc if po intake improves   Reassess tomorrow in the setting of electrolytes and poor po intake

## 2023-08-17 NOTE — PLAN OF CARE
Problem: MOBILITY - ADULT  Goal: Maintain or return to baseline ADL function  Description: INTERVENTIONS:  -  Assess patient's ability to carry out ADLs; assess patient's baseline for ADL function and identify physical deficits which impact ability to perform ADLs (bathing, care of mouth/teeth, toileting, grooming, dressing, etc.)  - Assess/evaluate cause of self-care deficits   - Assess range of motion  - Assess patient's mobility; develop plan if impaired  - Assess patient's need for assistive devices and provide as appropriate  - Encourage maximum independence but intervene and supervise when necessary  - Involve family in performance of ADLs  - Assess for home care needs following discharge   - Consider OT consult to assist with ADL evaluation and planning for discharge  - Provide patient education as appropriate  Outcome: Progressing  Goal: Maintains/Returns to pre admission functional level  Description: INTERVENTIONS:  - Perform BMAT or MOVE assessment daily.   - Set and communicate daily mobility goal to care team and patient/family/caregiver. - Collaborate with rehabilitation services on mobility goals if consulted  - Perform Range of Motion  times a day. - Reposition patient every  hours.   - Dangle patient  times a day  - Stand patient  times a day  - Ambulate patient  times a day  - Out of bed to chair  times a day   - Out of bed for meals  times a day  - Out of bed for toileting  - Record patient progress and toleration of activity level   Outcome: Progressing     Problem: PAIN - ADULT  Goal: Verbalizes/displays adequate comfort level or baseline comfort level  Description: Interventions:  - Encourage patient to monitor pain and request assistance  - Assess pain using appropriate pain scale  - Administer analgesics based on type and severity of pain and evaluate response  - Implement non-pharmacological measures as appropriate and evaluate response  - Consider cultural and social influences on pain and pain management  - Notify physician/advanced practitioner if interventions unsuccessful or patient reports new pain  Outcome: Progressing     Problem: INFECTION - ADULT  Goal: Absence or prevention of progression during hospitalization  Description: INTERVENTIONS:  - Assess and monitor for signs and symptoms of infection  - Monitor lab/diagnostic results  - Monitor all insertion sites, i.e. indwelling lines, tubes, and drains  - Monitor endotracheal if appropriate and nasal secretions for changes in amount and color  - Sunset appropriate cooling/warming therapies per order  - Administer medications as ordered  - Instruct and encourage patient and family to use good hand hygiene technique  - Identify and instruct in appropriate isolation precautions for identified infection/condition  Outcome: Progressing  Goal: Absence of fever/infection during neutropenic period  Description: INTERVENTIONS:  - Monitor WBC    Outcome: Progressing     Problem: SAFETY ADULT  Goal: Maintain or return to baseline ADL function  Description: INTERVENTIONS:  -  Assess patient's ability to carry out ADLs; assess patient's baseline for ADL function and identify physical deficits which impact ability to perform ADLs (bathing, care of mouth/teeth, toileting, grooming, dressing, etc.)  - Assess/evaluate cause of self-care deficits   - Assess range of motion  - Assess patient's mobility; develop plan if impaired  - Assess patient's need for assistive devices and provide as appropriate  - Encourage maximum independence but intervene and supervise when necessary  - Involve family in performance of ADLs  - Assess for home care needs following discharge   - Consider OT consult to assist with ADL evaluation and planning for discharge  - Provide patient education as appropriate  Outcome: Progressing  Goal: Maintains/Returns to pre admission functional level  Description: INTERVENTIONS:  - Perform BMAT or MOVE assessment daily.   - Set and communicate daily mobility goal to care team and patient/family/caregiver. - Collaborate with rehabilitation services on mobility goals if consulted  - Perform Range of Motion  times a day. - Reposition patient every  hours.   - Dangle patient  times a day  - Stand patient  times a day  - Ambulate patient  times a day  - Out of bed to chair  times a day   - Out of bed for meals  times a day  - Out of bed for toileting  - Record patient progress and toleration of activity level   Outcome: Progressing  Goal: Patient will remain free of falls  Description: INTERVENTIONS:  - Educate patient/family on patient safety including physical limitations  - Instruct patient to call for assistance with activity   - Consult OT/PT to assist with strengthening/mobility   - Keep Call bell within reach  - Keep bed low and locked with side rails adjusted as appropriate  - Keep care items and personal belongings within reach  - Initiate and maintain comfort rounds  - Make Fall Risk Sign visible to staff  - Offer Toileting every  Hours, in advance of need  - Initiate/Maintain alarm  - Obtain necessary fall risk management equipment  - Apply yellow socks and bracelet for high fall risk patients  - Consider moving patient to room near nurses station  Outcome: Progressing     Problem: DISCHARGE PLANNING  Goal: Discharge to home or other facility with appropriate resources  Description: INTERVENTIONS:  - Identify barriers to discharge w/patient and caregiver  - Arrange for needed discharge resources and transportation as appropriate  - Identify discharge learning needs (meds, wound care, etc.)  - Arrange for interpretive services to assist at discharge as needed  - Refer to Case Management Department for coordinating discharge planning if the patient needs post-hospital services based on physician/advanced practitioner order or complex needs related to functional status, cognitive ability, or social support system  Outcome: Progressing Problem: Knowledge Deficit  Goal: Patient/family/caregiver demonstrates understanding of disease process, treatment plan, medications, and discharge instructions  Description: Complete learning assessment and assess knowledge base.   Interventions:  - Provide teaching at level of understanding  - Provide teaching via preferred learning methods  Outcome: Progressing     Problem: Prexisting or High Potential for Compromised Skin Integrity  Goal: Skin integrity is maintained or improved  Description: INTERVENTIONS:  - Identify patients at risk for skin breakdown  - Assess and monitor skin integrity  - Assess and monitor nutrition and hydration status  - Monitor labs   - Assess for incontinence   - Turn and reposition patient  - Assist with mobility/ambulation  - Relieve pressure over bony prominences  - Avoid friction and shearing  - Provide appropriate hygiene as needed including keeping skin clean and dry  - Evaluate need for skin moisturizer/barrier cream  - Collaborate with interdisciplinary team   - Patient/family teaching  - Consider wound care consult   Outcome: Progressing     Problem: SAFETY,RESTRAINT: NV/NON-SELF DESTRUCTIVE BEHAVIOR  Goal: Remains free of harm/injury (restraint for non violent/non self-detsructive behavior)  Description: INTERVENTIONS:  - Instruct patient/family regarding restraint use   - Assess and monitor physiologic and psychological status   - Provide interventions and comfort measures to meet assessed patient needs   - Identify and implement measures to help patient regain control  - Assess readiness for release of restraint   Outcome: Progressing  Goal: Returns to optimal restraint-free functioning  Description: INTERVENTIONS:  - Assess the patient's behavior and symptoms that indicate continued need for restraint  - Identify and implement measures to help patient regain control  - Assess readiness for release of restraint   Outcome: Progressing     Problem: Nutrition/Hydration-ADULT  Goal: Nutrient/Hydration intake appropriate for improving, restoring or maintaining nutritional needs  Description: Monitor and assess patient's nutrition/hydration status for malnutrition. Collaborate with interdisciplinary team and initiate plan and interventions as ordered. Monitor patient's weight and dietary intake as ordered or per policy. Utilize nutrition screening tool and intervene as necessary. Determine patient's food preferences and provide high-protein, high-caloric foods as appropriate.      INTERVENTIONS:  - Monitor oral intake, urinary output, labs, and treatment plans  - Assess nutrition and hydration status and recommend course of action  - Evaluate amount of meals eaten  - Assist patient with eating if necessary   - Allow adequate time for meals  - Recommend/ encourage appropriate diets, oral nutritional supplements, and vitamin/mineral supplements  - Order, calculate, and assess calorie counts as needed  - Recommend, monitor, and adjust tube feedings and TPN/PPN based on assessed needs  - Assess need for intravenous fluids  - Provide specific nutrition/hydration education as appropriate  - Include patient/family/caregiver in decisions related to nutrition  Outcome: Progressing

## 2023-08-17 NOTE — ASSESSMENT & PLAN NOTE
Lab Results   Component Value Date    HGBA1C 7.0 (H) 08/03/2023       Recent Labs     08/16/23  1122 08/16/23  1614 08/16/23 2055 08/17/23  0804   POCGLU 153* 168* 132 133       Blood Sugar Average: Last 72 hrs:  (P) 165.4004348336484421   Not on any home regime   Hba1c- 7  Was initially started on Lantus 10 and scheduled mealtime    Plan-   Decreased Lantus to 5U   Discontinued mealtime as pt has poor po intake   Hypoglycemia protocol   ISS

## 2023-08-17 NOTE — ASSESSMENT & PLAN NOTE
Hypoactive delirium and patient  Home meds Aricept 5 mg nightly, olanzapine 5 mg daily, Namenda 10 mg twice daily, trazodone 50 mg at bedtime    Plan-  Holding trazodone 50 mg at bedtime and  Zyprexa 5 mg  Delirium precaution  Frequent reorientation  Avoid restraints if able unless danger to himself  Appreciate geriatric recommendations

## 2023-08-17 NOTE — ASSESSMENT & PLAN NOTE
POA platelets 798, pressure review in September 2022 was noted unremarkable  Iron panel: Iron Sat: 14, TIBC: 182, iron: 26  plt  improving  429    Plan  Likely in the setting of sepsis   Continue to monitor

## 2023-08-17 NOTE — ASSESSMENT & PLAN NOTE
Chest x-ray- There is a right mid to lower lung opacity with well-circumscribed medial margin suggests loculated effusion. CT chest- Moderate right pleural effusion with compressive atelectasis over the right lower lobe and posterior right middle and upper lobes, Consolidation in the left lower lobe, probably atelectatic. Superimposed pneumonia cannot be excluded. Soft tissue densities in the bronchial tree as above, appearance favors secretions.   IR placed chest tube 8/4  Repeat Chest Xray- appears to be improved r pleural effusion diaphragmatic boarders more clear to visualize, pending official read      Plan-  Appreciate pulmonology recommendations- s/p trial of 6/6 TPA/Dornase BID per MIST2 trial,  Overall poor surgical candidate and his sister agrees, ongoing [address] [place] discussions, water seal chest tube yesterday, repeat chest xray   Chest PT, encourage cough, incentive spirometry   Appreciate infectious disease recommendations

## 2023-08-17 NOTE — ASSESSMENT & PLAN NOTE
Patient's prognosis remains guarded as he is not a candidate for VATS procedure  Continuing source control with chest tube and drainage  Continuing IV antibiotics  Multiple goals of care discussions with sister; sister was hopeful of patient's recovery and to return back to baseline  Extensive conversation explained that his prognosis is poor    Plan-  Appreciate Palliative Care recommendations- Discussion yesterday including Hospice however family is not ready for hospice as of right now, ongoing discussions

## 2023-08-17 NOTE — PROGRESS NOTES
[facility]  Progress Note  Name: Kathryn Khan  MRN: [id number]  Unit/Bed#: W -01 I Date of Admission: 8/3/2023   Date of Service: 8/17/2023 I Hospital Day: 14    Assessment/Plan   * Severe sepsis [facility])  Assessment & Plan  On admission patient met criteria for severe sepsis secondary to pneumonia due to the loculated effusion  · Blood culture-negative  · Fluid culture-negative  IR chest tube placement 8/4  Procal 0.56  Repeat Bcx- Negative     Plan-  Appreciate ID recommendations-  continue Ceftriaxone   Respiratory protocol  Monitor vital signs  Mucinex 1200 twice daily  See loculated pleural effusion plan        Loculated pleural effusion  Assessment & Plan  Chest x-ray- There is a right mid to lower lung opacity with well-circumscribed medial margin suggests loculated effusion. CT chest- Moderate right pleural effusion with compressive atelectasis over the right lower lobe and posterior right middle and upper lobes, Consolidation in the left lower lobe, probably atelectatic. Superimposed pneumonia cannot be excluded. Soft tissue densities in the bronchial tree as above, appearance favors secretions.   IR placed chest tube 8/4  Repeat Chest Xray- appears to be improved r pleural effusion diaphragmatic boarders more clear to visualize, pending official read      Plan-  Appreciate pulmonology recommendations- s/p trial of 6/6 TPA/Dornase BID per MIST2 trial,  Overall poor surgical candidate and his sister agrees, ongoing [address] [place] discussions, water seal chest tube yesterday, repeat chest xray   Chest PT, encourage cough, incentive spirometry   Appreciate infectious disease recommendations    Moderate protein-calorie malnutrition ([address])  Assessment & Plan  Malnutrition Findings:   Adult Malnutrition type: Acute illness (in the setting of chronic illness)  Adult Degree of Malnutrition: Malnutrition of moderate degree  Malnutrition Characteristics: Fat loss, Muscle loss 360 Statement: Moderate malnutrition related to dysphagia, inadequate oral intake as evidenced by loss of subcutaneous fat and muscle extremities, temples, orbitals, calves. BMI 18.17 Currently treated with oral supplementation    BMI Findings:  Adult BMI Classifications: Underweight < 18.5        Body mass index is 18.85 kg/m².    Plan-   Ensure supplements   Encourage eating with assistance from staff to prompt       Hypernatremia  Assessment & Plan  Likely in the setting of poor po intake   Na 150  Na 139, resolved     Plan-   D5 infusion, continuous will dc if po intake improves   Reassess tomorrow in the setting of electrolytes and poor po intake            Goals of care, counseling/discussion  Assessment & Plan  Patient's prognosis remains guarded as he is not a candidate for VATS procedure  Continuing source control with chest tube and drainage  Continuing IV antibiotics  Multiple goals of care discussions with sister; sister was hopeful of patient's recovery and to return back to baseline  Extensive conversation explained that his prognosis is poor    Plan-  Appreciate Palliative Care recommendations- Discussion yesterday including Hospice however family is not ready for hospice as of right now, ongoing discussions     Type 2 diabetes mellitus [facility])  Assessment & Plan  Lab Results   Component Value Date    HGBA1C 7.0 (H) 08/03/2023       Recent Labs     08/16/23  1122 08/16/23  1614 08/16/23  2055 08/17/23  0804   POCGLU 153* 168* 132 133       Blood Sugar Average: Last 72 hrs:  (P) [id number]   Not on any home regime   Hba1c- 7  Was initially started on Lantus 10 and scheduled mealtime    Plan-   Decreased Lantus to 5U   Discontinued mealtime as pt has poor po intake   Hypoglycemia protocol   ISS     Ambulatory dysfunction  Assessment & Plan  · At baseline ambulate without AD, noted unsteadiness new from baseline      Plan-  PT OT- postacute rehabilitation      Encephalopathy acute  Assessment & Plan  · POA at facility noted slurred speech, slow responsive, not at his baseline  · Encephalopathy likely metabolic in the setting of sepsis  · Check CT head : No acute intracranial leasion  · Continue antibiotic as above      Thrombocytosis  Assessment & Plan  POA platelets 686, pressure review in September 2022 was noted unremarkable  Iron panel: Iron Sat: 14, TIBC: 182, iron: 26  plt  improving  429    Plan  Likely in the setting of sepsis   Continue to monitor    Anemia  Assessment & Plan  · POA hemoglobin 8.0, pressure review hemoglobin back in September 2022 noted 10.1. · Unclear if history of melena, hematochezia  · Anemia unclear etiology at this time possibly secondary to iron deficiency given thrombocytosis although this could represent current setting of infection versus underlying malignancy? Check iron panel: Ferritin 714, Iron Sat: 14, TIBC: 182, Iron: 26  folate, vitamin B12 WNL     Plan  · Monitor output and consider transfusion if less than 7  ·  CBC a.m. Late onset Alzheimer's dementia with behavioral disturbance ([address])  Assessment & Plan  Hypoactive delirium and patient  Home meds Aricept 5 mg nightly, olanzapine 5 mg daily, Namenda 10 mg twice daily, trazodone 50 mg at bedtime    Plan-  Holding trazodone 50 mg at bedtime and  Zyprexa 5 mg  Delirium precaution  Frequent reorientation  Avoid restraints if able unless danger to himself  Appreciate geriatric recommendations            VTE Pharmacologic Prophylaxis: VTE Score: 5 Moderate Risk (Score 3-4) - Pharmacological DVT Prophylaxis Ordered: enoxaparin (Lovenox). Patient Centered Rounds: I performed bedside rounds with nursing staff today. Discussions with Specialists or Other Care Team Provider: Pulmonology, infectious disease, geriatrics    Education and Discussions with Family / Patient: Updated  (sister) via phone.     Current Length of Stay: 14 day(s)  Current Patient Status: Inpatient Discharge Plan: Anticipate discharge in 48 hrs to rehab facility. Code Status: Level 3 - DNAR and DNI    Subjective:   Patient was seen and examined at bedside. Patient was resting comfortably no acute distress. No overnight were reported. Patient had no complaints at the time my evaluation. Patient denies chest pain, shortness of breath, palpitations, abdominal pain, NVD, or any other symptoms at this time. Objective:     Vitals:   Temp (24hrs), Av.5 °F (36.9 °C), Min:97.9 °F (36.6 °C), Max:99.1 °F (37.3 °C)    Temp:  [97.9 °F (36.6 °C)-99.1 °F (37.3 °C)] 97.9 °F (36.6 °C)  HR:  [78-90] 79  Resp:  [16-19] 16  BP: ()/(33-63) 111/63  SpO2:  [95 %-96 %] 96 %  Body mass index is 18.85 kg/m². Input and Output Summary (last 24 hours): Intake/Output Summary (Last 24 hours) at 2023 0932  Last data filed at 2023 0840  Gross per 24 hour   Intake 1860 ml   Output 370 ml   Net 1490 ml       Physical Exam:   Physical Exam  Vitals and nursing note reviewed. Constitutional:       General: He is not in acute distress. Appearance: He is well-developed. He is ill-appearing. He is not toxic-appearing or diaphoretic. HENT:      Head: Normocephalic and atraumatic. Mouth/Throat:      Mouth: Mucous membranes are moist.   Eyes:      Extraocular Movements: Extraocular movements intact. Conjunctiva/sclera: Conjunctivae normal.      Pupils: Pupils are equal, round, and reactive to light. Cardiovascular:      Rate and Rhythm: Normal rate and regular rhythm. Pulses: Normal pulses. Heart sounds: Normal heart sounds. No murmur heard. Pulmonary:      Effort: Pulmonary effort is normal. No respiratory distress. Breath sounds: Rhonchi present. No wheezing. Comments: Bilaterally, improving    Abdominal:      General: Bowel sounds are normal. There is no distension. Palpations: Abdomen is soft. Tenderness: There is no abdominal tenderness.  There is no guarding or rebound. Musculoskeletal:         General: No swelling or tenderness. Normal range of motion. Cervical back: Normal range of motion and neck supple. Right lower leg: No edema. Left lower leg: No edema. Skin:     General: Skin is warm and dry. Capillary Refill: Capillary refill takes less than 2 seconds. Neurological:      Mental Status: He is alert. Mental status is at baseline. He is disoriented. Cranial Nerves: No cranial nerve deficit. Sensory: No sensory deficit. Motor: Weakness present. Comments: Awake and alert, follows some commands, able to converse and answer some questions appropriately more today , remains hypoactive overall   Psychiatric:         Mood and Affect: Mood normal.          Additional Data:     Labs:  Results from last 7 days   Lab Units 08/16/23  0516 08/14/23  0525 08/13/23  0504   WBC Thousand/uL 7.30   < > 8.43   HEMOGLOBIN g/dL 7.3*   < > 8.3*   HEMATOCRIT % 24.0*   < > 26.9*   PLATELETS Thousands/uL 429*   < > 543*   NEUTROS PCT %  --   --  69   LYMPHS PCT %  --   --  19   MONOS PCT %  --   --  8   EOS PCT %  --   --  2    < > = values in this interval not displayed. Results from last 7 days   Lab Units 08/17/23  0632 08/13/23  0504 08/12/23  0517   SODIUM mmol/L 139   < > 143   POTASSIUM mmol/L 3.8   < > 3.3*   CHLORIDE mmol/L 106   < > 110*   CO2 mmol/L 28   < > 27   BUN mg/dL 14   < > 16   CREATININE mg/dL 0.76   < > 0.99   ANION GAP mmol/L 5   < > 6   CALCIUM mg/dL 7.9*   < > 8.2*   ALBUMIN g/dL  --   --  2.7*   TOTAL BILIRUBIN mg/dL  --   --  0.50   ALK PHOS U/L  --   --  104   ALT U/L  --   --  8   AST U/L  --   --  15   GLUCOSE RANDOM mg/dL 121   < > 94    < > = values in this interval not displayed.          Results from last 7 days   Lab Units 08/17/23  0804 08/16/23  2055 08/16/23  1614 08/16/23  1122 08/16/23  0747 08/15/23  2045 08/15/23  1550 08/15/23  1129 08/15/23  0738 08/14/23  2109 08/14/23  1603 08/14/23  1122   POC GLUCOSE mg/dl 133 132 168* 153* 155* 179* 121 201* 155* 201* 229* 191*               Lines/Drains:  Invasive Devices     Peripheral Intravenous Line  Duration           Peripheral IV 08/15/23 Left Antecubital 2 days    Peripheral IV 08/15/23 Left;Ventral (anterior) Forearm 1 day          Drain  Duration           Chest Tube Right 12 Fr. 12 days    External Urinary Catheter 10 days                      Imaging: Reviewed radiology reports from this admission including: chest xray, chest CT scan, CT head and procedure reports    Recent Cultures (last 7 days):   Results from last 7 days   Lab Units 08/12/23  1503   BLOOD CULTURE  No Growth After 4 Days. No Growth After 4 Days. Last 24 Hours Medication List:   Current Facility-Administered Medications   Medication Dose Route Frequency Provider Last Rate   • acetaminophen  325 mg Rectal Q4H PRN John Burk DO     • acetaminophen  650 mg Oral Q6H PRN Jovani Hawk MD     • cefTRIAXone  2,000 mg Intravenous Q24H Vicki Solis MD 2,000 mg (08/17/23 6867)   • dextrose  75 mL/hr Intravenous Continuous Juan M Braun MD 75 mL/hr (08/17/23 0908)   • donepezil  5 mg Oral HS Jovani Hawk MD     • enoxaparin  40 mg Subcutaneous Daily Jovani Hawk MD     • guaiFENesin  1,200 mg Oral Q12H [address] [place], MD     • insulin glargine  5 Units Subcutaneous HS Juan M Braun MD     • insulin lispro  1-5 Units Subcutaneous TID [address] [place], MD     • insulin lispro  2 Units Subcutaneous TID With Meals Juan M Braun MD     • magnesium sulfate  2 g Intravenous Once Juan M Braun MD     • memantine  10 mg Oral BID Jovani Hawk MD          Today, Patient Was Seen By: Juan M Braun MD    **Please Note: This note may have been constructed using a voice recognition system. **

## 2023-08-17 NOTE — PROGRESS NOTES
Progress Note - Palliative & Supportive Care  Clarita Berumen  82 y.o.  male  W /W -01   MRN: [id number]  Encounter: [id number]     Assessment  Advanced Alzheimer's dementia  Severe sepsis  Respiratory failure with hypoxia  Streptococcal empyema  Goals of care counseling  Palliative care encounter    Plan  1. Symptom Management  · Per primary team at this time. Patient does not have any complaints at today's visit. 2. Goals of Care  • Level 3 code status  • Disease-directed therapies with limit of DNR/DNI  • Patient's sister George Rico wished to speak to pulmonary prior to deciding on hospice care  • Spoke with sister George Rico via phone and she has not yet heard from pulmonary  • She does not have any other questions or concerns and has not made any further decisions at this time  • Goals of care discussions will be ongoing    3. Social Support  • Patient well-supported by his sister George Rico  • Emotional support provided    4. Follow-up  • Palliative care will continue to follow and goals of care conversations will be ongoing. Please reach out with any questions or concerns. 24 Hour History  Chart reviewed before visit. No acute events overnight. Vital signs stable. Upon my encounter today, patient seen and examined at bedside with no family present. He appears comfortable and in no acute distress. He responds to some questions appropriately, but confused at times. Occasionally, does not answer and stares down at bed. He does not have any complaints at today's visit and denies pain. He ate a small portion of his breakfast this morning. No other questions or concerns. Called sister George Rico to follow up on conversation from yesterday. She does not have any additional questions or concerns and has not made any further decisions at this time. She has not yet spoken with pulmonary. Will attempt to speak with her tomorrow to follow up on this.        Review of Systems   Unable to perform ROS: Mental status change       Medications    Current Facility-Administered Medications:   •  acetaminophen (TYLENOL) rectal suppository 325 mg, 325 mg, Rectal, Q4H PRN, Nasima Obando DO, 325 mg at 08/11/23 2154  •  acetaminophen (TYLENOL) tablet 650 mg, 650 mg, Oral, Q6H PRN, Hunter Mcclellan MD, 650 mg at 08/10/23 1925  •  cefTRIAXone (ROCEPHIN) 2,000 mg in dextrose 5 % 50 mL IVPB, 2,000 mg, Intravenous, Q24H, Charmaine Concepcion MD, Last Rate: 100 mL/hr at 08/16/23 0918, 2,000 mg at 08/16/23 0918  •  dextrose 5 % infusion, 75 mL/hr, Intravenous, Continuous, Mary Kingston MD, Last Rate: 75 mL/hr at 08/16/23 0917, 75 mL/hr at 08/16/23 0917  •  donepezil (ARICEPT) tablet 5 mg, 5 mg, Oral, HS, Hunter Mcclellan MD, 5 mg at 08/16/23 2149  •  enoxaparin (LOVENOX) subcutaneous injection 40 mg, 40 mg, Subcutaneous, Daily, Hunter Mcclellan MD, 40 mg at 08/16/23 0901  •  guaiFENesin (MUCINEX) 12 hr tablet 1,200 mg, 1,200 mg, Oral, Q12H [facility], Hunter Mcclellan MD, 1,200 mg at 08/15/23 2107  •  insulin glargine (LANTUS) subcutaneous injection 5 Units 0.05 mL, 5 Units, Subcutaneous, HS, Mary Kingston MD, 5 Units at 08/16/23 2148  •  insulin lispro (HumaLOG) 100 units/mL subcutaneous injection 1-5 Units, 1-5 Units, Subcutaneous, TID AC, 1 Units at 08/16/23 1734 **AND** Fingerstick Glucose (POCT), , , TID AC, Hunter Mcclellan MD  •  insulin lispro (HumaLOG) 100 units/mL subcutaneous injection 2 Units, 2 Units, Subcutaneous, TID With Meals, Mary Kingston MD, 2 Units at 08/16/23 1735  •  memantine (NAMENDA) tablet 10 mg, 10 mg, Oral, BID, Hunter Mcclellan MD, 10 mg at 08/16/23 1735    Objective  /63   Pulse 79   Temp 97.9 °F (36.6 °C)   Resp 16   Ht 5' 9" (1.753 m)   Wt 57.9 kg (127 lb 10.3 oz)   SpO2 96%   BMI 18.85 kg/m²   Physical Exam:   Constitutional: Appears chronically ill. Comfortable and in no acute distress. Head: Normocephalic and atraumatic.    Eyes: EOM are normal. No ocular discharge. No scleral icterus. Neck: no visible adenopathy or masses  Cardiac: Normal rate  Respiratory: Effort normal. No stridor. No respiratory distress. No cough  Gastrointestinal: No abdominal distension. Musculoskeletal: No edema. Neurological: Alert and answers some questions appropriately, but confused at times  Skin: Dry, no diaphoresis. Psychiatric: Calm with no signs of agitation    Lab Results:   I have personally reviewed pertinent labs. , CBC: No results found for: "WBC", "HGB", "HCT", "MCV", "PLT", "ADJUSTEDWBC", "RBC", "MCH", "MCHC", "RDW", "MPV", "NRBC", CMP:   Lab Results   Component Value Date    SODIUM 139 08/17/2023    K 3.8 08/17/2023     08/17/2023    CO2 28 08/17/2023    BUN 14 08/17/2023    CREATININE 0.76 08/17/2023    CALCIUM 7.9 (L) 08/17/2023    EGFR 86 08/17/2023     Imaging Studies: I have personally reviewed pertinent reports. EKG, Pathology, and Other Studies: I have personally reviewed pertinent reports. Counseling / Coordination of Care  Total floor / unit time spent today 30 minutes. Soraya Paul PA-C  Palliative & Supportive Care    Portions of this document may have been created using dictation software and as such some "sound alike" terms may have been generated by the system. Do not hesitate to contact me with any questions or clarifications.

## 2023-08-17 NOTE — PLAN OF CARE
Problem: MOBILITY - ADULT  Goal: Maintain or return to baseline ADL function  Description: INTERVENTIONS:  -  Assess patient's ability to carry out ADLs; assess patient's baseline for ADL function and identify physical deficits which impact ability to perform ADLs (bathing, care of mouth/teeth, toileting, grooming, dressing, etc.)  - Assess/evaluate cause of self-care deficits   - Assess range of motion  - Assess patient's mobility; develop plan if impaired  - Assess patient's need for assistive devices and provide as appropriate  - Encourage maximum independence but intervene and supervise when necessary  - Involve family in performance of ADLs  - Assess for home care needs following discharge   - Consider OT consult to assist with ADL evaluation and planning for discharge  - Provide patient education as appropriate  Outcome: Progressing  Goal: Maintains/Returns to pre admission functional level  Description: INTERVENTIONS:  - Perform BMAT or MOVE assessment daily.   - Set and communicate daily mobility goal to care team and patient/family/caregiver. - Collaborate with rehabilitation services on mobility goals if consulted  - Perform Range of Motion  times a day. - Reposition patient every  hours.   - Dangle patient  times a day  - Stand patient  times a day  - Ambulate patient  times a day  - Out of bed to chair  times a day   - Out of bed for meals  times a day  - Out of bed for toileting  - Record patient progress and toleration of activity level   Outcome: Progressing     Problem: PAIN - ADULT  Goal: Verbalizes/displays adequate comfort level or baseline comfort level  Description: Interventions:  - Encourage patient to monitor pain and request assistance  - Assess pain using appropriate pain scale  - Administer analgesics based on type and severity of pain and evaluate response  - Implement non-pharmacological measures as appropriate and evaluate response  - Consider cultural and social influences on pain and pain management  - Notify physician/advanced practitioner if interventions unsuccessful or patient reports new pain  Outcome: Progressing     Problem: INFECTION - ADULT  Goal: Absence or prevention of progression during hospitalization  Description: INTERVENTIONS:  - Assess and monitor for signs and symptoms of infection  - Monitor lab/diagnostic results  - Monitor all insertion sites, i.e. indwelling lines, tubes, and drains  - Monitor endotracheal if appropriate and nasal secretions for changes in amount and color  - [place] appropriate cooling/warming therapies per order  - Administer medications as ordered  - Instruct and encourage patient and family to use good hand hygiene technique  - Identify and instruct in appropriate isolation precautions for identified infection/condition  Outcome: Progressing  Goal: Absence of fever/infection during neutropenic period  Description: INTERVENTIONS:  - Monitor WBC    Outcome: Progressing     Problem: SAFETY ADULT  Goal: Maintain or return to baseline ADL function  Description: INTERVENTIONS:  -  Assess patient's ability to carry out ADLs; assess patient's baseline for ADL function and identify physical deficits which impact ability to perform ADLs (bathing, care of mouth/teeth, toileting, grooming, dressing, etc.)  - Assess/evaluate cause of self-care deficits   - Assess range of motion  - Assess patient's mobility; develop plan if impaired  - Assess patient's need for assistive devices and provide as appropriate  - Encourage maximum independence but intervene and supervise when necessary  - Involve family in performance of ADLs  - Assess for home care needs following discharge   - Consider OT consult to assist with ADL evaluation and planning for discharge  - Provide patient education as appropriate  Outcome: Progressing  Goal: Maintains/Returns to pre admission functional level  Description: INTERVENTIONS:  - Perform BMAT or MOVE assessment daily.   - Set and communicate daily mobility goal to care team and patient/family/caregiver. - Collaborate with rehabilitation services on mobility goals if consulted  - Perform Range of Motion  times a day. - Reposition patient every  hours.   - Dangle patient  times a day  - Stand patient  times a day  - Ambulate patient  times a day  - Out of bed to chair  times a day   - Out of bed for meals  times a day  - Out of bed for toileting  - Record patient progress and toleration of activity level   Outcome: Progressing  Goal: Patient will remain free of falls  Description: INTERVENTIONS:  - Educate patient/family on patient safety including physical limitations  - Instruct patient to call for assistance with activity   - Consult OT/PT to assist with strengthening/mobility   - Keep Call bell within reach  - Keep bed low and locked with side rails adjusted as appropriate  - Keep care items and personal belongings within reach  - Initiate and maintain comfort rounds  - Make Fall Risk Sign visible to staff  - Offer Toileting every  Hours, in advance of need  - Initiate/Maintain alarm  - Obtain necessary fall risk management equipment  - Apply yellow socks and bracelet for high fall risk patients  - Consider moving patient to room near nurses station  Outcome: Progressing     Problem: DISCHARGE PLANNING  Goal: Discharge to home or other facility with appropriate resources  Description: INTERVENTIONS:  - Identify barriers to discharge w/patient and caregiver  - Arrange for needed discharge resources and transportation as appropriate  - Identify discharge learning needs (meds, wound care, etc.)  - Arrange for interpretive services to assist at discharge as needed  - Refer to Case Management Department for coordinating discharge planning if the patient needs post-hospital services based on physician/advanced practitioner order or complex needs related to functional status, cognitive ability, or social support system  Outcome: Progressing Problem: Prexisting or High Potential for Compromised Skin Integrity  Goal: Skin integrity is maintained or improved  Description: INTERVENTIONS:  - Identify patients at risk for skin breakdown  - Assess and monitor skin integrity  - Assess and monitor nutrition and hydration status  - Monitor labs   - Assess for incontinence   - Turn and reposition patient  - Assist with mobility/ambulation  - Relieve pressure over bony prominences  - Avoid friction and shearing  - Provide appropriate hygiene as needed including keeping skin clean and dry  - Evaluate need for skin moisturizer/barrier cream  - Collaborate with interdisciplinary team   - Patient/family teaching  - Consider wound care consult   Outcome: Progressing     Problem: Knowledge Deficit  Goal: Patient/family/caregiver demonstrates understanding of disease process, treatment plan, medications, and discharge instructions  Description: Complete learning assessment and assess knowledge base.   Interventions:  - Provide teaching at level of understanding  - Provide teaching via preferred learning methods  Outcome: Progressing     Problem: SAFETY,RESTRAINT: NV/NON-SELF DESTRUCTIVE BEHAVIOR  Goal: Remains free of harm/injury (restraint for non violent/non self-detsructive behavior)  Description: INTERVENTIONS:  - Instruct patient/family regarding restraint use   - Assess and monitor physiologic and psychological status   - Provide interventions and comfort measures to meet assessed patient needs   - Identify and implement measures to help patient regain control  - Assess readiness for release of restraint   Outcome: Progressing  Goal: Returns to optimal restraint-free functioning  Description: INTERVENTIONS:  - Assess the patient's behavior and symptoms that indicate continued need for restraint  - Identify and implement measures to help patient regain control  - Assess readiness for release of restraint   Outcome: Progressing     Problem: Nutrition/Hydration-ADULT  Goal: Nutrient/Hydration intake appropriate for improving, restoring or maintaining nutritional needs  Description: Monitor and assess patient's nutrition/hydration status for malnutrition. Collaborate with interdisciplinary team and initiate plan and interventions as ordered. Monitor patient's weight and dietary intake as ordered or per policy. Utilize nutrition screening tool and intervene as necessary. Determine patient's food preferences and provide high-protein, high-caloric foods as appropriate.      INTERVENTIONS:  - Monitor oral intake, urinary output, labs, and treatment plans  - Assess nutrition and hydration status and recommend course of action  - Evaluate amount of meals eaten  - Assist patient with eating if necessary   - Allow adequate time for meals  - Recommend/ encourage appropriate diets, oral nutritional supplements, and vitamin/mineral supplements  - Order, calculate, and assess calorie counts as needed  - Recommend, monitor, and adjust tube feedings and TPN/PPN based on assessed needs  - Assess need for intravenous fluids  - Provide specific nutrition/hydration education as appropriate  - Include patient/family/caregiver in decisions related to nutrition  Outcome: Progressing

## 2023-08-17 NOTE — ASSESSMENT & PLAN NOTE
Malnutrition Findings:   Adult Malnutrition type: Acute illness (in the setting of chronic illness)  Adult Degree of Malnutrition: Malnutrition of moderate degree  Malnutrition Characteristics: Fat loss, Muscle loss                  360 Statement: Moderate malnutrition related to dysphagia, inadequate oral intake as evidenced by loss of subcutaneous fat and muscle extremities, temples, orbitals, calves. BMI 18.17 Currently treated with oral supplementation    BMI Findings:  Adult BMI Classifications: Underweight < 18.5        Body mass index is 18.85 kg/m².    Plan-   Ensure supplements   Encourage eating with assistance from staff to prompt

## 2023-08-17 NOTE — QUICK NOTE
Attempted to update sister Ms. Fatimah Ellis. Unfortunately was unable to reach at this time, left voicemail.  Will try again later

## 2023-08-17 NOTE — PROGRESS NOTES
Progress Note - Infectious Disease   Seema Phoenix 82 y.o. male MRN: [id number]  Unit/Bed#: W -01 Encounter: [id number]      Impression/Plan:  1.  Severe sepsis.  Present on admission with leukocytosis and tachycardia as well as a lactate level of 3.2.  Now subsequently has developed a fever. Suspect secondary to a pneumonia with complicating empyema.  No other clear source appreciated.  Suspect the ongoing sepsis is secondary to inadequate source control.  Seems to have had improvement since instillation of tPA and dornase.  Temperatures had been down but had a low-grade fever in the last 24 hours.  Repeat blood cultures negative.  May still be having intermittent aspiration events.  -Antibiotics as below  -Source control measures as below  -Follow-up repeat blood cultures  -Recheck CBC with differential and CMP  -Supportive care     2.  Streptococcus intermedius empyema.  Suspect the ongoing clinical sepsis is related to inadequate source control.  Increase pleural fluid output since tPA dornase instillation.  Some improvement clinically noted.  Chest x-ray improved. -Continue ceftriaxone  -Chest tube drainage  -Recheck CT chest once output has dropped to see if chest tube can be pulled  -Close pulmonary follow-up  -Not a surgical candidate for VATS per pulmonary  -Duration of antibiotics will largely depend upon adequacy of source control and whether plan is to continue aggressive treatment     3.  Acute hypoxic respiratory failure.  Appears to be secondary to pneumonia with complicated parapneumonic effusion.  No other clear source appreciated.  Patient  has been on and off nasal cannula oxygen support  -Oxygen support as needed  -Monitor respiratory status  -Treatment of complicated effusion as above     4.  Acute encephalopathy.  Complicating baseline dementia.  Suspect related to the patient's acute infectious process.  Remains nonverbal.  Imaging of the brain without any abnormality.   Continues to wax and wane as far as cognition  -Monitor cognition  -Treat sepsis as above     5.  Diarrhea.  The patient still receiving MiraLAX and Senokot.  Possible medication effect.  Less likely C. difficile colitis.  -Hold MiraLAX and Senokot  -Monitor stool output    Discussed the above management plan with the primary service    Discussed in detail with the patient's daughter and son-in-law    Await decisions about level of care    Antibiotics:  Ceftriaxone 10  Antibiotics 15  Post chest tube placement 13    Subjective:  Patient has no fever, chills, sweats; no nausea, vomiting, diarrhea; no cough, shortness of breath; no pain. No new symptoms. Is awake and alert and able answer simple questions. Objective:  Vitals:  Temp:  [97.9 °F (36.6 °C)-99.1 °F (37.3 °C)] 97.9 °F (36.6 °C)  HR:  [78-90] 79  Resp:  [16-19] 16  BP: ()/(33-63) 111/63  SpO2:  [95 %-96 %] 96 %  Temp (24hrs), Av.5 °F (36.9 °C), Min:97.9 °F (36.6 °C), Max:99.1 °F (37.3 °C)  Current: Temperature: 97.9 °F (36.6 °C)    Physical Exam:   General Appearance:  Alert, interactive, nontoxic, no acute distress. Throat: Oropharynx moist without lesions. Lungs:   Decreased breath sounds bilaterally; no wheezes, rhonchi or rales; respirations unlabored. Right-sided chest tube in place   Heart:  RRR; no murmur, rub or gallop   Abdomen:   Soft, non-tender, non-distended, positive bowel sounds. Extremities: No clubbing, cyanosis or edema   Skin: No new rashes or lesions. No draining wounds noted.        Labs, Imaging, & Other studies:   All pertinent labs and imaging studies were personally reviewed  Results from last 7 days   Lab Units 23  0516 08/15/23  0454 23  0525   WBC Thousand/uL 7.30 10.43* 10.39*   HEMOGLOBIN g/dL 7.3* 8.0* 8.5*   PLATELETS Thousands/uL 429* 537* 545*     Results from last 7 days   Lab Units 23  0632 23  0516 08/15/23  1555 23  0504 23  0517 23  0454   SODIUM mmol/L 139 143 146   < > 143 141   POTASSIUM mmol/L 3.8 3.3* 3.7   < > 3.3* 3.3*   CHLORIDE mmol/L 106 109* 111*   < > 110* 109*   CO2 mmol/L 28 27 26   < > 27 24   BUN mg/dL 14 14 16   < > 16 22   CREATININE mg/dL 0.76 0.82 0.88   < > 0.99 0.95   EGFR ml/min/1.73sq m 86 83 81   < > 71 75   CALCIUM mg/dL 7.9* 7.8* 8.6   < > 8.2* 7.9*   AST U/L  --   --   --   --  15 17   ALT U/L  --   --   --   --  8 9   ALK PHOS U/L  --   --   --   --  104 102    < > = values in this interval not displayed. Results from last 7 days   Lab Units 08/12/23  1503   BLOOD CULTURE  No Growth After 4 Days. No Growth After 4 Days. Chest x-ray. Right basilar atelectasis and small effusion.   Chest tube in place    Images personally reviewed by me in PACS

## 2023-08-17 NOTE — PROGRESS NOTES
Progress Note - Pulmonary   Lo Dux 82 y.o. male MRN: [id number]  Unit/Bed#: W -01 Encouter:[id number]    Assessment/Plan:    1. Streptococcus intermedius empyema  • 12 Fr chest tube placed on August 4  • S/p 6 doses of tPA/dornase. Subjective improvement after tPA/Dornase  • Output since 3 PM yesterday is about 70 mL until 10am today  • Continue chest tube -20cm H2O water seal.  • Day 10 ceftriaxone, total 11 days of antibiotics  • We will consider pulling out the chest tube today. • Recommend incentive spirometry  • Possibly due to recurrent aspirations  • Repeat chest x-ray  • Continue antibiotics as scheduled  • Will consider repeating sputum culture      2. Dementia with toxic metabolic encephalopathy  • Toxic metabolic encephalopathy likely from a pneumonia/sepsis  • Patient was drowsy, disoriented at the time of examination  • Patient has poor oral intake resulting in hypernatremia, primary care team started the patient on gentle hydration. • Encourage frequent reorientation  • Continue appropriate sleep-wake cycles        Subjective: At the time of examination patient was awake, sitting comfortably in the chair. Patient reportedly feels cold and has experienced chills. Patient denies any chest pain, palpitations, relative shortness of breath. He also denies any pain anywhere else in the body. Objective:    Vitals: Blood pressure 111/63, pulse 79, temperature 97.9 °F (36.6 °C), resp. rate 16, height 5' 9" (1.753 m), weight 57.9 kg (127 lb 10.3 oz), SpO2 96 %. On room air,Body mass index is 18.85 kg/m².       Intake/Output Summary (Last 24 hours) at 8/17/2023 1031  Last data filed at 8/17/2023 1011  Gross per 24 hour   Intake 1620 ml   Output 370 ml   Net 1250 ml       Invasive Devices     Peripheral Intravenous Line  Duration           Peripheral IV 08/15/23 Left Antecubital 2 days    Peripheral IV 08/15/23 Left;Ventral (anterior) Forearm 2 days          Drain  Duration           Chest Tube Right 12 Fr. 12 days    External Urinary Catheter 10 days                Physical Exam:    Physical Exam  Constitutional:       Appearance: He is ill-appearing. Comments: Disoriented, unable to recall why he is in the hospital  Looks weak and frail   HENT:      Head: Normocephalic and atraumatic. Mouth/Throat:      Comments: Oral cavity clean clear and moist  Eyes:      Pupils: Pupils are equal, round, and reactive to light. Cardiovascular:      Rate and Rhythm: Normal rate and regular rhythm. Heart sounds: Normal heart sounds. Pulmonary:      Effort: Pulmonary effort is normal.      Breath sounds: No rhonchi or rales. Comments: Decreased breath sounds on the right  Abdominal:      General: Abdomen is flat. Palpations: Abdomen is soft. Tenderness: There is no abdominal tenderness. Musculoskeletal:         General: Normal range of motion. Comments: Patient moves limbs on command   Skin:     General: Skin is dry. Coloration: Skin is not jaundiced. Comments: Skin is cold to touch     Neurological:      Mental Status: He is disoriented. Sensory: No sensory deficit. Motor: No weakness. Psychiatric:      Comments: Confused, anxious         Labs: I have personally reviewed pertinent lab results. Imaging and other studies: I have personally reviewed pertinent films in PACS x-ray shows marked improvement.

## 2023-08-17 NOTE — PHYSICAL THERAPY NOTE
PHYSICAL THERAPY NOTE          Patient Name: Kimberly DUCKWORTH Date: 8/17/2023 08/17/23 0851   PT Last Visit   PT Visit Date 08/17/23   Note Type   Note Type Treatment   Pain Assessment   Pain Assessment Tool 0-10   Pain Score No Pain   Yates-Baker FACES Pain Rating 0   Pain Location/Orientation Location: Generalized   Pain Rating: FLACC (Rest) - Face 0   Pain Rating: FLACC (Rest) - Legs 0   Pain Rating: FLACC (Rest) - Activity 0   Pain Rating: FLACC (Rest) - Cry 0   Pain Rating: FLACC (Rest) - Consolability 0   Score: FLACC (Rest) 0   Pain Rating: FLACC (Activity) - Face 0   Pain Rating: FLACC (Activity) - Legs 0   Pain Rating: FLACC (Activity) - Activity 0   Pain Rating: FLACC (Activity) - Cry 0   Pain Rating: FLACC (Activity) - Consolability 0   Score: FLACC (Activity) 0   Restrictions/Precautions   Other Precautions Cognitive; Chair Alarm; Bed Alarm;Multiple lines; Fall Risk  (chest tube, ayon catheter and masimo)   General   Chart Reviewed Yes   Response to Previous Treatment Patient with no complaints from previous session. Family/Caregiver Present No   Cognition   Overall Cognitive Status Impaired   Arousal/Participation Cooperative;Responsive   Attention Attends with cues to redirect   Orientation Level Oriented to person;Disoriented to place; Disoriented to time;Disoriented to situation   Memory Decreased long term memory;Decreased recall of biographical information;Decreased short term memory;Decreased recall of recent events;Decreased recall of precautions   Following Commands Follows one step commands with increased time or repetition   Comments pt had difficulty following directions in todays tx session and required increased directions for bed mobility and functional transfers   Subjective   Subjective pt was agreeable to get OOB and sit in recliner for breakfast   Bed Mobility   Rolling R 2  Maximal assistance   Additional items Assist x 1;HOB elevated; Bedrails; Increased time required;Verbal cues;LE management; Other  (trunk management)   Rolling L 2  Maximal assistance   Additional items Assist x 1;HOB elevated; Bedrails; Increased time required;Verbal cues;LE management; Other  (trunk management)   Supine to Sit 2  Maximal assistance   Additional items Assist x 1;HOB elevated; Bedrails; Increased time required;Verbal cues;LE management; Other  (trunk management)   Sit to Supine Unable to assess   Additional Comments pt seated OOB in the recliner with family present, legs elevated, call bell in arms reach and chair alarm activated   Transfers   Sit to Stand 2  Maximal assistance   Additional items Assist x 1; Armrests; Increased time required;Verbal cues  (w/ HHA, bilateral knee block)   Stand to Sit 2  Maximal assistance   Additional items Assist x 1; Armrests; Increased time required;Verbal cues  (w/ HHA and bilateral knee blocks)   Stand pivot 2  Maximal assistance   Additional items Assist x 1; Armrests; Increased time required;Verbal cues  (HHA w/ bilateral knee blocks)   Additional Comments pt continues to require max Ax1 for all bed mobility and functional transfers. Ambulation/Elevation   Gait pattern Not tested   Balance   Static Sitting Fair -   Dynamic Sitting Poor +   Static Standing Poor   Endurance Deficit   Endurance Deficit Yes   Endurance Deficit Description limited bed mobility, functional transfers, standing tolerance   Activity Tolerance   Activity Tolerance Patient limited by fatigue; Other (Comment)  (cognition)   Nurse Made Aware Spoke to RN Cristhian   Assessment   Prognosis Fair   Problem List Decreased strength;Decreased range of motion;Decreased endurance; Impaired balance;Decreased mobility; Decreased coordination;Decreased cognition; Impaired judgement;Decreased safety awareness   Assessment pt began tx session lying supine in the bed and was agreeable to participate in PT intervention.  Upon arrival to pt room pt with bowel incontinence and required cleaning and changing of gown. pt continues to require max ax1 for all bed mobility such as rolling and repositioning in the bed from L to R with LE and trunk managmeent. pt also required max ax1 in order to sit EOB from supine position w/ LE and trunk management. pt demonstrated posterior lean while seated EOB that required min ax1 to correct. pt continues to require max ax1 for all functional transfers w/ HHA and bilateral knee blocks for increase safety and balance while standing OOB. pt with limited standing tolerance as pt remains at risk for falls and injuries. pt would benefit from continued skilled PT intervention in order to address deficits listed above. Post tx session pt in recliner with call bell, chair alarm activated, legs elevated, family present post tx session and PCA setting up breakfast for pt   Goals   Patient Goals none stated this tx session   STG Expiration Date 08/17/23   PT Treatment Day 3   Plan   Treatment/Interventions Functional transfer training;LE strengthening/ROM; Therapeutic exercise; Endurance training;Cognitive reorientation;Patient/family training;Equipment eval/education; Bed mobility;Gait training;Spoke to nursing; Compensatory technique education   Progress Slow progress, cognitive deficits   PT Frequency 3-5x/wk   Recommendation   PT Discharge Recommendation Post acute rehabilitation services  (vs return to facility with skilled PT intervention)   AM-PAC Basic Mobility Inpatient   Turning in Flat Bed Without Bedrails 2   Lying on Back to Sitting on Edge of Flat Bed Without Bedrails 2   Moving Bed to Chair 2   Standing Up From Chair Using Arms 2   Walk in Room 1   Climb 3-5 Stairs With Railing 1   Basic Mobility Inpatient Raw Score 10   Highest Level Of Mobility   JH-HLM Goal 4: Move to chair/commode   -HLM Achieved 4: Move to [employer] Provided Other  (bed mobility and functional transfers) Patient Reinforcement needed   End of Consult   Patient Position at End of Consult Bedside chair;Bed/Chair alarm activated; All needs within reach   The patient's AM-PAC Basic Mobility Inpatient Short Form Raw Score is 10. A Raw score of less than or equal to 16 suggests the patient may benefit from discharge to post-acute rehabilitation services. Please also refer to the recommendation of the Physical Therapist for safe discharge planning.      Lanre Jackson

## 2023-08-17 NOTE — PROGRESS NOTES
Progress Note - Geriatric Medicine   Marium Keyes 82 y.o. male MRN: [id number]  Unit/Bed#: W -01 Encounter: [id number]      Assessment/Plan:  Late onset Alzheimer's dementia with behavior disturbance/ acute encephalopathy  · History of Alzheimer's dementia  · Mental status has been waxing and waning this entire hospitalization  · Maintained at the facility on Aricept 5 mg nightly, olanzapine 5 mg daily, Namenda twice daily, and trazodone 50 mg nightly for insomnia  · Trazodone and Zyprexa remain held-continue to hold  Alert to person only on exam-   No issues with behaviors or insomnia reported by nursing  At risk for delirium secondary to Alzheimer's dementia, hospitalization, sepsis, sleep disturbance  Recommend delirium precautions  Maintain sleep-wake cycle, avoid nighttime interruptions  minimize overnight interruptions, group overnight vitals/labs/nursing checks as possible  dim lights, close blinds and turn off tv to minimize stimulation and encourage sleep environment in evenings  Provide adequate pain control  Avoid urinary retention and constipation  Provide frequent and early mobilization  Provide frequent redirection and reorientation as needed  Avoid medications that may worsen or precipitate delirium such as tramadol, benzodiazepines, anticholinergics, and Benadryl  Redirect unwanted behaviors as first-line therapy, avoid physical restraints as able to  · Continue to monitor    Severe sepsis  · Presented from [facility] with hypoxia and altered mental status  · Met sepsis protocol with leukocytosis, tachycardia, and elevated lactic acid  · Blood cultures no growth after 5 days  · Currently on Rocephin 2000 mg daily  · Infectious disease following  · Management per primary and infectious disease    Loculated pleural effusion  · Underwent IR chest tube placement on 8/4/2023-possible removal today?   · has received multiple doses of tPA/dornase  · Pulmonology and infectious disease following  · Poor surgical candidate for VATS procedure  · Palliative care was consulted for goals of care-family considering hospice at this time, but wanted to speak with pulmonology prior to making a final decision  · Management per pulmonology and infectious disease    Insomnia  First-line treatment is behavior modification  Maintain sleep-wake cycle, avoid nighttime interruptions  Avoid caffeine throughout the day  Avoid napping throughout the day  Encourage physical activity throughout the day  · Avoid sedative hypnotics including benzodiazepines and benadryl  · Doing well off of his trazodone  · Encouraged To keep patient awake during the day  · If needed could do melatonin 3 mg nightly    Ambulatory dysfunction  At a baseline ambulates without AD  PT/OT following  Fall precautions  Out of bed as tolerated  Encourage early and frequent mobilization  Encourage adequate hydration and nutrition  Provide adequate pain management   Goal is undetermined-family considering hospice and palliative care is following  · Continue with PT/OT for continued strength and balance training     Subjective:   Patient is being seen for a geriatrics follow-up. Upon examination patient was at bed in the chair, resting. He appeared comfortable and was in no acute distress. He was sleeping initially, but arouses easily to voice. He reports having some pain, but could not specify where. Nursing staff reports he ate most of his breakfast with assistance. He slept well over the night. He moved his bowels this morning. Per nursing no acute concerns or issues at this time. Review of Systems   Unable to perform ROS: Dementia         Objective:     Vitals: Blood pressure 111/63, pulse 79, temperature 97.9 °F (36.6 °C), resp. rate 16, height 5' 9" (1.753 m), weight 57.9 kg (127 lb 10.3 oz), SpO2 96 %. ,Body mass index is 18.85 kg/m².       Intake/Output Summary (Last 24 hours) at 8/17/2023 1125  Last data filed at 8/17/2023 1119  Gross per 24 hour   Intake 1620 ml   Output 820 ml   Net 800 ml       Current Medications: Reviewed    Physical Exam:   Physical Exam  Vitals reviewed. Constitutional:       General: He is not in acute distress. Appearance: He is well-developed. He is ill-appearing. Comments: Frail-appearing   HENT:      Head: Normocephalic and atraumatic. Cardiovascular:      Rate and Rhythm: Normal rate and regular rhythm. Heart sounds: No murmur heard. Pulmonary:      Effort: Pulmonary effort is normal. No respiratory distress. Breath sounds: Normal breath sounds. Comments: Chest tube in place  Abdominal:      General: Abdomen is flat. Bowel sounds are normal. There is no distension. Palpations: Abdomen is soft. Tenderness: There is no abdominal tenderness. Musculoskeletal:         General: No swelling. Right lower leg: No edema. Left lower leg: No edema. Skin:     General: Skin is warm and dry. Coloration: Skin is pale. Neurological:      Mental Status: He is alert and easily aroused. Mental status is at baseline. Cranial Nerves: No cranial nerve deficit. Motor: Weakness present. Gait: Gait abnormal.      Comments: Alert to person only on exam- unable to tell me his birthday           Invasive Devices     Peripheral Intravenous Line  Duration           Peripheral IV 08/15/23 Left Antecubital 2 days    Peripheral IV 08/15/23 Left;Ventral (anterior) Forearm 2 days          Drain  Duration           Chest Tube Right 12 Fr. 12 days    External Urinary Catheter 10 days                Lab, Imaging and other studies:    Lab Results:   I have personally reviewed pertinent lab results including the following:  -CBC, BMP, magnesium    I have personally reviewed the following imaging study reports in PACS:  No recent imaging to review  - I have personally reviewed pertinent reports.       Please note:  Voice-recognition software may have been used in the preparation of this document. Occasional wrong word or "sound-alike" substitutions may have occurred due to the inherent limitations of voice recognition software. Interpretation should be guided by context.

## 2023-08-18 ENCOUNTER — HOME CARE VISIT (OUTPATIENT)
Dept: HOME HOSPICE | Facility: HOSPICE | Age: 83
End: 2023-08-18
Payer: MEDICARE

## 2023-08-18 ENCOUNTER — HOSPICE ADMISSION (OUTPATIENT)
Dept: HOME HOSPICE | Facility: HOSPICE | Age: 83
End: 2023-08-18
Payer: MEDICARE

## 2023-08-18 ENCOUNTER — HOME CARE VISIT (OUTPATIENT)
Dept: HOME HEALTH SERVICES | Facility: HOME HEALTHCARE | Age: 83
End: 2023-08-18
Payer: MEDICARE

## 2023-08-18 LAB
BACTERIA BLD CULT: NORMAL
BACTERIA BLD CULT: NORMAL
GLUCOSE SERPL-MCNC: 118 MG/DL (ref 65–140)
GLUCOSE SERPL-MCNC: 128 MG/DL (ref 65–140)
GLUCOSE SERPL-MCNC: 142 MG/DL (ref 65–140)
GLUCOSE SERPL-MCNC: 143 MG/DL (ref 65–140)

## 2023-08-18 PROCEDURE — 99232 SBSQ HOSP IP/OBS MODERATE 35: CPT | Performed by: INTERNAL MEDICINE

## 2023-08-18 PROCEDURE — 99232 SBSQ HOSP IP/OBS MODERATE 35: CPT | Performed by: PHYSICIAN ASSISTANT

## 2023-08-18 PROCEDURE — 82948 REAGENT STRIP/BLOOD GLUCOSE: CPT

## 2023-08-18 PROCEDURE — 99232 SBSQ HOSP IP/OBS MODERATE 35: CPT | Performed by: STUDENT IN AN ORGANIZED HEALTH CARE EDUCATION/TRAINING PROGRAM

## 2023-08-18 RX ORDER — AMOXICILLIN 250 MG/5ML
1000 POWDER, FOR SUSPENSION ORAL EVERY 8 HOURS SCHEDULED
Status: DISCONTINUED | OUTPATIENT
Start: 2023-08-18 | End: 2023-08-19 | Stop reason: HOSPADM

## 2023-08-18 RX ORDER — AMOXICILLIN 250 MG/5ML
1000 POWDER, FOR SUSPENSION ORAL EVERY 8 HOURS SCHEDULED
Status: DISCONTINUED | OUTPATIENT
Start: 2023-08-18 | End: 2023-08-18

## 2023-08-18 RX ORDER — AMOXICILLIN 250 MG/1
1000 CAPSULE ORAL EVERY 8 HOURS SCHEDULED
Status: DISCONTINUED | OUTPATIENT
Start: 2023-08-18 | End: 2023-08-18

## 2023-08-18 RX ADMIN — AMOXICILLIN 1000 MG: 250 POWDER, FOR SUSPENSION ORAL at 22:13

## 2023-08-18 RX ADMIN — GUAIFENESIN 1200 MG: 600 TABLET ORAL at 10:51

## 2023-08-18 RX ADMIN — INSULIN LISPRO 2 UNITS: 100 INJECTION, SOLUTION INTRAVENOUS; SUBCUTANEOUS at 10:00

## 2023-08-18 RX ADMIN — INSULIN LISPRO 2 UNITS: 100 INJECTION, SOLUTION INTRAVENOUS; SUBCUTANEOUS at 17:45

## 2023-08-18 RX ADMIN — INSULIN LISPRO 2 UNITS: 100 INJECTION, SOLUTION INTRAVENOUS; SUBCUTANEOUS at 13:15

## 2023-08-18 RX ADMIN — DONEPEZIL HYDROCHLORIDE 5 MG: 5 TABLET ORAL at 22:13

## 2023-08-18 RX ADMIN — MEMANTINE 10 MG: 10 TABLET ORAL at 17:44

## 2023-08-18 RX ADMIN — SODIUM CHLORIDE 50 ML/HR: 0.9 INJECTION, SOLUTION INTRAVENOUS at 03:56

## 2023-08-18 RX ADMIN — AMOXICILLIN 1000 MG: 250 POWDER, FOR SUSPENSION ORAL at 17:44

## 2023-08-18 RX ADMIN — ENOXAPARIN SODIUM 40 MG: 40 INJECTION SUBCUTANEOUS at 10:51

## 2023-08-18 RX ADMIN — MEMANTINE 10 MG: 10 TABLET ORAL at 10:51

## 2023-08-18 RX ADMIN — CEFTRIAXONE 2000 MG: 2 INJECTION, POWDER, FOR SOLUTION INTRAMUSCULAR; INTRAVENOUS at 10:51

## 2023-08-18 RX ADMIN — INSULIN GLARGINE 5 UNITS: 100 INJECTION, SOLUTION SUBCUTANEOUS at 22:14

## 2023-08-18 NOTE — INCIDENTAL FINDINGS
The following findings require follow up:  Radiographic finding   Finding:   XR chest portable   Final Result by Rashid Vila MD (08/17 5219)      Stably positioned right basilar pigtail catheter. Improved loculated right-sided pneumothorax. Improved aeration in the medial right lung base. Workstation performed: [id number]         XR chest portable   Final Result by Lewis Martini MD (08/14 9140)      Pigtail catheter in the right costophrenic sulcus with radiopaque marker in the chest wall and not the pleural space. Trace loculated right pneumothorax. Small rounded opacity in the medial right base, question loculated pleural effusion. Workstation performed: [id number]         XR chest portable   Final Result by Meir Hardin MD (08/11 6811)      Stable small right pleural effusion. Workstation performed: [id number]         XR chest portable   Final Result by Wade Hussein MD (08/10 1635)      Diminishing right pleural effusion. Drainage catheter at the right lung base. Bibasilar atelectasis, right more than left. Workstation performed: [id number]         XR chest pa & lateral   Final Result by Mya Pierce MD (08/07 1215)      Persistent large right pleural effusion         Workstation performed: [id number]         IR chest tube placement   Final Result by Seb Love MD (08/09 1637)   Impression:      1. Successful placement of a 12 Indian catheter into ultrasound under ultrasound guidance, and 10 cc of clear yellow fluid was aspirated and a specimen sent to the lab as described above. Workstation performed: [id number]         CT chest wo contrast   Final Result by Veena De Jesus MD (08/04 2243)      Moderate right pleural effusion with compressive atelectasis over the right lower lobe and posterior right middle and upper lobes. Consolidation in the left lower lobe, probably atelectatic. Superimposed pneumonia cannot be excluded. Soft tissue densities in the bronchial tree as above, appearance favors secretions. Other findings, as per the body of the report. Workstation performed: [id number]         CT head wo contrast   Final Result by Yojana Bolden MD (08/04 0248)      No acute intracranial abnormality. Workstation performed: [id number]         XR chest 1 view portable   Final Result by Anneliese Yi MD (08/03 3981)      There is a right mid to lower lung opacity with well-circumscribed medial margin suggests loculated effusion. Suggest CT chest for further evaluation                  Workstation performed: [id number]              Follow up required:  Follow up with PCP    Follow up should be done within 1 week(s)    Please notify the following clinician to assist with the follow up:   Dr. Marky Flynn, PCP   Discharge from hospital w/ hospice care

## 2023-08-18 NOTE — CASE MANAGEMENT
Case Management Discharge Planning Note    Patient name Srikanth Bedolla  Location W /W -25 MRN [id number]  : 1940 Date 2023       Current Admission Date: 8/3/2023  Current Admission Diagnosis:Severe sepsis [facility])   Patient Active Problem List    Diagnosis Date Noted   • Hypernatremia 08/15/2023   • Moderate protein-calorie malnutrition ([address]) 08/15/2023   • Goals of care, counseling/discussion 2023   • Type 2 diabetes mellitus ([address]) 2023   • Delirium 2023   • Constipation 2023   • Loculated pleural effusion 2023   • Hypoxia 2023   • Slurred speech 2023   • Hyperglycemia 2023   • Severe sepsis ([address]) 2023   • Acute respiratory failure with hypoxia ([address]) 2023   • Anemia 2023   • Thrombocytosis 2023   • Encephalopathy acute 2023   • Ambulatory dysfunction 2023   • Low BP 2023   • Depression 2022   • Late onset Alzheimer's dementia with behavioral disturbance ([address]) 2022   • Other hyperlipidemia 2022   • Unsteady gait 2022   • Insomnia 2022      LOS (days): 15  Geometric Mean LOS (GMLOS) (days): 5.00  Days to GMLOS:-10.1     OBJECTIVE:  Risk of Unplanned Readmission Score: 18.75         Current admission status: Inpatient   Preferred Pharmacy:   PATIENT/FAMILY REPORTS NO PREFERRED PHARMACY  No address on file      Primary Care Provider: Beck Elliott MD    Primary Insurance: Maikol Ling REP  Secondary Insurance:     DISCHARGE DETAILS:  CM sent referral via [facility] to [address] [place] [address] to review.

## 2023-08-18 NOTE — PROGRESS NOTES
Progress Note - Pulmonary   [place] Sincere 82 y.o. male MRN: [id number]  Unit/Bed#: W -01 Encouter:[id number]    Assessment/Plan:    1. Streptococcus intermedius empyema  • 12 Fr chest tube placed on August 4  • S/p 6 doses of tPA/dornase. Subjective improvement after tPA/Dornase  • Output since 3 PM yesterday is about 50 mL until 10am today  • Continue chest tube -20cm H2O water seal.  • Day 11 ceftriaxone  • We will consider pulling out the chest tube today. • Recommend incentive spirometry  • Possibly due to recurrent aspirations  • Repeat chest x-ray  • Continue antibiotics as scheduled  • Will consider repeating sputum culture      2. Dementia with toxic metabolic encephalopathy  • Toxic metabolic encephalopathy likely from a pneumonia/sepsis  • Patient was drowsy, disoriented at the time of examination  • Patient has poor oral intake resulting in hypernatremia, primary care team started the patient on gentle hydration. • Encourage frequent reorientation  • Continue appropriate sleep-wake cycles        Subjective: At the time of examination patient was awake, sitting comfortably in the chair. Patient reportedly feels cold and has experienced chills. Patient denies any chest pain, palpitations, relative shortness of breath. He also denies any pain anywhere else in the body. Objective:    Vitals: Blood pressure 110/64, pulse 81, temperature (!) 97.4 °F (36.3 °C), temperature source Axillary, resp. rate 14, height 5' 9" (1.753 m), weight 58.4 kg (128 lb 12 oz), SpO2 95 %. On room air,Body mass index is 19.01 kg/m². Intake/Output Summary (Last 24 hours) at 8/18/2023 1021  Last data filed at 8/18/2023 0732  Gross per 24 hour   Intake 600 ml   Output 1565 ml   Net -965 ml       Invasive Devices     Peripheral Intravenous Line  Duration           Peripheral IV 08/15/23 Left;Ventral (anterior) Forearm 3 days          Drain  Duration           Chest Tube Right 12 Fr.  13 days    External Urinary Catheter 11 days                Physical Exam:    Physical Exam  Constitutional:       Appearance: He is ill-appearing. Comments: Disoriented, unable to recall why he is in the hospital  Looks weak and frail   HENT:      Head: Normocephalic and atraumatic. Mouth/Throat:      Comments: Oral cavity clean clear and moist  Eyes:      Pupils: Pupils are equal, round, and reactive to light. Cardiovascular:      Rate and Rhythm: Normal rate and regular rhythm. Heart sounds: Normal heart sounds. Pulmonary:      Effort: Pulmonary effort is normal.      Breath sounds: No rhonchi or rales. Comments: Decreased breath sounds on the right  Abdominal:      General: Abdomen is flat. Palpations: Abdomen is soft. Tenderness: There is no abdominal tenderness. Musculoskeletal:         General: Normal range of motion. Comments: Patient moves limbs on command   Skin:     General: Skin is dry. Coloration: Skin is not jaundiced. Comments: Skin is cold to touch     Neurological:      Mental Status: He is disoriented. Sensory: No sensory deficit. Motor: No weakness. Psychiatric:      Comments: Confused, anxious         Labs: I have personally reviewed pertinent lab results. Imaging and other studies: I have personally reviewed pertinent films in PACS x-ray shows marked improvement.

## 2023-08-18 NOTE — ASSESSMENT & PLAN NOTE
· At baseline ambulate without AD, noted unsteadiness new from baseline      Plan-  PT OT- postacute rehabilitation  Returning to [facility] w/ hospice

## 2023-08-18 NOTE — HOSPICE NOTE
Received hospice referral.  I spoke with his sister Arvind Nogueira to discuss hospice services. Hospice benefits reviewed per Medicare guidelines and all questions answered. Dr Domi Green approved for home hospice LOC. DME to be delivered tomorrow by noon. Pt will return to [facility], transport arranged for 2pm. Left VM for his sister Arvind Nogueira regarding hospice consents. Left main office number for her to call to arrange to sign consents. Weekend liaison aware.

## 2023-08-18 NOTE — ASSESSMENT & PLAN NOTE
Lab Results   Component Value Date    HGBA1C 7.0 (H) 08/03/2023       Recent Labs     08/17/23  1540 08/17/23  1738 08/17/23  2206 08/18/23  0756   POCGLU 164* 153* 109 118       Blood Sugar Average: Last 72 hrs:  (P) [id number]   Not on any home regime   Hba1c- 7  Was initially started on Lantus 10 and scheduled mealtime    Plan-   Decreased Lantus to 5U   Lispro 2U TID w/ meals   Hypoglycemia protocol   ISS

## 2023-08-18 NOTE — ASSESSMENT & PLAN NOTE
POA platelets 210, pressure review in September 2022 was noted unremarkable  Iron panel: Iron Sat: 14, TIBC: 182, iron: 26  plt  improving      Plan  Likely in the setting of sepsis   Continue to monitor

## 2023-08-18 NOTE — ASSESSMENT & PLAN NOTE
Malnutrition Findings:   Adult Malnutrition type: Acute illness (in the setting of chronic illness)  Adult Degree of Malnutrition: Malnutrition of moderate degree  Malnutrition Characteristics: Fat loss, Muscle loss                  360 Statement: Moderate malnutrition related to dysphagia, inadequate oral intake as evidenced by loss of subcutaneous fat and muscle extremities, temples, orbitals, calves. BMI 18.17 Currently treated with oral supplementation    BMI Findings:  Adult BMI Classifications: Underweight < 18.5        Body mass index is 19.01 kg/m².    Plan-   Ensure supplements   Encourage eating with assistance from staff to prompt

## 2023-08-18 NOTE — CASE MANAGEMENT
Case Management Discharge Planning Note    Patient name Seema Phoenix  Location W /W -02 MRN [id number]  : 1940 Date 2023       Current Admission Date: 8/3/2023  Current Admission Diagnosis:Severe sepsis [facility])   Patient Active Problem List    Diagnosis Date Noted   • Hypernatremia 08/15/2023   • Moderate protein-calorie malnutrition ([address]) 08/15/2023   • Goals of care, counseling/discussion 2023   • Type 2 diabetes mellitus ([address]) 2023   • Delirium 2023   • Constipation 2023   • Loculated pleural effusion 2023   • Hypoxia 2023   • Slurred speech 2023   • Hyperglycemia 2023   • Severe sepsis ([address]) 2023   • Acute respiratory failure with hypoxia ([address]) 2023   • Anemia 2023   • Thrombocytosis 2023   • Encephalopathy acute 2023   • Ambulatory dysfunction 2023   • Low BP 2023   • Depression 2022   • Late onset Alzheimer's dementia with behavioral disturbance ([address]) 2022   • Other hyperlipidemia 2022   • Unsteady gait 2022   • Insomnia 2022      LOS (days): 15  Geometric Mean LOS (GMLOS) (days): 5.00  Days to GMLOS:-10.1     OBJECTIVE:  Risk of Unplanned Readmission Score: 18.08         Current admission status: Inpatient   Preferred Pharmacy:   PATIENT/FAMILY REPORTS NO PREFERRED PHARMACY  No address on file      Primary Care Provider: Gisella Solis MD    [facility]  Secondary Insurance:     DISCHARGE DETAILS:  CM updated . [facility] hospice delivering DME tomorrow to [facility] in the morning. [facility] hospice requesting afternoon transport time for tomorrow back to facility. CM sent referral via [facility] for BLS transport. Patient's transport time is 1400 via [facility]. CM updated Marina Henderson at [facility] with  time tomorrow for 1400 and plan of care. Facility is able to accept tomorrow  at 1400.      CM updated hospice liaison with transport time and SLIM. CM updated patient's sister Julia Andino with plan of care at . Sister aware that transport is tomorrow at 1400 back to [facility] with [address] All questions/concerns answered at this time.

## 2023-08-18 NOTE — SOCIAL WORK
Palliative LSW saw patient at the bedside today. LSW appreciates the opportunity to provide patient/family with inpatient emotional support and guidance while patient continues to receive medical attention from the medical team.     Topics discussed: [address] team met with pt at bedside. Pt very minimally participating in conversation. Pt's sister Chanda Samayoa not present at bedside for visit. Per communication with Pulmonary team, plan is to have pt's chest tube removed today. [address] team to f/u with pt's sister Chanda Samayoa for further conversation re: [employer] for pt. Awaiting return call from pt's sister Chanda Samayoa to further discuss. Areas that need follow-up: [address]  Resources given: None  Others present: [address] team      I have spent 15 minutes with Patient and family today in which greater than 50% of this time was spent in counseling/coordination of care regarding Counseling / Coordination of care.        LSW will continue to follow as requested by the medical team, patient, or family

## 2023-08-18 NOTE — ASSESSMENT & PLAN NOTE
· POA hemoglobin 8.0, pressure review hemoglobin back in September 2022 noted 10.1. · Unclear if history of melena, hematochezia  · Anemia unclear etiology at this time possibly secondary to iron deficiency given thrombocytosis although this could represent current setting of infection versus underlying malignancy?   Check iron panel: Ferritin 714, Iron Sat: 14, TIBC: 182, Iron: 26  folate, vitamin B12 WNL     Plan  Follow up with PCP

## 2023-08-18 NOTE — QUICK NOTE
Attempted to update patient's sister Ms. Travis Calvillo. Unfortunately was unable to reach at this time, left a voicemail. Will try again.

## 2023-08-18 NOTE — PROGRESS NOTES
Progress Note - Infectious Disease   Jerre Lombard 82 y.o. male MRN: [id number]  Unit/Bed#: W -01 Encounter: [id number]      Impression/Plan:  1.  Severe sepsis.  Present on admission with leukocytosis and tachycardia as well as a lactate level of 3.2.  Now subsequently has developed a fever.  Suspect secondary to a pneumonia with complicating empyema.  No other clear source appreciated.  Suspect the ongoing sepsis is secondary to inadequate source control.  Seems to have had improvement since instillation of tPA and dornase.  Temperatures had been down but had a low-grade fever in the last 24 hours.  Repeat blood cultures negative.  May still be having intermittent aspiration events.  -Antibiotics as below  -Source control measures as below  -Follow-up repeat blood cultures  -Recheck CBC with differential and CMP  -Supportive care     2.  Streptococcus intermedius empyema.  Suspect the ongoing clinical sepsis is related to inadequate source control.  Increase pleural fluid output since tPA dornase instillation.  Some improvement clinically noted.  Chest x-ray improved with minimal fluid remaining and decreased output in the chest tube  -Discontinue ceftriaxone after dose today  -Remove chest tube today as per pulmonary  -Tomorrow can begin amoxicillin 1 g p.o. every 8 hours to be continued through 8/25/2023 which will be 3 weeks from the chest tube placement  -Close pulmonary follow-up  -Not a surgical candidate for VATS per pulmonary     3.  Acute hypoxic respiratory failure.  Appears to be secondary to pneumonia with complicated parapneumonic effusion.  No other clear source appreciated.  Patient  has been on and off nasal cannula oxygen support  -Oxygen support as needed  -Monitor respiratory status  -Treatment of complicated effusion as above     4.  Acute encephalopathy.  Complicating baseline dementia.  Suspect related to the patient's acute infectious process.  Remains nonverbal.  Imaging of the brain without any abnormality.  Continues to wax and wane as far as cognition  -Monitor cognition  -Treat sepsis as above     5.  Diarrhea.  The patient still receiving MiraLAX and Senokot.  Possible medication effect.  Less likely C. difficile colitis.  -Hold MiraLAX and Senokot  -Monitor stool output    Discussed the above management plan with the primary service and the pulmonary service    Patient likely to transition to hospice soon. Infectious disease service will sign off for now. Please call for questions    Antibiotics:  Ceftriaxone 11  Antibiotics 16  Post chest tube placement 14    Subjective:  Patient has no fever, chills, sweats; no nausea, vomiting, diarrhea; no cough, shortness of breath; no pain. No new symptoms. Remains confused and minimally verbal but answers simple yes/no questions. Objective:  Vitals:  Temp:  [97.4 °F (36.3 °C)-98.2 °F (36.8 °C)] 97.4 °F (36.3 °C)  HR:  [71-81] 81  Resp:  [14-18] 14  BP: (110-112)/(63-64) 110/64  SpO2:  [90 %-100 %] 95 %  Temp (24hrs), Av.8 °F (36.6 °C), Min:97.4 °F (36.3 °C), Max:98.2 °F (36.8 °C)  Current: Temperature: (!) 97.4 °F (36.3 °C)    Physical Exam:   General Appearance:  Alert, interactive, nontoxic, no acute distress. Throat: Oropharynx moist without lesions. Lungs:   Decreased breath sounds bilaterally; no wheezes, rhonchi or rales; respirations unlabored. Right-sided chest tube in place   Heart:  RRR; no murmur, rub or gallop   Abdomen:   Soft, non-tender, non-distended, positive bowel sounds. Extremities: No clubbing, cyanosis or edema   Skin: No new rashes or lesions. No draining wounds noted.        Labs, Imaging, & Other studies:   All pertinent labs and imaging studies were personally reviewed  Results from last 7 days   Lab Units 23  0516 08/15/23  0454 23  0525   WBC Thousand/uL 7.30 10.43* 10.39*   HEMOGLOBIN g/dL 7.3* 8.0* 8.5*   PLATELETS Thousands/uL 429* 537* 545*     Results from last 7 days   Lab Units 08/17/23  6271 08/16/23  0516 08/15/23  1555 08/13/23  0504 08/12/23  0517   SODIUM mmol/L 139 143 146   < > 143   POTASSIUM mmol/L 3.8 3.3* 3.7   < > 3.3*   CHLORIDE mmol/L 106 109* 111*   < > 110*   CO2 mmol/L 28 27 26   < > 27   BUN mg/dL 14 14 16   < > 16   CREATININE mg/dL 0.76 0.82 0.88   < > 0.99   EGFR ml/min/1.73sq m 86 83 81   < > 71   CALCIUM mg/dL 7.9* 7.8* 8.6   < > 8.2*   AST U/L  --   --   --   --  15   ALT U/L  --   --   --   --  8   ALK PHOS U/L  --   --   --   --  104    < > = values in this interval not displayed. Results from last 7 days   Lab Units 08/12/23  1503   BLOOD CULTURE  No Growth After 5 Days. No Growth After 5 Days.

## 2023-08-18 NOTE — ASSESSMENT & PLAN NOTE
Lab Results   Component Value Date    HGBA1C 7.0 (H) 08/03/2023       Recent Labs     08/17/23  2206 08/18/23  0756 08/18/23  1227 08/18/23  1502   POCGLU 109 118 128 143*       Blood Sugar Average: Last 72 hrs:  (P) 146.3125   Not on any home regime   Hba1c- 7  Was initially started on Lantus 10 and scheduled mealtime    Plan-   Will DC without insulin as patient not on home insulin

## 2023-08-18 NOTE — ASSESSMENT & PLAN NOTE
On admission patient met criteria for severe sepsis secondary to pneumonia due to the loculated effusion  · Blood culture-negative  · Fluid culture-negative  IR chest tube placement 8/4  Procal 0.56  Repeat Bcx- Negative     Plan-  Appreciate ID recommendations-  Transition to oral amoxicillin    Mucinex 1200 twice daily  See loculated pleural effusion plan

## 2023-08-18 NOTE — CASE MANAGEMENT
Case Management Discharge Planning Note    Patient name Jin Sumner  Location W /W -99 MRN [id number]  : 1940 Date 2023       Current Admission Date: 8/3/2023  Current Admission Diagnosis:Severe sepsis [facility])   Patient Active Problem List    Diagnosis Date Noted   • Hypernatremia 08/15/2023   • Moderate protein-calorie malnutrition ([address]) 08/15/2023   • Goals of care, counseling/discussion 2023   • Type 2 diabetes mellitus ([address]) 2023   • Delirium 2023   • Constipation 2023   • Loculated pleural effusion 2023   • Hypoxia 2023   • Slurred speech 2023   • Hyperglycemia 2023   • Severe sepsis ([address]) 2023   • Acute respiratory failure with hypoxia ([address]) 2023   • Anemia 2023   • Thrombocytosis 2023   • Encephalopathy acute 2023   • Ambulatory dysfunction 2023   • Low BP 2023   • Depression 2022   • Late onset Alzheimer's dementia with behavioral disturbance ([address]) 2022   • Other hyperlipidemia 2022   • Unsteady gait 2022   • Insomnia 2022      LOS (days): 15  Geometric Mean LOS (GMLOS) (days): 5.00  Days to GMLOS:-10     OBJECTIVE:  Risk of Unplanned Readmission Score: 18.75         Current admission status: Inpatient   Preferred Pharmacy:   PATIENT/FAMILY REPORTS NO PREFERRED PHARMACY  No address on file      Primary Care Provider: Loraine Cotto MD    Primary Insurance: [employer]lige Handler REP  Secondary Insurance:     DISCHARGE DETAILS:  Patient's sister spoke with CM at desk. CM introduced self and role. Per sister, patient's chest tube removed. Patient's sister requesting return to [facility] with hospice. Sister preference is to utilize the hospice agency that [facility] is contracted with. Sister aware that CM will reach out to [address] [place] [address] with [facility] to discuss plan of care. CM spoke with [address] [place] [address] at [phone]. CM introduced self and role.  PERLA updated Radha Kendall conversation with sister and her preference to return to facility on hospice services. Janna updated chest tube removed at bedside today. CM provided contact number to Radha Kendall for f/u call. SLIM updated with plan of care.

## 2023-08-18 NOTE — ASSESSMENT & PLAN NOTE
Likely in the setting of poor po intake   Na 150  Na 139, resolved   Awaiting results    Plan-   NS IVF , continuous will dc if po intake improves   Reassess tomorrow in the setting of electrolytes and poor po intake

## 2023-08-18 NOTE — ASSESSMENT & PLAN NOTE
Chest x-ray- There is a right mid to lower lung opacity with well-circumscribed medial margin suggests loculated effusion. CT chest- Moderate right pleural effusion with compressive atelectasis over the right lower lobe and posterior right middle and upper lobes, Consolidation in the left lower lobe, probably atelectatic. Superimposed pneumonia cannot be excluded. Soft tissue densities in the bronchial tree as above, appearance favors secretions.   IR placed chest tube 8/4  Repeat Chest Xray-improved loculated right pneumothorax, improved aeration medial right lung base  Chest Tube removal 8/18     Plan-  · Appreciate pulmonology recommendations- S/p CT removal, keep dressing for 24hrs   · Appreciate ID recommendations- Continue Amoxicillin 1g Q8 until 8/25

## 2023-08-18 NOTE — DISCHARGE INSTR - AVS FIRST PAGE
Dear Srikanth Bedolla,     It was our pleasure to care for you here at [facility]. It is our hope that we were always able to exceed the expected standards for your care during your stay. You were hospitalized due to altered mental status. You were cared for on the 4th floor by Mary Kingston MD under the service of Daniele Werner DO with the [facility] who covers for your primary care physician (PCP), Beck Elliott MD, while you were hospitalized. If you have any questions or concerns related to this hospitalization, you may contact us at [phone]. For follow up as well as any medication refills, we recommend that you follow up with your primary care physician. A registered nurse will reach out to you by phone within a few days after your discharge to answer any additional questions that you may have after going home.   However, at this time we provide for you here, the most important instructions / recommendations at discharge:     Notable Medication Adjustments -   If patient remains hypoactive I recommend discontinuing Zyprexa and Desyrel  Take 20 mL amoxicillin every 8 hours until the end of 08/25/2023  Take Mucinex 1200mg twice daily as needed for cough  Testing Required after Discharge -   ** Please contact your PCP to request testing orders for any of the testing recommended here **  Important follow up information -   [facility]   Other Instructions -   You will be discharged on Hospice care however if you choose to change your decision and if you are experiencing any chest pain, shortness of breath, cough, fevers, worsening mental status please return to the ED  Please review this entire after visit summary as additional general instructions including medication list, appointments, activity, diet, any pertinent wound care, and other additional recommendations from your care team that may be provided for you.       Sincerely,     Yuki Mancia MD

## 2023-08-18 NOTE — PROGRESS NOTES
[address] [facility]  Progress Note  Name: Lucía Farah  MRN: [id number]  Unit/Bed#: W -01 I Date of Admission: 8/3/2023   Date of Service: 8/18/2023 I Hospital Day: 15    Assessment/Plan   * Severe sepsis [facility])  Assessment & Plan  On admission patient met criteria for severe sepsis secondary to pneumonia due to the loculated effusion  · Blood culture-negative  · Fluid culture-negative  IR chest tube placement 8/4  Procal 0.56  Repeat Bcx- Negative     Plan-  Appreciate ID recommendations-  continue Ceftriaxone   Respiratory protocol  Monitor vital signs  Mucinex 1200 twice daily  See loculated pleural effusion plan        Loculated pleural effusion  Assessment & Plan  Chest x-ray- There is a right mid to lower lung opacity with well-circumscribed medial margin suggests loculated effusion. CT chest- Moderate right pleural effusion with compressive atelectasis over the right lower lobe and posterior right middle and upper lobes, Consolidation in the left lower lobe, probably atelectatic. Superimposed pneumonia cannot be excluded. Soft tissue densities in the bronchial tree as above, appearance favors secretions.   IR placed chest tube 8/4  Repeat Chest Xray-improved loculated right pneumothorax, improved aeration medial right lung base     Plan-  Appreciate pulmonology recommendations- s/p trial of 6/6 TPA/Dornase BID per MIST2 trial, Overall poor surgical candidate and his sister agrees, ongoing [address] [employer] [place] discussions if drainage continues to decrease no indication for chest tube, once chest tube removed likely to be on hospice   Encourage cough, incentive spirometry   Appreciate infectious disease recommendations    Acute respiratory failure with hypoxia ([address])  Assessment & Plan  Secondary to loculated effusion/right-sided pneumothorax  S/p chest tube to waterseal  Overnight appeared to require 5L NC  During my examination I decreased the O2 supplementation to 3L NC and was saturating 95%    Plan-  See plan for loculated effusion  Oxygen supplementation keep O2> 88%, wean as able    Moderate protein-calorie malnutrition (HCC)  Assessment & Plan  Malnutrition Findings:   Adult Malnutrition type: Acute illness (in the setting of chronic illness)  Adult Degree of Malnutrition: Malnutrition of moderate degree  Malnutrition Characteristics: Fat loss, Muscle loss                  360 Statement: Moderate malnutrition related to dysphagia, inadequate oral intake as evidenced by loss of subcutaneous fat and muscle extremities, temples, orbitals, calves. BMI 18.17 Currently treated with oral supplementation    BMI Findings:  Adult BMI Classifications: Underweight < 18.5        Body mass index is 19.01 kg/m².    Plan-   Ensure supplements   Encourage eating with assistance from staff to prompt       Hypernatremia  Assessment & Plan  Likely in the setting of poor po intake   Na 150  Na 139, resolved   Awaiting results    Plan-   NS IVF , continuous will dc if po intake improves   Reassess tomorrow in the setting of electrolytes and poor po intake            Goals of care, counseling/discussion  Assessment & Plan  Patient's prognosis remains guarded as he is not a candidate for VATS procedure  Continuing source control with chest tube and drainage  Continuing IV antibiotics  Multiple goals of care discussions with sister; sister was hopeful of patient's recovery and to return back to baseline  Extensive conversation explained that his prognosis is poor    Plan-  Appreciate Palliative Care recommendations- Discussion yesterday including Hospice however family is not ready for hospice as of right now, ongoing discussions     Type 2 diabetes mellitus [facility])  Assessment & Plan  Lab Results   Component Value Date    HGBA1C 7.0 (H) 08/03/2023       Recent Labs     08/17/23  1540 08/17/23  1738 08/17/23  2206 08/18/23  0756   POCGLU 164* 153* 109 118       Blood Sugar Average: Last 72 hrs:  (P) [id number] Not on any home regime   Hba1c- 7  Was initially started on Lantus 10 and scheduled mealtime    Plan-   Decreased Lantus to 5U   Lispro 2U TID w/ meals   Hypoglycemia protocol   ISS     Delirium  Assessment & Plan  Patient likely has hypoactive delirium secondary to sepsis  Infectious disease consult appreciated and we will continue antibiotics and source control  We will do nonpharmacological management at this point of time with frequent reorientation and reestablishment of the sleep-wake cycle    Ambulatory dysfunction  Assessment & Plan  · At baseline ambulate without AD, noted unsteadiness new from baseline      Plan-  PT OT- postacute rehabilitation      Encephalopathy acute  Assessment & Plan  · POA at facility noted slurred speech, slow responsive, not at his baseline  · Encephalopathy likely metabolic in the setting of sepsis  · Check CT head : No acute intracranial leasion  · Continue antibiotic as above      Thrombocytosis  Assessment & Plan  POA platelets 608, pressure review in September 2022 was noted unremarkable  Iron panel: Iron Sat: 14, TIBC: 182, iron: 26  plt  improving      Plan  Likely in the setting of sepsis   Continue to monitor    Anemia  Assessment & Plan  · POA hemoglobin 8.0, pressure review hemoglobin back in September 2022 noted 10.1. · Unclear if history of melena, hematochezia  · Anemia unclear etiology at this time possibly secondary to iron deficiency given thrombocytosis although this could represent current setting of infection versus underlying malignancy? Check iron panel: Ferritin 714, Iron Sat: 14, TIBC: 182, Iron: 26  folate, vitamin B12 WNL     Plan  · Monitor output and consider transfusion if less than 7  ·  CBC a.m.        Late onset Alzheimer's dementia with behavioral disturbance ([address])  Assessment & Plan  Hypoactive delirium and patient  Home meds Aricept 5 mg nightly, olanzapine 5 mg daily, Namenda 10 mg twice daily, trazodone 50 mg at bedtime    Plan-  Holding trazodone 50 mg at bedtime and  Zyprexa 5 mg  Delirium precaution  Frequent reorientation  Avoid restraints if able unless danger to himself  Appreciate geriatric recommendations         VTE Pharmacologic Prophylaxis: VTE Score: 5 High Risk (Score >/= 5) - Pharmacological DVT Prophylaxis Ordered: enoxaparin (Lovenox). Sequential Compression Devices Ordered. Patient Centered Rounds: I performed bedside rounds with nursing staff today. Discussions with Specialists or Other Care Team Provider: Pulmonology, infectious disease, palliative care, geriatrics    Education and Discussions with Family / Patient: Updated  (sister) via phone. Current Length of Stay: 15 day(s)  Current Patient Status: Inpatient   Discharge Plan: Anticipate discharge in 24-48 hrs to Possible hospice    Code Status: Level 3 - DNAR and DNI    Subjective:   Patient was seen and examined at the bedside. Patient was resting comfortably in no overt acute distress. No overnight events were reported. Patient denied any complaints at the time my evaluation. Patient remained hypoactive during the encounter. Objective:     Vitals:   Temp (24hrs), Av.8 °F (36.6 °C), Min:97.4 °F (36.3 °C), Max:98.2 °F (36.8 °C)    Temp:  [97.4 °F (36.3 °C)-98.2 °F (36.8 °C)] 97.4 °F (36.3 °C)  HR:  [71-81] 81  Resp:  [14-18] 14  BP: (110-112)/(63-64) 110/64  SpO2:  [90 %-100 %] 95 %  Body mass index is 19.01 kg/m². Input and Output Summary (last 24 hours): Intake/Output Summary (Last 24 hours) at 2023 0853  Last data filed at 2023 0732  Gross per 24 hour   Intake 360 ml   Output 1615 ml   Net -1255 ml       Physical Exam:   Physical Exam  Vitals and nursing note reviewed. Constitutional:       General: He is not in acute distress. Appearance: He is well-developed. He is ill-appearing. He is not toxic-appearing or diaphoretic. HENT:      Head: Normocephalic and atraumatic.       Mouth/Throat:      Mouth: Mucous membranes are moist.   Eyes:      Extraocular Movements: Extraocular movements intact. Conjunctiva/sclera: Conjunctivae normal.      Pupils: Pupils are equal, round, and reactive to light. Cardiovascular:      Rate and Rhythm: Normal rate and regular rhythm. Pulses: Normal pulses. Heart sounds: Normal heart sounds. No murmur heard. Pulmonary:      Effort: Pulmonary effort is normal. No respiratory distress. Breath sounds: Rhonchi present. No wheezing. Comments: Bilaterally, improving    Abdominal:      General: Bowel sounds are normal. There is no distension. Palpations: Abdomen is soft. Tenderness: There is no abdominal tenderness. There is no guarding or rebound. Musculoskeletal:         General: No swelling or tenderness. Normal range of motion. Cervical back: Normal range of motion and neck supple. Right lower leg: No edema. Left lower leg: No edema. Skin:     General: Skin is warm and dry. Capillary Refill: Capillary refill takes less than 2 seconds. Neurological:      Mental Status: He is alert. Mental status is at baseline. He is disoriented. Cranial Nerves: No cranial nerve deficit. Sensory: No sensory deficit. Motor: Weakness present. Comments: Awake and alert, follows some commands, able to converse and answer some questions appropriately more today , remains hypoactive overall   Psychiatric:         Mood and Affect: Mood normal.          Additional Data:     Labs:  Results from last 7 days   Lab Units 08/16/23  0516 08/14/23  0525 08/13/23  0504   WBC Thousand/uL 7.30   < > 8.43   HEMOGLOBIN g/dL 7.3*   < > 8.3*   HEMATOCRIT % 24.0*   < > 26.9*   PLATELETS Thousands/uL 429*   < > 543*   NEUTROS PCT %  --   --  69   LYMPHS PCT %  --   --  19   MONOS PCT %  --   --  8   EOS PCT %  --   --  2    < > = values in this interval not displayed.      Results from last 7 days   Lab Units 08/17/23  0632 08/13/23  0504 08/12/23  0517   SODIUM mmol/L 139   < > 143   POTASSIUM mmol/L 3.8   < > 3.3*   CHLORIDE mmol/L 106   < > 110*   CO2 mmol/L 28   < > 27   BUN mg/dL 14   < > 16   CREATININE mg/dL 0.76   < > 0.99   ANION GAP mmol/L 5   < > 6   CALCIUM mg/dL 7.9*   < > 8.2*   ALBUMIN g/dL  --   --  2.7*   TOTAL BILIRUBIN mg/dL  --   --  0.50   ALK PHOS U/L  --   --  104   ALT U/L  --   --  8   AST U/L  --   --  15   GLUCOSE RANDOM mg/dL 121   < > 94    < > = values in this interval not displayed. Results from last 7 days   Lab Units 08/18/23  0756 08/17/23  2206 08/17/23  1738 08/17/23  1540 08/17/23  1108 08/17/23  0804 08/16/23  2055 08/16/23  1614 08/16/23  1122 08/16/23  0747 08/15/23  2045 08/15/23  1550   POC GLUCOSE mg/dl 118 109 153* 164* 129 133 132 168* 153* 155* 179* 121               Lines/Drains:  Invasive Devices     Peripheral Intravenous Line  Duration           Peripheral IV 08/15/23 Left;Ventral (anterior) Forearm 2 days          Drain  Duration           Chest Tube Right 12 Fr. 13 days    External Urinary Catheter 11 days                      Imaging: Reviewed radiology reports from this admission including: chest xray, chest CT scan and CT head    Recent Cultures (last 7 days):   Results from last 7 days   Lab Units 08/12/23  1503   BLOOD CULTURE  No Growth After 5 Days. No Growth After 5 Days.        Last 24 Hours Medication List:   Current Facility-Administered Medications   Medication Dose Route Frequency Provider Last Rate   • acetaminophen  325 mg Rectal Q4H PRN Luther Thao DO     • acetaminophen  650 mg Oral Q6H PRN Leonel Rivas MD     • cefTRIAXone  2,000 mg Intravenous Q24H Larissa Escudero MD 2,000 mg (08/17/23 9990)   • donepezil  5 mg Oral HS Leonel Rivas MD     • enoxaparin  40 mg Subcutaneous Daily Leonel Rivas MD     • guaiFENesin  1,200 mg Oral Q12H [facility] MD Kim     • insulin glargine  5 Units Subcutaneous HS Sterling Chen MD     • insulin lispro  1-5 Units Subcutaneous TID [address] Lucas MD     • insulin lispro  2 Units Subcutaneous TID With Meals Marina Mcmanus MD     • memantine  10 mg Oral BID Pam Quijano MD     • sodium chloride  50 mL/hr Intravenous Continuous Marina Mcmanus MD 50 mL/hr (08/18/23 9980)        Today, Patient Was Seen By: Marina Mcmanus MD    **Please Note: This note may have been constructed using a voice recognition system. **

## 2023-08-18 NOTE — ASSESSMENT & PLAN NOTE
Secondary to loculated effusion/right-sided pneumothorax  S/p chest tube to waterseal  Overnight appeared to require 5L NC  During my examination I decreased the O2 supplementation to 3L NC and was saturating 95%    Plan-  See plan for loculated effusion  Oxygen supplementation keep O2> 88%, wean as able

## 2023-08-18 NOTE — CASE MANAGEMENT
Case Management Discharge Planning Note    Patient name Norma Serrano  Location W /W -96 MRN [id number]  : 1940 Date 2023       Current Admission Date: 8/3/2023  Current Admission Diagnosis:Severe sepsis [facility])   Patient Active Problem List    Diagnosis Date Noted   • Hypernatremia 08/15/2023   • Moderate protein-calorie malnutrition ([address]) 08/15/2023   • Goals of care, counseling/discussion 2023   • Type 2 diabetes mellitus ([address]) 2023   • Delirium 2023   • Constipation 2023   • Loculated pleural effusion 2023   • Hypoxia 2023   • Slurred speech 2023   • Hyperglycemia 2023   • Severe sepsis ([address]) 2023   • Acute respiratory failure with hypoxia ([address]) 2023   • Anemia 2023   • Thrombocytosis 2023   • Encephalopathy acute 2023   • Ambulatory dysfunction 2023   • Low BP 2023   • Depression 2022   • Late onset Alzheimer's dementia with behavioral disturbance ([address]) 2022   • Other hyperlipidemia 2022   • Unsteady gait 2022   • Insomnia 2022      LOS (days): 15  Geometric Mean LOS (GMLOS) (days): 5.00  Days to GMLOS:-10     OBJECTIVE:  Risk of Unplanned Readmission Score: 18.75         Current admission status: Inpatient   Preferred Pharmacy:   PATIENT/FAMILY REPORTS NO PREFERRED PHARMACY  No address on file      Primary Care Provider: Jorge Eubanks MD    Primary Insurance: Rosemary Latisha REP  Secondary Insurance:     DISCHARGE DETAILS:  CM received call from [facility] at . CM introduced self and role. Janna rios per KATY, patient may discharge once facility able to accept/hospice set up. Terri Manriquez stated they are going to use [facility]. [facility] liaison contacting [address] to set up delivery of hospital bed and any other DME needed. CM provided contact number for weekend if unable to discharge today.

## 2023-08-18 NOTE — ASSESSMENT & PLAN NOTE
Hypoactive delirium and patient  Home meds Aricept 5 mg nightly, olanzapine 5 mg daily, Namenda 10 mg twice daily, trazodone 50 mg at bedtime    Plan-  Holding trazodone 50 mg at bedtime and  Zyprexa 5 mg  Delirium precautions at facility   Frequent reorientation  Would avoid any home meds   Discharged back to [facility] on hospice

## 2023-08-18 NOTE — PROGRESS NOTES
Progress note - Palliative and Supportive Care   Gina Lawrence 82 y.o. male [id number]    Patient Active Problem List   Diagnosis   • Depression   • Late onset Alzheimer's dementia with behavioral disturbance ([address])   • Other hyperlipidemia   • Unsteady gait   • Insomnia   • Low BP   • Hypoxia   • Slurred speech   • Hyperglycemia   • Severe sepsis (HCC)   • Acute respiratory failure with hypoxia (HCC)   • Anemia   • Thrombocytosis   • Encephalopathy acute   • Ambulatory dysfunction   • Loculated pleural effusion   • Constipation   • Delirium   • Type 2 diabetes mellitus (HCC)   • Goals of care, counseling/discussion   • Hypernatremia   • Moderate protein-calorie malnutrition ([address])     Active issues specifically addressed today include:   Advanced Alzheimer's dementia  Severe sepsis  Respiratory failure with hypoxia  Streptococcal empyema  Goals of care counseling  Palliative care encounter    Plan  1. Symptom Management  · Per yvan-med team at this time. Patient does not have any complaints at today's visit. · Attempted to assist with breakfast, patient declined    2. Goals of Care  • Level 3 code status  • Disease-directed therapies with limit of DNR/DNI, plan for CT removal today  • Some documentation from pulmonary to suggest that sister is interested in hospice upon discontinuation of chest tube. However, sister unable to discuss upon my call and did not receive return call by end of day. 3. Social Support  • Patient well-supported by his sister Chris Mariscal  • Emotional support provided    4. Follow-up  • Palliative care will continue to follow and goals of care conversations will be ongoing. Please reach out with any questions or concerns. Interval history:       No noted events overnight. Pulmonary planning removal of chest tube today. During time of encounter patient does not offer complaints but is confused with minimal engagement in conversation.  Attempted to assist him with breakfast without success. MEDICATIONS / ALLERGIES:     all current active meds have been reviewed    No Known Allergies    OBJECTIVE:    Physical Exam  Physical Exam  HENT:      Head: Atraumatic. Comments: Temporal wasting  Pulmonary:      Effort: No respiratory distress. Comments: Right sided chest tube in place  Abdominal:      Tenderness: There is no guarding. Musculoskeletal:         General: No swelling. Neurological:      Mental Status: He is alert. Comments: Minimal verbal responses or interraction   Psychiatric:      Comments: Calm, withdrawn         Lab Results:   Results from last 7 days   Lab Units 08/16/23  0516 08/15/23  0454 08/14/23  0525 08/13/23  0504   WBC Thousand/uL 7.30 10.43* 10.39* 8.43   HEMOGLOBIN g/dL 7.3* 8.0* 8.5* 8.3*   HEMATOCRIT % 24.0* 26.6* 27.4* 26.9*   PLATELETS Thousands/uL 429* 537* 545* 543*   NEUTROS PCT %  --   --   --  69   MONOS PCT %  --   --   --  8   EOS PCT %  --   --   --  2     Results from last 7 days   Lab Units 08/17/23  0632 08/16/23  0516 08/15/23  1555 08/13/23  0504 08/12/23  0517   POTASSIUM mmol/L 3.8 3.3* 3.7   < > 3.3*   CHLORIDE mmol/L 106 109* 111*   < > 110*   CO2 mmol/L 28 27 26   < > 27   BUN mg/dL 14 14 16   < > 16   CREATININE mg/dL 0.76 0.82 0.88   < > 0.99   CALCIUM mg/dL 7.9* 7.8* 8.6   < > 8.2*   ALK PHOS U/L  --   --   --   --  104   ALT U/L  --   --   --   --  8   AST U/L  --   --   --   --  15    < > = values in this interval not displayed. Imaging Studies: reviewed pertinent studies   EKG, Pathology, and Other Studies: reviewed pertinent studies    Counseling / Coordination of Care    Total floor / unit time spent today 15 minutes. Greater than 50% of total time was spent with the patient and / or family counseling and / or coordination of care.  A description of the counseling / coordination of care: time spent assessing patient, communicating with RN, brief call with sister, coordination with pulmonary team.    This note was not shared with the patient due to privacy exception: note includes other individuals

## 2023-08-18 NOTE — PROGRESS NOTES
Progress Note - Geriatric Medicine   Denia Steele 82 y.o. male MRN: [id number]  Unit/Bed#:  W -01 Encounter: [id number]      Assessment/Plan:  Late onset Alzheimer's dementia with behavior disturbance  · History of Alzheimer's dementia-may have developed some delirium since hospitalized  · Mental status continues to wax and wane this entire hospitalization  · Prior to hospitalization was on trazodone and Zyprexa-medications have been on hold this entire hospitalization  Did not verbally respond on exam today  Patient to be discharged with hospice services   No issues with behaviors or insomnia reported by nursing  At risk for delirium secondary to Alzheimer's dementia, hospitalization, sepsis, sleep disturbance  Recommend delirium precautions  Maintain sleep-wake cycle, avoid nighttime interruptions  minimize overnight interruptions, group overnight vitals/labs/nursing checks as possible  dim lights, close blinds and turn off tv to minimize stimulation and encourage sleep environment in evenings  Provide adequate pain control  Avoid urinary retention and constipation  Provide frequent and early mobilization  Provide frequent redirection and reorientation as needed  Avoid medications that may worsen or precipitate delirium such as tramadol, benzodiazepines, anticholinergics, and Benadryl  Redirect unwanted behaviors as first-line therapy, avoid physical restraints as able to  · Continue to monitor    Loculated pleural effusion  · Underwent IR chest tube placement on 8/4/2023-chest tube was removed today  · Pulmonology and infectious disease following-continue antibiotics  · Plan at this time appears to return [facility] with hosp[facility] services  · Management per pulmonology and infectious disease    Insomnia  First-line treatment is behavior modification  Maintain sleep-wake cycle, avoid nighttime interruptions  Avoid caffeine throughout the day  Avoid napping throughout the day  Encourage physical activity throughout the day  · Avoid sedative hypnotics including benzodiazepines and benadryl  · Doing well off of his trazodone  · If needed could do melatonin 3 mg nightly    Ambulatory dysfunction  At a baseline ambulates without AD  PT/OT following  Fall precautions  Out of bed as tolerated  Encourage early and frequent mobilization  Encourage adequate hydration and nutrition  Provide adequate pain management   Goal is undetermined-family considering hospice and palliative care is following  · Continue with PT/OT for continued strength and balance training     Subjective:   Patient is being seen for a geriatrics follow-up. Upon exam patient was OOB in the chair, resting. He appeared comfortable and was in no acute distress. Appetite fluctuates- did not eat any lunch. Chest tube was removed today. Patient has been having intermittent diarrhea. Patient to be discharged back to [facility] with hosp[facility] services. Per nursing no acute concerns or issues at this time. Review of Systems   Unable to perform ROS: Dementia         Objective:     Vitals: Blood pressure 110/64, pulse 81, temperature (!) 97.4 °F (36.3 °C), temperature source Axillary, resp. rate 14, height 5' 9" (1.753 m), weight 58.4 kg (128 lb 12 oz), SpO2 95 %. ,Body mass index is 19.01 kg/m². Intake/Output Summary (Last 24 hours) at 8/18/2023 1448  Last data filed at 8/18/2023 1239  Gross per 24 hour   Intake 240 ml   Output 1175 ml   Net -935 ml       Current Medications: Reviewed    Physical Exam:   Physical Exam  Vitals reviewed. Constitutional:       General: He is not in acute distress. Appearance: He is well-developed. He is not ill-appearing. Comments: Frail-appearing   HENT:      Head: Normocephalic and atraumatic. Cardiovascular:      Rate and Rhythm: Normal rate and regular rhythm. Heart sounds: No murmur heard. Pulmonary:      Effort: Pulmonary effort is normal. No respiratory distress.       Breath sounds: Normal breath sounds. Abdominal:      General: Abdomen is flat. Bowel sounds are normal. There is no distension. Palpations: Abdomen is soft. Tenderness: There is no abdominal tenderness. Musculoskeletal:         General: No swelling. Right lower leg: No edema. Left lower leg: No edema. Skin:     General: Skin is warm and dry. Coloration: Skin is pale. Neurological:      Mental Status: He is alert and easily aroused. Mental status is at baseline. Cranial Nerves: No cranial nerve deficit. Motor: Weakness present. Gait: Gait abnormal.      Comments: Did not verbally respond one exam today          Invasive Devices     Peripheral Intravenous Line  Duration           Peripheral IV 08/15/23 Left;Ventral (anterior) Forearm 3 days          Drain  Duration           Chest Tube Right 12 Fr. 13 days                Lab, Imaging and other studies:    Lab Results:   I have personally reviewed pertinent lab results including the following:  No new labs to review    I have personally reviewed the following imaging study reports in PACS:  No recent imaging to review  - I have personally reviewed pertinent reports. Please note:  Voice-recognition software may have been used in the preparation of this document. Occasional wrong word or "sound-alike" substitutions may have occurred due to the inherent limitations of voice recognition software. Interpretation should be guided by context.

## 2023-08-18 NOTE — ASSESSMENT & PLAN NOTE
Patient's prognosis remains guarded as he is not a candidate for VATS procedure  Continuing source control with chest tube and drainage  Continuing IV antibiotics  Multiple goals of care discussions with sister; sister was hopeful of patient's recovery and to return back to baseline  Extensive conversation explained that his prognosis is poor    Plan-  Appreciate Palliative Care recommendations- Discharge to [facility] w/ [facility]

## 2023-08-18 NOTE — ASSESSMENT & PLAN NOTE
Chest x-ray- There is a right mid to lower lung opacity with well-circumscribed medial margin suggests loculated effusion. CT chest- Moderate right pleural effusion with compressive atelectasis over the right lower lobe and posterior right middle and upper lobes, Consolidation in the left lower lobe, probably atelectatic. Superimposed pneumonia cannot be excluded. Soft tissue densities in the bronchial tree as above, appearance favors secretions.   IR placed chest tube 8/4  Repeat Chest Xray-improved loculated right pneumothorax, improved aeration medial right lung base     Plan-  Appreciate pulmonology recommendations- s/p trial of 6/6 TPA/Dornase BID per MIST2 trial, Overall poor surgical candidate and his sister agrees, ongoing [address] [place] discussions if drainage continues to decrease no indication for chest tube, once chest tube removed likely to be on hospice   Encourage cough, incentive spirometry   Appreciate infectious disease recommendations

## 2023-08-19 ENCOUNTER — DOCUMENTATION (OUTPATIENT)
Dept: HOSPICE | Facility: HOSPICE | Age: 83
End: 2023-08-19

## 2023-08-19 ENCOUNTER — HOME CARE VISIT (OUTPATIENT)
Dept: HOME HOSPICE | Facility: HOSPICE | Age: 83
End: 2023-08-19
Payer: MEDICARE

## 2023-08-19 ENCOUNTER — HOME CARE VISIT (OUTPATIENT)
Dept: HOME HEALTH SERVICES | Facility: HOME HEALTHCARE | Age: 83
End: 2023-08-19
Payer: MEDICARE

## 2023-08-19 VITALS
HEIGHT: 69 IN | BODY MASS INDEX: 18.38 KG/M2 | RESPIRATION RATE: 18 BRPM | OXYGEN SATURATION: 95 % | SYSTOLIC BLOOD PRESSURE: 145 MMHG | DIASTOLIC BLOOD PRESSURE: 92 MMHG | HEART RATE: 78 BPM | WEIGHT: 124.12 LBS | TEMPERATURE: 97.6 F

## 2023-08-19 DIAGNOSIS — J96.01 ACUTE RESPIRATORY FAILURE WITH HYPOXIA (HCC): ICD-10-CM

## 2023-08-19 DIAGNOSIS — T40.2X5A THERAPEUTIC OPIOID INDUCED CONSTIPATION: Primary | ICD-10-CM

## 2023-08-19 DIAGNOSIS — G30.1 LATE ONSET ALZHEIMER'S DEMENTIA WITH BEHAVIORAL DISTURBANCE (HCC): Primary | ICD-10-CM

## 2023-08-19 DIAGNOSIS — F02.818 LATE ONSET ALZHEIMER'S DEMENTIA WITH BEHAVIORAL DISTURBANCE (HCC): Primary | ICD-10-CM

## 2023-08-19 DIAGNOSIS — K59.03 THERAPEUTIC OPIOID INDUCED CONSTIPATION: Primary | ICD-10-CM

## 2023-08-19 LAB
ANION GAP SERPL CALCULATED.3IONS-SCNC: 8 MMOL/L
BUN SERPL-MCNC: 12 MG/DL (ref 5–25)
CALCIUM SERPL-MCNC: 8.5 MG/DL (ref 8.4–10.2)
CHLORIDE SERPL-SCNC: 106 MMOL/L (ref 96–108)
CO2 SERPL-SCNC: 22 MMOL/L (ref 21–32)
CREAT SERPL-MCNC: 0.75 MG/DL (ref 0.6–1.3)
GFR SERPL CREATININE-BSD FRML MDRD: 86 ML/MIN/1.73SQ M
GLUCOSE SERPL-MCNC: 115 MG/DL (ref 65–140)
GLUCOSE SERPL-MCNC: 121 MG/DL (ref 65–140)
GLUCOSE SERPL-MCNC: 170 MG/DL (ref 65–140)
POTASSIUM SERPL-SCNC: 4.5 MMOL/L (ref 3.5–5.3)
SODIUM SERPL-SCNC: 136 MMOL/L (ref 135–147)

## 2023-08-19 PROCEDURE — G0299 HHS/HOSPICE OF RN EA 15 MIN: HCPCS

## 2023-08-19 PROCEDURE — 80048 BASIC METABOLIC PNL TOTAL CA: CPT

## 2023-08-19 PROCEDURE — 82948 REAGENT STRIP/BLOOD GLUCOSE: CPT

## 2023-08-19 PROCEDURE — 99239 HOSP IP/OBS DSCHRG MGMT >30: CPT | Performed by: INTERNAL MEDICINE

## 2023-08-19 RX ORDER — AMOXICILLIN 250 MG/5ML
1000 POWDER, FOR SUSPENSION ORAL EVERY 8 HOURS SCHEDULED
Refills: 0
Start: 2023-08-19 | End: 2023-08-26

## 2023-08-19 RX ORDER — OXYCODONE HYDROCHLORIDE 5 MG/1
5 TABLET ORAL EVERY 4 HOURS PRN
Qty: 180 TABLET | Refills: 0 | Status: SHIPPED | OUTPATIENT
Start: 2023-08-19 | End: 2023-09-18

## 2023-08-19 RX ORDER — SENNOSIDES 8.6 MG
8.6 TABLET ORAL
Qty: 90 TABLET | Refills: 0 | Status: SHIPPED | OUTPATIENT
Start: 2023-08-19

## 2023-08-19 RX ORDER — LORAZEPAM 1 MG/1
1 TABLET ORAL EVERY 6 HOURS PRN
Qty: 120 TABLET | Refills: 0 | Status: SHIPPED | OUTPATIENT
Start: 2023-08-19

## 2023-08-19 RX ORDER — NALOXONE HYDROCHLORIDE 4 MG/.1ML
SPRAY NASAL
Qty: 1 EACH | Refills: 0 | Status: SHIPPED | OUTPATIENT
Start: 2023-08-19 | End: 2024-08-18

## 2023-08-19 RX ORDER — GUAIFENESIN 1200 MG/1
1200 TABLET, EXTENDED RELEASE ORAL EVERY 12 HOURS SCHEDULED
Refills: 0
Start: 2023-08-19 | End: 2023-09-18

## 2023-08-19 RX ADMIN — INSULIN LISPRO 2 UNITS: 100 INJECTION, SOLUTION INTRAVENOUS; SUBCUTANEOUS at 10:00

## 2023-08-19 RX ADMIN — INSULIN LISPRO 2 UNITS: 100 INJECTION, SOLUTION INTRAVENOUS; SUBCUTANEOUS at 12:58

## 2023-08-19 RX ADMIN — ENOXAPARIN SODIUM 40 MG: 40 INJECTION SUBCUTANEOUS at 10:00

## 2023-08-19 RX ADMIN — INSULIN LISPRO 1 UNITS: 100 INJECTION, SOLUTION INTRAVENOUS; SUBCUTANEOUS at 12:58

## 2023-08-19 RX ADMIN — MEMANTINE 10 MG: 10 TABLET ORAL at 10:00

## 2023-08-19 NOTE — CASE MANAGEMENT
Case Management Discharge Planning Note    Patient name Bladimir Stephenson  Location W /W -38 MRN [id number]  : 1940 Date 2023       Current Admission Date: 8/3/2023  Current Admission Diagnosis:Severe sepsis [facility])   Patient Active Problem List    Diagnosis Date Noted   • Hypernatremia 08/15/2023   • Moderate protein-calorie malnutrition ([address]) 08/15/2023   • Goals of care, counseling/discussion 2023   • Type 2 diabetes mellitus ([address]) 2023   • Delirium 2023   • Constipation 2023   • Loculated pleural effusion 2023   • Hypoxia 2023   • Slurred speech 2023   • Hyperglycemia 2023   • Severe sepsis ([address]) 2023   • Acute respiratory failure with hypoxia ([address]) 2023   • Anemia 2023   • Thrombocytosis 2023   • Encephalopathy acute 2023   • Ambulatory dysfunction 2023   • Low BP 2023   • Depression 2022   • Late onset Alzheimer's dementia with behavioral disturbance ([address]) 2022   • Other hyperlipidemia 2022   • Unsteady gait 2022   • Insomnia 2022      LOS (days): 16  Geometric Mean LOS (GMLOS) (days): 5.00  Days to GMLOS:-10.9     OBJECTIVE:  Risk of Unplanned Readmission Score: 14.69         Current admission status: Inpatient   Preferred Pharmacy:   PATIENT/FAMILY REPORTS NO PREFERRED PHARMACY  No address on file      Primary Care Provider: Anshu Back MD    Primary Insurance: [facility] REP  Secondary Insurance:     DISCHARGE DETAILS:  CM updated patient's sister Freedom Salmeron that transport is still set up for 1400 today to [facility]. Sister stated she is coming to hospital today to fill out hospice paperwork. All questions/concerns answered at this time.

## 2023-08-19 NOTE — ASSESSMENT & PLAN NOTE
Likely in the setting of poor po intake   Na 150  Na 139, resolved   Awaiting results    Plan-   We will discharge to [address] [place] [address] on hospice  Encourage p.o. intake  Monitor hydration status  Follow up with PCP

## 2023-08-20 ENCOUNTER — HOME CARE VISIT (OUTPATIENT)
Dept: HOME HOSPICE | Facility: HOSPICE | Age: 83
End: 2023-08-20
Payer: MEDICARE

## 2023-08-20 PROCEDURE — G0155 HHCP-SVS OF CSW,EA 15 MIN: HCPCS

## 2023-08-20 NOTE — CASE COMMUNICATION
Ship to  Pt.  Home   [facility]     Tab Briefs    Med [id number]                        96/cs x1     Positioning Wedge [id number]   [id number] x2

## 2023-08-21 ENCOUNTER — HOME CARE VISIT (OUTPATIENT)
Dept: HOME HOSPICE | Facility: HOSPICE | Age: 83
End: 2023-08-21
Payer: MEDICARE

## 2023-08-21 PROCEDURE — 10330064 ALIGNER, FOAM BODY SM (8/CS)

## 2023-08-21 PROCEDURE — 10330064 BRIEF, WINGS CHOICE PLUS QUILTED MED (12

## 2023-08-22 ENCOUNTER — HOME CARE VISIT (OUTPATIENT)
Dept: HOME HEALTH SERVICES | Facility: HOME HEALTHCARE | Age: 83
End: 2023-08-22
Payer: MEDICARE

## 2023-08-22 VITALS — RESPIRATION RATE: 14 BRPM | HEART RATE: 88 BPM

## 2023-08-22 DIAGNOSIS — Z51.5 HOSPICE CARE: Primary | ICD-10-CM

## 2023-08-22 DIAGNOSIS — J96.01 ACUTE RESPIRATORY FAILURE WITH HYPOXIA (HCC): ICD-10-CM

## 2023-08-22 PROCEDURE — 10330057 MEDICATION, GENERAL

## 2023-08-22 PROCEDURE — G0299 HHS/HOSPICE OF RN EA 15 MIN: HCPCS

## 2023-08-22 PROCEDURE — G0156 HHCP-SVS OF AIDE,EA 15 MIN: HCPCS

## 2023-08-22 RX ORDER — HALOPERIDOL 2 MG/ML
1 SOLUTION ORAL EVERY 6 HOURS PRN
Qty: 15 ML | Refills: 0 | Status: SHIPPED | OUTPATIENT
Start: 2023-08-22

## 2023-08-22 RX ORDER — LORAZEPAM 0.5 MG/1
0.5 TABLET ORAL EVERY 6 HOURS PRN
Qty: 6 TABLET | Refills: 0 | Status: SHIPPED | OUTPATIENT
Start: 2023-08-22

## 2023-08-22 RX ORDER — MORPHINE SULFATE 100 MG/5ML
5 SOLUTION, CONCENTRATE ORAL
Qty: 15 ML | Refills: 0 | Status: SHIPPED | OUTPATIENT
Start: 2023-08-22 | End: 2023-08-23

## 2023-08-22 NOTE — PROGRESS NOTES
8/22/2023 1:04 PM  Count includes the [facility] facility patient requests SOC medications. Filled electronically via Epic [place] per [place] Law. Requested Prescriptions     Signed Prescriptions Disp Refills   • haloperidol (HALDOL) oral concentrated solution 15 mL 0     Sig: Take 0.5 mL (1 mg total) by mouth every 6 (six) hours as needed for agitation (nausea/vomiting) CP medication On Hold   • LORazepam (Ativan) 0.5 mg tablet 6 tablet 0     Sig: Take 1 tablet (0.5 mg total) by mouth every 6 (six) hours as needed for anxiety (dyspnea) CP medication On Hold   • Morphine Sulfate, Concentrate, 20 mg/mL concentrated solution 15 mL 0     Sig: Take 0.25 mL (5 mg total) by mouth every 3 (three) hours as needed (pain / dyspnea) CP medication On Hold Max Daily Amount: 40 mg       Hung Lebron Answering Service: [phone]  You can find me on TigerConnect!

## 2023-08-23 ENCOUNTER — HOME CARE VISIT (OUTPATIENT)
Dept: HOME HEALTH SERVICES | Facility: HOME HEALTHCARE | Age: 83
End: 2023-08-23
Payer: MEDICARE

## 2023-08-23 ENCOUNTER — HOME CARE VISIT (OUTPATIENT)
Dept: HOME HOSPICE | Facility: HOSPICE | Age: 83
End: 2023-08-23
Payer: MEDICARE

## 2023-08-23 DIAGNOSIS — Z51.5 HOSPICE CARE: ICD-10-CM

## 2023-08-23 PROCEDURE — G0156 HHCP-SVS OF AIDE,EA 15 MIN: HCPCS

## 2023-08-23 PROCEDURE — 10330057 MEDICATION, GENERAL

## 2023-08-23 RX ORDER — MORPHINE SULFATE 100 MG/5ML
5 SOLUTION, CONCENTRATE ORAL
Qty: 30 ML | Refills: 0 | Status: SHIPPED | OUTPATIENT
Start: 2023-08-23

## 2023-08-23 NOTE — PROGRESS NOTES
8/23/2023 2:37 PM  [facility] facility patient requests medication reordered at request of pharmacy due to pharmacy availability. Filled electronically via Epic [place] per [place] Law. Requested Prescriptions     Signed Prescriptions Disp Refills   • Morphine Sulfate, Concentrate, 20 mg/mL concentrated solution 30 mL 0     Sig: Take 0.25 mL (5 mg total) by mouth every 3 (three) hours as needed (pain / dyspnea) CP medication On Hold Max Daily Amount: 40 mg       Hung Wright Answering Service: [phone]  You can find me on [employer]Connect!

## 2023-08-24 ENCOUNTER — HOME CARE VISIT (OUTPATIENT)
Dept: HOME HEALTH SERVICES | Facility: HOME HEALTHCARE | Age: 83
End: 2023-08-24
Payer: MEDICARE

## 2023-08-24 PROCEDURE — G0299 HHS/HOSPICE OF RN EA 15 MIN: HCPCS

## 2023-08-24 PROCEDURE — G0156 HHCP-SVS OF AIDE,EA 15 MIN: HCPCS

## 2023-08-25 PROCEDURE — 10330064 CUSHION, COMPRESS COCCYX (4/CS)

## 2023-08-28 ENCOUNTER — HOME CARE VISIT (OUTPATIENT)
Dept: HOME HEALTH SERVICES | Facility: HOME HEALTHCARE | Age: 83
End: 2023-08-28
Payer: MEDICARE

## 2023-08-28 PROCEDURE — G0156 HHCP-SVS OF AIDE,EA 15 MIN: HCPCS

## 2023-08-29 ENCOUNTER — HOME CARE VISIT (OUTPATIENT)
Dept: HOME HEALTH SERVICES | Facility: HOME HEALTHCARE | Age: 83
End: 2023-08-29
Payer: MEDICARE

## 2023-08-29 PROCEDURE — G0299 HHS/HOSPICE OF RN EA 15 MIN: HCPCS

## 2023-08-31 ENCOUNTER — HOME CARE VISIT (OUTPATIENT)
Dept: HOME HEALTH SERVICES | Facility: HOME HEALTHCARE | Age: 83
End: 2023-08-31
Payer: MEDICARE

## 2023-08-31 PROCEDURE — G0299 HHS/HOSPICE OF RN EA 15 MIN: HCPCS

## 2023-08-31 PROCEDURE — G0156 HHCP-SVS OF AIDE,EA 15 MIN: HCPCS

## 2023-09-01 ENCOUNTER — HOME CARE VISIT (OUTPATIENT)
Dept: HOME HEALTH SERVICES | Facility: HOME HEALTHCARE | Age: 83
End: 2023-09-01
Payer: MEDICARE

## 2023-09-01 PROCEDURE — G0156 HHCP-SVS OF AIDE,EA 15 MIN: HCPCS

## 2023-09-05 ENCOUNTER — HOME CARE VISIT (OUTPATIENT)
Dept: HOME HEALTH SERVICES | Facility: HOME HEALTHCARE | Age: 83
End: 2023-09-05
Payer: MEDICARE

## 2023-09-05 PROCEDURE — G0156 HHCP-SVS OF AIDE,EA 15 MIN: HCPCS

## 2023-09-05 PROCEDURE — G0299 HHS/HOSPICE OF RN EA 15 MIN: HCPCS

## 2023-09-06 ENCOUNTER — HOME CARE VISIT (OUTPATIENT)
Dept: HOME HEALTH SERVICES | Facility: HOME HEALTHCARE | Age: 83
End: 2023-09-06
Payer: MEDICARE

## 2023-09-06 ENCOUNTER — HOME CARE VISIT (OUTPATIENT)
Dept: HOME HOSPICE | Facility: HOSPICE | Age: 83
End: 2023-09-06
Payer: MEDICARE

## 2023-09-06 PROCEDURE — G0156 HHCP-SVS OF AIDE,EA 15 MIN: HCPCS

## 2023-09-07 ENCOUNTER — HOME CARE VISIT (OUTPATIENT)
Dept: HOME HEALTH SERVICES | Facility: HOME HEALTHCARE | Age: 83
End: 2023-09-07
Payer: MEDICARE

## 2023-09-07 PROCEDURE — G0299 HHS/HOSPICE OF RN EA 15 MIN: HCPCS

## 2023-09-08 ENCOUNTER — HOME CARE VISIT (OUTPATIENT)
Dept: HOME HEALTH SERVICES | Facility: HOME HEALTHCARE | Age: 83
End: 2023-09-08
Payer: MEDICARE

## 2023-09-08 PROCEDURE — G0156 HHCP-SVS OF AIDE,EA 15 MIN: HCPCS

## 2023-09-11 ENCOUNTER — HOME CARE VISIT (OUTPATIENT)
Dept: HOME HEALTH SERVICES | Facility: HOME HEALTHCARE | Age: 83
End: 2023-09-11
Payer: MEDICARE

## 2023-09-11 PROCEDURE — G0156 HHCP-SVS OF AIDE,EA 15 MIN: HCPCS

## 2023-09-12 ENCOUNTER — HOME CARE VISIT (OUTPATIENT)
Dept: HOME HEALTH SERVICES | Facility: HOME HEALTHCARE | Age: 83
End: 2023-09-12
Payer: MEDICARE

## 2023-09-12 ENCOUNTER — HOME CARE VISIT (OUTPATIENT)
Dept: HOME HOSPICE | Facility: HOSPICE | Age: 83
End: 2023-09-12
Payer: MEDICARE

## 2023-09-12 PROCEDURE — G0299 HHS/HOSPICE OF RN EA 15 MIN: HCPCS

## 2023-09-12 PROCEDURE — G0156 HHCP-SVS OF AIDE,EA 15 MIN: HCPCS

## 2023-09-12 PROCEDURE — G0155 HHCP-SVS OF CSW,EA 15 MIN: HCPCS

## 2023-09-15 ENCOUNTER — HOME CARE VISIT (OUTPATIENT)
Dept: HOME HEALTH SERVICES | Facility: HOME HEALTHCARE | Age: 83
End: 2023-09-15
Payer: MEDICARE

## 2023-09-15 VITALS — RESPIRATION RATE: 16 BRPM | HEART RATE: 72 BPM

## 2023-09-15 PROCEDURE — G0156 HHCP-SVS OF AIDE,EA 15 MIN: HCPCS

## 2023-09-15 PROCEDURE — G0299 HHS/HOSPICE OF RN EA 15 MIN: HCPCS

## 2023-09-19 ENCOUNTER — HOME CARE VISIT (OUTPATIENT)
Dept: HOME HEALTH SERVICES | Facility: HOME HEALTHCARE | Age: 83
End: 2023-09-19
Payer: MEDICARE

## 2023-09-19 PROCEDURE — G0299 HHS/HOSPICE OF RN EA 15 MIN: HCPCS

## 2023-09-20 ENCOUNTER — HOME CARE VISIT (OUTPATIENT)
Dept: HOME HEALTH SERVICES | Facility: HOME HEALTHCARE | Age: 83
End: 2023-09-20
Payer: MEDICARE

## 2023-09-21 ENCOUNTER — HOME CARE VISIT (OUTPATIENT)
Dept: HOME HEALTH SERVICES | Facility: HOME HEALTHCARE | Age: 83
End: 2023-09-21
Payer: MEDICARE

## 2023-09-21 ENCOUNTER — NURSING HOME VISIT (OUTPATIENT)
Dept: GERIATRICS | Facility: OTHER | Age: 83
End: 2023-09-21
Payer: COMMERCIAL

## 2023-09-21 DIAGNOSIS — R26.2 AMBULATORY DYSFUNCTION: ICD-10-CM

## 2023-09-21 DIAGNOSIS — E44.0 MODERATE PROTEIN-CALORIE MALNUTRITION (HCC): ICD-10-CM

## 2023-09-21 DIAGNOSIS — G30.1 LATE ONSET ALZHEIMER'S DEMENTIA WITH BEHAVIORAL DISTURBANCE (HCC): Primary | ICD-10-CM

## 2023-09-21 DIAGNOSIS — F02.818 LATE ONSET ALZHEIMER'S DEMENTIA WITH BEHAVIORAL DISTURBANCE (HCC): Primary | ICD-10-CM

## 2023-09-21 DIAGNOSIS — R09.02 HYPOXIA: ICD-10-CM

## 2023-09-21 DIAGNOSIS — R53.81 DEBILITY: ICD-10-CM

## 2023-09-21 PROCEDURE — G0299 HHS/HOSPICE OF RN EA 15 MIN: HCPCS

## 2023-09-21 PROCEDURE — G0156 HHCP-SVS OF AIDE,EA 15 MIN: HCPCS

## 2023-09-21 PROCEDURE — 99349 HOME/RES VST EST MOD MDM 40: CPT | Performed by: FAMILY MEDICINE

## 2023-09-21 NOTE — PROGRESS NOTES
[address]  [phone]  [address]  [address]      NAME: Starleen Dancer  AGE: 82 y.o. SEX: male [phone]    DATE OF ENCOUNTER: 9/23/2023    Assessment and Plan     1. Late onset Alzheimer's dementia with behavioral disturbance ([address])  - declining  - redirection, reorientation  - max assistance with ADLs  - d/tara Donepezil and Haloperidol  - cont Olanzapine 5 mg po qd  - cont memantine 10 mg po bid    2. Moderate protein-calorie malnutrition ([address])  - comfort foods  - assistance with feeding    3. Ambulatory dysfunction  - Fall precautions in place  - uses walker    4. Hypoxia  - on 3 lit of O2 via NC  - stressed on compliance with keeping O2 with staff    5. Debility  - on hospice care  - comfort measures      - Counseling Documentation: patient was counseled regarding: prognosis    Chief Complaint     Routine Long term follow-up visit. History of Present Illness     HPI  Starleen Dancer, a 83 y/o male is a long term resident at [facility], seen and examined, he found sitting in the family room. He has oxygen 3 lit via NC, but it he frequently removes it. As per staff, it is hard to redirect him, he needs max assistance with ADLs, he uses walker. He is on hospice care for worsening chronic conditions. The following portions of the patient's history were reviewed and updated as appropriate: allergies, current medications, past family history, past medical history, past social history, past surgical history and problem list.    Review of Systems     Review of Systems   Unable to perform ROS: Dementia   As in HPI.     Active Problem List     Patient Active Problem List   Diagnosis   • Depression   • Late onset Alzheimer's dementia with behavioral disturbance ([address])   • Other hyperlipidemia   • Unsteady gait   • Insomnia   • Low BP   • Hypoxia   • Slurred speech   • Hyperglycemia   • Severe sepsis (HCC)   • Acute respiratory failure with hypoxia (HCC)   • Anemia   • Thrombocytosis   • Encephalopathy acute   • Ambulatory dysfunction   • Loculated pleural effusion   • Constipation   • Delirium   • Type 2 diabetes mellitus (HCC)   • Goals of care, counseling/discussion   • Hypernatremia   • Moderate protein-calorie malnutrition (HCC)   • Debility       Objective     Vitals: stable. Physical Exam  Vitals and nursing note reviewed. Constitutional:       General: He is not in acute distress. Appearance: He is well-developed. He is ill-appearing. He is not diaphoretic. Comments: Frail, cachectic male   HENT:      Head: Normocephalic and atraumatic. Nose: Nose normal.      Mouth/Throat:      Mouth: Mucous membranes are dry. Pharynx: Oropharynx is clear. No oropharyngeal exudate. Eyes:      General: No scleral icterus. Right eye: No discharge. Left eye: No discharge. Conjunctiva/sclera: Conjunctivae normal.   Cardiovascular:      Rate and Rhythm: Normal rate and regular rhythm. Heart sounds: Normal heart sounds. No murmur heard. Pulmonary:      Effort: Pulmonary effort is normal. No respiratory distress. Breath sounds: Normal breath sounds. No wheezing. Chest:      Chest wall: No tenderness. Abdominal:      General: Bowel sounds are normal. There is no distension. Palpations: Abdomen is soft. Tenderness: There is no abdominal tenderness. There is no guarding. Musculoskeletal:         General: No tenderness or deformity. Right lower leg: No edema. Left lower leg: No edema. Comments: Impaired ROM due to debility   Skin:     General: Skin is warm and dry. Neurological:      Mental Status: He is alert. Cranial Nerves: No cranial nerve deficit. Motor: No abnormal muscle tone. Coordination: Coordination normal.      Comments: Responds when calls his name  Minimally verbal  Unable to follow commands   Psychiatric:      Comments: Confused, memory impaired.          Pertinent Laboratory/Diagnostic Studies:  Refer to facility chart. Current Medications   Medications reviewed and updated in facility chart.

## 2023-09-22 ENCOUNTER — HOME CARE VISIT (OUTPATIENT)
Dept: HOME HEALTH SERVICES | Facility: HOME HEALTHCARE | Age: 83
End: 2023-09-22
Payer: MEDICARE

## 2023-09-22 PROCEDURE — G0156 HHCP-SVS OF AIDE,EA 15 MIN: HCPCS

## 2023-09-23 PROBLEM — R53.81 DEBILITY: Status: ACTIVE | Noted: 2023-09-23

## 2023-09-26 ENCOUNTER — HOME CARE VISIT (OUTPATIENT)
Dept: HOME HEALTH SERVICES | Facility: HOME HEALTHCARE | Age: 83
End: 2023-09-26
Payer: MEDICARE

## 2023-09-26 PROCEDURE — G0300 HHS/HOSPICE OF LPN EA 15 MIN: HCPCS

## 2023-09-26 PROCEDURE — G0156 HHCP-SVS OF AIDE,EA 15 MIN: HCPCS

## 2023-09-28 ENCOUNTER — HOME CARE VISIT (OUTPATIENT)
Dept: HOME HEALTH SERVICES | Facility: HOME HEALTHCARE | Age: 83
End: 2023-09-28
Payer: MEDICARE

## 2023-09-28 ENCOUNTER — HOME CARE VISIT (OUTPATIENT)
Dept: HOME HOSPICE | Facility: HOSPICE | Age: 83
End: 2023-09-28
Payer: MEDICARE

## 2023-09-28 PROCEDURE — G0156 HHCP-SVS OF AIDE,EA 15 MIN: HCPCS

## 2023-09-28 PROCEDURE — G0299 HHS/HOSPICE OF RN EA 15 MIN: HCPCS

## 2023-09-30 ENCOUNTER — HOME CARE VISIT (OUTPATIENT)
Dept: HOME HEALTH SERVICES | Facility: HOME HEALTHCARE | Age: 83
End: 2023-09-30
Payer: MEDICARE

## 2023-09-30 PROCEDURE — G0156 HHCP-SVS OF AIDE,EA 15 MIN: HCPCS

## 2023-10-02 ENCOUNTER — HOME CARE VISIT (OUTPATIENT)
Dept: HOME HOSPICE | Facility: HOSPICE | Age: 83
End: 2023-10-02
Payer: MEDICARE

## 2023-10-02 ENCOUNTER — HOME CARE VISIT (OUTPATIENT)
Dept: HOME HEALTH SERVICES | Facility: HOME HEALTHCARE | Age: 83
End: 2023-10-02
Payer: MEDICARE

## 2023-10-02 PROCEDURE — G0156 HHCP-SVS OF AIDE,EA 15 MIN: HCPCS

## 2023-10-03 ENCOUNTER — HOME CARE VISIT (OUTPATIENT)
Dept: HOME HEALTH SERVICES | Facility: HOME HEALTHCARE | Age: 83
End: 2023-10-03
Payer: MEDICARE

## 2023-10-03 PROCEDURE — G0299 HHS/HOSPICE OF RN EA 15 MIN: HCPCS

## 2023-10-04 ENCOUNTER — HOME CARE VISIT (OUTPATIENT)
Dept: HOME HEALTH SERVICES | Facility: HOME HEALTHCARE | Age: 83
End: 2023-10-04
Payer: MEDICARE

## 2023-10-04 ENCOUNTER — HOME CARE VISIT (OUTPATIENT)
Dept: HOME HOSPICE | Facility: HOSPICE | Age: 83
End: 2023-10-04
Payer: MEDICARE

## 2023-10-04 PROCEDURE — G0156 HHCP-SVS OF AIDE,EA 15 MIN: HCPCS

## 2023-10-05 ENCOUNTER — HOME CARE VISIT (OUTPATIENT)
Dept: HOME HEALTH SERVICES | Facility: HOME HEALTHCARE | Age: 83
End: 2023-10-05
Payer: MEDICARE

## 2023-10-05 PROCEDURE — G0299 HHS/HOSPICE OF RN EA 15 MIN: HCPCS

## 2023-10-05 PROCEDURE — G0156 HHCP-SVS OF AIDE,EA 15 MIN: HCPCS

## 2023-10-09 ENCOUNTER — HOME CARE VISIT (OUTPATIENT)
Dept: HOME HEALTH SERVICES | Facility: HOME HEALTHCARE | Age: 83
End: 2023-10-09
Payer: MEDICARE

## 2023-10-09 PROCEDURE — G0156 HHCP-SVS OF AIDE,EA 15 MIN: HCPCS

## 2023-10-10 ENCOUNTER — HOME CARE VISIT (OUTPATIENT)
Dept: HOME HEALTH SERVICES | Facility: HOME HEALTHCARE | Age: 83
End: 2023-10-10
Payer: MEDICARE

## 2023-10-10 VITALS — SYSTOLIC BLOOD PRESSURE: 100 MMHG | HEART RATE: 82 BPM | RESPIRATION RATE: 18 BRPM | DIASTOLIC BLOOD PRESSURE: 70 MMHG

## 2023-10-10 PROCEDURE — G0156 HHCP-SVS OF AIDE,EA 15 MIN: HCPCS

## 2023-10-10 PROCEDURE — G0299 HHS/HOSPICE OF RN EA 15 MIN: HCPCS

## 2023-10-12 ENCOUNTER — HOME CARE VISIT (OUTPATIENT)
Dept: HOME HEALTH SERVICES | Facility: HOME HEALTHCARE | Age: 83
End: 2023-10-12
Payer: MEDICARE

## 2023-10-12 VITALS — HEART RATE: 78 BPM | SYSTOLIC BLOOD PRESSURE: 121 MMHG | DIASTOLIC BLOOD PRESSURE: 75 MMHG | RESPIRATION RATE: 16 BRPM

## 2023-10-12 PROCEDURE — G0156 HHCP-SVS OF AIDE,EA 15 MIN: HCPCS

## 2023-10-12 PROCEDURE — G0299 HHS/HOSPICE OF RN EA 15 MIN: HCPCS

## 2023-10-16 ENCOUNTER — HOME CARE VISIT (OUTPATIENT)
Dept: HOME HEALTH SERVICES | Facility: HOME HEALTHCARE | Age: 83
End: 2023-10-16
Payer: MEDICARE

## 2023-10-16 PROCEDURE — G0156 HHCP-SVS OF AIDE,EA 15 MIN: HCPCS

## 2023-10-17 ENCOUNTER — HOME CARE VISIT (OUTPATIENT)
Dept: HOME HEALTH SERVICES | Facility: HOME HEALTHCARE | Age: 83
End: 2023-10-17
Payer: MEDICARE

## 2023-10-17 PROCEDURE — G0299 HHS/HOSPICE OF RN EA 15 MIN: HCPCS

## 2023-10-18 ENCOUNTER — HOME CARE VISIT (OUTPATIENT)
Dept: HOME HEALTH SERVICES | Facility: HOME HEALTHCARE | Age: 83
End: 2023-10-18
Payer: MEDICARE

## 2023-10-18 DIAGNOSIS — R05.9 COUGH, UNSPECIFIED TYPE: ICD-10-CM

## 2023-10-18 DIAGNOSIS — Z51.5 HOSPICE CARE: Primary | ICD-10-CM

## 2023-10-18 PROBLEM — A41.9 SEVERE SEPSIS (HCC): Status: RESOLVED | Noted: 2023-08-03 | Resolved: 2023-10-18

## 2023-10-18 PROBLEM — R65.20 SEVERE SEPSIS (HCC): Status: RESOLVED | Noted: 2023-08-03 | Resolved: 2023-10-18

## 2023-10-18 PROCEDURE — G0156 HHCP-SVS OF AIDE,EA 15 MIN: HCPCS

## 2023-10-18 RX ORDER — GUAIFENESIN 600 MG/1
1200 TABLET, EXTENDED RELEASE ORAL EVERY 12 HOURS SCHEDULED
COMMUNITY
Start: 2023-10-10 | End: 2023-10-18 | Stop reason: SDUPTHER

## 2023-10-18 RX ORDER — GUAIFENESIN 600 MG/1
1200 TABLET, EXTENDED RELEASE ORAL EVERY 12 HOURS SCHEDULED
Qty: 60 TABLET | Refills: 0 | Status: SHIPPED | OUTPATIENT
Start: 2023-10-18

## 2023-10-18 NOTE — TELEPHONE ENCOUNTER
10/18/2023 10:15 AM  [facility] facility patient requests facility hospice patient refill. Filled electronically via Epic [place] per [place] Law. Requested Prescriptions     Signed Prescriptions Disp Refills    Mucus Relief 600 MG 12 hr tablet 60 tablet 0     Sig: Take 2 tablets (1,200 mg total) by mouth every 12 (twelve) hours     Authorizing Provider: Hung Padgett Answering Service: [phone]  You can find me on TigerConnect!

## 2023-10-19 ENCOUNTER — HOME CARE VISIT (OUTPATIENT)
Dept: HOME HEALTH SERVICES | Facility: HOME HEALTHCARE | Age: 83
End: 2023-10-19
Payer: MEDICARE

## 2023-10-19 PROCEDURE — G0156 HHCP-SVS OF AIDE,EA 15 MIN: HCPCS

## 2023-10-19 PROCEDURE — G0299 HHS/HOSPICE OF RN EA 15 MIN: HCPCS

## 2023-10-23 ENCOUNTER — HOME CARE VISIT (OUTPATIENT)
Dept: HOME HEALTH SERVICES | Facility: HOME HEALTHCARE | Age: 83
End: 2023-10-23
Payer: MEDICARE

## 2023-10-23 ENCOUNTER — HOME CARE VISIT (OUTPATIENT)
Dept: HOME HOSPICE | Facility: HOSPICE | Age: 83
End: 2023-10-23
Payer: MEDICARE

## 2023-10-23 PROCEDURE — G0156 HHCP-SVS OF AIDE,EA 15 MIN: HCPCS

## 2023-10-23 PROCEDURE — G0155 HHCP-SVS OF CSW,EA 15 MIN: HCPCS

## 2023-10-25 ENCOUNTER — HOME CARE VISIT (OUTPATIENT)
Dept: HOME HEALTH SERVICES | Facility: HOME HEALTHCARE | Age: 83
End: 2023-10-25
Payer: MEDICARE

## 2023-10-25 PROCEDURE — G0156 HHCP-SVS OF AIDE,EA 15 MIN: HCPCS

## 2023-10-26 ENCOUNTER — HOME CARE VISIT (OUTPATIENT)
Dept: HOME HEALTH SERVICES | Facility: HOME HEALTHCARE | Age: 83
End: 2023-10-26
Payer: MEDICARE

## 2023-10-26 PROCEDURE — G0299 HHS/HOSPICE OF RN EA 15 MIN: HCPCS

## 2023-10-27 ENCOUNTER — HOME CARE VISIT (OUTPATIENT)
Dept: HOME HEALTH SERVICES | Facility: HOME HEALTHCARE | Age: 83
End: 2023-10-27
Payer: MEDICARE

## 2023-10-27 PROCEDURE — G0156 HHCP-SVS OF AIDE,EA 15 MIN: HCPCS

## 2023-10-31 ENCOUNTER — HOME CARE VISIT (OUTPATIENT)
Dept: HOME HEALTH SERVICES | Facility: HOME HEALTHCARE | Age: 83
End: 2023-10-31
Payer: MEDICARE

## 2023-10-31 PROCEDURE — G0156 HHCP-SVS OF AIDE,EA 15 MIN: HCPCS

## 2023-10-31 PROCEDURE — G0300 HHS/HOSPICE OF LPN EA 15 MIN: HCPCS

## 2023-11-01 ENCOUNTER — HOME CARE VISIT (OUTPATIENT)
Dept: HOME HOSPICE | Facility: HOSPICE | Age: 83
End: 2023-11-01
Payer: MEDICARE

## 2023-11-02 ENCOUNTER — HOME CARE VISIT (OUTPATIENT)
Dept: HOME HEALTH SERVICES | Facility: HOME HEALTHCARE | Age: 83
End: 2023-11-02
Payer: MEDICARE

## 2023-11-02 PROCEDURE — G0299 HHS/HOSPICE OF RN EA 15 MIN: HCPCS

## 2023-11-02 PROCEDURE — G0156 HHCP-SVS OF AIDE,EA 15 MIN: HCPCS

## 2023-11-03 ENCOUNTER — HOME CARE VISIT (OUTPATIENT)
Dept: HOME HEALTH SERVICES | Facility: HOME HEALTHCARE | Age: 83
End: 2023-11-03
Payer: MEDICARE

## 2023-11-03 PROCEDURE — G0156 HHCP-SVS OF AIDE,EA 15 MIN: HCPCS

## 2023-11-07 ENCOUNTER — HOME CARE VISIT (OUTPATIENT)
Dept: HOME HEALTH SERVICES | Facility: HOME HEALTHCARE | Age: 83
End: 2023-11-07
Payer: MEDICARE

## 2023-11-07 PROCEDURE — G0156 HHCP-SVS OF AIDE,EA 15 MIN: HCPCS

## 2023-11-07 PROCEDURE — G0299 HHS/HOSPICE OF RN EA 15 MIN: HCPCS

## 2023-11-08 ENCOUNTER — HOME CARE VISIT (OUTPATIENT)
Dept: HOME HEALTH SERVICES | Facility: HOME HEALTHCARE | Age: 83
End: 2023-11-08
Payer: MEDICARE

## 2023-11-08 PROCEDURE — G0156 HHCP-SVS OF AIDE,EA 15 MIN: HCPCS

## 2023-11-09 ENCOUNTER — NURSING HOME VISIT (OUTPATIENT)
Dept: OTHER | Facility: HOSPITAL | Age: 83
End: 2023-11-09

## 2023-11-09 ENCOUNTER — HOME CARE VISIT (OUTPATIENT)
Dept: HOME HOSPICE | Facility: HOSPICE | Age: 83
End: 2023-11-09
Payer: MEDICARE

## 2023-11-09 DIAGNOSIS — J96.01 ACUTE RESPIRATORY FAILURE WITH HYPOXIA (HCC): ICD-10-CM

## 2023-11-09 DIAGNOSIS — Z51.5 HOSPICE CARE: Primary | ICD-10-CM

## 2023-11-09 DIAGNOSIS — E11.69 TYPE 2 DIABETES MELLITUS WITH OTHER SPECIFIED COMPLICATION, WITHOUT LONG-TERM CURRENT USE OF INSULIN (HCC): ICD-10-CM

## 2023-11-09 DIAGNOSIS — Z51.5 HOSPICE CARE: ICD-10-CM

## 2023-11-09 DIAGNOSIS — R26.2 AMBULATORY DYSFUNCTION: ICD-10-CM

## 2023-11-09 RX ORDER — MORPHINE SULFATE 100 MG/5ML
5 SOLUTION, CONCENTRATE ORAL
Qty: 30 ML | Refills: 0 | Status: SHIPPED | OUTPATIENT
Start: 2023-11-09

## 2023-11-09 RX ORDER — LORAZEPAM 0.5 MG/1
0.5 TABLET ORAL EVERY 6 HOURS PRN
Qty: 30 TABLET | Refills: 0 | Status: SHIPPED | OUTPATIENT
Start: 2023-11-09

## 2023-11-09 NOTE — PROGRESS NOTES
Nursing Home  address  POS: 13: Assisted Living Facility      NAME: Brandi Mack  AGE: 82 y.o. SEX: male [phone]    DATE OF ENCOUNTER: 11/9/2023    Assessment and Plan     Diagnoses and all orders for this visit:    Hospice care    Ambulatory dysfunction    Acute respiratory failure with hypoxia ([address])    Type 2 diabetes mellitus with other specified complication, without long-term current use of insulin ([address])        1. Late onset Alzheimer's dementia with behavioral disturbance ([address])    - Patient with incomprehensive speech  - redirection, reorientation  - max assistance with ADLs  - cont Olanzapine 5 mg po qd  - cont memantine 10 mg po bid     2. Moderate protein-calorie malnutrition ([address])  - comfort foods  - assistance with feeding     3. Ambulatory dysfunction  - Fall precautions in place  - uses walker     4. Debility  - on hospice care  - comfort measures         Chief Complaint     Routine follow-up. History of Present Illness     Brandi Mack, a 81 y/o male is a long term resident at [facility], seen and examined, he found sitting in the family room. As per staff, it is hard to redirect him, he needs max assistance with ADLs, he uses walker. He is on hospice care for worsening chronic conditions. Patient is with disorganized speech and fully disoriented on assessment. Fortunately, no acute concerns are reported per nursing staff.      The following portions of the patient's history were reviewed and updated as appropriate: allergies, current medications, past family history, past medical history, past social history, past surgical history and problem list.        The following portions of the patient's history were reviewed and updated as appropriate: allergies, current medications, past family history, past medical history, past social history, past surgical history and problem list.    Review of Systems     Review of Systems   Unable to perform ROS: Dementia       Active Problem List Patient Active Problem List   Diagnosis    Depression    Late onset Alzheimer's dementia with behavioral disturbance ([address])    Other hyperlipidemia    Unsteady gait    Insomnia    Low BP    Hypoxia    Slurred speech    Hyperglycemia    Acute respiratory failure with hypoxia (HCC)    Anemia    Thrombocytosis    Encephalopathy acute    Ambulatory dysfunction    Loculated pleural effusion    Constipation    Delirium    Type 2 diabetes mellitus ([address])    Hospice care    Hypernatremia    Moderate protein-calorie malnutrition (HCC)    Debility       Objective     Temp: 96.8 F HR: 88/min BP: 116/65 mmHg RR: 16/min SPO2: 97%    Physical Exam  Constitutional:       Appearance: Normal appearance. HENT:      Head: Normocephalic. Cardiovascular:      Rate and Rhythm: Normal rate and regular rhythm. Pulmonary:      Breath sounds: No wheezing, rhonchi or rales. Abdominal:      Palpations: Abdomen is soft. Musculoskeletal:      Right lower leg: No edema. Left lower leg: No edema. Neurological:      Mental Status: He is alert. He is disoriented. Psychiatric:         Mood and Affect: Mood normal.         Behavior: Behavior normal.       Pertinent Laboratory/Diagnostic Studies:  No updated lab work; patient is currently on hospice services. Current Medications   Medications reviewed and updated in facility chart.

## 2023-11-09 NOTE — TELEPHONE ENCOUNTER
11/9/2023 3:28 PM  [facility] facility patient requests refill of CII medication. Filled electronically via Epic [place] per [place] Law. Requested Prescriptions     Signed Prescriptions Disp Refills    LORazepam (Ativan) 0.5 mg tablet 30 tablet 0     Sig: Take 1 tablet (0.5 mg total) by mouth every 6 (six) hours as needed for anxiety (dyspnea / insomnia)     Authorizing Provider: Chucho Perkins    Morphine Sulfate, Concentrate, 20 mg/mL concentrated solution 30 mL 0     Sig: Take 0.25 mL (5 mg total) by mouth every 3 (three) hours as needed (pain / dyspnea) Max Daily Amount: 40 mg     Authorizing Provider: Emeterio Ling, [id number] [facility]  Hospice Answering Service: [phone]  You can find me on [employer]berto[employer]!

## 2023-11-10 ENCOUNTER — HOME CARE VISIT (OUTPATIENT)
Dept: HOME HEALTH SERVICES | Facility: HOME HEALTHCARE | Age: 83
End: 2023-11-10
Payer: MEDICARE

## 2023-11-10 ENCOUNTER — HOME CARE VISIT (OUTPATIENT)
Dept: HOME HOSPICE | Facility: HOSPICE | Age: 83
End: 2023-11-10
Payer: MEDICARE

## 2023-11-10 PROCEDURE — G0156 HHCP-SVS OF AIDE,EA 15 MIN: HCPCS

## 2023-11-10 PROCEDURE — G0155 HHCP-SVS OF CSW,EA 15 MIN: HCPCS

## 2023-11-13 ENCOUNTER — HOME CARE VISIT (OUTPATIENT)
Dept: HOME HOSPICE | Facility: HOSPICE | Age: 83
End: 2023-11-13
Payer: MEDICARE

## 2023-11-13 ENCOUNTER — HOME CARE VISIT (OUTPATIENT)
Dept: HOME HEALTH SERVICES | Facility: HOME HEALTHCARE | Age: 83
End: 2023-11-13
Payer: MEDICARE

## 2023-11-13 PROCEDURE — G0156 HHCP-SVS OF AIDE,EA 15 MIN: HCPCS

## 2023-11-14 ENCOUNTER — HOME CARE VISIT (OUTPATIENT)
Dept: HOME HEALTH SERVICES | Facility: HOME HEALTHCARE | Age: 83
End: 2023-11-14
Payer: MEDICARE

## 2023-11-14 PROCEDURE — G0299 HHS/HOSPICE OF RN EA 15 MIN: HCPCS

## 2023-11-14 PROCEDURE — G0156 HHCP-SVS OF AIDE,EA 15 MIN: HCPCS

## 2023-11-16 ENCOUNTER — HOME CARE VISIT (OUTPATIENT)
Dept: HOME HEALTH SERVICES | Facility: HOME HEALTHCARE | Age: 83
End: 2023-11-16
Payer: MEDICARE

## 2023-11-16 PROCEDURE — G0300 HHS/HOSPICE OF LPN EA 15 MIN: HCPCS

## 2023-11-17 ENCOUNTER — HOME CARE VISIT (OUTPATIENT)
Dept: HOME HEALTH SERVICES | Facility: HOME HEALTHCARE | Age: 83
End: 2023-11-17
Payer: MEDICARE

## 2023-11-17 PROCEDURE — G0156 HHCP-SVS OF AIDE,EA 15 MIN: HCPCS

## 2023-11-20 ENCOUNTER — HOME CARE VISIT (OUTPATIENT)
Dept: HOME HOSPICE | Facility: HOSPICE | Age: 83
End: 2023-11-20
Payer: MEDICARE

## 2023-11-20 ENCOUNTER — HOME CARE VISIT (OUTPATIENT)
Dept: HOME HEALTH SERVICES | Facility: HOME HEALTHCARE | Age: 83
End: 2023-11-20
Payer: MEDICARE

## 2023-11-20 PROCEDURE — G0156 HHCP-SVS OF AIDE,EA 15 MIN: HCPCS

## 2023-11-21 ENCOUNTER — HOME CARE VISIT (OUTPATIENT)
Dept: HOME HEALTH SERVICES | Facility: HOME HEALTHCARE | Age: 83
End: 2023-11-21
Payer: MEDICARE

## 2023-11-21 PROCEDURE — G0299 HHS/HOSPICE OF RN EA 15 MIN: HCPCS

## 2023-11-21 PROCEDURE — G0156 HHCP-SVS OF AIDE,EA 15 MIN: HCPCS

## 2023-11-23 ENCOUNTER — HOME CARE VISIT (OUTPATIENT)
Dept: HOME HOSPICE | Facility: HOSPICE | Age: 83
End: 2023-11-23
Payer: MEDICARE

## 2023-11-24 ENCOUNTER — HOME CARE VISIT (OUTPATIENT)
Dept: HOME HEALTH SERVICES | Facility: HOME HEALTHCARE | Age: 83
End: 2023-11-24
Payer: MEDICARE

## 2023-11-24 PROCEDURE — G0299 HHS/HOSPICE OF RN EA 15 MIN: HCPCS

## 2023-11-24 PROCEDURE — G0156 HHCP-SVS OF AIDE,EA 15 MIN: HCPCS

## 2023-11-26 ENCOUNTER — HOME CARE VISIT (OUTPATIENT)
Dept: HOME HEALTH SERVICES | Facility: HOME HEALTHCARE | Age: 83
End: 2023-11-26
Payer: MEDICARE

## 2023-11-26 PROCEDURE — G0156 HHCP-SVS OF AIDE,EA 15 MIN: HCPCS

## 2023-11-27 ENCOUNTER — HOME CARE VISIT (OUTPATIENT)
Dept: HOME HOSPICE | Facility: HOSPICE | Age: 83
End: 2023-11-27
Payer: MEDICARE

## 2023-11-28 ENCOUNTER — HOME CARE VISIT (OUTPATIENT)
Dept: HOME HEALTH SERVICES | Facility: HOME HEALTHCARE | Age: 83
End: 2023-11-28
Payer: MEDICARE

## 2023-11-28 PROCEDURE — G0299 HHS/HOSPICE OF RN EA 15 MIN: HCPCS

## 2023-11-28 PROCEDURE — G0156 HHCP-SVS OF AIDE,EA 15 MIN: HCPCS

## 2023-11-29 ENCOUNTER — VBI (OUTPATIENT)
Dept: ADMINISTRATIVE | Facility: OTHER | Age: 83
End: 2023-11-29

## 2023-11-30 ENCOUNTER — HOME CARE VISIT (OUTPATIENT)
Dept: HOME HOSPICE | Facility: HOSPICE | Age: 83
End: 2023-11-30
Payer: MEDICARE

## 2023-12-01 ENCOUNTER — HOME CARE VISIT (OUTPATIENT)
Dept: HOME HEALTH SERVICES | Facility: HOME HEALTHCARE | Age: 83
End: 2023-12-01
Payer: MEDICARE

## 2023-12-01 PROCEDURE — G0156 HHCP-SVS OF AIDE,EA 15 MIN: HCPCS

## 2023-12-04 ENCOUNTER — HOME CARE VISIT (OUTPATIENT)
Dept: HOME HEALTH SERVICES | Facility: HOME HEALTHCARE | Age: 83
End: 2023-12-04
Payer: MEDICARE

## 2023-12-04 PROCEDURE — G0156 HHCP-SVS OF AIDE,EA 15 MIN: HCPCS

## 2023-12-05 ENCOUNTER — HOME CARE VISIT (OUTPATIENT)
Dept: HOME HEALTH SERVICES | Facility: HOME HEALTHCARE | Age: 83
End: 2023-12-05
Payer: MEDICARE

## 2023-12-05 ENCOUNTER — HOME CARE VISIT (OUTPATIENT)
Dept: HOME HOSPICE | Facility: HOSPICE | Age: 83
End: 2023-12-05
Payer: MEDICARE

## 2023-12-05 PROCEDURE — G0155 HHCP-SVS OF CSW,EA 15 MIN: HCPCS

## 2023-12-05 PROCEDURE — G0299 HHS/HOSPICE OF RN EA 15 MIN: HCPCS

## 2023-12-05 PROCEDURE — G0156 HHCP-SVS OF AIDE,EA 15 MIN: HCPCS

## 2023-12-06 ENCOUNTER — HOME CARE VISIT (OUTPATIENT)
Dept: HOME HEALTH SERVICES | Facility: HOME HEALTHCARE | Age: 83
End: 2023-12-06
Payer: MEDICARE

## 2023-12-06 PROCEDURE — G0156 HHCP-SVS OF AIDE,EA 15 MIN: HCPCS

## 2023-12-12 ENCOUNTER — HOME CARE VISIT (OUTPATIENT)
Dept: HOME HEALTH SERVICES | Facility: HOME HEALTHCARE | Age: 83
End: 2023-12-12
Payer: MEDICARE

## 2023-12-12 PROCEDURE — G0299 HHS/HOSPICE OF RN EA 15 MIN: HCPCS

## 2023-12-12 PROCEDURE — G0156 HHCP-SVS OF AIDE,EA 15 MIN: HCPCS

## 2023-12-14 ENCOUNTER — HOME CARE VISIT (OUTPATIENT)
Dept: HOME HEALTH SERVICES | Facility: HOME HEALTHCARE | Age: 83
End: 2023-12-14
Payer: MEDICARE

## 2023-12-14 PROCEDURE — G0156 HHCP-SVS OF AIDE,EA 15 MIN: HCPCS

## 2023-12-16 ENCOUNTER — HOME CARE VISIT (OUTPATIENT)
Dept: HOME HEALTH SERVICES | Facility: HOME HEALTHCARE | Age: 83
End: 2023-12-16
Payer: MEDICARE

## 2023-12-16 PROCEDURE — G0156 HHCP-SVS OF AIDE,EA 15 MIN: HCPCS

## 2023-12-18 ENCOUNTER — HOME CARE VISIT (OUTPATIENT)
Dept: HOME HOSPICE | Facility: HOSPICE | Age: 83
End: 2023-12-18
Payer: MEDICARE

## 2023-12-18 PROCEDURE — G0155 HHCP-SVS OF CSW,EA 15 MIN: HCPCS

## 2023-12-20 ENCOUNTER — HOME CARE VISIT (OUTPATIENT)
Dept: HOME HEALTH SERVICES | Facility: HOME HEALTHCARE | Age: 83
End: 2023-12-20
Payer: MEDICARE

## 2023-12-20 PROCEDURE — G0299 HHS/HOSPICE OF RN EA 15 MIN: HCPCS

## 2023-12-20 PROCEDURE — G0156 HHCP-SVS OF AIDE,EA 15 MIN: HCPCS

## 2023-12-22 ENCOUNTER — HOME CARE VISIT (OUTPATIENT)
Dept: HOME HEALTH SERVICES | Facility: HOME HEALTHCARE | Age: 83
End: 2023-12-22
Payer: MEDICARE

## 2023-12-22 PROCEDURE — G0156 HHCP-SVS OF AIDE,EA 15 MIN: HCPCS

## 2023-12-24 VITALS — DIASTOLIC BLOOD PRESSURE: 70 MMHG | SYSTOLIC BLOOD PRESSURE: 118 MMHG | RESPIRATION RATE: 18 BRPM | HEART RATE: 68 BPM

## 2023-12-26 ENCOUNTER — HOME CARE VISIT (OUTPATIENT)
Dept: HOME HOSPICE | Facility: HOSPICE | Age: 83
End: 2023-12-26
Payer: MEDICARE

## 2023-12-26 ENCOUNTER — HOME CARE VISIT (OUTPATIENT)
Dept: HOME HEALTH SERVICES | Facility: HOME HEALTHCARE | Age: 83
End: 2023-12-26
Payer: MEDICARE

## 2023-12-26 PROCEDURE — G0156 HHCP-SVS OF AIDE,EA 15 MIN: HCPCS

## 2023-12-27 ENCOUNTER — HOME CARE VISIT (OUTPATIENT)
Dept: HOME HEALTH SERVICES | Facility: HOME HEALTHCARE | Age: 83
End: 2023-12-27
Payer: MEDICARE

## 2023-12-27 PROCEDURE — G0299 HHS/HOSPICE OF RN EA 15 MIN: HCPCS

## 2023-12-27 PROCEDURE — G0156 HHCP-SVS OF AIDE,EA 15 MIN: HCPCS

## 2024-01-01 ENCOUNTER — HOME CARE VISIT (OUTPATIENT)
Dept: HOME HEALTH SERVICES | Facility: HOME HEALTHCARE | Age: 84
End: 2024-01-01
Payer: MEDICARE

## 2024-01-01 PROCEDURE — G0156 HHCP-SVS OF AIDE,EA 15 MIN: HCPCS

## 2024-01-04 ENCOUNTER — HOME CARE VISIT (OUTPATIENT)
Dept: HOME HEALTH SERVICES | Facility: HOME HEALTHCARE | Age: 84
End: 2024-01-04
Payer: MEDICARE

## 2024-01-04 ENCOUNTER — NURSING HOME VISIT (OUTPATIENT)
Dept: GERIATRICS | Facility: OTHER | Age: 84
End: 2024-01-04
Payer: COMMERCIAL

## 2024-01-04 DIAGNOSIS — R26.2 AMBULATORY DYSFUNCTION: ICD-10-CM

## 2024-01-04 DIAGNOSIS — F02.818 LATE ONSET ALZHEIMER'S DEMENTIA WITH BEHAVIORAL DISTURBANCE (HCC): Primary | ICD-10-CM

## 2024-01-04 DIAGNOSIS — Z51.5 HOSPICE CARE: ICD-10-CM

## 2024-01-04 DIAGNOSIS — G30.1 LATE ONSET ALZHEIMER'S DEMENTIA WITH BEHAVIORAL DISTURBANCE (HCC): Primary | ICD-10-CM

## 2024-01-04 DIAGNOSIS — R53.81 DEBILITY: ICD-10-CM

## 2024-01-04 DIAGNOSIS — E44.0 MODERATE PROTEIN-CALORIE MALNUTRITION (HCC): ICD-10-CM

## 2024-01-04 PROCEDURE — G0299 HHS/HOSPICE OF RN EA 15 MIN: HCPCS

## 2024-01-04 PROCEDURE — 99349 HOME/RES VST EST MOD MDM 40: CPT | Performed by: FAMILY MEDICINE

## 2024-01-04 PROCEDURE — G0156 HHCP-SVS OF AIDE,EA 15 MIN: HCPCS

## 2024-01-04 NOTE — PROGRESS NOTES
[facility]  [address] [address]  [phone]  [address]  [address]      NAME: Mathieu Carlisle  AGE: 83 y.o. SEX: male [id number]    DATE OF ENCOUNTER: 1/4/2024    Assessment and Plan     1. Late onset Alzheimer's dementia with behavioral disturbance (HCC)  - Patient with incomprehensive speech  - redirection, reorientation  - max assistance with ADLs  - cont Olanzapine 5 mg po qd  - cont memantine 10 mg po bid    2. Ambulatory dysfunction  - comfort foods  - assistance with feeding    3. Moderate protein-calorie malnutrition (HCC)  - Fall precautions in place  - uses walker    4. Debility  - on hospice care  - comfort measures    5. Hospice care  - As above.    1. Late onset Alzheimer's dementia with behavioral disturbance (HCC)     - Patient with incomprehensive speech  - redirection, reorientation  - max assistance with ADLs  - cont Olanzapine 5 mg po qd  - cont memantine 10 mg po bid     2. Moderate protein-calorie malnutrition (HCC)  - comfort foods  - assistance with feeding     3. Ambulatory dysfunction  - Fall precautions in place  - uses walker     4. Debility  - on hospice care  - comfort measures      Chief Complaint     Routine Long term follow-up visit.    History of Present Illness     Mr. Carlisle was encountered today in his room.  The resident is resting at the time of initiating the encounter.  Resident is arousable yet somnolent, however voices no issues or concerns at this time.  No acute events reported by ancillary staff.    The following portions of the patient's history were reviewed and updated as appropriate: allergies, current medications, past family history, past medical history, past social history, past surgical history and problem list.    Review of Systems     Review of Systems   Respiratory:  Negative for shortness of breath.    Cardiovascular:  Negative for chest pain.   Gastrointestinal:  Negative for abdominal pain.   Neurological:  Negative for headaches.    Psychiatric/Behavioral:  Negative for agitation and behavioral problems.      Active Problem List     Patient Active Problem List   Diagnosis    Depression    Late onset Alzheimer's dementia with behavioral disturbance (HCC)    Other hyperlipidemia    Unsteady gait    Insomnia    Low BP    Hypoxia    Slurred speech    Hyperglycemia    Acute respiratory failure with hypoxia (HCC)    Anemia    Thrombocytosis    Encephalopathy acute    Ambulatory dysfunction    Loculated pleural effusion    Constipation    Delirium    Type 2 diabetes mellitus (HCC)    Hospice care    Hypernatremia    Moderate protein-calorie malnutrition (HCC)    Debility       Objective     Vitals: T: 111/61 HR: 80 SpO2: 98% on 2 L/min RR: 20 T: 97.2 weight: 113.6    Physical Exam  Constitutional:       General: He is sleeping.      Appearance: He is underweight.   HENT:      Head: Normocephalic and atraumatic.   Eyes:      Conjunctiva/sclera: Conjunctivae normal.   Cardiovascular:      Rate and Rhythm: Normal rate and regular rhythm.   Pulmonary:      Effort: Pulmonary effort is normal.      Breath sounds: Normal breath sounds.   Abdominal:      Palpations: Abdomen is soft.   Musculoskeletal:      Right lower leg: No edema.      Left lower leg: No edema.   Skin:     General: Skin is warm and dry.   Neurological:      Mental Status: He is easily aroused. Mental status is at baseline.   Psychiatric:         Behavior: Behavior normal.     Pertinent Laboratory/Diagnostic Studies:  Refer to facility chart.    Current Medications   Medications reviewed and updated in facility chart.

## 2024-01-05 ENCOUNTER — HOME CARE VISIT (OUTPATIENT)
Dept: HOME HEALTH SERVICES | Facility: HOME HEALTHCARE | Age: 84
End: 2024-01-05
Payer: MEDICARE

## 2024-01-05 PROCEDURE — G0156 HHCP-SVS OF AIDE,EA 15 MIN: HCPCS

## 2024-01-08 ENCOUNTER — HOME CARE VISIT (OUTPATIENT)
Dept: HOME HEALTH SERVICES | Facility: HOME HEALTHCARE | Age: 84
End: 2024-01-08
Payer: MEDICARE

## 2024-01-08 VITALS
SYSTOLIC BLOOD PRESSURE: 122 MMHG | DIASTOLIC BLOOD PRESSURE: 88 MMHG | TEMPERATURE: 97.5 F | HEART RATE: 72 BPM | RESPIRATION RATE: 16 BRPM

## 2024-01-08 PROCEDURE — G0299 HHS/HOSPICE OF RN EA 15 MIN: HCPCS

## 2024-01-09 ENCOUNTER — HOME CARE VISIT (OUTPATIENT)
Dept: HOME HOSPICE | Facility: HOSPICE | Age: 84
End: 2024-01-09
Payer: MEDICARE

## 2024-01-09 ENCOUNTER — HOME CARE VISIT (OUTPATIENT)
Dept: HOME HEALTH SERVICES | Facility: HOME HEALTHCARE | Age: 84
End: 2024-01-09
Payer: MEDICARE

## 2024-01-09 PROCEDURE — G0155 HHCP-SVS OF CSW,EA 15 MIN: HCPCS

## 2024-01-09 PROCEDURE — G0156 HHCP-SVS OF AIDE,EA 15 MIN: HCPCS

## 2024-01-10 ENCOUNTER — HOME CARE VISIT (OUTPATIENT)
Dept: HOME HEALTH SERVICES | Facility: HOME HEALTHCARE | Age: 84
End: 2024-01-10
Payer: MEDICARE

## 2024-01-10 PROCEDURE — G0156 HHCP-SVS OF AIDE,EA 15 MIN: HCPCS

## 2024-01-11 ENCOUNTER — HOME CARE VISIT (OUTPATIENT)
Dept: HOME HEALTH SERVICES | Facility: HOME HEALTHCARE | Age: 84
End: 2024-01-11
Payer: MEDICARE

## 2024-01-11 PROCEDURE — G0299 HHS/HOSPICE OF RN EA 15 MIN: HCPCS

## 2024-01-11 PROCEDURE — G0156 HHCP-SVS OF AIDE,EA 15 MIN: HCPCS

## 2024-01-15 ENCOUNTER — HOME CARE VISIT (OUTPATIENT)
Dept: HOME HEALTH SERVICES | Facility: HOME HEALTHCARE | Age: 84
End: 2024-01-15
Payer: MEDICARE

## 2024-01-15 VITALS
TEMPERATURE: 97.1 F | DIASTOLIC BLOOD PRESSURE: 76 MMHG | SYSTOLIC BLOOD PRESSURE: 122 MMHG | HEART RATE: 84 BPM | RESPIRATION RATE: 16 BRPM

## 2024-01-15 PROCEDURE — G0156 HHCP-SVS OF AIDE,EA 15 MIN: HCPCS

## 2024-01-15 PROCEDURE — G0299 HHS/HOSPICE OF RN EA 15 MIN: HCPCS

## 2024-01-16 ENCOUNTER — HOME CARE VISIT (OUTPATIENT)
Dept: HOME HEALTH SERVICES | Facility: HOME HEALTHCARE | Age: 84
End: 2024-01-16
Payer: MEDICARE

## 2024-01-16 PROCEDURE — G0156 HHCP-SVS OF AIDE,EA 15 MIN: HCPCS

## 2024-01-18 ENCOUNTER — HOME CARE VISIT (OUTPATIENT)
Dept: HOME HEALTH SERVICES | Facility: HOME HEALTHCARE | Age: 84
End: 2024-01-18
Payer: MEDICARE

## 2024-01-18 PROCEDURE — G0156 HHCP-SVS OF AIDE,EA 15 MIN: HCPCS

## 2024-01-23 ENCOUNTER — HOME CARE VISIT (OUTPATIENT)
Dept: HOME HEALTH SERVICES | Facility: HOME HEALTHCARE | Age: 84
End: 2024-01-23
Payer: MEDICARE

## 2024-01-23 PROCEDURE — G0156 HHCP-SVS OF AIDE,EA 15 MIN: HCPCS

## 2024-01-23 PROCEDURE — G0299 HHS/HOSPICE OF RN EA 15 MIN: HCPCS

## 2024-01-25 ENCOUNTER — HOME CARE VISIT (OUTPATIENT)
Dept: HOME HEALTH SERVICES | Facility: HOME HEALTHCARE | Age: 84
End: 2024-01-25
Payer: MEDICARE

## 2024-01-25 PROCEDURE — G0156 HHCP-SVS OF AIDE,EA 15 MIN: HCPCS

## 2024-01-25 PROCEDURE — G0299 HHS/HOSPICE OF RN EA 15 MIN: HCPCS

## 2024-01-26 ENCOUNTER — HOME CARE VISIT (OUTPATIENT)
Dept: HOME HEALTH SERVICES | Facility: HOME HEALTHCARE | Age: 84
End: 2024-01-26
Payer: MEDICARE

## 2024-01-26 PROCEDURE — G0156 HHCP-SVS OF AIDE,EA 15 MIN: HCPCS

## 2024-01-30 ENCOUNTER — HOME CARE VISIT (OUTPATIENT)
Dept: HOME HEALTH SERVICES | Facility: HOME HEALTHCARE | Age: 84
End: 2024-01-30
Payer: MEDICARE

## 2024-01-30 ENCOUNTER — HOME CARE VISIT (OUTPATIENT)
Dept: HOME HOSPICE | Facility: HOSPICE | Age: 84
End: 2024-01-30
Payer: MEDICARE

## 2024-01-30 VITALS — WEIGHT: 113.38 LBS | BODY MASS INDEX: 16.74 KG/M2

## 2024-01-30 PROCEDURE — G0156 HHCP-SVS OF AIDE,EA 15 MIN: HCPCS

## 2024-01-30 PROCEDURE — G0299 HHS/HOSPICE OF RN EA 15 MIN: HCPCS

## 2024-02-01 ENCOUNTER — HOME CARE VISIT (OUTPATIENT)
Dept: HOME HOSPICE | Facility: HOSPICE | Age: 84
End: 2024-02-01
Payer: MEDICARE

## 2024-02-01 ENCOUNTER — HOME CARE VISIT (OUTPATIENT)
Dept: HOME HEALTH SERVICES | Facility: HOME HEALTHCARE | Age: 84
End: 2024-02-01
Payer: MEDICARE

## 2024-02-01 PROCEDURE — G0156 HHCP-SVS OF AIDE,EA 15 MIN: HCPCS

## 2024-02-02 ENCOUNTER — HOME CARE VISIT (OUTPATIENT)
Dept: HOME HOSPICE | Facility: HOSPICE | Age: 84
End: 2024-02-02
Payer: MEDICARE

## 2024-02-02 ENCOUNTER — HOME CARE VISIT (OUTPATIENT)
Dept: HOME HEALTH SERVICES | Facility: HOME HEALTHCARE | Age: 84
End: 2024-02-02
Payer: MEDICARE

## 2024-02-02 DIAGNOSIS — Z51.5 HOSPICE CARE: Primary | ICD-10-CM

## 2024-02-02 DIAGNOSIS — J96.01 ACUTE RESPIRATORY FAILURE WITH HYPOXIA (HCC): ICD-10-CM

## 2024-02-02 PROCEDURE — G0156 HHCP-SVS OF AIDE,EA 15 MIN: HCPCS

## 2024-02-02 RX ORDER — OXYCODONE HYDROCHLORIDE 5 MG/1
5 TABLET ORAL EVERY 4 HOURS PRN
Qty: 30 TABLET | Refills: 0 | Status: SHIPPED | OUTPATIENT
Start: 2024-02-02

## 2024-02-02 NOTE — TELEPHONE ENCOUNTER
2/2/2024 12:23 PM  [facility] facility patient requests {HH Refill:refill of CII medication.  Filled electronically via Epic [place] per [place] Law.    Requested Prescriptions     Signed Prescriptions Disp Refills    oxyCODONE (ROXICODONE) 5 immediate release tablet 30 tablet 0     Sig: Take 1 tablet (5 mg total) by mouth every 4 (four) hours as needed (pain / dyspnea) Medication may be crushed and administered in applesauce, dissolve in a drop of water and give under the tongue or dilute in a few drops of water and administer via oral syringe Max Daily Amount: 30 mg     Authorizing Provider: TIFF MOSES CRNP  [facility]  Hospice Answering Service: [phone]  You can find me on TigRufusonnect!       [facility]

## 2024-02-06 ENCOUNTER — HOME CARE VISIT (OUTPATIENT)
Dept: HOME HEALTH SERVICES | Facility: HOME HEALTHCARE | Age: 84
End: 2024-02-06
Payer: MEDICARE

## 2024-02-06 PROCEDURE — G0299 HHS/HOSPICE OF RN EA 15 MIN: HCPCS

## 2024-02-06 PROCEDURE — G0156 HHCP-SVS OF AIDE,EA 15 MIN: HCPCS

## 2024-02-07 ENCOUNTER — HOME CARE VISIT (OUTPATIENT)
Dept: HOME HEALTH SERVICES | Facility: HOME HEALTHCARE | Age: 84
End: 2024-02-07
Payer: MEDICARE

## 2024-02-07 PROCEDURE — G0156 HHCP-SVS OF AIDE,EA 15 MIN: HCPCS

## 2024-02-08 ENCOUNTER — HOME CARE VISIT (OUTPATIENT)
Dept: HOME HEALTH SERVICES | Facility: HOME HEALTHCARE | Age: 84
End: 2024-02-08
Payer: MEDICARE

## 2024-02-08 PROCEDURE — G0156 HHCP-SVS OF AIDE,EA 15 MIN: HCPCS

## 2024-02-12 ENCOUNTER — HOME CARE VISIT (OUTPATIENT)
Dept: HOME HEALTH SERVICES | Facility: HOME HEALTHCARE | Age: 84
End: 2024-02-12
Payer: MEDICARE

## 2024-02-12 PROCEDURE — G0156 HHCP-SVS OF AIDE,EA 15 MIN: HCPCS

## 2024-02-13 ENCOUNTER — HOME CARE VISIT (OUTPATIENT)
Dept: HOME HOSPICE | Facility: HOSPICE | Age: 84
End: 2024-02-13
Payer: MEDICARE

## 2024-02-13 ENCOUNTER — HOME CARE VISIT (OUTPATIENT)
Dept: HOME HEALTH SERVICES | Facility: HOME HEALTHCARE | Age: 84
End: 2024-02-13
Payer: MEDICARE

## 2024-02-13 PROCEDURE — G0156 HHCP-SVS OF AIDE,EA 15 MIN: HCPCS

## 2024-02-13 NOTE — CASE COMMUNICATION
Nurse Toya called from [place] to  informed Mathieu had an unwitnessed fall , found laying on his back on the floor. He has a red diana on his back, complained initially of back pain. Nurse Toay giving Tylenol first, then if it does not help she will give Oycodone. She informed she will call back if no meds help him. He also has a skin tear on his top  mindline of his  head, not bleeding and patient is alert and oriented , denies any con fusion. Toya does not feel patient needs a visit, but will call back if she has any other concerns.

## 2024-02-14 ENCOUNTER — HOME CARE VISIT (OUTPATIENT)
Dept: HOME HOSPICE | Facility: HOSPICE | Age: 84
End: 2024-02-14
Payer: MEDICARE

## 2024-02-15 ENCOUNTER — HOME CARE VISIT (OUTPATIENT)
Dept: HOME HEALTH SERVICES | Facility: HOME HEALTHCARE | Age: 84
End: 2024-02-15
Payer: MEDICARE

## 2024-02-15 PROCEDURE — G0299 HHS/HOSPICE OF RN EA 15 MIN: HCPCS

## 2024-02-16 ENCOUNTER — HOME CARE VISIT (OUTPATIENT)
Dept: HOME HEALTH SERVICES | Facility: HOME HEALTHCARE | Age: 84
End: 2024-02-16
Payer: MEDICARE

## 2024-02-16 DIAGNOSIS — Z51.5 HOSPICE CARE: ICD-10-CM

## 2024-02-16 DIAGNOSIS — J96.01 ACUTE RESPIRATORY FAILURE WITH HYPOXIA (HCC): ICD-10-CM

## 2024-02-16 PROCEDURE — G0156 HHCP-SVS OF AIDE,EA 15 MIN: HCPCS

## 2024-02-16 RX ORDER — OXYCODONE HYDROCHLORIDE 5 MG/1
5 TABLET ORAL EVERY 4 HOURS PRN
Qty: 30 TABLET | Refills: 0 | Status: SHIPPED | OUTPATIENT
Start: 2024-02-16

## 2024-02-16 NOTE — TELEPHONE ENCOUNTER
2/16/2024 9:10 AM  [facility] facility patient requests {HH Refill:refill of CII medication.  Filled electronically via Epic [place] per [place] Law.    Requested Prescriptions     Signed Prescriptions Disp Refills    oxyCODONE (ROXICODONE) 5 immediate release tablet 30 tablet 0     Sig: Take 1 tablet (5 mg total) by mouth every 4 (four) hours as needed (pain / dyspnea) Medication may be crushed and administered in applesauce, dissolve in a drop of water and give under the tongue or dilute in a few drops of water and administer via oral syringe Max Daily Amount: 30 mg     Authorizing Provider: TIFF MOSES CRNP  [facility]  Hospice Answering Service: [phone]  You can find me on TigRufusonnect!

## 2024-02-17 ENCOUNTER — HOME CARE VISIT (OUTPATIENT)
Dept: HOME HEALTH SERVICES | Facility: HOME HEALTHCARE | Age: 84
End: 2024-02-17
Payer: MEDICARE

## 2024-02-17 PROCEDURE — G0156 HHCP-SVS OF AIDE,EA 15 MIN: HCPCS

## 2024-02-18 ENCOUNTER — HOME CARE VISIT (OUTPATIENT)
Dept: HOME HEALTH SERVICES | Facility: HOME HEALTHCARE | Age: 84
End: 2024-02-18
Payer: MEDICARE

## 2024-02-18 PROCEDURE — G0156 HHCP-SVS OF AIDE,EA 15 MIN: HCPCS

## 2024-02-20 ENCOUNTER — HOME CARE VISIT (OUTPATIENT)
Dept: HOME HEALTH SERVICES | Facility: HOME HEALTHCARE | Age: 84
End: 2024-02-20
Payer: MEDICARE

## 2024-02-20 ENCOUNTER — HOME CARE VISIT (OUTPATIENT)
Dept: HOME HOSPICE | Facility: HOSPICE | Age: 84
End: 2024-02-20
Payer: MEDICARE

## 2024-02-20 PROCEDURE — G0156 HHCP-SVS OF AIDE,EA 15 MIN: HCPCS

## 2024-02-20 PROCEDURE — G0299 HHS/HOSPICE OF RN EA 15 MIN: HCPCS

## 2024-02-22 ENCOUNTER — HOME CARE VISIT (OUTPATIENT)
Dept: HOME HEALTH SERVICES | Facility: HOME HEALTHCARE | Age: 84
End: 2024-02-22
Payer: MEDICARE

## 2024-02-22 PROCEDURE — G0156 HHCP-SVS OF AIDE,EA 15 MIN: HCPCS

## 2024-02-26 ENCOUNTER — HOME CARE VISIT (OUTPATIENT)
Dept: HOME HEALTH SERVICES | Facility: HOME HEALTHCARE | Age: 84
End: 2024-02-26
Payer: MEDICARE

## 2024-02-26 PROCEDURE — G0156 HHCP-SVS OF AIDE,EA 15 MIN: HCPCS

## 2024-02-27 ENCOUNTER — HOME CARE VISIT (OUTPATIENT)
Dept: HOME HEALTH SERVICES | Facility: HOME HEALTHCARE | Age: 84
End: 2024-02-27
Payer: MEDICARE

## 2024-02-27 PROCEDURE — G0156 HHCP-SVS OF AIDE,EA 15 MIN: HCPCS

## 2024-02-29 ENCOUNTER — HOME CARE VISIT (OUTPATIENT)
Dept: HOME HEALTH SERVICES | Facility: HOME HEALTHCARE | Age: 84
End: 2024-02-29
Payer: MEDICARE

## 2024-02-29 PROCEDURE — G0156 HHCP-SVS OF AIDE,EA 15 MIN: HCPCS

## 2024-02-29 PROCEDURE — G0299 HHS/HOSPICE OF RN EA 15 MIN: HCPCS

## 2024-03-01 ENCOUNTER — NURSING HOME VISIT (OUTPATIENT)
Dept: GERIATRICS | Facility: OTHER | Age: 84
End: 2024-03-01

## 2024-03-01 DIAGNOSIS — R26.2 AMBULATORY DYSFUNCTION: ICD-10-CM

## 2024-03-01 DIAGNOSIS — F02.818 LATE ONSET ALZHEIMER'S DEMENTIA WITH BEHAVIORAL DISTURBANCE (HCC): Primary | ICD-10-CM

## 2024-03-01 DIAGNOSIS — R53.81 DEBILITY: ICD-10-CM

## 2024-03-01 DIAGNOSIS — G30.1 LATE ONSET ALZHEIMER'S DEMENTIA WITH BEHAVIORAL DISTURBANCE (HCC): Primary | ICD-10-CM

## 2024-03-01 DIAGNOSIS — Z51.5 HOSPICE CARE: ICD-10-CM

## 2024-03-01 NOTE — PROGRESS NOTES
[facility]  [address] [address] [phone]  [address]  [address]      NAME: Mathieu Carlisle  AGE: 83 y.o. SEX: male [id number]    DATE OF ENCOUNTER: 3/1/2024    Assessment and Plan     1. Late onset Alzheimer's dementia with behavioral disturbance (HCC)   declining  - redirection, reorientation  - max assistance with ADLs  - d/tara Donepezil and Haloperidol  - cont Olanzapine 5 mg po qd  - cont memantine 10 mg po bi    2. Hospice care   on hospice care  - comfort measures    3. Ambulatory dysfunction  Had a fall on 2/28.  - Maintain fall precautions.  - Firmly encourage use of walker with ambulation.    4. Debility   on hospice care  - comfort measures      Chief Complaint     Routine Long term follow-up visit.    History of Present Illness     Mr. Carlisle was interviewed today in his room. No acute issues reported. Behavior, sleep-wake cycle and diet are at baseline.    The following portions of the patient's history were reviewed and updated as appropriate: allergies, current medications, past family history, past medical history, past social history, past surgical history and problem list.    Review of Systems     Review of Systems   Unable to perform ROS: Dementia     Active Problem List     Patient Active Problem List   Diagnosis   • Depression   • Late onset Alzheimer's dementia with behavioral disturbance (HCC)   • Other hyperlipidemia   • Unsteady gait   • Insomnia   • Low BP   • Hypoxia   • Slurred speech   • Hyperglycemia   • Acute respiratory failure with hypoxia (HCC)   • Anemia   • Thrombocytosis   • Encephalopathy acute   • Ambulatory dysfunction   • Loculated pleural effusion   • Constipation   • Delirium   • Type 2 diabetes mellitus (HCC)   • Hospice care   • Hypernatremia   • Moderate protein-calorie malnutrition (HCC)   • Debility       Objective     Vitals: T: 97.3 BP: 127/75 HR: 63 RR: 18    Physical Exam  Constitutional:       Appearance: He is ill-appearing.   HENT:       Head: Normocephalic and atraumatic.   Cardiovascular:      Rate and Rhythm: Normal rate and regular rhythm.   Pulmonary:      Effort: Pulmonary effort is normal.      Breath sounds: Normal breath sounds.   Abdominal:      Palpations: Abdomen is soft.   Musculoskeletal:      Right lower leg: No edema.      Left lower leg: No edema.   Skin:     General: Skin is warm and dry.   Neurological:      Mental Status: He is alert. He is disoriented.   Psychiatric:         Mood and Affect: Mood normal.         Behavior: Behavior normal.     Pertinent Laboratory/Diagnostic Studies:  Refer to facility chart.    Current Medications   Medications reviewed and updated in facility chart.

## 2024-03-01 NOTE — PROGRESS NOTES
[facility]  [address] [address] [phone]  [place]  [address]      NAME: Mathieu Carlisle  AGE: 83 y.o. SEX: male [id number]    DATE OF ENCOUNTER: 3/01/2024    Assessment and Plan     1. Acute respiratory failure with hypoxia (HCC)  ***    2. Late onset Alzheimer's dementia with behavioral disturbance (HCC)  ***    3. Hospice care  ***      {[id number]}    Chief Complaint     Routine Long term follow-up visit.    History of Present Illness     HPI    The following portions of the patient's history were reviewed and updated as appropriate: allergies, current medications, past family history, past medical history, past social history, past surgical history and problem list.    Review of Systems     Review of Systems    Active Problem List     Patient Active Problem List   Diagnosis    Depression    Late onset Alzheimer's dementia with behavioral disturbance (HCC)    Other hyperlipidemia    Unsteady gait    Insomnia    Low BP    Hypoxia    Slurred speech    Hyperglycemia    Acute respiratory failure with hypoxia (HCC)    Anemia    Thrombocytosis    Encephalopathy acute    Ambulatory dysfunction    Loculated pleural effusion    Constipation    Delirium    Type 2 diabetes mellitus (HCC)    Hospice care    Hypernatremia    Moderate protein-calorie malnutrition (HCC)    Debility       Objective     Vitals:    Physical Exam    Pertinent Laboratory/Diagnostic Studies:  Refer to facility chart.    Current Medications   Medications reviewed and updated in facility chart.

## 2024-03-04 ENCOUNTER — HOME CARE VISIT (OUTPATIENT)
Dept: HOME HEALTH SERVICES | Facility: HOME HEALTHCARE | Age: 84
End: 2024-03-04
Payer: MEDICARE

## 2024-03-04 PROCEDURE — G0156 HHCP-SVS OF AIDE,EA 15 MIN: HCPCS

## 2024-03-05 ENCOUNTER — HOME CARE VISIT (OUTPATIENT)
Dept: HOME HEALTH SERVICES | Facility: HOME HEALTHCARE | Age: 84
End: 2024-03-05
Payer: MEDICARE

## 2024-03-05 PROCEDURE — G0156 HHCP-SVS OF AIDE,EA 15 MIN: HCPCS

## 2024-03-07 ENCOUNTER — HOME CARE VISIT (OUTPATIENT)
Dept: HOME HEALTH SERVICES | Facility: HOME HEALTHCARE | Age: 84
End: 2024-03-07
Payer: MEDICARE

## 2024-03-07 PROCEDURE — G0299 HHS/HOSPICE OF RN EA 15 MIN: HCPCS

## 2024-03-07 PROCEDURE — G0156 HHCP-SVS OF AIDE,EA 15 MIN: HCPCS

## 2024-03-10 ENCOUNTER — HOME CARE VISIT (OUTPATIENT)
Dept: HOME HEALTH SERVICES | Facility: HOME HEALTHCARE | Age: 84
End: 2024-03-10
Payer: MEDICARE

## 2024-03-10 PROCEDURE — G0156 HHCP-SVS OF AIDE,EA 15 MIN: HCPCS

## 2024-03-11 ENCOUNTER — HOME CARE VISIT (OUTPATIENT)
Dept: HOME HOSPICE | Facility: HOSPICE | Age: 84
End: 2024-03-11
Payer: MEDICARE

## 2024-03-12 ENCOUNTER — HOME CARE VISIT (OUTPATIENT)
Dept: HOME HOSPICE | Facility: HOSPICE | Age: 84
End: 2024-03-12
Payer: MEDICARE

## 2024-03-12 ENCOUNTER — HOME CARE VISIT (OUTPATIENT)
Dept: HOME HEALTH SERVICES | Facility: HOME HEALTHCARE | Age: 84
End: 2024-03-12
Payer: MEDICARE

## 2024-03-12 PROCEDURE — G0156 HHCP-SVS OF AIDE,EA 15 MIN: HCPCS

## 2024-03-12 PROCEDURE — G0299 HHS/HOSPICE OF RN EA 15 MIN: HCPCS

## 2024-03-13 ENCOUNTER — HOME CARE VISIT (OUTPATIENT)
Dept: HOME HEALTH SERVICES | Facility: HOME HEALTHCARE | Age: 84
End: 2024-03-13
Payer: MEDICARE

## 2024-03-13 PROCEDURE — G0156 HHCP-SVS OF AIDE,EA 15 MIN: HCPCS

## 2024-03-19 ENCOUNTER — HOME CARE VISIT (OUTPATIENT)
Dept: HOME HEALTH SERVICES | Facility: HOME HEALTHCARE | Age: 84
End: 2024-03-19
Payer: MEDICARE

## 2024-03-19 PROCEDURE — G0156 HHCP-SVS OF AIDE,EA 15 MIN: HCPCS

## 2024-03-20 ENCOUNTER — HOME CARE VISIT (OUTPATIENT)
Dept: HOME HEALTH SERVICES | Facility: HOME HEALTHCARE | Age: 84
End: 2024-03-20
Payer: MEDICARE

## 2024-03-20 PROCEDURE — G0156 HHCP-SVS OF AIDE,EA 15 MIN: HCPCS

## 2024-03-21 ENCOUNTER — HOME CARE VISIT (OUTPATIENT)
Dept: HOME HEALTH SERVICES | Facility: HOME HEALTHCARE | Age: 84
End: 2024-03-21
Payer: MEDICARE

## 2024-03-21 ENCOUNTER — HOME CARE VISIT (OUTPATIENT)
Dept: HOME HOSPICE | Facility: HOSPICE | Age: 84
End: 2024-03-21
Payer: MEDICARE

## 2024-03-21 PROCEDURE — G0156 HHCP-SVS OF AIDE,EA 15 MIN: HCPCS

## 2024-03-21 PROCEDURE — G0299 HHS/HOSPICE OF RN EA 15 MIN: HCPCS

## 2024-03-25 ENCOUNTER — HOME CARE VISIT (OUTPATIENT)
Dept: HOME HEALTH SERVICES | Facility: HOME HEALTHCARE | Age: 84
End: 2024-03-25
Payer: MEDICARE

## 2024-03-25 PROCEDURE — G0156 HHCP-SVS OF AIDE,EA 15 MIN: HCPCS

## 2024-03-26 ENCOUNTER — HOME CARE VISIT (OUTPATIENT)
Dept: HOME HEALTH SERVICES | Facility: HOME HEALTHCARE | Age: 84
End: 2024-03-26
Payer: MEDICARE

## 2024-03-26 PROCEDURE — G0156 HHCP-SVS OF AIDE,EA 15 MIN: HCPCS

## 2024-03-26 PROCEDURE — G0299 HHS/HOSPICE OF RN EA 15 MIN: HCPCS

## 2024-03-28 ENCOUNTER — HOME CARE VISIT (OUTPATIENT)
Dept: HOME HEALTH SERVICES | Facility: HOME HEALTHCARE | Age: 84
End: 2024-03-28
Payer: MEDICARE

## 2024-03-28 PROCEDURE — G0299 HHS/HOSPICE OF RN EA 15 MIN: HCPCS

## 2024-04-25 ENCOUNTER — NURSING HOME VISIT (OUTPATIENT)
Dept: GERIATRICS | Facility: OTHER | Age: 84
End: 2024-04-25
Payer: COMMERCIAL

## 2024-04-25 DIAGNOSIS — R26.2 AMBULATORY DYSFUNCTION: ICD-10-CM

## 2024-04-25 DIAGNOSIS — F02.818 LATE ONSET ALZHEIMER'S DEMENTIA WITH BEHAVIORAL DISTURBANCE (HCC): Primary | ICD-10-CM

## 2024-04-25 DIAGNOSIS — G30.1 LATE ONSET ALZHEIMER'S DEMENTIA WITH BEHAVIORAL DISTURBANCE (HCC): Primary | ICD-10-CM

## 2024-04-25 PROCEDURE — 99349 HOME/RES VST EST MOD MDM 40: CPT | Performed by: FAMILY MEDICINE

## 2024-04-25 NOTE — PROGRESS NOTES
[facility]  [address] [address] [phone]  [place] [address]  [address]      NAME: Mathieu Carlisle  AGE: 83 y.o. SEX: male [phone]    DATE OF ENCOUNTER: 4/25/2024    Assessment and Plan     1. Late onset Alzheimer's dementia with behavioral disturbance (HCC)   - redirection, reorientation  - max assistance with ADLs  - d/tara Donepezil and Haloperidol  - cont Olanzapine 5 mg po qd  - cont memantine 10 mg po b    2. Ambulatory dysfunction  - No recent falls reported.  - Maintain fall precautions.    Patient was recently on hospice services, which has since been discontinued; will obtain updated CBC and CMP.    - Plan of care discussed with nursing staff    Chief Complaint     Routine Long term follow-up visit.    History of Present Illness     Mr. Carlisle is an 83-year-old gentleman with a history of advanced dementia, and who was recently taken of of hospice services due to tapering decline. Patient is non-verbal upon interaction. Remains with eyes closed and not engaged in the encounter. Fortunately, there have been no acute issues reported by nursing staff.      The following portions of the patient's history were reviewed and updated as appropriate: allergies, current medications, past family history, past medical history, past social history, past surgical history and problem list.    Review of Systems     Review of Systems   Unable to perform ROS: Dementia       Active Problem List     Patient Active Problem List   Diagnosis    Depression    Late onset Alzheimer's dementia with behavioral disturbance (HCC)    Other hyperlipidemia    Unsteady gait    Insomnia    Low BP    Hypoxia    Slurred speech    Hyperglycemia    Acute respiratory failure with hypoxia (HCC)    Anemia    Thrombocytosis    Encephalopathy acute    Ambulatory dysfunction    Loculated pleural effusion    Constipation    Delirium    Type 2 diabetes mellitus (HCC)    Hospice care    Hypernatremia    Moderate protein-calorie  malnutrition (HCC)    Debility       Objective     Vitals: T 97.4 F; HR 82 rpm; /76 mmHg; RR 18 rpm; SPO2 92% room air    Physical Exam  Constitutional:       Comments: Appears stated age   HENT:      Head: Normocephalic and atraumatic.      Mouth/Throat:      Mouth: Mucous membranes are moist.   Eyes:      Conjunctiva/sclera: Conjunctivae normal.   Cardiovascular:      Rate and Rhythm: Normal rate and regular rhythm.   Pulmonary:      Breath sounds: Normal breath sounds.   Abdominal:      Palpations: Abdomen is soft.   Musculoskeletal:      Right lower leg: No edema.      Left lower leg: No edema.   Neurological:      Comments: Somnolent but responsive to noxious stimuli       Pertinent Laboratory/Diagnostic Studies:  Refer to facility chart.    Current Medications   Medications reviewed and updated in facility chart.

## 2024-05-01 ENCOUNTER — APPOINTMENT (EMERGENCY)
Dept: CT IMAGING | Facility: HOSPITAL | Age: 84
End: 2024-05-01
Payer: COMMERCIAL

## 2024-05-01 ENCOUNTER — HOSPITAL ENCOUNTER (EMERGENCY)
Facility: HOSPITAL | Age: 84
Discharge: HOME | End: 2024-05-01
Attending: EMERGENCY MEDICINE
Payer: COMMERCIAL

## 2024-05-01 VITALS
HEART RATE: 81 BPM | OXYGEN SATURATION: 96 % | SYSTOLIC BLOOD PRESSURE: 149 MMHG | DIASTOLIC BLOOD PRESSURE: 75 MMHG | TEMPERATURE: 97.8 F | RESPIRATION RATE: 16 BRPM

## 2024-05-01 DIAGNOSIS — S01.21XA LACERATION OF NOSE, INITIAL ENCOUNTER: ICD-10-CM
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
## 2024-05-01 DIAGNOSIS — S01.81XA FACIAL LACERATION, INITIAL ENCOUNTER: ICD-10-CM

## 2024-05-01 DIAGNOSIS — S02.2XXA NASAL FRACTURE: ICD-10-CM

## 2024-05-01 DIAGNOSIS — S09.90XA INJURY OF HEAD, INITIAL ENCOUNTER: ICD-10-CM

## 2024-05-01 PROCEDURE — 99284 EMERGENCY DEPT VISIT MOD MDM: CPT | Performed by: EMERGENCY MEDICINE

## 2024-05-01 PROCEDURE — 99284 EMERGENCY DEPT VISIT MOD MDM: CPT

## 2024-05-01 PROCEDURE — 90715 TDAP VACCINE 7 YRS/> IM: CPT | Performed by: EMERGENCY MEDICINE

## 2024-05-01 PROCEDURE — 90471 IMMUNIZATION ADMIN: CPT

## 2024-05-01 PROCEDURE — 12011 RPR F/E/E/N/L/M 2.5 CM/<: CPT | Performed by: EMERGENCY MEDICINE

## 2024-05-01 PROCEDURE — 70450 CT HEAD/BRAIN W/O DYE: CPT

## 2024-05-01 PROCEDURE — 70486 CT MAXILLOFACIAL W/O DYE: CPT

## 2024-05-01 PROCEDURE — 72125 CT NECK SPINE W/O DYE: CPT

## 2024-05-01 RX ORDER — ACETAMINOPHEN 325 MG/1
650 TABLET ORAL ONCE
Status: DISCONTINUED | OUTPATIENT
Start: 2024-05-01 | End: 2024-05-01 | Stop reason: HOSPADM

## 2024-05-01 RX ADMIN — TETANUS TOXOID, REDUCED DIPHTHERIA TOXOID AND ACELLULAR PERTUSSIS VACCINE, ADSORBED 0.5 ML: 5; 2.5; 8; 8; 2.5 SUSPENSION INTRAMUSCULAR at 18:42

## 2024-05-01 NOTE — ED PROCEDURE NOTE
"PROCEDURE  Universal Protocol:  Consent: Verbal consent obtained.  Risks and benefits discussed: pt with dementia.  Consent given by: pt with dementia- no capacity.  Time out: Immediately prior to procedure a \"time out\" was called to verify the correct patient, procedure, equipment, support staff and site/side marked as required.  Timeout called at: 5/1/2024 4:49 PM.  Laceration repair    Date/Time: 5/1/2024 4:49 PM    Performed by: Lev Jimenez MD  Authorized by: Lev Jimenez MD  Body area: head/neck (1)  Location details: forehead  Laceration length: 1 cm  Contaminated: none.  Foreign body present: none.  Tendon involvement: none  Nerve involvement: none  Vascular damage: no    Sedation:  Patient sedated: no      Wound Dehiscence:  Superficial Wound Dehiscence: simple closure      Procedure Details:  Irrigation solution: tap water  Irrigation method: tap  Amount of cleaning: standard  Debridement: none  Degree of undermining: none  Skin closure: glue  Approximation: close  Approximation difficulty: simple  Patient tolerance: patient tolerated the procedure well with no immediate complications  Comments: Pt has 2 .5 cm  cm linear lacerations -  1st above medial aspect of eyebrow- linear - superficial -- 2nd- . , 5 cm . cm puncture type laceration - both cleaned with tape water and glued - with band aid applied  Foreign body: none.         Lev Jimenez MD  05/01/24 8399    "

## 2024-05-01 NOTE — ED PROVIDER NOTES
History  Chief Complaint   Patient presents with    Fall     Arrives via EMS from [facility]. Had a witnessed fall during activity. +HS. -LOC. -Thinners. -ASA. GCS 14. Dementia at baseline.     Mr. Carlisle is 83 year old male with PMH of  dementia and hyperlipidemia who presented from [facility] for a fall. Patient is poor historian due to severe dementia. Per PAIGE Greenfield at [facility], patient entered the activity center and fell forward. When they checked his oxygen, his SPO2 was 80. Per Jean Pierre, patient did not have any recent URI, no change in appetite or behavior. He was discharged from hospice and uses 2 L oxygen at baseline. He uses a walker to ambulate and is incontinent at baseline. Patient is not on  blood thinner.         Prior to Admission Medications   Prescriptions Last Dose Informant Patient Reported? Taking?   LORazepam (Ativan) 0.5 mg tablet   No Yes   Sig: Take 1 tablet (0.5 mg total) by mouth every 6 (six) hours as needed for anxiety (dyspnea / insomnia)   Morphine Sulfate, Concentrate, 20 mg/mL concentrated solution   No Yes   Sig: Take 0.25 mL (5 mg total) by mouth every 3 (three) hours as needed (pain / dyspnea) Max Daily Amount: 40 mg   Mucus Relief 600 MG 12 hr tablet   No Yes   Sig: Take 2 tablets (1,200 mg total) by mouth every 12 (twelve) hours   acetaminophen (TYLENOL) 325 mg tablet   Yes Yes   Sig: Take 650 mg by mouth every 6 (six) hours as needed   oxyCODONE (ROXICODONE) 5 immediate release tablet   No Yes   Sig: Take 1 tablet (5 mg total) by mouth every 4 (four) hours as needed (pain / dyspnea) Medication may be crushed and administered in applesauce, dissolve in a drop of water and give under the tongue or dilute in a few drops of water and administer via oral syringe Max Daily Amount: 30 mg   oxygen gas   Yes No   Sig: Inhale 3 L/min continuous as needed (dyspnea). Inhale 3 L/min via nasal cannula continuously as needed for dyspnea.  Indications: Difficulty Breathing   senna (Senna-Tabs)  8.6 MG tablet   Yes Yes   Sig: Take 1 tablet by mouth daily. Take 1 tab by mouth daily.  Indications: Constipation      Facility-Administered Medications: None       Past Medical History:   Diagnosis Date    Late onset Alzheimer's dementia with behavioral disturbance (HCC)     Other hyperlipidemia        Past Surgical History:   Procedure Laterality Date    IR CHEST TUBE PLACEMENT  8/4/2023       History reviewed. No pertinent family history.  I have reviewed and agree with the history as documented.    E-Cigarette/Vaping     E-Cigarette/Vaping Substances    Nicotine No     Flavoring No      Social History     Tobacco Use    Smoking status: Never    Smokeless tobacco: Never   Substance Use Topics    Alcohol use: Not Currently    Drug use: Not Currently        Review of Systems   Reason unable to perform ROS: Severe dementia.       Physical Exam  ED Triage Vitals   Temperature Pulse Respirations Blood Pressure SpO2   05/01/24 1620 05/01/24 1620 05/01/24 1620 05/01/24 1620 05/01/24 1620   97.8 °F (36.6 °C) 81 16 115/64 100 %      Temp Source Heart Rate Source Patient Position - Orthostatic VS BP Location FiO2 (%)   05/01/24 1620 05/01/24 1620 05/01/24 1620 05/01/24 1620 --   Oral Monitor Sitting Right arm       Pain Score       05/01/24 1842       No Pain             Orthostatic Vital Signs  Vitals:    05/01/24 1620 05/01/24 1845   BP: 115/64 149/75   Pulse: 81 81   Patient Position - Orthostatic VS: Sitting Sitting       Physical Exam  HENT:      Head:      Comments: Laceration on left forehead and left nasal side wall     Mouth/Throat:      Mouth: Mucous membranes are dry.   Cardiovascular:      Rate and Rhythm: Normal rate and regular rhythm.      Pulses: Normal pulses.      Heart sounds: Normal heart sounds.   Pulmonary:      Effort: Pulmonary effort is normal.      Breath sounds: Normal breath sounds.   Abdominal:      General: Abdomen is flat. Bowel sounds are normal.      Palpations: Abdomen is soft.   Skin:      General: Skin is warm and dry.      Comments: 1 CM Skin tear on left hand     Neurological:      Mental Status: He is alert. He is disoriented.         ED Medications  Medications   tetanus-diphtheria-acellular pertussis (BOOSTRIX) IM injection 0.5 mL (0.5 mL Intramuscular Given 5/1/24 1842)       Diagnostic Studies  Results Reviewed       None                   CT head without contrast   Final Result by Richar Zavaleta MD (05/01 1933)   Bilateral nasal bone fractures with mild rightward deviation.   No acute intracranial abnormality.  Chronic microangiopathic changes.                  Workstation performed: [id number]         CT facial bones without contrast   Final Result by Richar Zavaleta MD (05/01 1934)      Bilateral comminuted nasal bone fractures with mild rightward deviation.               Workstation performed: [id number]         CT cervical spine without contrast   Final Result by Richar Zavaleta MD (05/01 1935)      No cervical spine fracture or traumatic malalignment.                  Workstation performed: [id number]               Procedures  Procedures      ED Course                                       Medical Decision Making  Mr. Carlisle is 83 year old male with PMH of  dementia and hyperlipidemia who presented from [facility] for a fall. Patient is poor historian due to severe dementia. Patient is not on any blood thinner but had laceration on left nasal and left forehead. Patient is moving all extremities without sign of pain. CT head- Bilateral nasal bone fractures with mild rightward deviation. CT facial bone without contrast- Bilateral comminuted nasal bone fractures with mild rightward deviation.Discharge.     Amount and/or Complexity of Data Reviewed  Radiology: ordered.    Risk  OTC drugs.  Prescription drug management.          Disposition  Final diagnoses:   Fall, initial encounter   Injury of head, initial encounter   Nasal fracture   Laceration of nose, initial encounter   Facial laceration,  initial encounter     Time reflects when diagnosis was documented in both MDM as applicable and the Disposition within this note       Time User Action Codes Description Comment    5/1/2024  7:47 PM Lev Jimenez [W19.XXXA] Fall, initial encounter     5/1/2024  7:47 PM Lev Jimenez Add [S09.90XA] Injury of head, initial encounter     5/1/2024  7:47 PM AzaleanateLev [S02.2XXA] Nasal fracture     5/1/2024  7:48 PM AzaleanateLev Add [S01.21XA] Laceration of nose, initial encounter     5/1/2024  7:48 PM Lev Jimenez Add [S01.81XA] Facial laceration, initial encounter           ED Disposition       ED Disposition   Discharge    Condition   Stable    Date/Time   Wed May 1, 2024  7:46 PM    Comment   Mathieu Carlisle discharge to home/self care.                   Follow-up Information       Follow up With Specialties Details Why Contact Info Additional Information    [facility] Emergency Department Emergency Medicine Go to  If symptoms worsen [facility] [facility] [address]  [phone] [facility] Emergency Department, [facility] [place], [place], [address]    Sean Salinas MD Family Medicine, Geriatric Medicine Schedule an appointment as soon as possible for a visit  As needed, If symptoms worsen [address] [address]  [phone]               Discharge Medication List as of 5/1/2024  8:00 PM        CONTINUE these medications which have NOT CHANGED    Details   acetaminophen (TYLENOL) 325 mg tablet Take 650 mg by mouth every 6 (six) hours as needed, Historical Med      LORazepam (Ativan) 0.5 mg tablet Take 1 tablet (0.5 mg total) by mouth every 6 (six) hours as needed for anxiety (dyspnea / insomnia), Starting Thu 11/9/2023, Normal      Morphine Sulfate, Concentrate, 20 mg/mL concentrated solution Take 0.25 mL (5 mg total) by mouth every 3 (three) hours as needed (pain /  dyspnea) Max Daily Amount: 40 mg, Starting Thu 11/9/2023, Normal      Mucus Relief 600 MG 12 hr tablet Take 2 tablets (1,200 mg total) by mouth every 12 (twelve) hours, Starting Wed 10/18/2023, Normal      oxyCODONE (ROXICODONE) 5 immediate release tablet Take 1 tablet (5 mg total) by mouth every 4 (four) hours as needed (pain / dyspnea) Medication may be crushed and administered in applesauce, dissolve in a drop of water and give under the tongue or dilute in a few drops of water and administer via oral  syringe Max Daily Amount: 30 mg, Starting Fri 2/16/2024, Normal      senna (Senna-Tabs) 8.6 MG tablet Take 1 tablet by mouth daily. Take 1 tab by mouth daily.  Indications: Constipation, Starting Sat 8/19/2023, Historical Med      oxygen gas Inhale 3 L/min continuous as needed (dyspnea). Inhale 3 L/min via nasal cannula continuously as needed for dyspnea.  Indications: Difficulty Breathing, Starting Sat 8/12/2023, Historical Med           No discharge procedures on file.    PDMP Review         Value Time User    PDMP Reviewed  Yes 2/16/2024  9:09 AM ALEXANDRA Vail             ED Provider  Attending physically available and evaluated Mathieu Carlisle. I managed the patient along with the ED Attending.    Electronically Signed by           Yenni Iqbal MD  05/01/24 6216

## 2024-05-02 NOTE — ED NOTES
[facility] will arrive at approx 2100 to transport patient back to [facility].      Dorita Mittal RN  05/01/24 2026

## 2024-05-02 NOTE — ED ATTENDING ATTESTATION
5/1/2024  ILev MD, saw and evaluated the patient. I have discussed the patient with the resident/non-physician practitioner and agree with the resident's/non-physician practitioner's findings, Plan of Care, and MDM as documented in the resident's/non-physician practitioner's note, except where noted. All available labs and Radiology studies were reviewed.  I was present for key portions of any procedure(s) performed by the resident/non-physician practitioner and I was immediately available to provide assistance.       At this point I agree with the current assessment done in the Emergency Department.  I have conducted an independent evaluation of this patient a history and physical is as follows:see h and p above     ED Course  ED Course as of 05/02/24 0139   Wed May 01, 2024   1652 Er md note- pt seen and thoroughly evaluated by er md- case dw/ resident- who contacted [facility] care facility -- 83 yr male dementia-- fell from standing position while walking onto face-  --- no pmt c/t/l/s spine- shania rom at c spine- .5 cm superficial laceration medial aspect of left eyebrow - and left mid nose-- rrr s1/s2-mild decreased bs-b/ soft nt/nd- moving bue/ble equally  no focal weakness- - pt  minimally verbal- mumbbling-- - left ulnar wrist superficial skin avulsion - - tm /oropharynx clear- no septal hematomas bilaterally    1909 -er md note- pt- re-eval- resting in bed with no comps          Critical Care Time  Procedures

## 2024-05-28 ENCOUNTER — HOME CARE VISIT (OUTPATIENT)
Dept: HOME HEALTH SERVICES | Facility: HOME HEALTHCARE | Age: 84
End: 2024-05-28

## 2024-06-16 ENCOUNTER — TELEPHONE (OUTPATIENT)
Dept: OTHER | Facility: OTHER | Age: 84
End: 2024-06-16

## 2024-06-16 NOTE — TELEPHONE ENCOUNTER
Angelika called, requesting a callback from on call provider, regarding patient's recent fall. Provider paged via [facility]

## 2024-06-21 ENCOUNTER — TELEPHONE (OUTPATIENT)
Dept: OTHER | Facility: OTHER | Age: 84
End: 2024-06-21

## 2024-06-21 NOTE — TELEPHONE ENCOUNTER
Keyanna from [facility] called to inform the on call that the pt had a decrease in oxygen with a fever.     Contacted the on call provider via Secure chat.

## 2024-12-22 NOTE — ASSESSMENT & PLAN NOTE
Patient likely has hypoactive delirium secondary to sepsis  Infectious disease consult appreciated and we will continue antibiotics and source control  We will do nonpharmacological management at this point of time with frequent reorientation and reestablishment of the sleep-wake cycle Assistance with ambulation/Assistance OOB with selected safe patient handling equipment/Communicate Risk of Fall with Harm to all staff/Discuss with provider need for PT consult/Monitor gait and stability/Provide patient with walking aids - walker, cane, crutches/Reinforce activity limits and safety measures with patient and family/Sit up slowly, dangle for a short time, stand at bedside before walking/Tailored Fall Risk Interventions/Visual Cue: Yellow wristband and red socks/Bed in lowest position, wheels locked, appropriate side rails in place/Call bell, personal items and telephone in reach/Instruct patient to call for assistance before getting out of bed or chair/Non-slip footwear when patient is out of bed/[place] to call system/Physically safe environment - no spills, clutter or unnecessary equipment/Purposeful Proactive Rounding/Room/bathroom lighting operational, light cord in reach